# Patient Record
Sex: MALE | Race: WHITE | NOT HISPANIC OR LATINO | Employment: OTHER | URBAN - METROPOLITAN AREA
[De-identification: names, ages, dates, MRNs, and addresses within clinical notes are randomized per-mention and may not be internally consistent; named-entity substitution may affect disease eponyms.]

---

## 2017-05-09 ENCOUNTER — TELEPHONE (OUTPATIENT)
Dept: OPTOMETRY | Facility: CLINIC | Age: 77
End: 2017-05-09

## 2017-05-22 ENCOUNTER — OFFICE VISIT (OUTPATIENT)
Dept: OPTOMETRY | Facility: CLINIC | Age: 77
End: 2017-05-22
Payer: MEDICARE

## 2017-05-22 DIAGNOSIS — H40.053 BORDERLINE GLAUCOMA WITH OCULAR HYPERTENSION, BILATERAL: Primary | ICD-10-CM

## 2017-05-22 DIAGNOSIS — H52.4 HYPEROPIA WITH PRESBYOPIA, BILATERAL: ICD-10-CM

## 2017-05-22 DIAGNOSIS — H25.13 NUCLEAR SCLEROSIS, BILATERAL: ICD-10-CM

## 2017-05-22 DIAGNOSIS — E11.9 DIABETES MELLITUS TYPE 2 WITHOUT RETINOPATHY: ICD-10-CM

## 2017-05-22 DIAGNOSIS — H52.03 HYPEROPIA WITH PRESBYOPIA, BILATERAL: ICD-10-CM

## 2017-05-22 PROCEDURE — 99499 UNLISTED E&M SERVICE: CPT | Mod: S$GLB,,, | Performed by: OPTOMETRIST

## 2017-05-22 PROCEDURE — 99999 PR PBB SHADOW E&M-EST. PATIENT-LVL II: CPT | Mod: PBBFAC,,, | Performed by: OPTOMETRIST

## 2017-05-22 PROCEDURE — 92133 CPTRZD OPH DX IMG PST SGM ON: CPT | Mod: S$GLB,,, | Performed by: OPTOMETRIST

## 2017-05-22 PROCEDURE — 92014 COMPRE OPH EXAM EST PT 1/>: CPT | Mod: S$GLB,,, | Performed by: OPTOMETRIST

## 2017-05-22 PROCEDURE — 92015 DETERMINE REFRACTIVE STATE: CPT | Mod: S$GLB,,, | Performed by: OPTOMETRIST

## 2017-05-22 RX ORDER — LATANOPROST 50 UG/ML
SOLUTION/ DROPS OPHTHALMIC
COMMUNITY
Start: 2017-05-09 | End: 2019-12-31 | Stop reason: SDUPTHER

## 2017-05-22 NOTE — PROGRESS NOTES
HPI      is here for annual Glaucoma and Diabetic Eye exam. Pt states   no vision or ocular changes  Eyemeds: Latanoprost  Blood Sugar- Pt Doesn't Check Regularly   Hemoglobin A1C       Date                     Value               Ref Range             Status                11/19/2015               6.0                 4.5 - 6.2 %           Final                 06/22/2015               10.6 (H)            4.5 - 6.2 %           Final                 06/18/2014               6.6 (H)             4.5 - 6.2 %           Final            ----------  (+)Flashes (+)Floaters  (-)Itch, (+)tear, (+)burn, (+)Dryness. (+) OTC Drops Systane PRN   (-)Photophobia  (-)Glare (-)diplopia (-) headaches      Requests refraction    Last edited by David Sheldon, OD on 5/22/2017  2:15 PM. (History)            Assessment /Plan     For exam results, see Encounter Report.    Borderline glaucoma with ocular hypertension, bilateral  -     OCT - Optic Nerve; Future  -No GLC defects noted OD, OS  -Good control with Latanoprost QHS    Diabetes mellitus type 2 without retinopathy  -No retinopathy noted today.  Continued control with primary care physician and annual comprehensive eye exam.    Nuclear sclerosis, bilateral  -Educated patient on presence of cataracts at today's exam, monitor at annual dilated fundus exam. 5+ years surgical estimate.    Hyperopia with presbyopia, bilateral  Eyeglass Final Rx     Eyeglass Final Rx       Sphere Cylinder Dist VA Add    Right +4.00 Sphere 20/30- +2.50    Left +4.00 Sphere 20/30- +2.50    Type:  PAL                    RTC 1 yr

## 2017-05-26 ENCOUNTER — PATIENT OUTREACH (OUTPATIENT)
Dept: ADMINISTRATIVE | Facility: HOSPITAL | Age: 77
End: 2017-05-26

## 2017-06-06 ENCOUNTER — PATIENT OUTREACH (OUTPATIENT)
Dept: ADMINISTRATIVE | Facility: HOSPITAL | Age: 77
End: 2017-06-06

## 2017-06-06 DIAGNOSIS — E11.9 TYPE 2 DIABETES MELLITUS WITHOUT COMPLICATION, WITHOUT LONG-TERM CURRENT USE OF INSULIN: Primary | ICD-10-CM

## 2017-06-12 ENCOUNTER — LAB VISIT (OUTPATIENT)
Dept: LAB | Facility: HOSPITAL | Age: 77
End: 2017-06-12
Attending: INTERNAL MEDICINE
Payer: MEDICARE

## 2017-06-12 DIAGNOSIS — E11.9 TYPE 2 DIABETES MELLITUS WITHOUT COMPLICATION, WITHOUT LONG-TERM CURRENT USE OF INSULIN: ICD-10-CM

## 2017-06-12 DIAGNOSIS — Z00.00 ANNUAL PHYSICAL EXAM: ICD-10-CM

## 2017-06-12 DIAGNOSIS — E78.5 HYPERLIPIDEMIA: ICD-10-CM

## 2017-06-12 DIAGNOSIS — E11.9 TYPE 2 DIABETES MELLITUS WITHOUT COMPLICATION: ICD-10-CM

## 2017-06-12 DIAGNOSIS — I10 ESSENTIAL HYPERTENSION: ICD-10-CM

## 2017-06-12 LAB
ALBUMIN SERPL BCP-MCNC: 3.5 G/DL
ALP SERPL-CCNC: 111 U/L
ALT SERPL W/O P-5'-P-CCNC: 18 U/L
ANION GAP SERPL CALC-SCNC: 10 MMOL/L
AST SERPL-CCNC: 15 U/L
BASOPHILS # BLD AUTO: 0.06 K/UL
BASOPHILS NFR BLD: 0.6 %
BILIRUB SERPL-MCNC: 0.7 MG/DL
BUN SERPL-MCNC: 18 MG/DL
CALCIUM SERPL-MCNC: 9.1 MG/DL
CHLORIDE SERPL-SCNC: 103 MMOL/L
CHOLEST/HDLC SERPL: 3.5 {RATIO}
CO2 SERPL-SCNC: 25 MMOL/L
COMPLEXED PSA SERPL-MCNC: 0.47 NG/ML
CREAT SERPL-MCNC: 1 MG/DL
DIFFERENTIAL METHOD: ABNORMAL
EOSINOPHIL # BLD AUTO: 0.5 K/UL
EOSINOPHIL NFR BLD: 5.5 %
ERYTHROCYTE [DISTWIDTH] IN BLOOD BY AUTOMATED COUNT: 14.8 %
EST. GFR  (AFRICAN AMERICAN): >60 ML/MIN/1.73 M^2
EST. GFR  (NON AFRICAN AMERICAN): >60 ML/MIN/1.73 M^2
ESTIMATED AVG GLUCOSE: 134 MG/DL
GLUCOSE SERPL-MCNC: 142 MG/DL
HBA1C MFR BLD HPLC: 6.3 %
HCT VFR BLD AUTO: 47 %
HDL/CHOLESTEROL RATIO: 28.2 %
HDLC SERPL-MCNC: 149 MG/DL
HDLC SERPL-MCNC: 42 MG/DL
HGB BLD-MCNC: 15.7 G/DL
LDLC SERPL CALC-MCNC: 75.8 MG/DL
LYMPHOCYTES # BLD AUTO: 3 K/UL
LYMPHOCYTES NFR BLD: 30.6 %
MCH RBC QN AUTO: 30.3 PG
MCHC RBC AUTO-ENTMCNC: 33.4 %
MCV RBC AUTO: 91 FL
MONOCYTES # BLD AUTO: 0.9 K/UL
MONOCYTES NFR BLD: 9 %
NEUTROPHILS # BLD AUTO: 5.3 K/UL
NEUTROPHILS NFR BLD: 54.3 %
NONHDLC SERPL-MCNC: 107 MG/DL
PLATELET # BLD AUTO: 239 K/UL
PMV BLD AUTO: 8.9 FL
POTASSIUM SERPL-SCNC: 3.7 MMOL/L
PROT SERPL-MCNC: 7.5 G/DL
RBC # BLD AUTO: 5.18 M/UL
SODIUM SERPL-SCNC: 138 MMOL/L
TRIGL SERPL-MCNC: 156 MG/DL
TSH SERPL DL<=0.005 MIU/L-ACNC: 2.2 UIU/ML
WBC # BLD AUTO: 9.78 K/UL

## 2017-06-12 PROCEDURE — 80061 LIPID PANEL: CPT

## 2017-06-12 PROCEDURE — 80053 COMPREHEN METABOLIC PANEL: CPT

## 2017-06-12 PROCEDURE — 84153 ASSAY OF PSA TOTAL: CPT

## 2017-06-12 PROCEDURE — 83036 HEMOGLOBIN GLYCOSYLATED A1C: CPT

## 2017-06-12 PROCEDURE — 84443 ASSAY THYROID STIM HORMONE: CPT

## 2017-06-12 PROCEDURE — 36415 COLL VENOUS BLD VENIPUNCTURE: CPT | Mod: PO

## 2017-06-12 PROCEDURE — 85025 COMPLETE CBC W/AUTO DIFF WBC: CPT | Mod: PO

## 2017-06-19 ENCOUNTER — OFFICE VISIT (OUTPATIENT)
Dept: INTERNAL MEDICINE | Facility: CLINIC | Age: 77
End: 2017-06-19
Payer: MEDICARE

## 2017-06-19 VITALS
HEIGHT: 73 IN | DIASTOLIC BLOOD PRESSURE: 72 MMHG | WEIGHT: 216.06 LBS | HEART RATE: 62 BPM | BODY MASS INDEX: 28.63 KG/M2 | SYSTOLIC BLOOD PRESSURE: 112 MMHG

## 2017-06-19 DIAGNOSIS — N40.0 BENIGN NON-NODULAR PROSTATIC HYPERPLASIA WITHOUT LOWER URINARY TRACT SYMPTOMS: ICD-10-CM

## 2017-06-19 DIAGNOSIS — Z00.00 ANNUAL PHYSICAL EXAM: Primary | ICD-10-CM

## 2017-06-19 DIAGNOSIS — I10 ESSENTIAL HYPERTENSION: ICD-10-CM

## 2017-06-19 DIAGNOSIS — H40.053 OCULAR HYPERTENSION, BILATERAL: ICD-10-CM

## 2017-06-19 DIAGNOSIS — Z80.0 FAMILY HISTORY OF COLON CANCER: ICD-10-CM

## 2017-06-19 DIAGNOSIS — E11.40 TYPE 2 DIABETES MELLITUS WITH DIABETIC NEUROPATHY, WITHOUT LONG-TERM CURRENT USE OF INSULIN: ICD-10-CM

## 2017-06-19 PROCEDURE — 99397 PER PM REEVAL EST PAT 65+ YR: CPT | Mod: S$GLB,,, | Performed by: INTERNAL MEDICINE

## 2017-06-19 PROCEDURE — 99499 UNLISTED E&M SERVICE: CPT | Mod: S$GLB,,, | Performed by: INTERNAL MEDICINE

## 2017-06-19 PROCEDURE — 99999 PR PBB SHADOW E&M-EST. PATIENT-LVL III: CPT | Mod: PBBFAC,,, | Performed by: INTERNAL MEDICINE

## 2017-06-19 RX ORDER — LOSARTAN POTASSIUM 100 MG/1
100 TABLET ORAL DAILY
Qty: 30 TABLET | Refills: 11 | Status: SHIPPED | OUTPATIENT
Start: 2017-06-19 | End: 2018-05-30 | Stop reason: SDUPTHER

## 2017-06-19 RX ORDER — METFORMIN HYDROCHLORIDE 500 MG/1
500 TABLET ORAL 2 TIMES DAILY WITH MEALS
Qty: 60 TABLET | Refills: 11 | Status: SHIPPED | OUTPATIENT
Start: 2017-06-19 | End: 2018-05-30 | Stop reason: SDUPTHER

## 2017-06-19 RX ORDER — TAMSULOSIN HYDROCHLORIDE 0.4 MG/1
0.4 CAPSULE ORAL DAILY
Qty: 30 CAPSULE | Refills: 11 | Status: SHIPPED | OUTPATIENT
Start: 2017-06-19 | End: 2018-05-30 | Stop reason: SDUPTHER

## 2017-06-19 RX ORDER — CHLORTHALIDONE 25 MG/1
25 TABLET ORAL DAILY
Qty: 30 TABLET | Refills: 11 | Status: SHIPPED | OUTPATIENT
Start: 2017-06-19 | End: 2018-05-30 | Stop reason: SDUPTHER

## 2017-06-19 RX ORDER — FLUTICASONE PROPIONATE 50 MCG
2 SPRAY, SUSPENSION (ML) NASAL DAILY
Qty: 16 G | Refills: 1 | Status: SHIPPED | OUTPATIENT
Start: 2017-06-19 | End: 2022-11-08
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
## 2018-05-30 RX ORDER — CHLORTHALIDONE 25 MG/1
25 TABLET ORAL DAILY
Qty: 30 TABLET | Refills: 11 | Status: SHIPPED | OUTPATIENT
Start: 2018-05-30 | End: 2019-05-15 | Stop reason: SDUPTHER

## 2018-05-30 RX ORDER — LOSARTAN POTASSIUM 100 MG/1
100 TABLET ORAL DAILY
Qty: 30 TABLET | Refills: 11 | Status: SHIPPED | OUTPATIENT
Start: 2018-05-30 | End: 2019-05-15 | Stop reason: SDUPTHER

## 2018-05-30 RX ORDER — TAMSULOSIN HYDROCHLORIDE 0.4 MG/1
0.4 CAPSULE ORAL DAILY
Qty: 30 CAPSULE | Refills: 11 | Status: SHIPPED | OUTPATIENT
Start: 2018-05-30 | End: 2019-05-15 | Stop reason: SDUPTHER

## 2018-05-30 RX ORDER — METFORMIN HYDROCHLORIDE 500 MG/1
500 TABLET ORAL 2 TIMES DAILY WITH MEALS
Qty: 60 TABLET | Refills: 11 | Status: SHIPPED | OUTPATIENT
Start: 2018-05-30 | End: 2019-05-15 | Stop reason: SDUPTHER

## 2018-05-30 NOTE — TELEPHONE ENCOUNTER
"----- Message from Cheyenne Garber sent at 5/30/2018  2:08 PM CDT -----  Contact: [facility]/ [phone]  RX request - refill or new RX.  Is this a refill or new RX:  refill  RX name and strength:  tamsulosin (FLOMAX) 0.4 mg Cp24  Directions: Take 1 capsule (0.4 mg total) by mouth once daily. 1 Capsule, Sust. Release 24 hr Oral At bedtime  Is this a 30 day or 90 day RX:    Local pharmacy or mail order pharmacy:    Pharmacy name and phone # (DON'T enter "on file" or "in chart"): [facility] [phone] (Phone) [phone] (Fax)  Comments:        "

## 2018-06-12 ENCOUNTER — TELEPHONE (OUTPATIENT)
Dept: INTERNAL MEDICINE | Facility: CLINIC | Age: 78
End: 2018-06-12

## 2018-06-12 NOTE — TELEPHONE ENCOUNTER
"----- Message from Kat Doe sent at 6/12/2018 11:34 AM CDT -----  Contact: [phone]  Patient would like to get medical advice.    Symptoms (please be specific):  Tingling feeling on stomach towards back, possibly shingles without rash    How long has patient had these symptoms:  4-5 days    Pharmacy name and phone # (DON'T enter "on file" or "in chart"):  [facility] # [address] [place] [address] [address] [phone] (Phone)  [phone] (Fax)        Any drug allergies:  No     Would you prefer a response via [employer]?:  No    Comments:    "

## 2018-06-12 NOTE — TELEPHONE ENCOUNTER
Pt states he has a rash on his chest he states his skin tingles when he touch it . It hurts on skin but it is not inside the skin. Pt noticed it this week

## 2018-08-14 ENCOUNTER — TELEPHONE (OUTPATIENT)
Dept: OPTOMETRY | Facility: CLINIC | Age: 78
End: 2018-08-14

## 2019-01-28 ENCOUNTER — PES CALL (OUTPATIENT)
Dept: ADMINISTRATIVE | Facility: CLINIC | Age: 79
End: 2019-01-28

## 2019-05-07 ENCOUNTER — TELEPHONE (OUTPATIENT)
Dept: INTERNAL MEDICINE | Facility: CLINIC | Age: 79
End: 2019-05-07

## 2019-05-07 DIAGNOSIS — I10 ESSENTIAL HYPERTENSION: ICD-10-CM

## 2019-05-07 DIAGNOSIS — E11.40 TYPE 2 DIABETES MELLITUS WITH DIABETIC NEUROPATHY, WITHOUT LONG-TERM CURRENT USE OF INSULIN: ICD-10-CM

## 2019-05-07 DIAGNOSIS — N40.0 BENIGN NON-NODULAR PROSTATIC HYPERPLASIA WITHOUT LOWER URINARY TRACT SYMPTOMS: ICD-10-CM

## 2019-05-07 DIAGNOSIS — Z00.00 ANNUAL PHYSICAL EXAM: Primary | ICD-10-CM

## 2019-05-07 DIAGNOSIS — Z12.5 ENCOUNTER FOR SCREENING FOR MALIGNANT NEOPLASM OF PROSTATE: ICD-10-CM

## 2019-05-07 NOTE — TELEPHONE ENCOUNTER
----- Message from Randa Wan sent at 5/7/2019 11:23 AM CDT -----  Contact: self/[phone]  What orders is pt asking for?: Annual lab    Is there a future appointment with the provider?: yes    When?: 05/15/19    Comments?: please contact patient for lab appointment.

## 2019-05-10 ENCOUNTER — LAB VISIT (OUTPATIENT)
Dept: LAB | Facility: HOSPITAL | Age: 79
End: 2019-05-10
Attending: INTERNAL MEDICINE
Payer: MEDICARE

## 2019-05-10 DIAGNOSIS — Z12.5 ENCOUNTER FOR SCREENING FOR MALIGNANT NEOPLASM OF PROSTATE: ICD-10-CM

## 2019-05-10 DIAGNOSIS — N40.0 BENIGN NON-NODULAR PROSTATIC HYPERPLASIA WITHOUT LOWER URINARY TRACT SYMPTOMS: ICD-10-CM

## 2019-05-10 DIAGNOSIS — I10 ESSENTIAL HYPERTENSION: ICD-10-CM

## 2019-05-10 DIAGNOSIS — Z00.00 ANNUAL PHYSICAL EXAM: ICD-10-CM

## 2019-05-10 DIAGNOSIS — E11.40 TYPE 2 DIABETES MELLITUS WITH DIABETIC NEUROPATHY, WITHOUT LONG-TERM CURRENT USE OF INSULIN: ICD-10-CM

## 2019-05-10 LAB
ALBUMIN SERPL BCP-MCNC: 3.5 G/DL (ref 3.5–5.2)
ALP SERPL-CCNC: 112 U/L (ref 55–135)
ALT SERPL W/O P-5'-P-CCNC: 36 U/L (ref 10–44)
ANION GAP SERPL CALC-SCNC: 10 MMOL/L (ref 8–16)
AST SERPL-CCNC: 27 U/L (ref 10–40)
BASOPHILS # BLD AUTO: 0.08 K/UL (ref 0–0.2)
BASOPHILS NFR BLD: 0.8 % (ref 0–1.9)
BILIRUB SERPL-MCNC: 0.4 MG/DL (ref 0.1–1)
BUN SERPL-MCNC: 23 MG/DL (ref 8–23)
CALCIUM SERPL-MCNC: 9.3 MG/DL (ref 8.7–10.5)
CHLORIDE SERPL-SCNC: 106 MMOL/L (ref 95–110)
CHOLEST SERPL-MCNC: 149 MG/DL (ref 120–199)
CHOLEST/HDLC SERPL: 3.5 {RATIO} (ref 2–5)
CO2 SERPL-SCNC: 25 MMOL/L (ref 23–29)
COMPLEXED PSA SERPL-MCNC: 0.38 NG/ML (ref 0–4)
CREAT SERPL-MCNC: 0.9 MG/DL (ref 0.5–1.4)
DIFFERENTIAL METHOD: ABNORMAL
EOSINOPHIL # BLD AUTO: 0.5 K/UL (ref 0–0.5)
EOSINOPHIL NFR BLD: 5.3 % (ref 0–8)
ERYTHROCYTE [DISTWIDTH] IN BLOOD BY AUTOMATED COUNT: 14.1 % (ref 11.5–14.5)
EST. GFR  (AFRICAN AMERICAN): >60 ML/MIN/1.73 M^2
EST. GFR  (NON AFRICAN AMERICAN): >60 ML/MIN/1.73 M^2
ESTIMATED AVG GLUCOSE: 137 MG/DL (ref 68–131)
GLUCOSE SERPL-MCNC: 144 MG/DL (ref 70–110)
HBA1C MFR BLD HPLC: 6.4 % (ref 4–5.6)
HCT VFR BLD AUTO: 49.1 % (ref 40–54)
HDLC SERPL-MCNC: 42 MG/DL (ref 40–75)
HDLC SERPL: 28.2 % (ref 20–50)
HGB BLD-MCNC: 16.3 G/DL (ref 14–18)
IMM GRANULOCYTES # BLD AUTO: 0.03 K/UL (ref 0–0.04)
IMM GRANULOCYTES NFR BLD AUTO: 0.3 % (ref 0–0.5)
LDLC SERPL CALC-MCNC: 77.6 MG/DL (ref 63–159)
LYMPHOCYTES # BLD AUTO: 3.1 K/UL (ref 1–4.8)
LYMPHOCYTES NFR BLD: 30.3 % (ref 18–48)
MCH RBC QN AUTO: 30.8 PG (ref 27–31)
MCHC RBC AUTO-ENTMCNC: 33.2 G/DL (ref 32–36)
MCV RBC AUTO: 93 FL (ref 82–98)
MONOCYTES # BLD AUTO: 0.7 K/UL (ref 0.3–1)
MONOCYTES NFR BLD: 7.1 % (ref 4–15)
NEUTROPHILS # BLD AUTO: 5.7 K/UL (ref 1.8–7.7)
NEUTROPHILS NFR BLD: 56.2 % (ref 38–73)
NONHDLC SERPL-MCNC: 107 MG/DL
NRBC BLD-RTO: 0 /100 WBC
PLATELET # BLD AUTO: 263 K/UL (ref 150–350)
PMV BLD AUTO: 8.8 FL (ref 9.2–12.9)
POTASSIUM SERPL-SCNC: 3.6 MMOL/L (ref 3.5–5.1)
PROT SERPL-MCNC: 7.3 G/DL (ref 6–8.4)
RBC # BLD AUTO: 5.3 M/UL (ref 4.6–6.2)
SODIUM SERPL-SCNC: 141 MMOL/L (ref 136–145)
TRIGL SERPL-MCNC: 147 MG/DL (ref 30–150)
TSH SERPL DL<=0.005 MIU/L-ACNC: 2.31 UIU/ML (ref 0.4–4)
WBC # BLD AUTO: 10.18 K/UL (ref 3.9–12.7)

## 2019-05-10 PROCEDURE — 84153 ASSAY OF PSA TOTAL: CPT | Mod: HCNC

## 2019-05-10 PROCEDURE — 36415 COLL VENOUS BLD VENIPUNCTURE: CPT | Mod: HCNC,PO

## 2019-05-10 PROCEDURE — 80053 COMPREHEN METABOLIC PANEL: CPT | Mod: HCNC

## 2019-05-10 PROCEDURE — 80061 LIPID PANEL: CPT | Mod: HCNC

## 2019-05-10 PROCEDURE — 84443 ASSAY THYROID STIM HORMONE: CPT | Mod: HCNC

## 2019-05-10 PROCEDURE — 85025 COMPLETE CBC W/AUTO DIFF WBC: CPT | Mod: HCNC

## 2019-05-10 PROCEDURE — 83036 HEMOGLOBIN GLYCOSYLATED A1C: CPT | Mod: HCNC
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
## 2019-05-15 ENCOUNTER — OFFICE VISIT (OUTPATIENT)
Dept: INTERNAL MEDICINE | Facility: CLINIC | Age: 79
End: 2019-05-15
Payer: MEDICARE

## 2019-05-15 VITALS
HEIGHT: 73 IN | SYSTOLIC BLOOD PRESSURE: 136 MMHG | OXYGEN SATURATION: 99 % | DIASTOLIC BLOOD PRESSURE: 70 MMHG | WEIGHT: 207 LBS | HEART RATE: 64 BPM | BODY MASS INDEX: 27.43 KG/M2

## 2019-05-15 DIAGNOSIS — Z80.0 FAMILY HISTORY OF COLON CANCER: ICD-10-CM

## 2019-05-15 DIAGNOSIS — R60.0 EDEMA OF LEFT FOOT: ICD-10-CM

## 2019-05-15 DIAGNOSIS — Z12.11 SCREENING FOR COLON CANCER: ICD-10-CM

## 2019-05-15 DIAGNOSIS — Z00.00 ANNUAL PHYSICAL EXAM: Primary | ICD-10-CM

## 2019-05-15 DIAGNOSIS — I10 ESSENTIAL HYPERTENSION: ICD-10-CM

## 2019-05-15 DIAGNOSIS — E11.40 TYPE 2 DIABETES MELLITUS WITH DIABETIC NEUROPATHY, WITHOUT LONG-TERM CURRENT USE OF INSULIN: ICD-10-CM

## 2019-05-15 DIAGNOSIS — N40.0 BENIGN NON-NODULAR PROSTATIC HYPERPLASIA WITHOUT LOWER URINARY TRACT SYMPTOMS: ICD-10-CM

## 2019-05-15 PROCEDURE — 99999 PR PBB SHADOW E&M-EST. PATIENT-LVL III: ICD-10-PCS | Mod: PBBFAC,HCNC,, | Performed by: INTERNAL MEDICINE

## 2019-05-15 PROCEDURE — 3078F PR MOST RECENT DIASTOLIC BLOOD PRESSURE < 80 MM HG: ICD-10-PCS | Mod: HCNC,CPTII,S$GLB, | Performed by: INTERNAL MEDICINE

## 2019-05-15 PROCEDURE — 3075F SYST BP GE 130 - 139MM HG: CPT | Mod: HCNC,CPTII,S$GLB, | Performed by: INTERNAL MEDICINE

## 2019-05-15 PROCEDURE — 99397 PER PM REEVAL EST PAT 65+ YR: CPT | Mod: HCNC,S$GLB,, | Performed by: INTERNAL MEDICINE

## 2019-05-15 PROCEDURE — 99397 PR PREVENTIVE VISIT,EST,65 & OVER: ICD-10-PCS | Mod: HCNC,S$GLB,, | Performed by: INTERNAL MEDICINE

## 2019-05-15 PROCEDURE — 99499 RISK ADDL DX/OHS AUDIT: ICD-10-PCS | Mod: HCNC,S$GLB,, | Performed by: INTERNAL MEDICINE

## 2019-05-15 PROCEDURE — 3075F PR MOST RECENT SYSTOLIC BLOOD PRESS GE 130-139MM HG: ICD-10-PCS | Mod: HCNC,CPTII,S$GLB, | Performed by: INTERNAL MEDICINE

## 2019-05-15 PROCEDURE — 3078F DIAST BP <80 MM HG: CPT | Mod: HCNC,CPTII,S$GLB, | Performed by: INTERNAL MEDICINE

## 2019-05-15 PROCEDURE — 99999 PR PBB SHADOW E&M-EST. PATIENT-LVL III: CPT | Mod: PBBFAC,HCNC,, | Performed by: INTERNAL MEDICINE

## 2019-05-15 PROCEDURE — 99499 UNLISTED E&M SERVICE: CPT | Mod: HCNC,S$GLB,, | Performed by: INTERNAL MEDICINE

## 2019-05-15 RX ORDER — METFORMIN HYDROCHLORIDE 500 MG/1
500 TABLET ORAL 2 TIMES DAILY WITH MEALS
Qty: 60 TABLET | Refills: 11 | Status: SHIPPED | OUTPATIENT
Start: 2019-05-15 | End: 2019-05-17 | Stop reason: SDUPTHER

## 2019-05-15 RX ORDER — TAMSULOSIN HYDROCHLORIDE 0.4 MG/1
0.4 CAPSULE ORAL DAILY
Qty: 30 CAPSULE | Refills: 11 | Status: SHIPPED | OUTPATIENT
Start: 2019-05-15 | End: 2021-11-20

## 2019-05-15 RX ORDER — CHLORTHALIDONE 25 MG/1
25 TABLET ORAL DAILY
Qty: 30 TABLET | Refills: 11 | Status: SHIPPED | OUTPATIENT
Start: 2019-05-15 | End: 2020-05-15

## 2019-05-15 RX ORDER — LOSARTAN POTASSIUM 100 MG/1
100 TABLET ORAL DAILY
Qty: 30 TABLET | Refills: 11 | Status: SHIPPED | OUTPATIENT
Start: 2019-05-15 | End: 2020-05-14

## 2019-05-16 RX ORDER — LOSARTAN POTASSIUM 100 MG/1
TABLET ORAL
Qty: 30 TABLET | Refills: 11 | Status: SHIPPED | OUTPATIENT
Start: 2019-05-16 | End: 2020-05-03

## 2019-05-16 RX ORDER — TAMSULOSIN HYDROCHLORIDE 0.4 MG/1
CAPSULE ORAL
Qty: 30 CAPSULE | Refills: 11 | Status: SHIPPED | OUTPATIENT
Start: 2019-05-16 | End: 2020-05-19

## 2019-05-17 ENCOUNTER — TELEPHONE (OUTPATIENT)
Dept: INTERNAL MEDICINE | Facility: CLINIC | Age: 79
End: 2019-05-17

## 2019-05-17 RX ORDER — METFORMIN HYDROCHLORIDE 500 MG/1
500 TABLET ORAL 3 TIMES DAILY
Qty: 90 TABLET | Refills: 5 | Status: SHIPPED | OUTPATIENT
Start: 2019-05-17 | End: 2019-11-12 | Stop reason: SDUPTHER

## 2019-05-17 NOTE — TELEPHONE ENCOUNTER
----- Message from Mone Garcia sent at 5/17/2019 11:30 AM CDT -----  Contact: [phone]  Patient requesting a call from the office to discuss medication metFORMIN (GLUCOPHAGE) 500 MG tablet . Please call and advise.Thanks

## 2019-08-02 ENCOUNTER — TELEPHONE (OUTPATIENT)
Dept: INTERNAL MEDICINE | Facility: CLINIC | Age: 79
End: 2019-08-02

## 2019-08-02 NOTE — TELEPHONE ENCOUNTER
----- Message from Mone Garcia sent at 8/2/2019 10:34 AM CDT -----  Contact: [phone]  Patient requesting a call from the office in regards to making an appointment ,stated call back today .

## 2019-08-02 NOTE — TELEPHONE ENCOUNTER
Pt have been having back problems for years he seen a chiropractor but it is not helping he want to know who can he see for his back issues he want you to give him a call after 5 he will not be home

## 2019-08-03 RX ORDER — PREDNISONE 5 MG/1
TABLET ORAL
Qty: 27 TABLET | Refills: 0 | Status: SHIPPED | OUTPATIENT
Start: 2019-08-03 | End: 2019-08-17

## 2019-08-08 ENCOUNTER — TELEPHONE (OUTPATIENT)
Dept: INTERNAL MEDICINE | Facility: CLINIC | Age: 79
End: 2019-08-08

## 2019-08-08 DIAGNOSIS — M47.816 LUMBAR SPONDYLOSIS: Primary | ICD-10-CM

## 2019-08-08 RX ORDER — DICLOFENAC SODIUM 75 MG/1
75 TABLET, DELAYED RELEASE ORAL 2 TIMES DAILY
Qty: 20 TABLET | Refills: 0 | Status: SHIPPED | OUTPATIENT
Start: 2019-08-08 | End: 2019-08-17 | Stop reason: SDUPTHER

## 2019-08-08 NOTE — TELEPHONE ENCOUNTER
----- Message from Hemant Márquez sent at 8/8/2019  9:49 AM CDT -----  Contact: self/[phone]  Pt is calling to discuss medication predniSONE (DELTASONE) 5 MG tablet with Dr. Negrete. Please call and advise.        Thank You

## 2019-08-08 NOTE — TELEPHONE ENCOUNTER
Pt states he was prescribed Prednisone he was told to call today to see how he was doing he states it is not better but he want to discuss what would be the next step please . He want you to call back to discuss

## 2019-08-08 NOTE — TELEPHONE ENCOUNTER
Spoke with patient he states he do not want to come in he states is there another mediation he can take with the prednisone to see if that can help, then maybe do MRI or see pain clinic

## 2019-08-09 NOTE — TELEPHONE ENCOUNTER
Spoke with pt, he states that the medication is working. X-ray scheduled. He wants to know if he can continue the stretching exercises for his back.

## 2019-08-12 ENCOUNTER — HOSPITAL ENCOUNTER (OUTPATIENT)
Dept: RADIOLOGY | Facility: HOSPITAL | Age: 79
Discharge: HOME | End: 2019-08-12
Attending: INTERNAL MEDICINE
Payer: MEDICARE

## 2019-08-12 DIAGNOSIS — M47.816 LUMBAR SPONDYLOSIS: ICD-10-CM

## 2019-08-12 PROCEDURE — 72100 XR LUMBAR SPINE AP AND LATERAL: ICD-10-PCS | Mod: 26,HCNC,, | Performed by: RADIOLOGY

## 2019-08-12 PROCEDURE — 72100 X-RAY EXAM L-S SPINE 2/3 VWS: CPT | Mod: 26,HCNC,, | Performed by: RADIOLOGY

## 2019-08-12 PROCEDURE — 72100 X-RAY EXAM L-S SPINE 2/3 VWS: CPT | Mod: TC,HCNC,FY,PO

## 2019-08-14 ENCOUNTER — TELEPHONE (OUTPATIENT)
Dept: INTERNAL MEDICINE | Facility: CLINIC | Age: 79
End: 2019-08-14

## 2019-08-14 DIAGNOSIS — M47.27 LUMBOSACRAL SPONDYLOSIS WITH RADICULOPATHY: Primary | ICD-10-CM

## 2019-08-15 ENCOUNTER — PES CALL (OUTPATIENT)
Dept: ADMINISTRATIVE | Facility: CLINIC | Age: 79
End: 2019-08-15

## 2019-08-16 ENCOUNTER — HOSPITAL ENCOUNTER (OUTPATIENT)
Dept: RADIOLOGY | Facility: HOSPITAL | Age: 79
Discharge: HOME | End: 2019-08-16
Attending: INTERNAL MEDICINE
Payer: MEDICARE

## 2019-08-16 DIAGNOSIS — M47.27 LUMBOSACRAL SPONDYLOSIS WITH RADICULOPATHY: ICD-10-CM

## 2019-08-16 PROCEDURE — 72148 MRI LUMBAR SPINE W/O DYE: CPT | Mod: TC,HCNC

## 2019-08-16 PROCEDURE — 72148 MRI LUMBAR SPINE W/O DYE: CPT | Mod: 26,HCNC,, | Performed by: RADIOLOGY

## 2019-08-16 PROCEDURE — 72148 MRI LUMBAR SPINE WITHOUT CONTRAST: ICD-10-PCS | Mod: 26,HCNC,, | Performed by: RADIOLOGY

## 2019-08-17 ENCOUNTER — TELEPHONE (OUTPATIENT)
Dept: INTERNAL MEDICINE | Facility: CLINIC | Age: 79
End: 2019-08-17

## 2019-08-17 DIAGNOSIS — M89.9 BONE LESION: Primary | ICD-10-CM

## 2019-08-17 DIAGNOSIS — M48.062 SPINAL STENOSIS OF LUMBAR REGION WITH NEUROGENIC CLAUDICATION: ICD-10-CM

## 2019-08-17 DIAGNOSIS — R93.7 ABNORMAL FINDINGS ON DIAGNOSTIC IMAGING OF OTHER PARTS OF MUSCULOSKELETAL SYSTEM: ICD-10-CM

## 2019-08-17 DIAGNOSIS — E11.40 TYPE 2 DIABETES MELLITUS WITH DIABETIC NEUROPATHY, WITHOUT LONG-TERM CURRENT USE OF INSULIN: ICD-10-CM

## 2019-08-17 RX ORDER — DICLOFENAC SODIUM 75 MG/1
75 TABLET, DELAYED RELEASE ORAL 2 TIMES DAILY
Qty: 60 TABLET | Refills: 1 | Status: SHIPPED | OUTPATIENT
Start: 2019-08-17 | End: 2022-11-08

## 2019-08-17 RX ORDER — PREDNISONE 20 MG/1
TABLET ORAL
Qty: 18 TABLET | Refills: 0 | Status: SHIPPED | OUTPATIENT
Start: 2019-08-17 | End: 2020-09-11

## 2019-08-17 NOTE — TELEPHONE ENCOUNTER
Marci      Refer to pain clinic    Severe spinal stenosis    Refer to neurosurgery    Same as above but main reason  Mri shows bone lesion at L4 vertebrae    Set up lab orders of 8-17 and cxr on monday 8-19

## 2019-08-17 NOTE — TELEPHONE ENCOUNTER
Mri results noted     Buttocks pain and leg pain      Refer to pain clinic regarding spinal stenosis and neurosurgery regarding spinal stenosis and bone lesion     Lab testing and cxr ordered

## 2019-08-19 ENCOUNTER — HOSPITAL ENCOUNTER (OUTPATIENT)
Dept: RADIOLOGY | Facility: HOSPITAL | Age: 79
Discharge: HOME | End: 2019-08-19
Attending: INTERNAL MEDICINE
Payer: MEDICARE

## 2019-08-19 ENCOUNTER — LAB VISIT (OUTPATIENT)
Dept: LAB | Facility: HOSPITAL | Age: 79
End: 2019-08-19
Attending: INTERNAL MEDICINE
Payer: MEDICARE

## 2019-08-19 DIAGNOSIS — E11.40 TYPE 2 DIABETES MELLITUS WITH DIABETIC NEUROPATHY, WITHOUT LONG-TERM CURRENT USE OF INSULIN: ICD-10-CM

## 2019-08-19 DIAGNOSIS — M89.9 BONE LESION: ICD-10-CM

## 2019-08-19 DIAGNOSIS — R93.7 ABNORMAL FINDINGS ON DIAGNOSTIC IMAGING OF OTHER PARTS OF MUSCULOSKELETAL SYSTEM: ICD-10-CM

## 2019-08-19 LAB
ALBUMIN SERPL BCP-MCNC: 3.4 G/DL (ref 3.5–5.2)
ALP SERPL-CCNC: 105 U/L (ref 55–135)
ALT SERPL W/O P-5'-P-CCNC: 38 U/L (ref 10–44)
ANION GAP SERPL CALC-SCNC: 8 MMOL/L (ref 8–16)
AST SERPL-CCNC: 31 U/L (ref 10–40)
BASOPHILS # BLD AUTO: 0.06 K/UL (ref 0–0.2)
BASOPHILS NFR BLD: 0.5 % (ref 0–1.9)
BILIRUB SERPL-MCNC: 0.4 MG/DL (ref 0.1–1)
BUN SERPL-MCNC: 30 MG/DL (ref 8–23)
CALCIUM SERPL-MCNC: 8.9 MG/DL (ref 8.7–10.5)
CHLORIDE SERPL-SCNC: 101 MMOL/L (ref 95–110)
CO2 SERPL-SCNC: 28 MMOL/L (ref 23–29)
COMPLEXED PSA SERPL-MCNC: 0.43 NG/ML (ref 0–4)
CREAT SERPL-MCNC: 1.2 MG/DL (ref 0.5–1.4)
DIFFERENTIAL METHOD: ABNORMAL
EOSINOPHIL # BLD AUTO: 0.1 K/UL (ref 0–0.5)
EOSINOPHIL NFR BLD: 0.9 % (ref 0–8)
ERYTHROCYTE [DISTWIDTH] IN BLOOD BY AUTOMATED COUNT: 14.8 % (ref 11.5–14.5)
EST. GFR  (AFRICAN AMERICAN): >60 ML/MIN/1.73 M^2
EST. GFR  (NON AFRICAN AMERICAN): 57.2 ML/MIN/1.73 M^2
ESTIMATED AVG GLUCOSE: 134 MG/DL (ref 68–131)
GLUCOSE SERPL-MCNC: 176 MG/DL (ref 70–110)
HBA1C MFR BLD HPLC: 6.3 % (ref 4–5.6)
HCT VFR BLD AUTO: 41.3 % (ref 40–54)
HGB BLD-MCNC: 13.7 G/DL (ref 14–18)
IMM GRANULOCYTES # BLD AUTO: 0.08 K/UL (ref 0–0.04)
IMM GRANULOCYTES NFR BLD AUTO: 0.7 % (ref 0–0.5)
LYMPHOCYTES # BLD AUTO: 3 K/UL (ref 1–4.8)
LYMPHOCYTES NFR BLD: 26 % (ref 18–48)
MCH RBC QN AUTO: 30.6 PG (ref 27–31)
MCHC RBC AUTO-ENTMCNC: 33.2 G/DL (ref 32–36)
MCV RBC AUTO: 92 FL (ref 82–98)
MONOCYTES # BLD AUTO: 0.8 K/UL (ref 0.3–1)
MONOCYTES NFR BLD: 6.5 % (ref 4–15)
NEUTROPHILS # BLD AUTO: 7.6 K/UL (ref 1.8–7.7)
NEUTROPHILS NFR BLD: 65.4 % (ref 38–73)
NRBC BLD-RTO: 0 /100 WBC
PLATELET # BLD AUTO: 247 K/UL (ref 150–350)
PMV BLD AUTO: 8.9 FL (ref 9.2–12.9)
POTASSIUM SERPL-SCNC: 3.9 MMOL/L (ref 3.5–5.1)
PROT SERPL-MCNC: 7 G/DL (ref 6–8.4)
RBC # BLD AUTO: 4.47 M/UL (ref 4.6–6.2)
SODIUM SERPL-SCNC: 137 MMOL/L (ref 136–145)
WBC # BLD AUTO: 11.62 K/UL (ref 3.9–12.7)

## 2019-08-19 PROCEDURE — 80053 COMPREHEN METABOLIC PANEL: CPT | Mod: HCNC

## 2019-08-19 PROCEDURE — 71046 X-RAY EXAM CHEST 2 VIEWS: CPT | Mod: 26,HCNC,, | Performed by: RADIOLOGY

## 2019-08-19 PROCEDURE — 85025 COMPLETE CBC W/AUTO DIFF WBC: CPT | Mod: HCNC

## 2019-08-19 PROCEDURE — 71046 XR CHEST PA AND LATERAL: ICD-10-PCS | Mod: 26,HCNC,, | Performed by: RADIOLOGY

## 2019-08-19 PROCEDURE — 36415 COLL VENOUS BLD VENIPUNCTURE: CPT | Mod: HCNC

## 2019-08-19 PROCEDURE — 84153 ASSAY OF PSA TOTAL: CPT | Mod: HCNC

## 2019-08-19 PROCEDURE — 83036 HEMOGLOBIN GLYCOSYLATED A1C: CPT | Mod: HCNC

## 2019-08-19 PROCEDURE — 71046 X-RAY EXAM CHEST 2 VIEWS: CPT | Mod: TC,HCNC,FY

## 2019-08-20 ENCOUNTER — TELEPHONE (OUTPATIENT)
Dept: INTERNAL MEDICINE | Facility: CLINIC | Age: 79
End: 2019-08-20

## 2019-08-20 ENCOUNTER — OFFICE VISIT (OUTPATIENT)
Dept: SPINE | Facility: CLINIC | Age: 79
End: 2019-08-20
Attending: ANESTHESIOLOGY
Payer: MEDICARE

## 2019-08-20 VITALS — HEIGHT: 73 IN | WEIGHT: 207 LBS | BODY MASS INDEX: 27.43 KG/M2

## 2019-08-20 DIAGNOSIS — D64.9 ANEMIA, UNSPECIFIED TYPE: ICD-10-CM

## 2019-08-20 DIAGNOSIS — M51.9 LUMBAR DISC LESION: ICD-10-CM

## 2019-08-20 DIAGNOSIS — M48.062 SPINAL STENOSIS OF LUMBAR REGION WITH NEUROGENIC CLAUDICATION: Primary | ICD-10-CM

## 2019-08-20 DIAGNOSIS — M47.26 OSTEOARTHRITIS OF SPINE WITH RADICULOPATHY, LUMBAR REGION: Primary | ICD-10-CM

## 2019-08-20 DIAGNOSIS — D49.2 BONE NEOPLASM: ICD-10-CM

## 2019-08-20 DIAGNOSIS — M48.061 SPINAL STENOSIS OF LUMBAR REGION, UNSPECIFIED WHETHER NEUROGENIC CLAUDICATION PRESENT: ICD-10-CM

## 2019-08-20 PROCEDURE — 99999 PR PBB SHADOW E&M-EST. PATIENT-LVL III: ICD-10-PCS | Mod: PBBFAC,HCNC,, | Performed by: ANESTHESIOLOGY

## 2019-08-20 PROCEDURE — 99205 PR OFFICE/OUTPT VISIT, NEW, LEVL V, 60-74 MIN: ICD-10-PCS | Mod: HCNC,S$GLB,, | Performed by: ANESTHESIOLOGY

## 2019-08-20 PROCEDURE — 99205 OFFICE O/P NEW HI 60 MIN: CPT | Mod: HCNC,S$GLB,, | Performed by: ANESTHESIOLOGY

## 2019-08-20 PROCEDURE — 99999 PR PBB SHADOW E&M-EST. PATIENT-LVL III: CPT | Mod: PBBFAC,HCNC,, | Performed by: ANESTHESIOLOGY

## 2019-08-20 PROCEDURE — 1101F PT FALLS ASSESS-DOCD LE1/YR: CPT | Mod: HCNC,CPTII,S$GLB, | Performed by: ANESTHESIOLOGY

## 2019-08-20 PROCEDURE — 1101F PR PT FALLS ASSESS DOC 0-1 FALLS W/OUT INJ PAST YR: ICD-10-PCS | Mod: HCNC,CPTII,S$GLB, | Performed by: ANESTHESIOLOGY

## 2019-08-20 NOTE — TELEPHONE ENCOUNTER
Ivanna was able to get patient in to Back and spine at [facility] today please place back and spine referral

## 2019-08-20 NOTE — PROGRESS NOTES
Subjective:      Patient ID: Suleiman Chavez is a 79 y.o. male.    Chief Complaint: Low-back Pain    Referred by: Caleb Negrete MD     Pain Scales  Best: 2/10  Worst: 8/10  Usually: 7/10  Today: 6/10    Low-back Pain   This is a chronic problem. The current episode started more than 1 year ago. The problem occurs constantly. The problem has been gradually worsening since onset. The pain is present in the lumbar spine and sacro-iliac. The pain radiates to the left thigh and left knee. The quality of the pain is described as aching, burning and shooting. The pain is at a severity of 7/10. The pain is severe. The pain is the same all the time. The symptoms are aggravated by bending and standing (walking). Stiffness is present all day. Associated symptoms include leg pain, numbness, tingling and weakness. He has tried bed rest, chiropractic manipulation, muscle relaxant and NSAIDs for the symptoms. The treatment provided no relief. Physical therapy was ineffective.    Current Pain Medication  Tylenol PRN  Aleve PRN  Medrol dose pack    Interventional Pain History  None    Imaging  MRI lumbar spine 8/14/2019  FINDINGS:  Alignment: Normal.    Vertebrae: No fracture or marrow replacing process.  A T1/T2 hypointense lesion with mild surrounding edema is seen within the posterior inferior L4 vertebral body.    Discs: There is moderate to severe disc height loss at L1-L2, L2-L3, L3-L4, and L4-L5.    Cord: Normal.  Conus terminates at T12-L1.    Degenerative findings:    T12-L1: The disc is normal.  There is no spinal canal stenosis or neural foraminal narrowing at this level.    L1-L2: There is moderate disc height loss with a diffuse disc bulge and mild bilateral facet arthropathy.  No spinal canal stenosis or neural foraminal narrowing.    L2-L3: There is moderate disc height loss.  There is a diffuse disc bulge with moderate bilateral facet arthropathy and ligamentum flavum hypertrophy resulting in moderate spinal  canal stenosis and mild right and moderate left neural foraminal narrowing.    L3-L4: There is severe disc height loss with a diffuse disc bulge and moderate bilateral facet arthropathy resulting in moderate spinal canal stenosis and moderate bilateral neural foraminal narrowing. Annular fissure noted.    L4-L5: There is severe disc height loss with a diffuse disc bulge with a superimposed left paracentral disc extrusion extending inferiorly, and moderate bilateral facet arthropathy and ligamentum flavum hypertrophy.   This results in severe spinal canal stenosis, and severe bilateral neural foraminal narrowing.    L5-S1: Disc height is maintained.  Circumferential disc bulge and mild facet arthropathy result in moderate left, mild right neural foraminal narrowing.  No spinal canal stenosis.    Paraspinal muscles & soft tissues: There is mild fatty atrophy of the paraspinal muscles.  The visualized soft tissues are otherwise unremarkable.      Impression       Multilevel degenerative changes of the lumbar spine detailed above.  Severe spinal canal stenosis noted at L4-L5, along with severe bilateral neural foraminal narrowing.    Low signal lesion with mild surrounding edema seen within the inferior posterior L4 vertebral body.  Lesion contacts the inferior endplate and may represent a Schmorl's node.  However, osteoblastic metastasis should be considered.  In the absence of prior studies, recommend further evaluation with whole-body bone scan.       Past Medical History:   Diagnosis Date    Bilateral ocular hypertension     Diabetes mellitus     Glaucoma     Hypertension     Nuclear cataract 12/17/2014       Past Surgical History:   Procedure Laterality Date    CHOLECYSTECTOMY      COLONOSCOPY N/A 1/7/2014    Performed by NARINDER Arita MD at [facility] ([address] [place] [address])       Review of patient's allergies indicates:  No Known Allergies    Current Outpatient Medications   Medication Sig Dispense Refill    aspirin  (ECOTRIN) 81 MG EC tablet Take 81 mg by mouth once daily.      chlorthalidone (HYGROTEN) 25 MG Tab Take 1 tablet (25 mg total) by mouth once daily. 30 tablet 11    diclofenac (VOLTAREN) 75 MG EC tablet Take 1 tablet (75 mg total) by mouth 2 (two) times daily. 60 tablet 1    fluticasone (FLONASE) 50 mcg/actuation nasal spray 2 sprays by Each Nare route once daily. 16 g 1    FLUZONE HIGH-DOSE 2014-15, PF, 180 mcg/0.5 mL Syrg       latanoprost 0.005 % ophthalmic solution       losartan (COZAAR) 100 MG tablet TAKE 1 TABLET BY MOUTH ONCE DAILY 30 tablet 11    losartan (COZAAR) 100 MG tablet Take 1 tablet (100 mg total) by mouth once daily. 30 tablet 11    metFORMIN (GLUCOPHAGE) 500 MG tablet Take 1 tablet (500 mg total) by mouth 3 (three) times daily. 90 tablet 5    predniSONE (DELTASONE) 20 MG tablet 3 tablets po daily for 3 days, then 2 tablets po daily for 3 days, then 1 tablets po daily for 3 days 18 tablet 0    tamsulosin (FLOMAX) 0.4 mg Cap TAKE ONE CAPSULE BY MOUTH AT BEDTIME 30 capsule 11    tamsulosin (FLOMAX) 0.4 mg Cap Take 1 capsule (0.4 mg total) by mouth once daily. 30 capsule 11     No current facility-administered medications for this visit.        Family History   Problem Relation Age of Onset    Cancer Mother         liver    Cancer Father         colon    No Known Problems Sister     No Known Problems Sister     Amblyopia Neg Hx     Blindness Neg Hx     Cataracts Neg Hx     Diabetes Neg Hx     Glaucoma Neg Hx     Hypertension Neg Hx     Macular degeneration Neg Hx     Retinal detachment Neg Hx     Strabismus Neg Hx     Stroke Neg Hx     Thyroid disease Neg Hx        Social History     Socioeconomic History    Marital status:      Spouse name: Not on file    Number of children: Not on file    Years of education: Not on file    Highest education level: Not on file   Occupational History     Employer: OTHER   Social Needs    Financial resource strain: Not on file     "Food insecurity:     Worry: Not on file     Inability: Not on file    Transportation needs:     Medical: Not on file     Non-medical: Not on file   Tobacco Use    Smoking status: Former Smoker     Types: Cigarettes     Last attempt to quit: 5/28/2004     Years since quitting: 15.2    Smokeless tobacco: Never Used   Substance and Sexual Activity    Alcohol use: No     Alcohol/week: 0.0 oz    Drug use: No    Sexual activity: Not on file   Lifestyle    Physical activity:     Days per week: Not on file     Minutes per session: Not on file    Stress: Not on file   Relationships    Social connections:     Talks on phone: Not on file     Gets together: Not on file     Attends Mandaen service: Not on file     Active member of club or organization: Not on file     Attends meetings of clubs or organizations: Not on file     Relationship status: Not on file   Other Topics Concern    Not on file   Social History Narrative    Not on file           Review of Systems   Cardiovascular:        Hypertension    Endocrine:        Diabetic    Musculoskeletal: Positive for back pain and falls.   Neurological: Positive for numbness, tingling and weakness.   All other systems reviewed and are negative.          Objective:   Ht 6' 1" (1.854 m)   Wt 93.9 kg (207 lb)   BMI 27.31 kg/m²   Pain Disability Index Review:  No flowsheet data found.  Normocephalic.  Atraumatic.  Affect appropriate.  Breathing unlabored.  Extra ocular muscles intact.           OBJECTIVE:    Ht 6' 1" (1.854 m)   Wt 93.9 kg (207 lb)   BMI 27.31 kg/m²     PHYSICAL EXAMINATION:    GENERAL: Well appearing, in no acute distress, alert and oriented x3.  PSYCH:  Mood and affect appropriate.  SKIN: Skin color, texture, turgor normal, no rashes or lesions.  HEAD/FACE:  Normocephalic, atraumatic. Cranial nerves grossly intact.  NECK: No pain to palpation over the cervical paraspinous muscles. Spurling Negative. No pain with neck flexion, extension, or lateral " flexion.   CV: RRR with palpation of the radial artery.  PULM: No evidence of respiratory difficulty, symmetric chest rise.  GI:  Soft and non-tender.  BACK: Straight leg raising in the sitting and supine positions is negative to radicular pain. No pain to palpation over the facet joints of the lumbar spine or spinous processes. Normal range of motion without pain reproduction.  EXTREMITIES: Peripheral joint ROM is full and pain free without obvious instability or laxity in all four extremities. No deformities, edema, or skin discoloration. Good capillary refill.  MUSCULOSKELETAL: Shoulder, hip, and knee provocative maneuvers are negative.  There is no pain with palpation over the sacroiliac joints bilaterally.  FABERs test is negative.  FADIRs test is negative.   Bilateral upper motor strength is normal and symmetric. LLE 4/5 and RLE 5/5 strength. No atrophy or tone abnormalities are noted.  NEURO: Bilateral upper and lower extremity coordination and muscle stretch reflexes are physiologic and symmetric.  Plantar response are downgoing. No clonus.  No loss of sensation is noted.  GAIT: normal.          Assessment:     Suleiman Chavez is a 79M with lower back and leg pain consistent with    Encounter Diagnoses   Name Primary?    Bone neoplasm     Osteoarthritis of spine with radiculopathy, lumbar region Yes    Spinal stenosis of lumbar region, unspecified whether neurogenic claudication present          Plan:   We discussed with the patient the assessment and recommendations. The following is the plan we agreed on:    1) Imaging reviewed with patient and all questions answered.    2) Ordered bone scan to evaluate for osteoblastic metastasis as noted on MRI.    3) If no concern for malignancy, will proceed with L4-L5 ILESI. Consented today.    4) Referral to back and spine for possible laminectomy.    5) Finish medrol dose pack. Stop voltaren.      Suleiman was seen today for low-back pain.    Diagnoses and all  orders for this visit:    Osteoarthritis of spine with radiculopathy, lumbar region    Bone neoplasm  -     NM Bone Scan Whole Body; Future    Spinal stenosis of lumbar region, unspecified whether neurogenic claudication present         Clay Tabor MD  CA2/PGY3    I have personally taken the history and examined this patient and agree with the resident's note as stated above.

## 2019-08-20 NOTE — LETTER
August 21, 2019      Caleb Negrete MD  [address]  [facility] [address]           [address]  [address]  [facility] [address]  Phone: [phone]  Fax: [phone]          Patient: Suleiman Chavez   MR Number: [id number]   YOB: 1940   Date of Visit: 8/20/2019       Dear Dr. Caleb Negrete:    Thank you for referring Suleiman Chavez to me for evaluation. Attached you will find relevant portions of my assessment and plan of care.    If you have questions, please do not hesitate to call me. I look forward to following Suleiman Chavez along with you.    Sincerely,    Marisa Bustamante MD    Enclosure  CC:  No Recipients    If you would like to receive this communication electronically, please contact [email or web address] or [phone] to request more information on [facility] Link access.    For providers and/or their staff who would like to refer a patient to [facility], please contact us through our one-stop-shop provider referral line, [facility], at [phone].    If you feel you have received this communication in error or would no longer like to receive these types of communications, please e-mail [email or web address]

## 2019-08-20 NOTE — PROGRESS NOTES
Lab testing noted     This was done due to mri suggesting possible osteoblastic lesion     All look stable   Chemistry was fine   PSA was normal    A bone scan was ordered

## 2019-08-21 NOTE — TELEPHONE ENCOUNTER
Pt is requesting results of labs and x rays please advise thanks he went to back and spine yesterday they do what he want he still in pain

## 2019-08-21 NOTE — TELEPHONE ENCOUNTER
----- Message from Agatha Gustafson sent at 8/21/2019 10:56 AM CDT -----  Contact: pt [phone]  Patient would like to get test results  Name of test (lab, mammo, etc.):   X ray & labs   Date of test:  8/19  Ordering provider: Caden  Where was the test performed:  [facility]  Would the patient rather a call back or a response via [facility]?:  Call back   Comments:  Pt only want to speak with doctor

## 2019-08-21 NOTE — TELEPHONE ENCOUNTER
Marci    The patient will be at the main clinic Thursday at 11:00 a.m.  for his bone scan      I would like to get the lab orders when he is there, whether before or after

## 2019-08-22 ENCOUNTER — TELEPHONE (OUTPATIENT)
Dept: INTERNAL MEDICINE | Facility: CLINIC | Age: 79
End: 2019-08-22

## 2019-08-22 ENCOUNTER — HOSPITAL ENCOUNTER (OUTPATIENT)
Dept: RADIOLOGY | Facility: HOSPITAL | Age: 79
Discharge: HOME | End: 2019-08-22
Attending: ANESTHESIOLOGY
Payer: MEDICARE

## 2019-08-22 DIAGNOSIS — D49.2 BONE NEOPLASM: ICD-10-CM

## 2019-08-22 PROCEDURE — 78306 NM BONE SCAN WHOLE BODY: ICD-10-PCS | Mod: 26,HCNC,, | Performed by: RADIOLOGY

## 2019-08-22 PROCEDURE — 78306 BONE IMAGING WHOLE BODY: CPT | Mod: 26,HCNC,, | Performed by: RADIOLOGY

## 2019-08-22 PROCEDURE — A9503 TC99M MEDRONATE: HCPCS | Mod: HCNC

## 2019-08-22 RX ORDER — GABAPENTIN 300 MG/1
300 CAPSULE ORAL 2 TIMES DAILY
Qty: 60 CAPSULE | Refills: 1 | Status: SHIPPED | OUTPATIENT
Start: 2019-08-22 | End: 2020-01-02

## 2019-08-27 ENCOUNTER — TELEPHONE (OUTPATIENT)
Dept: INTERNAL MEDICINE | Facility: CLINIC | Age: 79
End: 2019-08-27

## 2019-08-27 NOTE — TELEPHONE ENCOUNTER
----- Message from Bonny Escobar sent at 8/27/2019  3:22 PM CDT -----  Contact: patient [phone]  Patient would like to discuss information he has for you regarding the pain management clinic. He prefers to speak with you personally, not the nurse. Please call him befor eyou leave today if possible.

## 2019-09-03 ENCOUNTER — TELEPHONE (OUTPATIENT)
Dept: PAIN MEDICINE | Facility: CLINIC | Age: 79
End: 2019-09-03

## 2019-09-03 NOTE — TELEPHONE ENCOUNTER
----- Message from Sherron Martinez sent at 9/3/2019 11:40 AM CDT -----  Contact: Pt    Name of Who is Calling:MECHE ODELL [[id number]]    What is the request in detail: patient would like a call back regarding cancelling procedure Please contact to further discuss and advise    Can the clinic reply by [facility]: No    What Number to Call Back if not in [facility]: [phone]

## 2019-09-04 ENCOUNTER — TELEPHONE (OUTPATIENT)
Dept: PAIN MEDICINE | Facility: CLINIC | Age: 79
End: 2019-09-04

## 2019-09-04 NOTE — TELEPHONE ENCOUNTER
----- Message from Casandra Keller sent at 9/4/2019  8:15 AM CDT -----  Contact: MECHE ODELL   Name of Who is Calling: MECHE ODELL       What is the request in detail: Patient is requesting a call back to cancel his 09/16/2019 procedure. Please contact to further advise.      Can the clinic reply by [facility]: No      What Number to Call Back if not in [facility]: [phone]

## 2019-09-29 NOTE — PROGRESS NOTES
Patient has ongoing worsening lumbar pain with extension to the buttocks    Lumbar radiograph noted     Will set up mri   
Warm/Dry

## 2019-10-03 ENCOUNTER — HOSPITAL ENCOUNTER (OUTPATIENT)
Dept: RADIOLOGY | Facility: HOSPITAL | Age: 79
Discharge: HOME | End: 2019-10-03
Attending: PHYSICAL MEDICINE & REHABILITATION
Payer: MEDICARE

## 2019-10-03 DIAGNOSIS — M48.062 LUMBAR STENOSIS WITH NEUROGENIC CLAUDICATION: ICD-10-CM

## 2019-10-03 PROCEDURE — 72120 X-RAY BEND ONLY L-S SPINE: CPT | Mod: TC,HCNC

## 2019-10-03 PROCEDURE — 72120 XR LUMBAR SPINE FLEXION AND EXTENSION ONLY: ICD-10-PCS | Mod: 26,HCNC,, | Performed by: RADIOLOGY

## 2019-10-03 PROCEDURE — 72120 X-RAY BEND ONLY L-S SPINE: CPT | Mod: 26,HCNC,, | Performed by: RADIOLOGY

## 2019-10-07 DIAGNOSIS — M48.062 SPINAL STENOSIS, LUMBAR REGION WITH NEUROGENIC CLAUDICATION: Primary | ICD-10-CM

## 2019-11-07 ENCOUNTER — TELEPHONE (OUTPATIENT)
Dept: INTERNAL MEDICINE | Facility: CLINIC | Age: 79
End: 2019-11-07

## 2019-11-07 NOTE — TELEPHONE ENCOUNTER
----- Message from Reny Radford sent at 11/7/2019  3:58 PM CST -----  Contact: Patient [phone]  Request callback to update you on his procedure that was done today.    Please call and advise  Thank you

## 2019-11-12 RX ORDER — METFORMIN HYDROCHLORIDE 500 MG/1
TABLET ORAL
Qty: 90 TABLET | Refills: 5 | Status: SHIPPED | OUTPATIENT
Start: 2019-11-12 | End: 2020-05-03

## 2019-12-31 ENCOUNTER — TELEPHONE (OUTPATIENT)
Dept: OPTOMETRY | Facility: CLINIC | Age: 79
End: 2019-12-31

## 2019-12-31 RX ORDER — LATANOPROST 50 UG/ML
SOLUTION/ DROPS OPHTHALMIC
Status: CANCELLED | OUTPATIENT
Start: 2019-12-31

## 2019-12-31 RX ORDER — LATANOPROST 50 UG/ML
1 SOLUTION/ DROPS OPHTHALMIC NIGHTLY
Qty: 7.5 ML | Refills: 3 | Status: SHIPPED | OUTPATIENT
Start: 2019-12-31 | End: 2021-02-22 | Stop reason: SDUPTHER

## 2019-12-31 NOTE — TELEPHONE ENCOUNTER
----- Message from JOSH Rene sent at 12/31/2019 10:51 AM CST -----  Contact:    Pt  [phone]  Just need to be signed  ----- Message -----  From: Susana Bazan  Sent: 12/31/2019  10:45 AM CST  To: Monalisa Hernandez Staff    Rx Refill/Request     Is this a Refill or New Rx:   Refill    Rx Name and Strength:    latanoprost 0.005 % ophthalmic solution  Preferred Pharmacy with phone number:    [facility]    [phone]  Communication Preference:  Phone   Additional Information:

## 2020-01-02 RX ORDER — GABAPENTIN 300 MG/1
CAPSULE ORAL
Qty: 60 CAPSULE | Refills: 1 | Status: SHIPPED | OUTPATIENT
Start: 2020-01-02 | End: 2020-04-29 | Stop reason: SDUPTHER

## 2020-04-29 ENCOUNTER — TELEPHONE (OUTPATIENT)
Dept: INTERNAL MEDICINE | Facility: CLINIC | Age: 80
End: 2020-04-29

## 2020-04-29 RX ORDER — GABAPENTIN 300 MG/1
300 CAPSULE ORAL 2 TIMES DAILY
Qty: 60 CAPSULE | Refills: 1 | Status: SHIPPED | OUTPATIENT
Start: 2020-04-29 | End: 2020-05-07

## 2020-04-29 RX ORDER — ROPINIROLE 1 MG/1
1 TABLET, FILM COATED ORAL NIGHTLY
Qty: 30 TABLET | Refills: 0 | Status: SHIPPED | OUTPATIENT
Start: 2020-04-29 | End: 2020-08-05

## 2020-04-29 RX ORDER — TRAMADOL HYDROCHLORIDE 50 MG/1
50 TABLET ORAL 2 TIMES DAILY PRN
Qty: 60 TABLET | Refills: 0 | Status: SHIPPED | OUTPATIENT
Start: 2020-04-29 | End: 2020-12-16 | Stop reason: SDUPTHER

## 2020-04-29 NOTE — TELEPHONE ENCOUNTER
----- Message from Fay Fagan sent at 4/29/2020 12:26 PM CDT -----  Contact: [phone]  Patient would like to speak to the nurse for advise on a current medication. Please call and advise.

## 2020-05-03 RX ORDER — LOSARTAN POTASSIUM 100 MG/1
TABLET ORAL
Qty: 30 TABLET | Refills: 9 | Status: SHIPPED | OUTPATIENT
Start: 2020-05-03 | End: 2021-03-30

## 2020-05-03 RX ORDER — METFORMIN HYDROCHLORIDE 500 MG/1
TABLET ORAL
Qty: 90 TABLET | Refills: 5 | Status: SHIPPED | OUTPATIENT
Start: 2020-05-03 | End: 2020-10-26

## 2020-05-07 RX ORDER — GABAPENTIN 300 MG/1
CAPSULE ORAL
Qty: 60 CAPSULE | Refills: 1 | Status: SHIPPED | OUTPATIENT
Start: 2020-05-07 | End: 2020-08-24

## 2020-05-15 RX ORDER — CHLORTHALIDONE 25 MG/1
TABLET ORAL
Qty: 30 TABLET | Refills: 0 | Status: SHIPPED | OUTPATIENT
Start: 2020-05-15 | End: 2020-06-10

## 2020-05-19 RX ORDER — TAMSULOSIN HYDROCHLORIDE 0.4 MG/1
CAPSULE ORAL
Qty: 30 CAPSULE | Refills: 9 | Status: SHIPPED | OUTPATIENT
Start: 2020-05-19 | End: 2021-02-13

## 2020-12-09 ENCOUNTER — PATIENT OUTREACH (OUTPATIENT)
Dept: ADMINISTRATIVE | Facility: HOSPITAL | Age: 80
End: 2020-12-09

## 2020-12-09 ENCOUNTER — TELEPHONE (OUTPATIENT)
Dept: INTERNAL MEDICINE | Facility: CLINIC | Age: 80
End: 2020-12-09

## 2020-12-09 DIAGNOSIS — Z12.5 ENCOUNTER FOR SCREENING FOR MALIGNANT NEOPLASM OF PROSTATE: ICD-10-CM

## 2020-12-09 DIAGNOSIS — N40.0 BENIGN NON-NODULAR PROSTATIC HYPERPLASIA WITHOUT LOWER URINARY TRACT SYMPTOMS: ICD-10-CM

## 2020-12-09 DIAGNOSIS — E11.40 TYPE 2 DIABETES MELLITUS WITH DIABETIC NEUROPATHY, WITHOUT LONG-TERM CURRENT USE OF INSULIN: ICD-10-CM

## 2020-12-09 DIAGNOSIS — I10 ESSENTIAL HYPERTENSION: ICD-10-CM

## 2020-12-09 DIAGNOSIS — Z00.00 ANNUAL PHYSICAL EXAM: Primary | ICD-10-CM

## 2020-12-09 NOTE — TELEPHONE ENCOUNTER
----- Message from Mariya Hinton sent at 12/9/2020  2:49 PM CST -----  Contact: [phone]  Doctor appointment and lab have been scheduled.  Please link lab orders to the lab appointment.  Date of doctor appointment:  12/16  Date of lab appointment:  12/11  Physical or F/U:   Comments:

## 2020-12-11 ENCOUNTER — LAB VISIT (OUTPATIENT)
Dept: LAB | Facility: HOSPITAL | Age: 80
End: 2020-12-11
Attending: INTERNAL MEDICINE
Payer: MEDICARE

## 2020-12-11 DIAGNOSIS — Z00.00 ANNUAL PHYSICAL EXAM: ICD-10-CM

## 2020-12-11 DIAGNOSIS — E11.40 TYPE 2 DIABETES MELLITUS WITH DIABETIC NEUROPATHY, WITHOUT LONG-TERM CURRENT USE OF INSULIN: ICD-10-CM

## 2020-12-11 DIAGNOSIS — Z12.5 ENCOUNTER FOR SCREENING FOR MALIGNANT NEOPLASM OF PROSTATE: ICD-10-CM

## 2020-12-11 DIAGNOSIS — I10 ESSENTIAL HYPERTENSION: ICD-10-CM

## 2020-12-11 DIAGNOSIS — N40.0 BENIGN NON-NODULAR PROSTATIC HYPERPLASIA WITHOUT LOWER URINARY TRACT SYMPTOMS: ICD-10-CM

## 2020-12-11 LAB
ALBUMIN SERPL BCP-MCNC: 3.8 G/DL (ref 3.5–5.2)
ALP SERPL-CCNC: 109 U/L (ref 55–135)
ALT SERPL W/O P-5'-P-CCNC: 21 U/L (ref 10–44)
ANION GAP SERPL CALC-SCNC: 15 MMOL/L (ref 8–16)
AST SERPL-CCNC: 17 U/L (ref 10–40)
BASOPHILS # BLD AUTO: 0.08 K/UL (ref 0–0.2)
BASOPHILS NFR BLD: 0.7 % (ref 0–1.9)
BILIRUB SERPL-MCNC: 0.5 MG/DL (ref 0.1–1)
BUN SERPL-MCNC: 20 MG/DL (ref 8–23)
CALCIUM SERPL-MCNC: 9.3 MG/DL (ref 8.7–10.5)
CHLORIDE SERPL-SCNC: 103 MMOL/L (ref 95–110)
CHOLEST SERPL-MCNC: 174 MG/DL (ref 120–199)
CHOLEST/HDLC SERPL: 3 {RATIO} (ref 2–5)
CO2 SERPL-SCNC: 22 MMOL/L (ref 23–29)
COMPLEXED PSA SERPL-MCNC: 0.39 NG/ML (ref 0–4)
CREAT SERPL-MCNC: 1 MG/DL (ref 0.5–1.4)
DIFFERENTIAL METHOD: ABNORMAL
EOSINOPHIL # BLD AUTO: 0.4 K/UL (ref 0–0.5)
EOSINOPHIL NFR BLD: 3.8 % (ref 0–8)
ERYTHROCYTE [DISTWIDTH] IN BLOOD BY AUTOMATED COUNT: 13.6 % (ref 11.5–14.5)
EST. GFR  (AFRICAN AMERICAN): >60 ML/MIN/1.73 M^2
EST. GFR  (NON AFRICAN AMERICAN): >60 ML/MIN/1.73 M^2
ESTIMATED AVG GLUCOSE: 146 MG/DL (ref 68–131)
GLUCOSE SERPL-MCNC: 173 MG/DL (ref 70–110)
HBA1C MFR BLD HPLC: 6.7 % (ref 4–5.6)
HCT VFR BLD AUTO: 44.8 % (ref 40–54)
HDLC SERPL-MCNC: 58 MG/DL (ref 40–75)
HDLC SERPL: 33.3 % (ref 20–50)
HGB BLD-MCNC: 14.2 G/DL (ref 14–18)
IMM GRANULOCYTES # BLD AUTO: 0.05 K/UL (ref 0–0.04)
IMM GRANULOCYTES NFR BLD AUTO: 0.4 % (ref 0–0.5)
LDLC SERPL CALC-MCNC: 86.4 MG/DL (ref 63–159)
LYMPHOCYTES # BLD AUTO: 3.9 K/UL (ref 1–4.8)
LYMPHOCYTES NFR BLD: 34.3 % (ref 18–48)
MCH RBC QN AUTO: 30 PG (ref 27–31)
MCHC RBC AUTO-ENTMCNC: 31.7 G/DL (ref 32–36)
MCV RBC AUTO: 95 FL (ref 82–98)
MONOCYTES # BLD AUTO: 0.7 K/UL (ref 0.3–1)
MONOCYTES NFR BLD: 6.5 % (ref 4–15)
NEUTROPHILS # BLD AUTO: 6.1 K/UL (ref 1.8–7.7)
NEUTROPHILS NFR BLD: 54.3 % (ref 38–73)
NONHDLC SERPL-MCNC: 116 MG/DL
NRBC BLD-RTO: 0 /100 WBC
PLATELET # BLD AUTO: 258 K/UL (ref 150–350)
PMV BLD AUTO: 9.4 FL (ref 9.2–12.9)
POTASSIUM SERPL-SCNC: 3.6 MMOL/L (ref 3.5–5.1)
PROT SERPL-MCNC: 7.7 G/DL (ref 6–8.4)
RBC # BLD AUTO: 4.74 M/UL (ref 4.6–6.2)
SODIUM SERPL-SCNC: 140 MMOL/L (ref 136–145)
TRIGL SERPL-MCNC: 148 MG/DL (ref 30–150)
TSH SERPL DL<=0.005 MIU/L-ACNC: 2.33 UIU/ML (ref 0.4–4)
WBC # BLD AUTO: 11.31 K/UL (ref 3.9–12.7)

## 2020-12-11 PROCEDURE — 36415 COLL VENOUS BLD VENIPUNCTURE: CPT | Mod: HCNC

## 2020-12-11 PROCEDURE — 80053 COMPREHEN METABOLIC PANEL: CPT | Mod: HCNC

## 2020-12-11 PROCEDURE — 80061 LIPID PANEL: CPT | Mod: HCNC

## 2020-12-11 PROCEDURE — 84153 ASSAY OF PSA TOTAL: CPT | Mod: HCNC

## 2020-12-11 PROCEDURE — 83036 HEMOGLOBIN GLYCOSYLATED A1C: CPT | Mod: HCNC

## 2020-12-11 PROCEDURE — 85025 COMPLETE CBC W/AUTO DIFF WBC: CPT | Mod: HCNC

## 2020-12-11 PROCEDURE — 84443 ASSAY THYROID STIM HORMONE: CPT | Mod: HCNC

## 2020-12-16 ENCOUNTER — OFFICE VISIT (OUTPATIENT)
Dept: INTERNAL MEDICINE | Facility: CLINIC | Age: 80
End: 2020-12-16
Payer: MEDICARE

## 2020-12-16 VITALS
DIASTOLIC BLOOD PRESSURE: 74 MMHG | BODY MASS INDEX: 27.96 KG/M2 | HEART RATE: 84 BPM | HEIGHT: 73 IN | SYSTOLIC BLOOD PRESSURE: 127 MMHG | WEIGHT: 211 LBS | OXYGEN SATURATION: 97 %

## 2020-12-16 DIAGNOSIS — E11.40 TYPE 2 DIABETES MELLITUS WITH DIABETIC NEUROPATHY, WITHOUT LONG-TERM CURRENT USE OF INSULIN: ICD-10-CM

## 2020-12-16 DIAGNOSIS — I10 ESSENTIAL HYPERTENSION: ICD-10-CM

## 2020-12-16 DIAGNOSIS — N40.0 BENIGN NON-NODULAR PROSTATIC HYPERPLASIA WITHOUT LOWER URINARY TRACT SYMPTOMS: ICD-10-CM

## 2020-12-16 DIAGNOSIS — M47.816 LUMBAR SPONDYLOSIS: ICD-10-CM

## 2020-12-16 DIAGNOSIS — Z00.00 ANNUAL PHYSICAL EXAM: Primary | ICD-10-CM

## 2020-12-16 PROCEDURE — 99397 PER PM REEVAL EST PAT 65+ YR: CPT | Mod: HCNC,S$GLB,, | Performed by: INTERNAL MEDICINE

## 2020-12-16 PROCEDURE — 3078F DIAST BP <80 MM HG: CPT | Mod: HCNC,CPTII,S$GLB, | Performed by: INTERNAL MEDICINE

## 2020-12-16 PROCEDURE — 99499 RISK ADDL DX/OHS AUDIT: ICD-10-PCS | Mod: S$GLB,,, | Performed by: INTERNAL MEDICINE

## 2020-12-16 PROCEDURE — 1126F PR PAIN SEVERITY QUANTIFIED, NO PAIN PRESENT: ICD-10-PCS | Mod: HCNC,S$GLB,, | Performed by: INTERNAL MEDICINE

## 2020-12-16 PROCEDURE — 99999 PR PBB SHADOW E&M-EST. PATIENT-LVL III: ICD-10-PCS | Mod: PBBFAC,HCNC,, | Performed by: INTERNAL MEDICINE

## 2020-12-16 PROCEDURE — 3288F PR FALLS RISK ASSESSMENT DOCUMENTED: ICD-10-PCS | Mod: HCNC,CPTII,S$GLB, | Performed by: INTERNAL MEDICINE

## 2020-12-16 PROCEDURE — 3074F PR MOST RECENT SYSTOLIC BLOOD PRESSURE < 130 MM HG: ICD-10-PCS | Mod: HCNC,CPTII,S$GLB, | Performed by: INTERNAL MEDICINE

## 2020-12-16 PROCEDURE — 1101F PR PT FALLS ASSESS DOC 0-1 FALLS W/OUT INJ PAST YR: ICD-10-PCS | Mod: HCNC,CPTII,S$GLB, | Performed by: INTERNAL MEDICINE

## 2020-12-16 PROCEDURE — 1101F PT FALLS ASSESS-DOCD LE1/YR: CPT | Mod: HCNC,CPTII,S$GLB, | Performed by: INTERNAL MEDICINE

## 2020-12-16 PROCEDURE — 99999 PR PBB SHADOW E&M-EST. PATIENT-LVL III: CPT | Mod: PBBFAC,HCNC,, | Performed by: INTERNAL MEDICINE

## 2020-12-16 PROCEDURE — 3074F SYST BP LT 130 MM HG: CPT | Mod: HCNC,CPTII,S$GLB, | Performed by: INTERNAL MEDICINE

## 2020-12-16 PROCEDURE — 99397 PR PREVENTIVE VISIT,EST,65 & OVER: ICD-10-PCS | Mod: HCNC,S$GLB,, | Performed by: INTERNAL MEDICINE

## 2020-12-16 PROCEDURE — 3078F PR MOST RECENT DIASTOLIC BLOOD PRESSURE < 80 MM HG: ICD-10-PCS | Mod: HCNC,CPTII,S$GLB, | Performed by: INTERNAL MEDICINE

## 2020-12-16 PROCEDURE — 3288F FALL RISK ASSESSMENT DOCD: CPT | Mod: HCNC,CPTII,S$GLB, | Performed by: INTERNAL MEDICINE

## 2020-12-16 PROCEDURE — 99499 UNLISTED E&M SERVICE: CPT | Mod: S$GLB,,, | Performed by: INTERNAL MEDICINE

## 2020-12-16 PROCEDURE — 1126F AMNT PAIN NOTED NONE PRSNT: CPT | Mod: HCNC,S$GLB,, | Performed by: INTERNAL MEDICINE

## 2020-12-16 RX ORDER — GABAPENTIN 300 MG/1
300 CAPSULE ORAL 3 TIMES DAILY
Qty: 90 CAPSULE | Refills: 5 | Status: SHIPPED | OUTPATIENT
Start: 2020-12-16 | End: 2021-06-27

## 2020-12-16 RX ORDER — AMOXICILLIN AND CLAVULANATE POTASSIUM 875; 125 MG/1; MG/1
1 TABLET, FILM COATED ORAL 2 TIMES DAILY
Qty: 20 TABLET | Refills: 0 | Status: SHIPPED | OUTPATIENT
Start: 2020-12-16 | End: 2022-02-11

## 2020-12-16 RX ORDER — INFLUENZA A VIRUS A/MICHIGAN/45/2015 X-275 (H1N1) ANTIGEN (FORMALDEHYDE INACTIVATED), INFLUENZA A VIRUS A/SINGAPORE/INFIMH-16-0019/2016 IVR-186 (H3N2) ANTIGEN (FORMALDEHYDE INACTIVATED), INFLUENZA B VIRUS B/PHUKET/3073/2013 ANTIGEN (FORMALDEHYDE INACTIVATED), AND INFLUENZA B VIRUS B/MARYLAND/15/2016 BX-69A ANTIGEN (FORMALDEHYDE INACTIVATED) 60; 60; 60; 60 UG/.7ML; UG/.7ML; UG/.7ML; UG/.7ML
INJECTION, SUSPENSION INTRAMUSCULAR
COMMUNITY
Start: 2020-11-25 | End: 2022-11-08

## 2020-12-16 RX ORDER — TRAMADOL HYDROCHLORIDE 50 MG/1
50 TABLET ORAL 2 TIMES DAILY PRN
Qty: 60 TABLET | Refills: 0 | Status: SHIPPED | OUTPATIENT
Start: 2020-12-16 | End: 2021-05-27 | Stop reason: SDUPTHER
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
## 2021-01-04 ENCOUNTER — IMMUNIZATION (OUTPATIENT)
Dept: INTERNAL MEDICINE | Facility: CLINIC | Age: 81
End: 2021-01-04
Payer: MEDICARE

## 2021-01-04 DIAGNOSIS — Z23 NEED FOR VACCINATION: ICD-10-CM

## 2021-01-04 PROCEDURE — 91300 COVID-19, MRNA, LNP-S, PF, 30 MCG/0.3 ML DOSE VACCINE: CPT | Mod: PBBFAC | Performed by: INTERNAL MEDICINE

## 2021-01-26 ENCOUNTER — IMMUNIZATION (OUTPATIENT)
Dept: INTERNAL MEDICINE | Facility: CLINIC | Age: 81
End: 2021-01-26
Payer: MEDICARE

## 2021-01-26 DIAGNOSIS — Z23 NEED FOR VACCINATION: Primary | ICD-10-CM

## 2021-01-26 PROCEDURE — 91300 COVID-19, MRNA, LNP-S, PF, 30 MCG/0.3 ML DOSE VACCINE: CPT | Mod: PBBFAC | Performed by: INTERNAL MEDICINE

## 2021-01-26 PROCEDURE — 0002A COVID-19, MRNA, LNP-S, PF, 30 MCG/0.3 ML DOSE VACCINE: CPT | Mod: PBBFAC | Performed by: INTERNAL MEDICINE

## 2021-02-23 RX ORDER — LATANOPROST 50 UG/ML
1 SOLUTION/ DROPS OPHTHALMIC NIGHTLY
Qty: 7.5 ML | Refills: 3 | Status: SHIPPED | OUTPATIENT
Start: 2021-02-23 | End: 2021-04-15 | Stop reason: SDUPTHER

## 2021-03-22 ENCOUNTER — PES CALL (OUTPATIENT)
Dept: ADMINISTRATIVE | Facility: CLINIC | Age: 81
End: 2021-03-22

## 2021-04-13 ENCOUNTER — PATIENT OUTREACH (OUTPATIENT)
Dept: ADMINISTRATIVE | Facility: OTHER | Age: 81
End: 2021-04-13

## 2021-04-15 ENCOUNTER — OFFICE VISIT (OUTPATIENT)
Dept: OPTOMETRY | Facility: CLINIC | Age: 81
End: 2021-04-15
Payer: COMMERCIAL

## 2021-04-15 DIAGNOSIS — H40.053 BORDERLINE GLAUCOMA OF BOTH EYES WITH OCULAR HYPERTENSION: ICD-10-CM

## 2021-04-15 DIAGNOSIS — H52.4 HYPEROPIA WITH PRESBYOPIA OF BOTH EYES: ICD-10-CM

## 2021-04-15 DIAGNOSIS — H52.03 HYPEROPIA WITH PRESBYOPIA OF BOTH EYES: ICD-10-CM

## 2021-04-15 DIAGNOSIS — Z01.00 EYE EXAM, ROUTINE: Primary | ICD-10-CM

## 2021-04-15 DIAGNOSIS — E11.9 DIABETES MELLITUS TYPE 2 WITHOUT RETINOPATHY: ICD-10-CM

## 2021-04-15 PROCEDURE — 99499 UNLISTED E&M SERVICE: CPT | Mod: S$GLB,,, | Performed by: OPTOMETRIST

## 2021-04-15 PROCEDURE — 99999 PR PBB SHADOW E&M-EST. PATIENT-LVL III: CPT | Mod: PBBFAC,,, | Performed by: OPTOMETRIST

## 2021-04-15 PROCEDURE — 92004 PR EYE EXAM, NEW PATIENT,COMPREHESV: ICD-10-PCS | Mod: S$GLB,,, | Performed by: OPTOMETRIST

## 2021-04-15 PROCEDURE — 92004 COMPRE OPH EXAM NEW PT 1/>: CPT | Mod: S$GLB,,, | Performed by: OPTOMETRIST

## 2021-04-15 PROCEDURE — 92015 PR REFRACTION: ICD-10-PCS | Mod: S$GLB,,, | Performed by: OPTOMETRIST

## 2021-04-15 PROCEDURE — 92015 DETERMINE REFRACTIVE STATE: CPT | Mod: S$GLB,,, | Performed by: OPTOMETRIST

## 2021-04-15 PROCEDURE — 99499 RISK ADDL DX/OHS AUDIT: ICD-10-PCS | Mod: S$GLB,,, | Performed by: OPTOMETRIST

## 2021-04-15 PROCEDURE — 99999 PR PBB SHADOW E&M-EST. PATIENT-LVL III: ICD-10-PCS | Mod: PBBFAC,,, | Performed by: OPTOMETRIST

## 2021-04-15 RX ORDER — LATANOPROST 50 UG/ML
1 SOLUTION/ DROPS OPHTHALMIC NIGHTLY
Qty: 7.5 ML | Refills: 3 | Status: SHIPPED | OUTPATIENT
Start: 2021-04-15 | End: 2022-04-21

## 2021-05-24 ENCOUNTER — TELEPHONE (OUTPATIENT)
Dept: INTERNAL MEDICINE | Facility: CLINIC | Age: 81
End: 2021-05-24
Payer: MEDICARE

## 2021-05-24 DIAGNOSIS — M47.816 LUMBAR SPONDYLOSIS: Primary | ICD-10-CM

## 2021-05-27 ENCOUNTER — TELEPHONE (OUTPATIENT)
Dept: INTERNAL MEDICINE | Facility: CLINIC | Age: 81
End: 2021-05-27

## 2021-05-27 RX ORDER — TRAMADOL HYDROCHLORIDE 50 MG/1
50 TABLET ORAL 2 TIMES DAILY PRN
Qty: 60 TABLET | Refills: 0 | Status: SHIPPED | OUTPATIENT
Start: 2021-05-27 | End: 2021-11-24

## 2021-05-31 RX ORDER — CHLORTHALIDONE 25 MG/1
25 TABLET ORAL DAILY
Qty: 30 TABLET | Refills: 5 | Status: SHIPPED | OUTPATIENT
Start: 2021-05-31 | End: 2021-11-19

## 2021-06-21 ENCOUNTER — TELEPHONE (OUTPATIENT)
Dept: INTERNAL MEDICINE | Facility: CLINIC | Age: 81
End: 2021-06-21
Payer: MEDICARE

## 2021-06-21 ENCOUNTER — TELEPHONE (OUTPATIENT)
Dept: INTERNAL MEDICINE | Facility: CLINIC | Age: 81
End: 2021-06-21

## 2021-06-27 RX ORDER — GABAPENTIN 300 MG/1
CAPSULE ORAL
Qty: 90 CAPSULE | Refills: 5 | Status: SHIPPED | OUTPATIENT
Start: 2021-06-27 | End: 2022-06-21

## 2021-07-01 ENCOUNTER — PATIENT MESSAGE (OUTPATIENT)
Dept: ADMINISTRATIVE | Facility: OTHER | Age: 81
End: 2021-07-01

## 2021-07-14 DIAGNOSIS — H91.93 BILATERAL HEARING LOSS, UNSPECIFIED HEARING LOSS TYPE: Primary | ICD-10-CM

## 2021-07-29 ENCOUNTER — TELEPHONE (OUTPATIENT)
Dept: INTERNAL MEDICINE | Facility: CLINIC | Age: 81
End: 2021-07-29

## 2021-07-29 DIAGNOSIS — H91.93 BILATERAL HEARING LOSS, UNSPECIFIED HEARING LOSS TYPE: Primary | ICD-10-CM

## 2021-07-29 DIAGNOSIS — M48.062 SPINAL STENOSIS OF LUMBAR REGION WITH NEUROGENIC CLAUDICATION: ICD-10-CM

## 2021-08-03 ENCOUNTER — PATIENT MESSAGE (OUTPATIENT)
Dept: ADMINISTRATIVE | Facility: HOSPITAL | Age: 81
End: 2021-08-03

## 2021-08-08 ENCOUNTER — HOSPITAL ENCOUNTER (OUTPATIENT)
Dept: RADIOLOGY | Facility: HOSPITAL | Age: 81
Discharge: HOME | End: 2021-08-08
Attending: INTERNAL MEDICINE
Payer: MEDICARE

## 2021-08-08 DIAGNOSIS — M48.062 SPINAL STENOSIS OF LUMBAR REGION WITH NEUROGENIC CLAUDICATION: ICD-10-CM

## 2021-08-08 PROCEDURE — 72148 MRI LUMBAR SPINE W/O DYE: CPT | Mod: TC

## 2021-08-08 PROCEDURE — 72148 MRI LUMBAR SPINE WITHOUT CONTRAST: ICD-10-PCS | Mod: 26,,, | Performed by: RADIOLOGY

## 2021-08-08 PROCEDURE — 72148 MRI LUMBAR SPINE W/O DYE: CPT | Mod: 26,,, | Performed by: RADIOLOGY

## 2021-08-09 ENCOUNTER — PATIENT MESSAGE (OUTPATIENT)
Dept: INTERNAL MEDICINE | Facility: CLINIC | Age: 81
End: 2021-08-09

## 2021-08-27 ENCOUNTER — PATIENT MESSAGE (OUTPATIENT)
Dept: RESEARCH | Facility: HOSPITAL | Age: 81
End: 2021-08-27

## 2021-09-21 ENCOUNTER — IMMUNIZATION (OUTPATIENT)
Dept: INTERNAL MEDICINE | Facility: CLINIC | Age: 81
End: 2021-09-21
Payer: MEDICARE

## 2021-09-21 DIAGNOSIS — Z23 NEED FOR VACCINATION: Primary | ICD-10-CM

## 2021-09-21 PROCEDURE — 91300 COVID-19, MRNA, LNP-S, PF, 30 MCG/0.3 ML DOSE VACCINE: CPT | Mod: HCNC,PBBFAC | Performed by: INTERNAL MEDICINE

## 2021-09-21 PROCEDURE — 0003A COVID-19, MRNA, LNP-S, PF, 30 MCG/0.3 ML DOSE VACCINE: CPT | Mod: HCNC,CV19,PBBFAC | Performed by: INTERNAL MEDICINE

## 2021-11-19 ENCOUNTER — PATIENT MESSAGE (OUTPATIENT)
Dept: INTERNAL MEDICINE | Facility: CLINIC | Age: 81
End: 2021-11-19
Payer: MEDICARE

## 2021-11-19 RX ORDER — METFORMIN HYDROCHLORIDE 500 MG/1
500 TABLET ORAL 3 TIMES DAILY
Qty: 90 TABLET | Refills: 0 | Status: SHIPPED | OUTPATIENT
Start: 2021-11-19 | End: 2022-11-08 | Stop reason: CLARIF

## 2021-11-19 RX ORDER — TAMSULOSIN HYDROCHLORIDE 0.4 MG/1
1 CAPSULE ORAL NIGHTLY
Qty: 30 CAPSULE | Refills: 0 | Status: SHIPPED | OUTPATIENT
Start: 2021-11-19 | End: 2022-07-25

## 2021-11-19 RX ORDER — CHLORTHALIDONE 25 MG/1
TABLET ORAL
Qty: 30 TABLET | Refills: 0 | Status: SHIPPED | OUTPATIENT
Start: 2021-11-19 | End: 2022-01-10

## 2021-11-24 RX ORDER — TRAMADOL HYDROCHLORIDE 50 MG/1
TABLET ORAL
Qty: 60 TABLET | Refills: 0 | Status: SHIPPED | OUTPATIENT
Start: 2021-11-24 | End: 2023-04-06 | Stop reason: SDUPTHER

## 2022-01-10 RX ORDER — CHLORTHALIDONE 25 MG/1
TABLET ORAL
Qty: 30 TABLET | Refills: 5 | Status: SHIPPED | OUTPATIENT
Start: 2022-01-10 | End: 2022-07-09

## 2022-01-10 NOTE — TELEPHONE ENCOUNTER
Care Due:                  Date            Visit Type   Department     Provider  --------------------------------------------------------------------------------                                             [facility] PRIMARY  Last Visit: 12-      None         CARE           Caleb Negrete  Next Visit: None Scheduled  None         None Found                                                            Last  Test          Frequency    Reason                     Performed    Due Date  --------------------------------------------------------------------------------    Office Visit  12 months..  chlorthalidone, losartan,   12- 12-                             metFORMIN, tamsulosin....    CMP.........  12 months..  chlorthalidone, losartan,   Not Found    Overdue                             metFORMIN................    HBA1C.......  6 months...  metFORMIN................  12-   06-    Powered by [employer]. Reference number: [id number].   1/10/2022 1:47:25 PM CST

## 2022-01-10 NOTE — TELEPHONE ENCOUNTER
Encounter details require adjustment(s)/ updating by OR Staff  As of this time Protocols and CDM: did not populate or display   Adjustment(s) made: Department  CDM should display. Medication(s) delegated by the OR.  Will resend refill request encounter to P Centralized Refill Staff Pool.   [facility]   Note composed:1:46 PM 01/10/2022

## 2022-01-26 ENCOUNTER — PATIENT MESSAGE (OUTPATIENT)
Dept: ADMINISTRATIVE | Facility: HOSPITAL | Age: 82
End: 2022-01-26
Payer: MEDICARE

## 2022-03-31 RX ORDER — LOSARTAN POTASSIUM 100 MG/1
TABLET ORAL
Qty: 30 TABLET | Refills: 11 | Status: SHIPPED | OUTPATIENT
Start: 2022-03-31 | End: 2023-04-10

## 2022-03-31 NOTE — TELEPHONE ENCOUNTER
Care Due:                  Date            Visit Type   Department     Provider  --------------------------------------------------------------------------------                                EP -                              PRIMARY      [facility] PRIMARY  Last Visit: 12-      CARE ([facility])   DARWIN Negrete  Next Visit: None Scheduled  None         None Found                                                            Last  Test          Frequency    Reason                     Performed    Due Date  --------------------------------------------------------------------------------    Office Visit  12 months..  chlorthalidone, losartan,   12- 12-                             metFORMIN, tamsulosin....    CMP.........  12 months..  chlorthalidone, losartan,   12- 12-                             metFORMIN................    HBA1C.......  6 months...  metFORMIN................  12-   06-    Powered by V[employer] by [employer]. Reference number: [id number].   3/31/2022 8:01:22 AM CDT

## 2022-04-01 NOTE — TELEPHONE ENCOUNTER
This Rx Request does not qualify for assessment with the [facility]   Please review protocol details and the Care Due Message for extra clinical information    Reasons Rx Request may be deferred:  Labs/Vitals overdue  Pt due for OV with PCP    Note composed:9:18 PM 03/31/2022            Visit Information Date & Time Provider Department Dept. Phone Encounter #  
 11/14/2017 10:00 AM Twyla Wise  [facility] [phone] [id number] Upcoming Health Maintenance Date Due FOBT Q 1 YEAR AGE 50-75 9/9/2001 GLAUCOMA SCREENING Q2Y 9/9/2016 MEDICARE YEARLY EXAM 11/15/2018 BREAST CANCER SCRN MAMMOGRAM 11/14/2019 DTaP/Tdap/Td series (2 - Td) 3/11/2026 Allergies as of 11/14/2017  Review Complete On: 11/14/2017 By: Twyla Wise NP Severity Noted Reaction Type Reactions Paxil [Paroxetine Hcl]  04/16/2010    Other (comments) edema Sulfa (Sulfonamide Antibiotics)  04/16/2010    Unable to Obtain Tramadol  04/13/2016    Hives Current Immunizations  Reviewed on 11/14/2017 Name Date Influenza High Dose Vaccine PF  Incomplete, 11/10/2016 Pneumococcal Conjugate (PCV-13) 11/10/2016 Tdap 3/11/2016  8:46 PM  
  
 Reviewed by Wendy Srivastava LPN on 61/30/1186 at 10:09 AM  
 Reviewed by Wendy Srivastava LPN on 08/56/2113 at 10:10 AM  
You Were Diagnosed With   
  
 Codes Comments Mixed hyperlipidemia    -  Primary ICD-10-CM: X42.3 ICD-9-CM: 272.2 Encounter for immunization     ICD-10-CM: W35 ICD-9-CM: V03.89 Other iron deficiency anemia     ICD-10-CM: D50.8 ICD-9-CM: 280.8 Medicare annual wellness visit, initial     ICD-10-CM: Z00.00 ICD-9-CM: V70.0 Smoker     ICD-10-CM: H99.880 ICD-9-CM: 305.1 Screening mammogram, encounter for     ICD-10-CM: Z12.31 
ICD-9-CM: V76.12 Low vitamin D level     ICD-10-CM: E55.9 ICD-9-CM: 268.9 Post-menopause     ICD-10-CM: Z78.0 ICD-9-CM: V49.81   
 ACP (advance care planning)     ICD-10-CM: Z71.89 ICD-9-CM: V65.49 Vitals BP Pulse Temp Resp Height(growth percentile) Weight(growth percentile) 111/69 (BP 1 Location: Left arm, BP Patient Position: Sitting) 88 98 °F (36.7 °C) (Oral) 18 5' 8\" (1.727 m) 145 lb 12.8 oz (66.1 kg) LMP SpO2 BMI OB Status Smoking Status 10/06/1995 98% 22.17 kg/m2 Postmenopausal Current Every Day Smoker BMI and BSA Data Body Mass Index Body Surface Area  
 22.17 kg/m 2 1.78 m 2 Preferred Pharmacy Pharmacy Name Phone [facility] [address] [place] [phone] [facility] [address] [phone] Your Updated Medication List  
  
   
This list is accurate as of: 11/14/17 10:35 AM.  Always use your most recent med list.  
  
  
  
  
 acetaminophen 325 mg tablet Commonly known as:  TYLENOL Take 2 Tabs by mouth every six (6) hours. aspirin delayed-release 81 mg tablet Take  by mouth daily. B.infantis-B.ani-B.long-B.bifi 10-15 mg Tbec Take 1 Cap by mouth daily (with dinner). CALTRATE 600+D PLUS MINERALS 600 mg calcium- 400 unit Tab Generic drug:  Calcium Carbonate-Vit D3-Min Take 1 Tab by mouth nightly. CENTRUM PO Take 1 Tab by mouth nightly. cetirizine 10 mg tablet Commonly known as:  ZYRTEC Take 10 mg by mouth nightly. FISH OIL 1,000 mg Cap Generic drug:  omega-3 fatty acids-vitamin e Take 1 Cap by mouth nightly. fluticasone 50 mcg/actuation nasal spray Commonly known as:  Taylor Vyas 2 Sprays by Both Nostrils route daily. polyethylene glycol 17 gram packet Commonly known as:  Dominique Mt Take 17 g by mouth nightly. pravastatin 40 mg tablet Commonly known as:  PRAVACHOL  
TAKE 1 TABLET AT BEDTIME We Performed the Following CBC WITH AUTOMATED DIFF [92402 CPT(R)] INFLUENZA VIRUS VACCINE, HIGH DOSE SEASONAL, PRESERVATIVE FREE [96941 CPT(R)] LIPID PANEL [41515 CPT(R)] METABOLIC PANEL, COMPREHENSIVE [19163 CPT(R)] VITAMIN D, 25 HYDROXY [id number] CPT(R)] To-Do List   
 11/14/2017 Imaging:  DEXA BONE DENSITY STUDY AXIAL   
  
 11/14/2017 Imaging:  MAXIMO MAMMOGRAM DIAG BILAT SAME DAY INCL CAD Introducing [facility] [place] [facility]!    
 Dear Chelsea: 
 Thank you for requesting a [employer] account. Our records indicate that you have previously registered for a [employer] account but its currently inactive. Please call our [employer] support line at [phone]. Additional Information If you have questions, please visit the Frequently Asked Questions section of the [employer] website at [email or web address] Remember, [employer] is NOT to be used for urgent needs. For medical emergencies, dial 911. Now available from your iPhone and Android! Please provide this summary of care documentation to your next provider. Your primary care clinician is listed as Rubio BLEVINS. If you have any questions after today's visit, please call [phone].

## 2022-05-07 ENCOUNTER — HOSPITAL ENCOUNTER (OUTPATIENT)
Dept: RADIOLOGY | Facility: HOSPITAL | Age: 82
Discharge: HOME | End: 2022-05-07
Attending: NEUROLOGICAL SURGERY
Payer: MEDICARE

## 2022-05-07 DIAGNOSIS — M48.062 LUMBAR STENOSIS WITH NEUROGENIC CLAUDICATION: ICD-10-CM

## 2022-05-07 PROCEDURE — 72148 MRI LUMBAR SPINE W/O DYE: CPT | Mod: 26,,, | Performed by: RADIOLOGY

## 2022-05-07 PROCEDURE — 72148 MRI LUMBAR SPINE W/O DYE: CPT | Mod: TC

## 2022-05-07 PROCEDURE — 72148 MRI LUMBAR SPINE WITHOUT CONTRAST: ICD-10-PCS | Mod: 26,,, | Performed by: RADIOLOGY

## 2022-07-25 RX ORDER — TAMSULOSIN HYDROCHLORIDE 0.4 MG/1
1 CAPSULE ORAL NIGHTLY
Qty: 90 CAPSULE | Refills: 1 | Status: SHIPPED | OUTPATIENT
Start: 2022-07-25 | End: 2022-10-27 | Stop reason: SDUPTHER

## 2022-11-07 ENCOUNTER — HOSPITAL ENCOUNTER (OUTPATIENT)
Facility: HOSPITAL | Age: 82
Discharge: HOME HEALTH | End: 2022-11-10
Attending: EMERGENCY MEDICINE | Admitting: EMERGENCY MEDICINE
Payer: MEDICARE

## 2022-11-07 DIAGNOSIS — A41.9 SEPSIS: ICD-10-CM

## 2022-11-07 DIAGNOSIS — R07.9 CHEST PAIN: ICD-10-CM

## 2022-11-07 DIAGNOSIS — N39.0 URINARY TRACT INFECTION WITHOUT HEMATURIA, SITE UNSPECIFIED: Primary | ICD-10-CM

## 2022-11-07 PROBLEM — M48.062 SPINAL STENOSIS, LUMBAR REGION, WITH NEUROGENIC CLAUDICATION: Status: ACTIVE | Noted: 2019-11-07

## 2022-11-07 LAB
ALBUMIN SERPL BCP-MCNC: 2.6 G/DL (ref 3.5–5.2)
ALP SERPL-CCNC: 117 U/L (ref 55–135)
ALT SERPL W/O P-5'-P-CCNC: 13 U/L (ref 10–44)
ANION GAP SERPL CALC-SCNC: 12 MMOL/L (ref 8–16)
AST SERPL-CCNC: 20 U/L (ref 10–40)
BACTERIA #/AREA URNS AUTO: ABNORMAL /HPF
BASOPHILS # BLD AUTO: 0.03 K/UL (ref 0–0.2)
BASOPHILS NFR BLD: 0.3 % (ref 0–1.9)
BILIRUB SERPL-MCNC: 0.6 MG/DL (ref 0.1–1)
BILIRUB UR QL STRIP: NEGATIVE
BUN SERPL-MCNC: 29 MG/DL (ref 8–23)
CALCIUM SERPL-MCNC: 8.4 MG/DL (ref 8.7–10.5)
CHLORIDE SERPL-SCNC: 99 MMOL/L (ref 95–110)
CLARITY UR REFRACT.AUTO: ABNORMAL
CO2 SERPL-SCNC: 23 MMOL/L (ref 23–29)
COLOR UR AUTO: YELLOW
CREAT SERPL-MCNC: 1.4 MG/DL (ref 0.5–1.4)
DIFFERENTIAL METHOD: ABNORMAL
EOSINOPHIL # BLD AUTO: 0 K/UL (ref 0–0.5)
EOSINOPHIL NFR BLD: 0.1 % (ref 0–8)
ERYTHROCYTE [DISTWIDTH] IN BLOOD BY AUTOMATED COUNT: 13.4 % (ref 11.5–14.5)
EST. GFR  (NO RACE VARIABLE): 50.2 ML/MIN/1.73 M^2
GLUCOSE SERPL-MCNC: 180 MG/DL (ref 70–110)
GLUCOSE UR QL STRIP: NEGATIVE
HCT VFR BLD AUTO: 35 % (ref 40–54)
HCV AB SERPL QL IA: NORMAL
HGB BLD-MCNC: 11.4 G/DL (ref 14–18)
HGB UR QL STRIP: ABNORMAL
HIV 1+2 AB+HIV1 P24 AG SERPL QL IA: NORMAL
HYALINE CASTS UR QL AUTO: 3 /LPF
IMM GRANULOCYTES # BLD AUTO: 0.06 K/UL (ref 0–0.04)
IMM GRANULOCYTES NFR BLD AUTO: 0.5 % (ref 0–0.5)
KETONES UR QL STRIP: ABNORMAL
LACTATE SERPL-SCNC: 0.9 MMOL/L (ref 0.5–2.2)
LEUKOCYTE ESTERASE UR QL STRIP: ABNORMAL
LYMPHOCYTES # BLD AUTO: 0.9 K/UL (ref 1–4.8)
LYMPHOCYTES NFR BLD: 7.9 % (ref 18–48)
MCH RBC QN AUTO: 28.7 PG (ref 27–31)
MCHC RBC AUTO-ENTMCNC: 32.6 G/DL (ref 32–36)
MCV RBC AUTO: 88 FL (ref 82–98)
MICROSCOPIC COMMENT: ABNORMAL
MONOCYTES # BLD AUTO: 0.9 K/UL (ref 0.3–1)
MONOCYTES NFR BLD: 7.9 % (ref 4–15)
NEUTROPHILS # BLD AUTO: 9.6 K/UL (ref 1.8–7.7)
NEUTROPHILS NFR BLD: 83.3 % (ref 38–73)
NITRITE UR QL STRIP: NEGATIVE
NRBC BLD-RTO: 0 /100 WBC
PH UR STRIP: 5 [PH] (ref 5–8)
PLATELET # BLD AUTO: 335 K/UL (ref 150–450)
PMV BLD AUTO: 8.8 FL (ref 9.2–12.9)
POTASSIUM SERPL-SCNC: 3.9 MMOL/L (ref 3.5–5.1)
PROT SERPL-MCNC: 7.1 G/DL (ref 6–8.4)
PROT UR QL STRIP: ABNORMAL
RBC # BLD AUTO: 3.97 M/UL (ref 4.6–6.2)
RBC #/AREA URNS AUTO: 10 /HPF (ref 0–4)
SODIUM SERPL-SCNC: 134 MMOL/L (ref 136–145)
SP GR UR STRIP: 1.02 (ref 1–1.03)
URN SPEC COLLECT METH UR: ABNORMAL
WBC # BLD AUTO: 11.46 K/UL (ref 3.9–12.7)
WBC #/AREA URNS AUTO: >100 /HPF (ref 0–5)

## 2022-11-07 PROCEDURE — 81001 URINALYSIS AUTO W/SCOPE: CPT | Performed by: EMERGENCY MEDICINE

## 2022-11-07 PROCEDURE — 99285 EMERGENCY DEPT VISIT HI MDM: CPT | Mod: 25

## 2022-11-07 PROCEDURE — 87077 CULTURE AEROBIC IDENTIFY: CPT | Performed by: EMERGENCY MEDICINE

## 2022-11-07 PROCEDURE — 87040 BLOOD CULTURE FOR BACTERIA: CPT | Performed by: EMERGENCY MEDICINE

## 2022-11-07 PROCEDURE — 99220 PR INITIAL OBSERVATION CARE,LEVL III: CPT | Mod: ,,, | Performed by: PHYSICIAN ASSISTANT

## 2022-11-07 PROCEDURE — G0378 HOSPITAL OBSERVATION PER HR: HCPCS

## 2022-11-07 PROCEDURE — 87389 HIV-1 AG W/HIV-1&-2 AB AG IA: CPT | Performed by: PHYSICIAN ASSISTANT

## 2022-11-07 PROCEDURE — 87186 SC STD MICRODIL/AGAR DIL: CPT | Performed by: EMERGENCY MEDICINE

## 2022-11-07 PROCEDURE — 87086 URINE CULTURE/COLONY COUNT: CPT | Performed by: EMERGENCY MEDICINE

## 2022-11-07 PROCEDURE — 80053 COMPREHEN METABOLIC PANEL: CPT | Performed by: EMERGENCY MEDICINE

## 2022-11-07 PROCEDURE — 99285 PR EMERGENCY DEPT VISIT,LEVEL V: ICD-10-PCS | Mod: CS,,, | Performed by: EMERGENCY MEDICINE

## 2022-11-07 PROCEDURE — 96365 THER/PROPH/DIAG IV INF INIT: CPT

## 2022-11-07 PROCEDURE — 63600175 PHARM REV CODE 636 W HCPCS: Performed by: EMERGENCY MEDICINE

## 2022-11-07 PROCEDURE — 85025 COMPLETE CBC W/AUTO DIFF WBC: CPT | Performed by: EMERGENCY MEDICINE

## 2022-11-07 PROCEDURE — 99220 PR INITIAL OBSERVATION CARE,LEVL III: ICD-10-PCS | Mod: ,,, | Performed by: PHYSICIAN ASSISTANT

## 2022-11-07 PROCEDURE — 87088 URINE BACTERIA CULTURE: CPT | Performed by: EMERGENCY MEDICINE

## 2022-11-07 PROCEDURE — 99285 EMERGENCY DEPT VISIT HI MDM: CPT | Mod: CS,,, | Performed by: EMERGENCY MEDICINE

## 2022-11-07 PROCEDURE — 83605 ASSAY OF LACTIC ACID: CPT | Performed by: EMERGENCY MEDICINE

## 2022-11-07 PROCEDURE — 0241U SARS-COV2 (COVID) WITH FLU/RSV BY PCR: CPT | Performed by: EMERGENCY MEDICINE

## 2022-11-07 PROCEDURE — 86803 HEPATITIS C AB TEST: CPT | Performed by: PHYSICIAN ASSISTANT

## 2022-11-07 RX ORDER — IBUPROFEN 200 MG
24 TABLET ORAL
Status: DISCONTINUED | OUTPATIENT
Start: 2022-11-07 | End: 2022-11-10 | Stop reason: HOSPADM

## 2022-11-07 RX ORDER — IBUPROFEN 200 MG
16 TABLET ORAL
Status: DISCONTINUED | OUTPATIENT
Start: 2022-11-07 | End: 2022-11-10 | Stop reason: HOSPADM

## 2022-11-07 RX ORDER — ACETAMINOPHEN 325 MG/1
650 TABLET ORAL EVERY 4 HOURS PRN
Status: DISCONTINUED | OUTPATIENT
Start: 2022-11-07 | End: 2022-11-10 | Stop reason: HOSPADM

## 2022-11-07 RX ORDER — GLUCAGON 1 MG
1 KIT INJECTION
Status: DISCONTINUED | OUTPATIENT
Start: 2022-11-07 | End: 2022-11-10 | Stop reason: HOSPADM

## 2022-11-07 RX ORDER — ONDANSETRON 8 MG/1
8 TABLET, ORALLY DISINTEGRATING ORAL EVERY 8 HOURS PRN
Status: DISCONTINUED | OUTPATIENT
Start: 2022-11-07 | End: 2022-11-10 | Stop reason: HOSPADM

## 2022-11-07 RX ORDER — INSULIN ASPART 100 [IU]/ML
0-5 INJECTION, SOLUTION INTRAVENOUS; SUBCUTANEOUS
Status: DISCONTINUED | OUTPATIENT
Start: 2022-11-07 | End: 2022-11-10 | Stop reason: HOSPADM

## 2022-11-07 RX ORDER — PROMETHAZINE HYDROCHLORIDE 25 MG/1
25 TABLET ORAL EVERY 6 HOURS PRN
Status: DISCONTINUED | OUTPATIENT
Start: 2022-11-07 | End: 2022-11-10 | Stop reason: HOSPADM

## 2022-11-07 RX ORDER — POLYETHYLENE GLYCOL 3350 17 G/17G
17 POWDER, FOR SOLUTION ORAL DAILY PRN
Status: DISCONTINUED | OUTPATIENT
Start: 2022-11-07 | End: 2022-11-10 | Stop reason: HOSPADM

## 2022-11-07 RX ORDER — METHOCARBAMOL 750 MG/1
750 TABLET, FILM COATED ORAL 3 TIMES DAILY
COMMUNITY
Start: 2022-10-18 | End: 2022-11-08

## 2022-11-07 RX ORDER — TALC
6 POWDER (GRAM) TOPICAL NIGHTLY PRN
Status: DISCONTINUED | OUTPATIENT
Start: 2022-11-07 | End: 2022-11-10 | Stop reason: HOSPADM

## 2022-11-07 RX ORDER — IPRATROPIUM BROMIDE AND ALBUTEROL SULFATE 2.5; .5 MG/3ML; MG/3ML
3 SOLUTION RESPIRATORY (INHALATION) EVERY 4 HOURS PRN
Status: DISCONTINUED | OUTPATIENT
Start: 2022-11-07 | End: 2022-11-10 | Stop reason: HOSPADM

## 2022-11-07 RX ORDER — BISACODYL 10 MG
10 SUPPOSITORY, RECTAL RECTAL DAILY PRN
Status: DISCONTINUED | OUTPATIENT
Start: 2022-11-07 | End: 2022-11-10 | Stop reason: HOSPADM

## 2022-11-07 RX ORDER — ENOXAPARIN SODIUM 100 MG/ML
40 INJECTION SUBCUTANEOUS EVERY 24 HOURS
Status: DISCONTINUED | OUTPATIENT
Start: 2022-11-07 | End: 2022-11-10 | Stop reason: HOSPADM

## 2022-11-07 RX ADMIN — CEFTRIAXONE 1 G: 1 INJECTION, SOLUTION INTRAVENOUS at 10:11

## 2022-11-07 NOTE — Clinical Note
Diagnosis: Sepsis [160871]   Future Attending Provider: JUAN M MCGARRY [[id number]]   Admitting Provider:: CORI BARBOUR [[id number]]

## 2022-11-08 PROBLEM — N17.9 AKI (ACUTE KIDNEY INJURY): Status: ACTIVE | Noted: 2022-11-08

## 2022-11-08 PROBLEM — N40.0 BENIGN PROSTATIC HYPERPLASIA WITHOUT LOWER URINARY TRACT SYMPTOMS: Status: ACTIVE | Noted: 2022-11-08

## 2022-11-08 PROBLEM — N30.00 ACUTE CYSTITIS WITHOUT HEMATURIA: Status: ACTIVE | Noted: 2022-11-07

## 2022-11-08 PROBLEM — D50.9 IDA (IRON DEFICIENCY ANEMIA): Status: ACTIVE | Noted: 2022-11-08

## 2022-11-08 LAB
ANION GAP SERPL CALC-SCNC: 11 MMOL/L (ref 8–16)
BASOPHILS # BLD AUTO: 0.05 K/UL (ref 0–0.2)
BASOPHILS NFR BLD: 0.4 % (ref 0–1.9)
BUN SERPL-MCNC: 31 MG/DL (ref 8–23)
CALCIUM SERPL-MCNC: 8.3 MG/DL (ref 8.7–10.5)
CHLORIDE SERPL-SCNC: 99 MMOL/L (ref 95–110)
CO2 SERPL-SCNC: 24 MMOL/L (ref 23–29)
CREAT SERPL-MCNC: 1.5 MG/DL (ref 0.5–1.4)
DIFFERENTIAL METHOD: ABNORMAL
EOSINOPHIL # BLD AUTO: 0 K/UL (ref 0–0.5)
EOSINOPHIL NFR BLD: 0 % (ref 0–8)
ERYTHROCYTE [DISTWIDTH] IN BLOOD BY AUTOMATED COUNT: 13.5 % (ref 11.5–14.5)
EST. GFR  (NO RACE VARIABLE): 46.2 ML/MIN/1.73 M^2
ESTIMATED AVG GLUCOSE: 143 MG/DL (ref 68–131)
FERRITIN SERPL-MCNC: 342 NG/ML (ref 20–300)
FOLATE SERPL-MCNC: 6.8 NG/ML (ref 4–24)
GLUCOSE SERPL-MCNC: 161 MG/DL (ref 70–110)
GLUCOSE SERPL-MCNC: 164 MG/DL (ref 70–110)
HBA1C MFR BLD: 6.6 % (ref 4–5.6)
HCT VFR BLD AUTO: 32 % (ref 40–54)
HGB BLD-MCNC: 10.5 G/DL (ref 14–18)
IMM GRANULOCYTES # BLD AUTO: 0.07 K/UL (ref 0–0.04)
IMM GRANULOCYTES NFR BLD AUTO: 0.6 % (ref 0–0.5)
INFLUENZA A, MOLECULAR: NOT DETECTED
INFLUENZA B, MOLECULAR: NOT DETECTED
IRON SERPL-MCNC: 12 UG/DL (ref 45–160)
LYMPHOCYTES # BLD AUTO: 1.5 K/UL (ref 1–4.8)
LYMPHOCYTES NFR BLD: 12.9 % (ref 18–48)
MAGNESIUM SERPL-MCNC: 1.2 MG/DL (ref 1.6–2.6)
MCH RBC QN AUTO: 28.8 PG (ref 27–31)
MCHC RBC AUTO-ENTMCNC: 32.8 G/DL (ref 32–36)
MCV RBC AUTO: 88 FL (ref 82–98)
MONOCYTES # BLD AUTO: 1.1 K/UL (ref 0.3–1)
MONOCYTES NFR BLD: 9.5 % (ref 4–15)
NEUTROPHILS # BLD AUTO: 8.8 K/UL (ref 1.8–7.7)
NEUTROPHILS NFR BLD: 76.6 % (ref 38–73)
NRBC BLD-RTO: 0 /100 WBC
PHOSPHATE SERPL-MCNC: 3 MG/DL (ref 2.7–4.5)
PLATELET # BLD AUTO: 296 K/UL (ref 150–450)
PMV BLD AUTO: 8.6 FL (ref 9.2–12.9)
POCT GLUCOSE: 161 MG/DL (ref 70–110)
POCT GLUCOSE: 164 MG/DL (ref 70–110)
POCT GLUCOSE: 185 MG/DL (ref 70–110)
POCT GLUCOSE: 187 MG/DL (ref 70–110)
POTASSIUM SERPL-SCNC: 3.7 MMOL/L (ref 3.5–5.1)
RBC # BLD AUTO: 3.65 M/UL (ref 4.6–6.2)
RSV AG BY MOLECULAR METHOD: NOT DETECTED
SARS-COV-2 RNA RESP QL NAA+PROBE: NOT DETECTED
SATURATED IRON: 5 % (ref 20–50)
SODIUM SERPL-SCNC: 134 MMOL/L (ref 136–145)
TOTAL IRON BINDING CAPACITY: 253 UG/DL (ref 250–450)
TRANSFERRIN SERPL-MCNC: 171 MG/DL (ref 200–375)
VIT B12 SERPL-MCNC: 192 PG/ML (ref 210–950)
WBC # BLD AUTO: 11.43 K/UL (ref 3.9–12.7)

## 2022-11-08 PROCEDURE — 84100 ASSAY OF PHOSPHORUS: CPT | Performed by: PHYSICIAN ASSISTANT

## 2022-11-08 PROCEDURE — 83036 HEMOGLOBIN GLYCOSYLATED A1C: CPT | Performed by: PHYSICIAN ASSISTANT

## 2022-11-08 PROCEDURE — 96361 HYDRATE IV INFUSION ADD-ON: CPT

## 2022-11-08 PROCEDURE — G0378 HOSPITAL OBSERVATION PER HR: HCPCS

## 2022-11-08 PROCEDURE — 82607 VITAMIN B-12: CPT | Performed by: PHYSICIAN ASSISTANT

## 2022-11-08 PROCEDURE — 83735 ASSAY OF MAGNESIUM: CPT | Performed by: PHYSICIAN ASSISTANT

## 2022-11-08 PROCEDURE — 82962 GLUCOSE BLOOD TEST: CPT

## 2022-11-08 PROCEDURE — 99226 PR SUBSEQUENT OBSERVATION CARE,LEVEL III: CPT | Mod: ,,, | Performed by: STUDENT IN AN ORGANIZED HEALTH CARE EDUCATION/TRAINING PROGRAM

## 2022-11-08 PROCEDURE — 96372 THER/PROPH/DIAG INJ SC/IM: CPT | Performed by: PHYSICIAN ASSISTANT

## 2022-11-08 PROCEDURE — 99226 PR SUBSEQUENT OBSERVATION CARE,LEVEL III: ICD-10-PCS | Mod: ,,, | Performed by: STUDENT IN AN ORGANIZED HEALTH CARE EDUCATION/TRAINING PROGRAM

## 2022-11-08 PROCEDURE — 82728 ASSAY OF FERRITIN: CPT | Performed by: PHYSICIAN ASSISTANT

## 2022-11-08 PROCEDURE — 85025 COMPLETE CBC W/AUTO DIFF WBC: CPT | Performed by: PHYSICIAN ASSISTANT

## 2022-11-08 PROCEDURE — 84466 ASSAY OF TRANSFERRIN: CPT | Performed by: PHYSICIAN ASSISTANT

## 2022-11-08 PROCEDURE — 25000003 PHARM REV CODE 250: Performed by: PHYSICIAN ASSISTANT

## 2022-11-08 PROCEDURE — 63600175 PHARM REV CODE 636 W HCPCS: Performed by: PHYSICIAN ASSISTANT

## 2022-11-08 PROCEDURE — 25000003 PHARM REV CODE 250: Performed by: STUDENT IN AN ORGANIZED HEALTH CARE EDUCATION/TRAINING PROGRAM

## 2022-11-08 PROCEDURE — 96367 TX/PROPH/DG ADDL SEQ IV INF: CPT

## 2022-11-08 PROCEDURE — 63600175 PHARM REV CODE 636 W HCPCS: Performed by: STUDENT IN AN ORGANIZED HEALTH CARE EDUCATION/TRAINING PROGRAM

## 2022-11-08 PROCEDURE — 82746 ASSAY OF FOLIC ACID SERUM: CPT | Performed by: PHYSICIAN ASSISTANT

## 2022-11-08 PROCEDURE — 96366 THER/PROPH/DIAG IV INF ADDON: CPT

## 2022-11-08 PROCEDURE — 80048 BASIC METABOLIC PNL TOTAL CA: CPT | Performed by: PHYSICIAN ASSISTANT

## 2022-11-08 RX ORDER — MAGNESIUM SULFATE HEPTAHYDRATE 40 MG/ML
2 INJECTION, SOLUTION INTRAVENOUS
Status: DISPENSED | OUTPATIENT
Start: 2022-11-08 | End: 2022-11-08

## 2022-11-08 RX ORDER — TAMSULOSIN HYDROCHLORIDE 0.4 MG/1
0.8 CAPSULE ORAL NIGHTLY
Status: DISCONTINUED | OUTPATIENT
Start: 2022-11-08 | End: 2022-11-10 | Stop reason: HOSPADM

## 2022-11-08 RX ORDER — HYDRALAZINE HYDROCHLORIDE 20 MG/ML
10 INJECTION INTRAMUSCULAR; INTRAVENOUS EVERY 6 HOURS PRN
Status: DISCONTINUED | OUTPATIENT
Start: 2022-11-08 | End: 2022-11-10 | Stop reason: HOSPADM

## 2022-11-08 RX ORDER — SODIUM CHLORIDE, SODIUM LACTATE, POTASSIUM CHLORIDE, CALCIUM CHLORIDE 600; 310; 30; 20 MG/100ML; MG/100ML; MG/100ML; MG/100ML
INJECTION, SOLUTION INTRAVENOUS CONTINUOUS
Status: DISCONTINUED | OUTPATIENT
Start: 2022-11-08 | End: 2022-11-10 | Stop reason: HOSPADM

## 2022-11-08 RX ORDER — ROPINIROLE 0.25 MG/1
1 TABLET, FILM COATED ORAL NIGHTLY PRN
Status: DISCONTINUED | OUTPATIENT
Start: 2022-11-08 | End: 2022-11-10 | Stop reason: HOSPADM

## 2022-11-08 RX ORDER — ACETAMINOPHEN 500 MG
1000 TABLET ORAL 2 TIMES DAILY PRN
COMMUNITY

## 2022-11-08 RX ORDER — TRAMADOL HYDROCHLORIDE 50 MG/1
50 TABLET ORAL 2 TIMES DAILY PRN
Status: DISCONTINUED | OUTPATIENT
Start: 2022-11-08 | End: 2022-11-10 | Stop reason: HOSPADM

## 2022-11-08 RX ORDER — ASPIRIN 81 MG/1
81 TABLET ORAL DAILY
Status: DISCONTINUED | OUTPATIENT
Start: 2022-11-08 | End: 2022-11-10 | Stop reason: HOSPADM

## 2022-11-08 RX ORDER — GABAPENTIN 300 MG/1
300 CAPSULE ORAL 3 TIMES DAILY
Status: DISCONTINUED | OUTPATIENT
Start: 2022-11-08 | End: 2022-11-10 | Stop reason: HOSPADM

## 2022-11-08 RX ORDER — LATANOPROST 50 UG/ML
1 SOLUTION/ DROPS OPHTHALMIC EVERY EVENING
Status: DISCONTINUED | OUTPATIENT
Start: 2022-11-08 | End: 2022-11-10 | Stop reason: HOSPADM

## 2022-11-08 RX ORDER — METHOCARBAMOL 750 MG/1
750 TABLET, FILM COATED ORAL 3 TIMES DAILY
Status: DISCONTINUED | OUTPATIENT
Start: 2022-11-08 | End: 2022-11-10 | Stop reason: HOSPADM

## 2022-11-08 RX ORDER — LANOLIN ALCOHOL/MO/W.PET/CERES
1 CREAM (GRAM) TOPICAL DAILY
Status: DISCONTINUED | OUTPATIENT
Start: 2022-11-08 | End: 2022-11-10 | Stop reason: HOSPADM

## 2022-11-08 RX ADMIN — GABAPENTIN 300 MG: 300 CAPSULE ORAL at 04:11

## 2022-11-08 RX ADMIN — METHOCARBAMOL 750 MG: 750 TABLET ORAL at 09:11

## 2022-11-08 RX ADMIN — INSULIN DETEMIR 8 UNITS: 100 INJECTION, SOLUTION SUBCUTANEOUS at 01:11

## 2022-11-08 RX ADMIN — SODIUM CHLORIDE, SODIUM LACTATE, POTASSIUM CHLORIDE, AND CALCIUM CHLORIDE 1000 ML: .6; .31; .03; .02 INJECTION, SOLUTION INTRAVENOUS at 01:11

## 2022-11-08 RX ADMIN — SODIUM CHLORIDE, SODIUM LACTATE, POTASSIUM CHLORIDE, AND CALCIUM CHLORIDE: .6; .31; .03; .02 INJECTION, SOLUTION INTRAVENOUS at 09:11

## 2022-11-08 RX ADMIN — ACETAMINOPHEN 650 MG: 325 TABLET ORAL at 11:11

## 2022-11-08 RX ADMIN — ENOXAPARIN SODIUM 40 MG: 100 INJECTION SUBCUTANEOUS at 12:11

## 2022-11-08 RX ADMIN — FERROUS SULFATE TAB 325 MG (65 MG ELEMENTAL FE) 1 EACH: 325 (65 FE) TAB at 08:11

## 2022-11-08 RX ADMIN — ASPIRIN 81 MG: 81 TABLET, COATED ORAL at 08:11

## 2022-11-08 RX ADMIN — METHOCARBAMOL 750 MG: 750 TABLET ORAL at 08:11

## 2022-11-08 RX ADMIN — ACETAMINOPHEN 650 MG: 325 TABLET ORAL at 04:11

## 2022-11-08 RX ADMIN — MAGNESIUM SULFATE 2 G: 2 INJECTION INTRAVENOUS at 09:11

## 2022-11-08 RX ADMIN — TAMSULOSIN HYDROCHLORIDE 0.8 MG: 0.4 CAPSULE ORAL at 03:11

## 2022-11-08 RX ADMIN — CEFTRIAXONE 1 G: 1 INJECTION, SOLUTION INTRAVENOUS at 04:11

## 2022-11-08 RX ADMIN — INSULIN DETEMIR 8 UNITS: 100 INJECTION, SOLUTION SUBCUTANEOUS at 09:11

## 2022-11-08 RX ADMIN — ACETAMINOPHEN 650 MG: 325 TABLET ORAL at 08:11

## 2022-11-08 RX ADMIN — GABAPENTIN 300 MG: 300 CAPSULE ORAL at 09:11

## 2022-11-08 RX ADMIN — GABAPENTIN 300 MG: 300 CAPSULE ORAL at 08:11

## 2022-11-08 RX ADMIN — ENOXAPARIN SODIUM 40 MG: 100 INJECTION SUBCUTANEOUS at 04:11

## 2022-11-08 RX ADMIN — LATANOPROST 1 DROP: 50 SOLUTION OPHTHALMIC at 09:11

## 2022-11-08 RX ADMIN — TAMSULOSIN HYDROCHLORIDE 0.8 MG: 0.4 CAPSULE ORAL at 09:11

## 2022-11-08 RX ADMIN — SODIUM CHLORIDE, SODIUM LACTATE, POTASSIUM CHLORIDE, AND CALCIUM CHLORIDE: .6; .31; .03; .02 INJECTION, SOLUTION INTRAVENOUS at 10:11

## 2022-11-08 RX ADMIN — ACETAMINOPHEN 650 MG: 325 TABLET ORAL at 12:11

## 2022-11-08 NOTE — ASSESSMENT & PLAN NOTE
- H/H stable near baseline  - Iron studies reviewed  - Started iron supplementation regimen  - Will continue to monitor on daily labs

## 2022-11-08 NOTE — SUBJECTIVE & OBJECTIVE
Interval History: Pt seen and examined this morning on iggy CUEVAS. Overall feels better today, though still with generalized weakness. Eager to work with PT/OT. Discussed continuing abx pending culture results. Care plan reviewed. Otherwise, doing well and with no further complaints at this time.      Objective:     Vital Signs (Most Recent):  Temp: 98.6 °F (37 °C) (11/08/22 0927)  Pulse: 83 (11/08/22 0927)  Resp: (!) 24 (11/08/22 0927)  BP: (!) 148/65 (11/08/22 0927)  SpO2: (!) 94 % (11/08/22 0927)   Vital Signs (24h Range):  Temp:  [98.6 °F (37 °C)-103 °F (39.4 °C)] 98.6 °F (37 °C)  Pulse:  [83-96] 83  Resp:  [15-28] 24  SpO2:  [94 %-97 %] 94 %  BP: (142-173)/(65-74) 148/65     Weight: 95.7 kg (211 lb)  Body mass index is 27.84 kg/m².    Intake/Output Summary (Last 24 hours) at 11/8/2022 0938  Last data filed at 11/8/2022 0521  Gross per 24 hour   Intake 26.23 ml   Output 875 ml   Net -848.77 ml        Physical Exam  Gen: in NAD, appears stated age  Neuro: AAOx4, CN2-12 grossly intact BL; motor, sensory, and strength grossly intact BL  HEENT: NTNC, EOMI, PERRLA, MMM; no thyromegaly or lymphadenopathy; no JVD appreciated  CVS: RRR, no m/r/g; S1/S2 auscultated with no S3 or S4; capillary refill < 2 sec  Resp: lungs CTAB, no w/r/r; no belabored breathing or accessory muscle use appreciated   Abd: BS+ in all 4 quadrants; NTND, soft to palpation; no organomegaly appreciated   Extrem: pulses full, equal, and regular over all 4 extremities; no UE or LE edema BL      Significant Labs: All pertinent labs within the past 24 hours have been reviewed.    Significant Imaging: I have reviewed all pertinent imaging results/findings within the past 24 hours.

## 2022-11-08 NOTE — HOSPITAL COURSE
Pt admitted to [facility] and was started on rocephin pending blood and urine culture results. DILIA noted with Cr 1.5, IVF provided; Tmax 103. Overall clinically improving, with resolution of suprapubic pain and dysuria. Still with fevers into 11/9, Tmax 102.6, relieved with tylenol; Ucx growing GNRs, awaiting further speciation. Renal function improved with IVF. PT/OT consulted for functional assessment.

## 2022-11-08 NOTE — ASSESSMENT & PLAN NOTE
- Cr 1.5, baseline WNL  - IVF LR at 100cc/hr provided  - Renally dosing all medications  - Avoid nephrotoxins  - Will continue to monitor on daily labs

## 2022-11-08 NOTE — PROGRESS NOTES
"[facility] - Emergency Dept  [facility] Medicine  Progress Note    Patient Name: Suleiman Chavez  MRN: [id number]  Patient Class: OP- Observation   Admission Date: 11/7/2022  Length of Stay: 0 days  Attending Physician: Efren Reeves MD  Primary Care Provider: Caleb Negrete MD        Subjective:     Principal Problem:Acute cystitis without hematuria        HPI:  Suleiman Chavez is a 82 y.o. male with a PMHx of HTN, T2DM, spinal stenosis s/p recent laminectomy of L3-L4 about 3 weeks ago who presents to [facility] for evaluation of generlaized weakness and fever. Family at bedside assist with history. Patient is currenly undergoing HH PT/OT since recent spinal surgery. He's usually able to participate in therapy without difficulty, however he's been significantly weak over the past 2 days and was unable to stand from his chair or walk today. He developed intermittent shaking/chills and fevers today. Wife also notes patient appeared to be slightly more confused and "out of it" over the past few days. He has slight bilateral back discomfort. Denies dysuria, difficulty urinating chest pain, SOB, N/V, abdominal pain, dizziness, HA or syncope.     ED: febrile to Tmax 102.3, vitals otherwise stable. No leukocytosis. Cr 1.4, baseline ~1.0. UA infectious. Cxr unremarkable. Given IV CTX 1g.       Overview/Hospital Course:  Pt admitted to [facility] and was started on rocephin pending blood and urine culture results. DILIA noted with Cr 1.5, IVF provided; Tmax 103. Overall clinically improving, with resolution of suprapubic pain and dysuria. PT/OT consulted for functional assessment.      Interval History: Pt seen and examined this morning on iggy CUEVAS. Overall feels better today, though still with generalized weakness. Eager to work with PT/OT. Discussed continuing abx pending culture results. Care plan reviewed. Otherwise, doing well and with no further complaints at this time.      Objective:     Vital Signs (Most Recent):  Temp: " 98.6 °F (37 °C) (11/08/22 0927)  Pulse: 83 (11/08/22 0927)  Resp: (!) 24 (11/08/22 0927)  BP: (!) 148/65 (11/08/22 0927)  SpO2: (!) 94 % (11/08/22 0927)   Vital Signs (24h Range):  Temp:  [98.6 °F (37 °C)-103 °F (39.4 °C)] 98.6 °F (37 °C)  Pulse:  [83-96] 83  Resp:  [15-28] 24  SpO2:  [94 %-97 %] 94 %  BP: (142-173)/(65-74) 148/65     Weight: 95.7 kg (211 lb)  Body mass index is 27.84 kg/m².    Intake/Output Summary (Last 24 hours) at 11/8/2022 0938  Last data filed at 11/8/2022 0521  Gross per 24 hour   Intake 26.23 ml   Output 875 ml   Net -848.77 ml        Physical Exam  Gen: in NAD, appears stated age  Neuro: AAOx4, CN2-12 grossly intact BL; motor, sensory, and strength grossly intact BL  HEENT: NTNC, EOMI, PERRLA, MMM; no thyromegaly or lymphadenopathy; no JVD appreciated  CVS: RRR, no m/r/g; S1/S2 auscultated with no S3 or S4; capillary refill < 2 sec  Resp: lungs CTAB, no w/r/r; no belabored breathing or accessory muscle use appreciated   Abd: BS+ in all 4 quadrants; NTND, soft to palpation; no organomegaly appreciated   Extrem: pulses full, equal, and regular over all 4 extremities; no UE or LE edema BL      Significant Labs: All pertinent labs within the past 24 hours have been reviewed.    Significant Imaging: I have reviewed all pertinent imaging results/findings within the past 24 hours.      Assessment/Plan:      * Acute cystitis without hematuria  - Interval history and physical exam findings as described above  - Tmax 103, no leukocytosis  - BCx and UCx pending  - Continue rocephin  - Clinically improving, hemodynamically stable  - PRN tylenol for fever  - Continuing to closely monitor    DILIA (acute kidney injury)  - Cr 1.5, baseline WNL  - IVF LR at 100cc/hr provided  - Renally dosing all medications  - Avoid nephrotoxins  - Will continue to monitor on daily labs    Essential hypertension  - Working to optimize BP control  - Holding losartan and chlorthalidone given DILIA   - PRN IV hydralazine for  SBP>160  - Will continue to monitor and further titrate antihypertensives as clinically indicated     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, with long-term current use of insulin  - Working to optimize BG control  - Levemir 8u qHS  - SSI provided for corrective dosing  - DXTs as ordered  - Hypoglycemic protocol in effect  - Continue home gabapentin regimen   - Diabetic diet provided    EMA (iron deficiency anemia)  - H/H stable near baseline  - Iron studies reviewed  - Started iron supplementation regimen  - Will continue to monitor on daily labs    Benign prostatic hyperplasia without lower urinary tract symptoms  - Continue home tamsulosin regimen      VTE Risk Mitigation (From admission, onward)         Ordered     enoxaparin injection 40 mg  Daily         11/07/22 2300     IP VTE HIGH RISK PATIENT  Once         11/07/22 2300                Discharge Planning   RAVIN: 11/10/2022     Code Status: Full Code   Is the patient medically ready for discharge?: No    Reason for patient still in hospital (select all that apply): Patient trending condition             Efren Reeves MD  Attending Physician  Department of Hospital Medicine  Epic secure chat preferred, or ext. [phone]  11/8/2022

## 2022-11-08 NOTE — ED NOTES
Tele box #4818 applied to sunil. Waleska in war room states able to see pt on monitor, NSR with HR 87.

## 2022-11-08 NOTE — ED TRIAGE NOTES
Patient complaining of weakness, fatigue, and fever. Tried going to  today to be evaluated but was unable to be seen. Sx began 2 days prior. Home COVID negative. Per EMS, patient normally able to walk without assistance but today he is too weak to walk.

## 2022-11-08 NOTE — ASSESSMENT & PLAN NOTE
- hold home metformin  - start inpatient regimen: detemir 8U qhs + LDSSI  - ACHS accuchecks  - diabetic diet  - repeat A1c  Lab Results   Component Value Date    HGBA1C 6.7 (H) 12/11/2020

## 2022-11-08 NOTE — ED PROVIDER NOTES
Encounter Date: 11/7/2022       History     Chief Complaint   Patient presents with    Weakness    Fever     Patient complaining of weakness, fatigue, and fever. Tried going to  today to be evaluated but was unable to be seen. Sx began 2 days prior. Home COVID negative. Per EMS, patient normally able to walk without assistance but today he is too weak to walk.      Patient is an 82-year-old male with a past medical history of hypertension, type 2 diabetes, BPH, and recent laminectomy of L3-4 2 weeks ago.  He presents to the [facility] [place] for fevers and weakness since last night (11/6).  Last night he realized he could not get out of his chair due to weakness and had a fever of over 101.  He took 2 Tylenol and tramadol and went to sleep.  He then woke up the following morning with a low-grade fever and has had shaking weakness and chills throughout the day.  He has had no nausea, vomiting, diarrhea, shortness of breath, chest pain, dizziness, or vision changes.  He reports no significant tenderness at the site of his recent surgery.  Of note, soon after his previous surgery his PCP doubled his dose of Flomax. Upon presentation in the emergency department he has a temperature of 101.5° F and a blood pressure of 150/70.      Review of patient's allergies indicates:  No Known Allergies  Past Medical History:   Diagnosis Date    Bilateral ocular hypertension     Diabetes mellitus     Glaucoma     Hypertension     Nuclear cataract 12/17/2014     Past Surgical History:   Procedure Laterality Date    CHOLECYSTECTOMY       Family History   Problem Relation Age of Onset    Cancer Mother         liver    Cancer Father         colon    No Known Problems Sister     No Known Problems Sister     Amblyopia Neg Hx     Blindness Neg Hx     Cataracts Neg Hx     Diabetes Neg Hx     Glaucoma Neg Hx     Hypertension Neg Hx     Macular degeneration Neg Hx     Retinal detachment Neg Hx     Strabismus Neg Hx     Stroke Neg Hx     Thyroid disease  Neg Hx      Social History     Tobacco Use    Smoking status: Former     Types: Cigarettes     Quit date: 2004     Years since quittin.4    Smokeless tobacco: Never   Substance Use Topics    Alcohol use: No     Alcohol/week: 0.0 standard drinks    Drug use: No     Review of Systems   Constitutional:  Positive for appetite change, chills, fatigue and fever.   HENT:  Positive for congestion. Negative for sore throat.    Eyes:  Negative for visual disturbance.   Respiratory:  Negative for cough, chest tightness and shortness of breath.    Cardiovascular:  Negative for chest pain, palpitations and leg swelling.   Gastrointestinal:  Negative for abdominal pain, constipation, diarrhea, nausea and vomiting.   Genitourinary:  Positive for frequency. Negative for dysuria and urgency.   Neurological:  Negative for dizziness, syncope, light-headedness and headaches.     Physical Exam     Initial Vitals [22 1641]   BP Pulse Resp Temp SpO2   (!) 150/70 96 20 (!) 101.5 °F (38.6 °C) 96 %      MAP       --         Physical Exam    Constitutional: He appears well-developed and well-nourished. He is diaphoretic.   Eyes: EOM are normal. Pupils are equal, round, and reactive to light.   Cardiovascular:  Normal rate, regular rhythm, normal heart sounds and intact distal pulses.           Pulmonary/Chest: Breath sounds normal. No respiratory distress.   Abdominal: Abdomen is soft. Bowel sounds are normal.   Musculoskeletal:         General: Normal range of motion.      Comments: Site of previous surgery looks clean and is nonerythematous but is diffusely warm to touch     Neurological: He is alert and oriented to person, place, and time.   Skin: Skin is warm. Capillary refill takes less than 2 seconds.   Psychiatric: He has a normal mood and affect.       ED Course   Procedures  Labs Reviewed   CBC W/ AUTO DIFFERENTIAL - Abnormal; Notable for the following components:       Result Value    RBC 3.97 (*)     Hemoglobin 11.4  (*)     Hematocrit 35.0 (*)     MPV 8.8 (*)     Gran # (ANC) 9.6 (*)     Immature Grans (Abs) 0.06 (*)     Lymph # 0.9 (*)     Gran % 83.3 (*)     Lymph % 7.9 (*)     All other components within normal limits   COMPREHENSIVE METABOLIC PANEL - Abnormal; Notable for the following components:    Sodium 134 (*)     Glucose 180 (*)     BUN 29 (*)     Calcium 8.4 (*)     Albumin 2.6 (*)     eGFR 50.2 (*)     All other components within normal limits   URINALYSIS, REFLEX TO URINE CULTURE - Abnormal; Notable for the following components:    Appearance, UA Cloudy (*)     Protein, UA 2+ (*)     Ketones, UA Trace (*)     Occult Blood UA 1+ (*)     Leukocytes, UA 3+ (*)     All other components within normal limits    Narrative:     Specimen Source->Urine   URINALYSIS MICROSCOPIC - Abnormal; Notable for the following components:    RBC, UA 10 (*)     WBC, UA >100 (*)     Bacteria Many (*)     Hyaline Casts, UA 3 (*)     All other components within normal limits    Narrative:     Specimen Source->Urine   CULTURE, BLOOD   CULTURE, BLOOD   CULTURE, URINE   HIV 1 / 2 ANTIBODY    Narrative:     Release to patient->Immediate   HEPATITIS C ANTIBODY    Narrative:     Release to patient->Immediate   LACTIC ACID, PLASMA   SARS-COV2 (COVID) WITH FLU/RSV BY PCR          Imaging Results              X-Ray Chest 1 View (Final result)  Result time 11/07/22 22:52:35   Procedure changed from X-Ray Chest PA And Lateral     Final result by Dylan Martin MD (11/07/22 22:52:35)                   Impression:      There is diminished depth of inspiration without additional radiographic evidence for superimposed acute intrathoracic process.      Electronically signed by: Dylan Martin  Date:    11/07/2022  Time:    22:52               Narrative:    EXAMINATION:  XR CHEST 1 VIEW    CLINICAL HISTORY:  sepsis; Sepsis, unspecified organism    TECHNIQUE:  Single frontal view of the chest was performed.    COMPARISON:  Chest radiograph August 19,  2019    FINDINGS:  Single chest view is submitted.  There is diminished depth of inspiration, this exaggerates the appearance of the cardiomediastinal silhouette which otherwise appears stable.  Accentuation of pulmonary bronchovascular markings consistent with diminished depth of inspiration noted.  There is no evidence for superimposed confluent infiltrate or consolidation, significant pleural effusion or pneumothorax.    Previously identified remote rib fractures are less apparent likely due to position and technique and depth of inspiration, there appears to be remote left clavicle fracture appearing similar.  Overall appearance of the osseous structures is that of chronic change.                                       Medications   enoxaparin injection 40 mg (40 mg Subcutaneous Given 11/8/22 0007)   albuterol-ipratropium 2.5 mg-0.5 mg/3 mL nebulizer solution 3 mL (has no administration in time range)   melatonin tablet 6 mg (has no administration in time range)   ondansetron disintegrating tablet 8 mg (has no administration in time range)   promethazine tablet 25 mg (has no administration in time range)   polyethylene glycol packet 17 g (has no administration in time range)   bisacodyL suppository 10 mg (has no administration in time range)   acetaminophen tablet 650 mg (650 mg Oral Given 11/8/22 0006)   glucose chewable tablet 16 g (has no administration in time range)   glucose chewable tablet 24 g (has no administration in time range)   glucagon (human recombinant) injection 1 mg (has no administration in time range)   dextrose 10% bolus 125 mL (has no administration in time range)   dextrose 10% bolus 250 mL (has no administration in time range)   insulin aspart U-100 pen 0-5 Units (has no administration in time range)   cefTRIAXone (ROCEPHIN) 1 g/50 mL D5W IVPB (0 g Intravenous Stopped 11/7/22 5940)     Medical Decision Making:   History:   Old Medical Records: I decided to obtain old medical  records.  Initial Assessment:   Patient is an 82-year-old male with a past medical history of hypertension, type 2 diabetes, BPH, and recent laminectomy of L3-4 2 weeks ago.  He [place] the [facility] for fevers and weakness since last night (11/6).     Differential Diagnosis:   Sepsis from pneumonia, UTI, or surgical site vs decompensation of his laminectomy surgery    Clinical Tests:   Lab Tests: Ordered and Reviewed  Radiological Study: Ordered and Reviewed  Medical Tests: Ordered and Reviewed  ED Management:  Patient is an 82-year-old male with recent laminectomy surgery that presents with severe generalized weakness and fevers.  He is diaphoretic on presentation and his overall clinical status is suspicious for infection.  Sepsis workup included urinalysis, chest x-ray, and blood cultures.  Urinalysis was positive for UTI and he was started on ceftriaxone.  Additionally he was tested for COVID-19, flu, and RSV, with results pending.  He was admitted to hospital medicine for his sepsis and inability to ambulate.          Attending Attestation:   Physician Attestation Statement for Resident:  As the supervising MD   Physician Attestation Statement: I have personally seen and examined this patient.   I agree with the above history.  -:   As the supervising MD I agree with the above PE.     As the supervising MD I agree with the above treatment, course, plan, and disposition.   -: 83 yo M s/p L3-4 laminectomy at [facility] Oct 18, 2022 presents for fever, chills and generalized weakness today  Reports also frequency of urination no dysuria, also reports congestion and cough though the congestion seems to be chronic    No cp/sob, no abd pain. Back: mild pain, has been getting better since the surgery. No skin rash, no LE edema or pain  Denies focal weakness, his entire body feels weak    Fatigued, congested, mild cough  Scattered mild wheezing  Rrr  Abd soft nd nt  LE no edema no calf ttp  UE 4/5 strength  LE 3/5 strength,  sensation intact, face symmetric, speech normal  Midline L spine surgical incision healing well no erythema no discharge no ttp    Patient had surgery at [facility] [place], however, all his physicians are at [facility] and prefers to stay at [facility]    Based on his presentation, high suspicion for infectious etiology:  vs respiratory. Surgical site healing well, no worsening pain  CXR independently reviewed and neg for infiltrate  UA suggestive of UTI, started on ceftriaxone    Dg:   Acute cystitis  Generalized weakness                              Clinical Impression:   Final diagnoses:  [A41.9] Sepsis  [N39.0] Urinary tract infection without hematuria, site unspecified (Primary)      ED Disposition Condition    Observation Stable                Crisitno Kelly DO  Resident  11/08/22 0059       Liset Willis MD  11/08/22 0843

## 2022-11-08 NOTE — SUBJECTIVE & OBJECTIVE
Past Medical History:   Diagnosis Date    Bilateral ocular hypertension     Diabetes mellitus     Glaucoma     Hypertension     Nuclear cataract 12/17/2014       Past Surgical History:   Procedure Laterality Date    CHOLECYSTECTOMY         Review of patient's allergies indicates:  No Known Allergies    No current facility-administered medications on file prior to encounter.     Current Outpatient Medications on File Prior to Encounter   Medication Sig    amoxicillin-clavulanate 875-125mg (AUGMENTIN) 875-125 mg per tablet TAKE 1 TABLET BY MOUTH TWICE DAILY FOR 10 DAYS    aspirin (ECOTRIN) 81 MG EC tablet Take 81 mg by mouth once daily.    chlorthalidone (HYGROTEN) 25 MG Tab TAKE 1 TABLET BY MOUTH EVERY DAY    diclofenac (VOLTAREN) 75 MG EC tablet Take 1 tablet (75 mg total) by mouth 2 (two) times daily. (Patient not taking: Reported on 12/16/2020)    fluticasone (FLONASE) 50 mcg/actuation nasal spray 2 sprays by Each Nare route once daily.    FLUZONE HIGHDOSE QUAD 20-21  mcg/0.7 mL Syrg INJECT INTO THE MUSCLE PER PROTOCOL    gabapentin (NEURONTIN) 300 MG capsule TAKE 1 CAPSULE BY MOUTH THREE TIMES DAILY    latanoprost 0.005 % ophthalmic solution INSTILL 1 DROP INTO BOTH EYES EVERY EVENING    losartan (COZAAR) 100 MG tablet TAKE 1 TABLET BY MOUTH EVERY DAY    metFORMIN (GLUCOPHAGE) 500 MG tablet Take 1 tablet (500 mg total) by mouth 3 (three) times daily.    metFORMIN (GLUCOPHAGE) 500 MG tablet TAKE 1 TABLET BY MOUTH THREE TIMES DAILY    methocarbamoL (ROBAXIN) 750 MG Tab Take 750 mg by mouth 3 (three) times daily.    rOPINIRole (REQUIP) 1 MG tablet TAKE 1 TABLET BY MOUTH EVERY EVENING    tamsulosin (FLOMAX) 0.4 mg Cap Take 2 capsules (0.8 mg total) by mouth every evening.    traMADoL (ULTRAM) 50 mg tablet TAKE 1 TABLET BY MOUTH TWICE DAILY AS NEEDED FOR PAIN     Family History       Problem Relation (Age of Onset)    Cancer Mother, Father    No Known Problems Sister, Sister          Tobacco Use    Smoking  status: Former     Types: Cigarettes     Quit date: 2004     Years since quittin.4    Smokeless tobacco: Never   Substance and Sexual Activity    Alcohol use: No     Alcohol/week: 0.0 standard drinks    Drug use: No    Sexual activity: Not on file     Review of Systems   Constitutional:  Positive for fatigue and fever. Negative for activity change and chills.   HENT:  Negative for congestion, trouble swallowing and voice change.    Eyes:  Negative for photophobia and visual disturbance.   Respiratory:  Negative for chest tightness, shortness of breath and wheezing.    Cardiovascular:  Negative for chest pain, palpitations and leg swelling.   Gastrointestinal:  Negative for abdominal pain, constipation, diarrhea, nausea and vomiting.   Genitourinary:  Positive for flank pain. Negative for dysuria, frequency, hematuria and urgency.   Musculoskeletal:  Negative for arthralgias, back pain and gait problem.   Skin:  Negative for color change and rash.   Neurological:  Positive for weakness. Negative for dizziness, syncope, light-headedness, numbness and headaches.   Psychiatric/Behavioral:  Positive for confusion. Negative for agitation. The patient is not nervous/anxious.    Objective:     Vital Signs (Most Recent):  Temp: 100.3 °F (37.9 °C) (22 0104)  Pulse: 92 (22)  Resp: (!) 24 (22)  BP: (!) 156/66 (22)  SpO2: 95 % (22)   Vital Signs (24h Range):  Temp:  [99.3 °F (37.4 °C)-102.3 °F (39.1 °C)] 100.3 °F (37.9 °C)  Pulse:  [92-96] 92  Resp:  [20-24] 24  SpO2:  [95 %-96 %] 95 %  BP: (150-156)/(66-70) 156/66     Weight: 95.7 kg (211 lb)  Body mass index is 27.84 kg/m².    Physical Exam  Vitals and nursing note reviewed.   Constitutional:       General: He is not in acute distress.     Appearance: He is well-developed.   HENT:      Head: Normocephalic and atraumatic.      Mouth/Throat:      Pharynx: No oropharyngeal exudate.   Eyes:      Extraocular Movements:  Extraocular movements intact.      Conjunctiva/sclera: Conjunctivae normal.   Cardiovascular:      Rate and Rhythm: Normal rate and regular rhythm.      Heart sounds: Normal heart sounds.   Pulmonary:      Effort: Pulmonary effort is normal. No respiratory distress.      Breath sounds: Normal breath sounds. No wheezing.   Abdominal:      General: Bowel sounds are normal. There is no distension.      Palpations: Abdomen is soft.      Tenderness: There is no abdominal tenderness.      Comments: Difficult to assess CVA tenderness 2/2 immobility   Musculoskeletal:         General: No tenderness. Normal range of motion.      Cervical back: Normal range of motion and neck supple.   Lymphadenopathy:      Cervical: No cervical adenopathy.   Skin:     General: Skin is warm and dry.      Capillary Refill: Capillary refill takes less than 2 seconds.      Findings: No rash.   Neurological:      Mental Status: He is alert and oriented to person, place, and time.      Cranial Nerves: No cranial nerve deficit.      Sensory: No sensory deficit.      Coordination: Coordination normal.   Psychiatric:         Behavior: Behavior normal.         Thought Content: Thought content normal.         Judgment: Judgment normal.           Significant Labs: All pertinent labs within the past 24 hours have been reviewed.  CBC:   Recent Labs   Lab 11/07/22  1840   WBC 11.46   HGB 11.4*   HCT 35.0*        CMP:   Recent Labs   Lab 11/07/22  1840   *   K 3.9   CL 99   CO2 23   *   BUN 29*   CREATININE 1.4   CALCIUM 8.4*   PROT 7.1   ALBUMIN 2.6*   BILITOT 0.6   ALKPHOS 117   AST 20   ALT 13   ANIONGAP 12       Significant Imaging: I have reviewed all pertinent imaging results/findings within the past 24 hours.

## 2022-11-08 NOTE — ASSESSMENT & PLAN NOTE
- Working to optimize BP control  - Holding losartan and chlorthalidone given DILIA   - PRN IV hydralazine for SBP>160  - Will continue to monitor and further titrate antihypertensives as clinically indicated

## 2022-11-08 NOTE — ED NOTES
Please call pt's wife, Liat, for updates of pt's care and whereabouts.   [phone] (cell)  [phone] (home)

## 2022-11-08 NOTE — ED NOTES
Patient identifiers verified and correct for Suleiman Chavez.   LOC: The patient is awake, alert and aware of environment with an appropriate affect, the patient is oriented x 3 and speaking appropriately.   APPEARANCE: Patient appears comfortable and in no acute distress, patient is clean and well groomed.  SKIN: The skin is warm and dry, color consistent with ethnicity, patient has normal skin turgor and moist mucus membranes, skin intact, no breakdown or bruising noted. Per the patient he had a fever today, patient currently afebrile.   MUSCULOSKELETAL: Patient moving all extremities spontaneously, but does have increase weakness in bilateral legs. no swelling noted. Patient usually able to ambulate at home, but today has felt an increase in weakness.   RESPIRATORY: Airway is open and patent, respirations are spontaneous, patient has a normal effort and rate, no accessory muscle use noted, O2 sats noted at 96% on room air.  CARDIAC: Patient has a normal rate and regular rhythm, no edema noted, capillary refill < 3 seconds.   GASTRO: Soft and non tender to palpation, no distention noted, normoactive bowel sounds present in all four quadrants. Pt states bowel movements have been regular.  : Pt denies any pain or frequency with urination. Patient presents to the ER with a condom cath in place from home. Urine a cloudy yellow color.   NEURO: Pt opens eyes spontaneously, behavior appropriate to situation, follows commands, facial expression symmetrical, bilateral hand grasp equal and even, purposeful motor response noted, normal sensation in all extremities when touched with a finger.

## 2022-11-08 NOTE — ED NOTES
LOC/APPEARANCE: Pt is AAOx4 and appears to be in NAD. Pt is calm and cooperative, following commands appropriately. Affect is appropriate. Speech is appropriate and clear. Bed in lowest and locked position w/ side rails up x2.  SKIN: Warm, dry, and intact; color consistent with ethnicity. Mucus membranes dry, acyanotic. 20G PIV clean, dry, and intact at LUE.  MUSCULOSKELETAL: Active ROM to BUE/BLE. No visible swelling or deformities noted to extremities.   RESPIRATORY: Airway is open and patent w/ no c/o SOB. RR spontaneous, even, easy, and non-labored. No accessory muscle use noted. Denies cough. Sating 97% on RA.  CARDIAC:  Regular HR. Denies c/o chest pain or discomfort. +2 peripheral pulses. No peripheral edema noted.   ABDOMEN: Round, soft, and non-tender to palpation. Denies abdominal pain, N/V/D, or change in appetite.  NEUROLOGIC: Head normocephalic and atraumatic. Pt wears bifocals - denies visual changes. Eyes open spontaneously.  Purposeful motor response noted; normal sensation in all extremities.   GENITOURINARY: Pt voiding independently via condom catheter.

## 2022-11-08 NOTE — ASSESSMENT & PLAN NOTE
- Interval history and physical exam findings as described above  - Tmax 103, no leukocytosis  - BCx and UCx pending  - Continue rocephin  - Clinically improving, hemodynamically stable  - PRN tylenol for fever  - Continuing to closely monitor

## 2022-11-08 NOTE — ASSESSMENT & PLAN NOTE
Sepsis  - 2/4 SIRS for HR>90, Temp >100.4  - lactic WNL  - UA infectious  - unable to fully access CVA tenderness 2/2 immobility  - continue IV CTX 1g q24hrs  - follow blood and urine cx  - IVFs  - supportive care

## 2022-11-08 NOTE — H&P
"[facility]  [facility] Physical    Patient Name: Suleiman Chavez  MRN: [id number]  Patient Class: OP- Observation  Admission Date: 11/7/2022  Attending Physician: Efren Reeves MD   Primary Care Provider: Caleb Negrete MD         Patient information was obtained from patient, past medical records and ER records.     Subjective:     Principal Problem:Acute cystitis without hematuria    Chief Complaint:   Chief Complaint   Patient presents with    Weakness    Fever     Patient complaining of weakness, fatigue, and fever. Tried going to  today to be evaluated but was unable to be seen. Sx began 2 days prior. Home COVID negative. Per EMS, patient normally able to walk without assistance but today he is too weak to walk.         HPI: Suleiman Chavez is a 82 y.o. male with a PMHx of HTN, T2DM, spinal stenosis s/p recent laminectomy of L3-L4 about 3 weeks ago who [place] [facility] [place] of generlaized weakness and fever. Family at bedside assist with history. Patient is currenly undergoing HH PT/OT since recent spinal surgery. He's usually able to participate in therapy without difficulty, however he's been significantly weak over the past 2 days and was unable to stand from his chair or walk today. He developed intermittent shaking/chills and fevers today. Wife also notes patient appeared to be slightly more confused and "out of it" over the past few days. He has slight bilateral back discomfort. Denies dysuria, difficulty urinating chest pain, SOB, N/V, abdominal pain, dizziness, HA or syncope.     ED: febrile to Tmax 102.3, vitals otherwise stable. No leukocytosis. Cr 1.4, baseline ~1.0. UA infectious. Cxr unremarkable. Given IV CTX 1g.       Past Medical History:   Diagnosis Date    Bilateral ocular hypertension     Diabetes mellitus     Glaucoma     Hypertension     Nuclear cataract 12/17/2014       Past Surgical History:   Procedure Laterality Date    " CHOLECYSTECTOMY         Review of patient's allergies indicates:  No Known Allergies    No current facility-administered medications on file prior to encounter.     Current Outpatient Medications on File Prior to Encounter   Medication Sig    amoxicillin-clavulanate 875-125mg (AUGMENTIN) 875-125 mg per tablet TAKE 1 TABLET BY MOUTH TWICE DAILY FOR 10 DAYS    aspirin (ECOTRIN) 81 MG EC tablet Take 81 mg by mouth once daily.    chlorthalidone (HYGROTEN) 25 MG Tab TAKE 1 TABLET BY MOUTH EVERY DAY    diclofenac (VOLTAREN) 75 MG EC tablet Take 1 tablet (75 mg total) by mouth 2 (two) times daily. (Patient not taking: Reported on 2020)    fluticasone (FLONASE) 50 mcg/actuation nasal spray 2 sprays by Each Nare route once daily.    FLUZONE HIGHDOSE QUAD 20-21  mcg/0.7 mL Syrg INJECT INTO THE MUSCLE PER PROTOCOL    gabapentin (NEURONTIN) 300 MG capsule TAKE 1 CAPSULE BY MOUTH THREE TIMES DAILY    latanoprost 0.005 % ophthalmic solution INSTILL 1 DROP INTO BOTH EYES EVERY EVENING    losartan (COZAAR) 100 MG tablet TAKE 1 TABLET BY MOUTH EVERY DAY    metFORMIN (GLUCOPHAGE) 500 MG tablet Take 1 tablet (500 mg total) by mouth 3 (three) times daily.    metFORMIN (GLUCOPHAGE) 500 MG tablet TAKE 1 TABLET BY MOUTH THREE TIMES DAILY    methocarbamoL (ROBAXIN) 750 MG Tab Take 750 mg by mouth 3 (three) times daily.    rOPINIRole (REQUIP) 1 MG tablet TAKE 1 TABLET BY MOUTH EVERY EVENING    tamsulosin (FLOMAX) 0.4 mg Cap Take 2 capsules (0.8 mg total) by mouth every evening.    traMADoL (ULTRAM) 50 mg tablet TAKE 1 TABLET BY MOUTH TWICE DAILY AS NEEDED FOR PAIN     Family History       Problem Relation (Age of Onset)    Cancer Mother, Father    No Known Problems Sister, Sister          Tobacco Use    Smoking status: Former     Types: Cigarettes     Quit date: 2004     Years since quittin.4    Smokeless tobacco: Never   Substance and Sexual Activity    Alcohol use: No     Alcohol/week: 0.0 standard  drinks    Drug use: No    Sexual activity: Not on file     Review of Systems   Constitutional:  Positive for fatigue and fever. Negative for activity change and chills.   HENT:  Negative for congestion, trouble swallowing and voice change.    Eyes:  Negative for photophobia and visual disturbance.   Respiratory:  Negative for chest tightness, shortness of breath and wheezing.    Cardiovascular:  Negative for chest pain, palpitations and leg swelling.   Gastrointestinal:  Negative for abdominal pain, constipation, diarrhea, nausea and vomiting.   Genitourinary:  Positive for flank pain. Negative for dysuria, frequency, hematuria and urgency.   Musculoskeletal:  Negative for arthralgias, back pain and gait problem.   Skin:  Negative for color change and rash.   Neurological:  Positive for weakness. Negative for dizziness, syncope, light-headedness, numbness and headaches.   Psychiatric/Behavioral:  Positive for confusion. Negative for agitation. The patient is not nervous/anxious.    Objective:     Vital Signs (Most Recent):  Temp: 100.3 °F (37.9 °C) (11/08/22 0104)  Pulse: 92 (11/07/22 2321)  Resp: (!) 24 (11/07/22 2321)  BP: (!) 156/66 (11/07/22 2321)  SpO2: 95 % (11/07/22 2321)   Vital Signs (24h Range):  Temp:  [99.3 °F (37.4 °C)-102.3 °F (39.1 °C)] 100.3 °F (37.9 °C)  Pulse:  [92-96] 92  Resp:  [20-24] 24  SpO2:  [95 %-96 %] 95 %  BP: (150-156)/(66-70) 156/66     Weight: 95.7 kg (211 lb)  Body mass index is 27.84 kg/m².    Physical Exam  Vitals and nursing note reviewed.   Constitutional:       General: He is not in acute distress.     Appearance: He is well-developed.   HENT:      Head: Normocephalic and atraumatic.      Mouth/Throat:      Pharynx: No oropharyngeal exudate.   Eyes:      Extraocular Movements: Extraocular movements intact.      Conjunctiva/sclera: Conjunctivae normal.   Cardiovascular:      Rate and Rhythm: Normal rate and regular rhythm.      Heart sounds: Normal heart sounds.   Pulmonary:       Effort: Pulmonary effort is normal. No respiratory distress.      Breath sounds: Normal breath sounds. No wheezing.   Abdominal:      General: Bowel sounds are normal. There is no distension.      Palpations: Abdomen is soft.      Tenderness: There is no abdominal tenderness.      Comments: Difficult to assess CVA tenderness 2/2 immobility   Musculoskeletal:         General: No tenderness. Normal range of motion.      Cervical back: Normal range of motion and neck supple.   Lymphadenopathy:      Cervical: No cervical adenopathy.   Skin:     General: Skin is warm and dry.      Capillary Refill: Capillary refill takes less than 2 seconds.      Findings: No rash.   Neurological:      Mental Status: He is alert and oriented to person, place, and time.      Cranial Nerves: No cranial nerve deficit.      Sensory: No sensory deficit.      Coordination: Coordination normal.   Psychiatric:         Behavior: Behavior normal.         Thought Content: Thought content normal.         Judgment: Judgment normal.           Significant Labs: All pertinent labs within the past 24 hours have been reviewed.  CBC:   Recent Labs   Lab 11/07/22  1840   WBC 11.46   HGB 11.4*   HCT 35.0*        CMP:   Recent Labs   Lab 11/07/22  1840   *   K 3.9   CL 99   CO2 23   *   BUN 29*   CREATININE 1.4   CALCIUM 8.4*   PROT 7.1   ALBUMIN 2.6*   BILITOT 0.6   ALKPHOS 117   AST 20   ALT 13   ANIONGAP 12       Significant Imaging: I have reviewed all pertinent imaging results/findings within the past 24 hours.    Assessment/Plan:     * Acute cystitis without hematuria  Sepsis  - 2/4 SIRS for HR>90, Temp >100.4  - lactic WNL  - UA infectious  - unable to fully access CVA tenderness 2/2 immobility  - continue IV CTX 1g q24hrs  - follow blood and urine cx  - IVFs  - supportive care    DILIA (acute kidney injury)  - Cr 1.4, baseline ~1.0  - suspect 2/2 dehydration and UTI  - IVFs and antibiotics as above  - avoid nephrotoxins, renally  dose meds  - strict I/Os  - trend BMP w/ daily labs    Type 2 diabetes mellitus, without long-term current use of insulin  - hold home metformin  - start inpatient regimen: detemir 8U qhs + LDSSI  - ACHS accuchecks  - diabetic diet  - repeat A1c  Lab Results   Component Value Date    HGBA1C 6.7 (H) 12/11/2020       Hypertension  - hold losartan and chlorthalidone given DILIA and sepsis     VTE Risk Mitigation (From admission, onward)         Ordered     enoxaparin injection 40 mg  Daily         11/07/22 2300     IP VTE HIGH RISK PATIENT  Once         11/07/22 2300                   Stella Ruiz PA-C  Department of Hospital Medicine   [facility] - Emergency Dept

## 2022-11-08 NOTE — HPI
"Suleiman Chavez is a 82 y.o. male with a PMHx of HTN, T2DM, spinal stenosis s/p recent laminectomy of L3-L4 about 3 weeks ago who [place] to [facility] [place] of generlaized weakness and fever. Family at bedside assist with history. Patient is currenly undergoing HH PT/OT since recent spinal surgery. He's usually able to participate in therapy without difficulty, however he's been significantly weak over the past 2 days and was unable to stand from his chair or walk today. He developed intermittent shaking/chills and fevers today. Wife also notes patient appeared to be slightly more confused and "out of it" over the past few days. He has slight bilateral back discomfort. Denies dysuria, difficulty urinating chest pain, SOB, N/V, abdominal pain, dizziness, HA or syncope.     ED: febrile to Tmax 102.3, vitals otherwise stable. No leukocytosis. Cr 1.4, baseline ~1.0. UA infectious. Cxr unremarkable. Given IV CTX 1g.   "

## 2022-11-08 NOTE — PHARMACY MED REC
"    Admission Medication History     The home medication history was taken by Elzbieta Vargas.    You may go to "Admission" then "Reconcile Home Medications" tabs to review and/or act upon these items.     The home medication list has been updated by the Pharmacy department.   Please read ALL comments highlighted in yellow.   Please address this information as you see fit.    Feel free to contact us if you have any questions or require assistance.      The medications listed below were removed from the home medication list. Please reorder if appropriate:  Patient reports no longer taking the following medication(s):  AMOXICILLIN-CLAVULANATE 875-125 MG  DICLOFENAC 75 MG EC  FLUTICASONE 50 MCG/ ACTUATION NASAL SPRAY  METHOCARBAMOL 750 MG    Medications listed below were obtained from: Patient/family      Current Outpatient Medications on File Prior to Encounter   Medication Sig    acetaminophen (TYLENOL) 500 MG tablet   Take 1,000 mg by mouth 2 (two) times a day.    aspirin (ECOTRIN) 81 MG EC tablet   Take 81 mg by mouth once daily.    chlorthalidone (HYGROTEN) 25 MG Tab   TAKE 1 TABLET BY MOUTH EVERY DAY    cholecalciferol, vitamin D3, (VITAMIN D3 ORAL)   Take 1,000 mg by mouth once daily.    gabapentin (NEURONTIN) 300 MG capsule   Take 300 mg by mouth 2 (two) times daily.    latanoprost 0.005 % ophthalmic solution   INSTILL 1 DROP INTO BOTH EYES EVERY EVENING    losartan (COZAAR) 100 MG tablet   TAKE 1 TABLET BY MOUTH EVERY DAY    metFORMIN (GLUCOPHAGE) 500 MG tablet   Take 1 tablet by mouth every morning & 2 tablets every evening    rOPINIRole (REQUIP) 1 MG tablet   Take 1 mg by mouth nightly as needed (restless leg).    tamsulosin (FLOMAX) 0.4 mg Cap   Take 2 capsules (0.8 mg total) by mouth every evening.    traMADoL (ULTRAM) 50 mg tablet TAKE 1 TABLET BY MOUTH TWICE DAILY AS NEEDED FOR PAIN         Elzbieta Vargas  EXT [id number]              .        "

## 2022-11-08 NOTE — ASSESSMENT & PLAN NOTE
- Cr 1.4, baseline ~1.0  - suspect 2/2 dehydration and UTI  - IVFs and antibiotics as above  - avoid nephrotoxins, renally dose meds  - strict I/Os  - trend BMP w/ daily labs

## 2022-11-08 NOTE — ASSESSMENT & PLAN NOTE
- Working to optimize BG control  - Levemir 8u qHS  - SSI provided for corrective dosing  - DXTs as ordered  - Hypoglycemic protocol in effect  - Continue home gabapentin regimen   - Diabetic diet provided

## 2022-11-08 NOTE — PLAN OF CARE
11/08/22 1716   Post-Acute Status   Post-Acute Authorization Home Health;Other   Home Health Status Pending post-acute provider review/more information requested   Discharge Delays None known at this time   Discharge Plan   Discharge Plan A Home Health;Home with family   Discharge Plan B Skilled Nursing Facility;Rehab     SW met with patient and family. Family has HH present and can resume care with HH agency. But SW is recommending SNF or Rehab if needed.      JOSE ROBERTO Huffman, MSW-LMSW  Medical Social Worker/  ER Department

## 2022-11-09 PROBLEM — R53.81 DEBILITY: Status: ACTIVE | Noted: 2022-11-09

## 2022-11-09 PROBLEM — N17.9 AKI (ACUTE KIDNEY INJURY): Status: RESOLVED | Noted: 2022-11-08 | Resolved: 2022-11-09

## 2022-11-09 LAB
ANION GAP SERPL CALC-SCNC: 12 MMOL/L (ref 8–16)
BACTERIA UR CULT: ABNORMAL
BASOPHILS # BLD AUTO: 0.04 K/UL (ref 0–0.2)
BASOPHILS NFR BLD: 0.4 % (ref 0–1.9)
BUN SERPL-MCNC: 34 MG/DL (ref 8–23)
CALCIUM SERPL-MCNC: 8.3 MG/DL (ref 8.7–10.5)
CHLORIDE SERPL-SCNC: 99 MMOL/L (ref 95–110)
CO2 SERPL-SCNC: 22 MMOL/L (ref 23–29)
CREAT SERPL-MCNC: 1.4 MG/DL (ref 0.5–1.4)
DIFFERENTIAL METHOD: ABNORMAL
EOSINOPHIL # BLD AUTO: 0 K/UL (ref 0–0.5)
EOSINOPHIL NFR BLD: 0.1 % (ref 0–8)
ERYTHROCYTE [DISTWIDTH] IN BLOOD BY AUTOMATED COUNT: 13.4 % (ref 11.5–14.5)
EST. GFR  (NO RACE VARIABLE): 50.2 ML/MIN/1.73 M^2
GLUCOSE SERPL-MCNC: 112 MG/DL (ref 70–110)
HCT VFR BLD AUTO: 35.9 % (ref 40–54)
HGB BLD-MCNC: 11.5 G/DL (ref 14–18)
IMM GRANULOCYTES # BLD AUTO: 0.05 K/UL (ref 0–0.04)
IMM GRANULOCYTES NFR BLD AUTO: 0.5 % (ref 0–0.5)
LYMPHOCYTES # BLD AUTO: 2.2 K/UL (ref 1–4.8)
LYMPHOCYTES NFR BLD: 22.3 % (ref 18–48)
MAGNESIUM SERPL-MCNC: 1.7 MG/DL (ref 1.6–2.6)
MCH RBC QN AUTO: 28.7 PG (ref 27–31)
MCHC RBC AUTO-ENTMCNC: 32 G/DL (ref 32–36)
MCV RBC AUTO: 90 FL (ref 82–98)
MONOCYTES # BLD AUTO: 1.1 K/UL (ref 0.3–1)
MONOCYTES NFR BLD: 10.8 % (ref 4–15)
NEUTROPHILS # BLD AUTO: 6.6 K/UL (ref 1.8–7.7)
NEUTROPHILS NFR BLD: 65.9 % (ref 38–73)
NRBC BLD-RTO: 0 /100 WBC
PHOSPHATE SERPL-MCNC: 2.7 MG/DL (ref 2.7–4.5)
PLATELET # BLD AUTO: 289 K/UL (ref 150–450)
PMV BLD AUTO: 8.9 FL (ref 9.2–12.9)
POCT GLUCOSE: 115 MG/DL (ref 70–110)
POCT GLUCOSE: 160 MG/DL (ref 70–110)
POCT GLUCOSE: 161 MG/DL (ref 70–110)
POCT GLUCOSE: 165 MG/DL (ref 70–110)
POCT GLUCOSE: 184 MG/DL (ref 70–110)
POCT GLUCOSE: 230 MG/DL (ref 70–110)
POTASSIUM SERPL-SCNC: 3.8 MMOL/L (ref 3.5–5.1)
RBC # BLD AUTO: 4.01 M/UL (ref 4.6–6.2)
SODIUM SERPL-SCNC: 133 MMOL/L (ref 136–145)
WBC # BLD AUTO: 10.03 K/UL (ref 3.9–12.7)

## 2022-11-09 PROCEDURE — 97161 PT EVAL LOW COMPLEX 20 MIN: CPT

## 2022-11-09 PROCEDURE — 97116 GAIT TRAINING THERAPY: CPT

## 2022-11-09 PROCEDURE — 85025 COMPLETE CBC W/AUTO DIFF WBC: CPT | Performed by: PHYSICIAN ASSISTANT

## 2022-11-09 PROCEDURE — 80048 BASIC METABOLIC PNL TOTAL CA: CPT | Performed by: PHYSICIAN ASSISTANT

## 2022-11-09 PROCEDURE — 96366 THER/PROPH/DIAG IV INF ADDON: CPT

## 2022-11-09 PROCEDURE — 63600175 PHARM REV CODE 636 W HCPCS: Performed by: PHYSICIAN ASSISTANT

## 2022-11-09 PROCEDURE — 97165 OT EVAL LOW COMPLEX 30 MIN: CPT

## 2022-11-09 PROCEDURE — 84100 ASSAY OF PHOSPHORUS: CPT | Performed by: PHYSICIAN ASSISTANT

## 2022-11-09 PROCEDURE — 97530 THERAPEUTIC ACTIVITIES: CPT

## 2022-11-09 PROCEDURE — 96372 THER/PROPH/DIAG INJ SC/IM: CPT | Performed by: PHYSICIAN ASSISTANT

## 2022-11-09 PROCEDURE — 99226 PR SUBSEQUENT OBSERVATION CARE,LEVEL III: CPT | Mod: ,,, | Performed by: STUDENT IN AN ORGANIZED HEALTH CARE EDUCATION/TRAINING PROGRAM

## 2022-11-09 PROCEDURE — G0378 HOSPITAL OBSERVATION PER HR: HCPCS

## 2022-11-09 PROCEDURE — 99226 PR SUBSEQUENT OBSERVATION CARE,LEVEL III: ICD-10-PCS | Mod: ,,, | Performed by: STUDENT IN AN ORGANIZED HEALTH CARE EDUCATION/TRAINING PROGRAM

## 2022-11-09 PROCEDURE — 25000003 PHARM REV CODE 250: Performed by: STUDENT IN AN ORGANIZED HEALTH CARE EDUCATION/TRAINING PROGRAM

## 2022-11-09 PROCEDURE — 25000003 PHARM REV CODE 250: Performed by: PHYSICIAN ASSISTANT

## 2022-11-09 PROCEDURE — 63600175 PHARM REV CODE 636 W HCPCS: Performed by: STUDENT IN AN ORGANIZED HEALTH CARE EDUCATION/TRAINING PROGRAM

## 2022-11-09 PROCEDURE — 36415 COLL VENOUS BLD VENIPUNCTURE: CPT | Performed by: PHYSICIAN ASSISTANT

## 2022-11-09 PROCEDURE — 96361 HYDRATE IV INFUSION ADD-ON: CPT

## 2022-11-09 PROCEDURE — 83735 ASSAY OF MAGNESIUM: CPT | Performed by: PHYSICIAN ASSISTANT

## 2022-11-09 RX ADMIN — ACETAMINOPHEN 650 MG: 325 TABLET ORAL at 03:11

## 2022-11-09 RX ADMIN — GABAPENTIN 300 MG: 300 CAPSULE ORAL at 09:11

## 2022-11-09 RX ADMIN — LATANOPROST 1 DROP: 50 SOLUTION OPHTHALMIC at 09:11

## 2022-11-09 RX ADMIN — TAMSULOSIN HYDROCHLORIDE 0.8 MG: 0.4 CAPSULE ORAL at 09:11

## 2022-11-09 RX ADMIN — CEFTRIAXONE 1 G: 1 INJECTION, SOLUTION INTRAVENOUS at 03:11

## 2022-11-09 RX ADMIN — FERROUS SULFATE TAB 325 MG (65 MG ELEMENTAL FE) 1 EACH: 325 (65 FE) TAB at 08:11

## 2022-11-09 RX ADMIN — METHOCARBAMOL 750 MG: 750 TABLET ORAL at 09:11

## 2022-11-09 RX ADMIN — TRAMADOL HYDROCHLORIDE 50 MG: 50 TABLET, COATED ORAL at 05:11

## 2022-11-09 RX ADMIN — GABAPENTIN 300 MG: 300 CAPSULE ORAL at 03:11

## 2022-11-09 RX ADMIN — SODIUM CHLORIDE, SODIUM LACTATE, POTASSIUM CHLORIDE, AND CALCIUM CHLORIDE: .6; .31; .03; .02 INJECTION, SOLUTION INTRAVENOUS at 06:11

## 2022-11-09 RX ADMIN — INSULIN DETEMIR 8 UNITS: 100 INJECTION, SOLUTION SUBCUTANEOUS at 09:11

## 2022-11-09 RX ADMIN — SODIUM CHLORIDE, SODIUM LACTATE, POTASSIUM CHLORIDE, AND CALCIUM CHLORIDE: .6; .31; .03; .02 INJECTION, SOLUTION INTRAVENOUS at 05:11

## 2022-11-09 RX ADMIN — GABAPENTIN 300 MG: 300 CAPSULE ORAL at 08:11

## 2022-11-09 RX ADMIN — ASPIRIN 81 MG: 81 TABLET, COATED ORAL at 09:11

## 2022-11-09 RX ADMIN — ENOXAPARIN SODIUM 40 MG: 100 INJECTION SUBCUTANEOUS at 03:11

## 2022-11-09 RX ADMIN — ROPINIROLE HYDROCHLORIDE 1 MG: 0.25 TABLET, FILM COATED ORAL at 05:11

## 2022-11-09 NOTE — PLAN OF CARE
SW met with patient wife and adult daughter at bedside. Patient was alert but did not answer any questions. SW was able to get collateral information from wife and daughter. Wife stated that the patient had back surgery at [facility] 3 weeks ago and has declined mentally and with his mobility.Wife stated that the patient is currently getting home health with Pt from [facility]. Patient and family is willing for patient to go to an inpatient SNF/Rehab if necessary. According to wife patient is compliant with medications.     Berta Weber, ALAINA, MSW-[facility]  Medical Social Worker/  ER Department   [facility]H  Initial Discharge Assessment       Primary Care Provider: Caleb Negrete MD    Admission Diagnosis: Chest pain [R07.9]  Sepsis [A41.9]  Urinary tract infection without hematuria, site unspecified [N39.0]    Admission Date: 11/7/2022  Expected Discharge Date: 11/10/2022    Discharge Barriers Identified: (P) None    Payor: [employer] MEDICARE / Plan: [employer] MEDICARE HMO / Product Type: Capitation /     Extended Emergency Contact Information  Primary Emergency Contact: Liat Chavez  Address: [address] [place] [address] DR Michael VERDUGO, [address]  Home Phone: [phone]  Relation: Spouse    Discharge Plan A: Home Health, Home with family  Discharge Plan B: Skilled Nursing Facility, Rehab      [facility] - [facility] [address] [place] [address]  Phone: [phone] Fax: [phone]    [id number] - [facility] [address] [address]  Phone: [phone] Fax: [phone]    [id number] [address] [facility] AT [address] [address]  Phone: [phone] Fax: [phone]    [facility] - [place]du[place]  [address]  [facility] [address]  Phone: [phone] Fax: [phone]      Initial Assessment (most recent)       Adult Discharge Assessment - 11/08/22 1716          Discharge Assessment    Assessment Type Discharge Planning Assessment (P)      Confirmed/corrected address, phone number and insurance Yes (P)      Confirmed Demographics Correct on Facesheet (P)      Source of Information patient;family (P)      When was your last doctors appointment? -- (P)    2 weeks ago via telephone but has not seen doctor physically in 2 years    Does patient/caregiver understand observation status Yes (P)      Communicated RAVIN with patient/caregiver Yes (P)      Reason For Admission Weakness, Non ambulatory and AMS (P)      Lives With significant other (P)      Facility Arrived From: Home (P)      Do you expect to return to your current living situation? Yes (P)      Do you have help at home or someone to help you manage your care at home? Yes (P)      Who are your caregiver(s) and their phone number(s)? Wife Liat (P)      Prior to hospitilization cognitive status: Unable to Assess (P)      Current cognitive status: Alert/Oriented (P)      Walking or Climbing Stairs Difficulty ambulation difficulty, dependent;stair climbing difficulty, dependent;transferring difficulty, dependent (P)      Mobility Management walker (P)      Dressing/Bathing Difficulty dressing difficulty, requires equipment;dressing difficulty, assistance 1 person (P)      Dressing/Bathing Management shower chair and wife (P)      Home Accessibility wheelchair accessible (P)    patient has a transport chair at home that he uses    Equipment Currently Used at Home bedside commode;walker, standard;rollator;cane, straight (P)      Readmission within 30 days? Yes (P)      Patient currently being followed by outpatient case management? No (P)      Do you currently have service(s) that help you manage your care at home? Yes (P)      Name and Contact number of agency  [facility] (P)      Is the pt/caregiver preference to resume services with current agency Yes (P)      Do you take prescription medications? Yes (P)      Do you have prescription coverage? Yes (P)      Coverage Medicare (P)      Do you have any problems affording any of your prescribed medications? No (P)      Is the patient taking medications as prescribed? yes (P)      Who is going to help you get home at discharge? Wife and daughter (P)      How do you get to doctors appointments? family or friend will provide (P)      Are you on dialysis? No (P)      Do you take coumadin? No (P)      DME Needed Upon Discharge  none (P)      Discharge Plan discussed with: Spouse/sig other;Patient;Adult children (P)      Name(s) and Number(s) Kath Laird [phone] (P)      Discharge Barriers Identified None (P)         Relationship/Environment    Name(s) of Who Lives With Patient Shona Josue (P)

## 2022-11-09 NOTE — PT/OT/SLP EVAL
Physical Therapy Co-Evaluation with OT    Patient Name:  Suleiman Chavez   MRN:  [id number]    Recommendations:     Discharge Recommendations:  other (see comments)   Discharge Equipment Recommendations: none   Barriers to discharge: None    Assessment:     Suleiman Chavez is a 82 y.o. male admitted with a medical diagnosis of Acute cystitis without hematuria.  He presents with the following impairments/functional limitations:  weakness, impaired endurance, impaired self care skills, impaired functional mobility, decreased coordination, impaired balance, gait instability Pt tolerated treatment well as he was able to gait train 8 ft with RW with min A. Pt had one LOB. Pt will continue to benefit from skilled PT 3x/ wk in order to increase functional mobility. Pt when medically stable should discharge with further therapy.    Rehab Prognosis: Good; patient would benefit from acute skilled PT services to address these deficits and reach maximum level of function.    Recent Surgery: * No surgery found *      Plan:     During this hospitalization, patient to be seen 3 x/week to address the identified rehab impairments via gait training, therapeutic activities, therapeutic exercises, neuromuscular re-education and progress toward the following goals:    Plan of Care Expires:  12/07/22    Subjective     Chief Complaint: none  Patient/Family Comments/goals: to return to prior level of function  Pain/Comfort:  Pain Rating 1: 0/10  Pain Rating Post-Intervention 1: 0/10    Patients cultural, spiritual, Pentecostalism conflicts given the current situation: no    Living Environment:  Pt lives with wife Liat in a [facility] that is wheel chair accessible. There is a slab to enter the home with no railing.  Prior to admission, patients level of function was mod I as he uses a walker, standard to mobilize and a SPC in the bathroom. Pt needs help to bathe which wife provides care as pt sits on shower chair.  Equipment used at home: bedside  commode, walker, standard, cane, straight, transport chair.  DME owned (not currently used): none.  Upon discharge, patient will have assistance from wife and family.    Objective:     Communicated with nurse prior to session.  Patient found supine with Condom Catheter, peripheral IV, telemetry  upon PT entry to room.    General Precautions: Standard, fall   Orthopedic Precautions:N/A   Braces: N/A  Respiratory Status: Room air    Exams:  Cognitive Exam:  Patient is oriented to Person, Place, Time, and Situation  RLE ROM: WFL  RLE Strength: WFL  LLE ROM: WFL  LLE Strength: WFL    Functional Mobility:  Bed Mobility:     Supine to Sit: stand by assistance    Transfers:     Sit to Stand x 2 reps:  stand by assistance with rolling walker. Pt needed verbal cueing for hand placement.     Gait: Pt gait trained for 8 ft with RW and min A. Pt had one LOB. Pt was able to step up on the scale to get a weight. Pt appeared unsteady during gait training.    Due to pt complex medical condition, the skill of 2 licensed therapists is needed to maximize treatment session and progression towards goals        AM-PAC 6 CLICK MOBILITY  Total Score:18       Treatment & Education:  Pt and wife were verbally educated on PT POC and PT role and both expressed verbal understanding.     Patient left up in chair with all lines intact, call button in reach, nurse notified, and nurse and wife present.    GOALS:   Multidisciplinary Problems       Physical Therapy Goals          Problem: Physical Therapy    Goal Priority Disciplines Outcome Goal Variances Interventions   Physical Therapy Goal     PT, PT/OT Ongoing, Progressing     Description: Goals to be met by: 22     Patient will increase functional independence with mobility by performin. Supine to sit with Modified [place]  2. Sit to supine with Modified [place]  3. Sit to stand transfer with Modified [place]  4. Gait  x 150 feet with Contact Guard Assistance using  Rolling Walker.                          History:     Past Medical History:   Diagnosis Date    Bilateral ocular hypertension     Diabetes mellitus     Glaucoma     Hypertension     Nuclear cataract 12/17/2014       Past Surgical History:   Procedure Laterality Date    CHOLECYSTECTOMY         Time Tracking:     PT Received On: 11/09/22  PT Start Time: 1053     PT Stop Time: 1116  PT Total Time (min): 23 min     Billable Minutes: Evaluation 8 min and Gait Training 15 min      11/09/2022

## 2022-11-09 NOTE — PLAN OF CARE
Problem: Physical Therapy  Goal: Physical Therapy Goal  Description: Goals to be met by: 22     Patient will increase functional independence with mobility by performin. Supine to sit with Modified [place]  2. Sit to supine with Modified [place]  3. Sit to stand transfer with Modified [place]  4. Gait  x 150 feet with Contact Guard Assistance using Rolling Walker.     Outcome: Ongoing, Progressing  Eval and goals appropriate 2022

## 2022-11-09 NOTE — PT/OT/SLP EVAL
Occupational Therapy   Evaluation and Treatment    Co-eval with PT to have 2 skilled therapists present to safely assess pt's functional mobility.     Name: Suleiman Chavez  MRN: [id number]  Admitting Diagnosis:  Acute cystitis without hematuria  Recent Surgery: * No surgery found *      Recommendations:     Discharge Recommendations: other (see comments)  Discharge Equipment Recommendations:  none  Barriers to discharge:  None    Assessment:     Suleiman Chavez is a 82 y.o. male with a medical diagnosis of Acute cystitis without hematuria.  Pt tolerated session well and without incident, but he requires assistance to perform ADLs and mobility and is performing below his functional baseline.  He should d/c with further therapy once medically stable.  He presents with the following. Performance deficits affecting function: weakness, impaired endurance, impaired self care skills, impaired functional mobility, gait instability, impaired balance, decreased coordination.      Rehab Prognosis: Good; patient would benefit from acute skilled OT services to address these deficits and reach maximum level of function.       Plan:     Patient to be seen 3 x/week to address the above listed problems via self-care/home management, therapeutic exercises, therapeutic activities  Plan of Care Expires: 12/08/22  Plan of Care Reviewed with: patient, spouse    Subjective     Chief Complaint: decreased level of function  Patient/Family Comments/goals: to return to his PLOF    Occupational Profile:  Living Environment: Pt lives with his wife in a [facility] with a threshold to enter.  He has a step-in shower with a small threshold to clear that has a shower chair.    Previous level of function: Mod I with standard walker in the house, but he uses the single point cane in the bathroom due to the narrowness of the door.  He uses a rollator or transport chair for community distances.  Pt's wife said she (A) him with t/f in and out of the shower,  but pt performed ADLs with independence.   His wife noticed a decline in pt's functional level since his back surgery 3 weeks ago due to UTI.    Roles and Routines:   Equipment Used at Home:  bedside commode, shower chair, rollator, walker, standard, cane, straight, other (see comments) (transport chair)  Assistance upon Discharge: His wife and family     Pain/Comfort:  Pain Rating 1: 0/10  Pain Rating Post-Intervention 1: 0/10    Patients cultural, spiritual, Sabianism conflicts given the current situation: no    Objective:     Communicated with: nursing and PT prior to session.  Patient found HOB elevated with Condom Catheter, telemetry, peripheral IV with his wife present upon OT entry to room.    General Precautions: Standard, fall, hearing impaired   Orthopedic Precautions:N/A   Braces: N/A  Respiratory Status: Room air    Occupational Performance:    Bed Mobility:    Patient completed Rolling/Turning to Right with stand by assistance  Patient completed Scooting/Bridging with stand by assistance  Patient completed Supine to Sit with stand by assistance    Functional Mobility/Transfers:  Patient completed Sit <> Stand Transfer from EOB with stand by assistance with rolling walker, requiring cueing for safe hand placement  Patient completed Bed <> Chair Transfer using Step Transfer technique with minimum assistance with rolling walker  Functional Mobility: Pt ambulated ~8 ft from EOB to the scale for nursing weight measure to the bedside chair, requiring Min A with RW and having 1 episode of LOB.  He was very unsteady while ambulating.      Activities of Daily Living:  Upper Body Dressing: minimum assistance to doff dirty gown/javier clean one and to manage lines while seated in bedside chair  Pt did not need to void but would require Min A with RW to stand pivot to a bedside commode.     Cognitive/Visual Perceptual:  Cognitive/Psychosocial Skills:     -       Oriented to: Person, Place, Time, and Situation    -       Follows Commands/attention:Follows multistep  commands  -       Communication: clear/fluent    Physical Exam:  Upper Extremity Range of Motion:     -       Right Upper Extremity: WFL  -       Left Upper Extremity: WFL  Upper Extremity Strength:    -       Right Upper Extremity: WFL  -       Left Upper Extremity: WFL   Strength:    -       Right Upper Extremity: WFL  -       Left Upper Extremity: WFL  Fine Motor Coordination:    -       Intact    AMPAC 6 Click ADL:  AMPAC Total Score: 19    Treatment & Education:  Pt and his wife edu on role of OT, POC, safety when performing self care tasks, benefit of performing OOB activity, and safety when performing functional transfers and mobility.    - Self care tasks completed-- as noted above      Patient left up in chair with all lines intact, call button in reach, nursing notified, and his wife and nursing present    GOALS:   Multidisciplinary Problems       Occupational Therapy Goals          Problem: Occupational Therapy    Goal Priority Disciplines Outcome Interventions   Occupational Therapy Goal     OT, PT/OT Ongoing, Progressing    Description: Goals to be met by: 11/23/22022     Patient will increase functional independence with ADLs by performing:    UE Dressing with Set-up Assistance.  LE Dressing with Supervision.  Grooming while standing at sink with Supervision.  Toileting from toilet with Supervision for hygiene and clothing management.   Supine to sit with Modified [place].  Sit to stand transfer with Supervision with RW.  Toilet transfer to toilet with SBA with RW.                         History:     Past Medical History:   Diagnosis Date    Bilateral ocular hypertension     Diabetes mellitus     Glaucoma     Hypertension     Nuclear cataract 12/17/2014         Past Surgical History:   Procedure Laterality Date    CHOLECYSTECTOMY         Time Tracking:     OT Date of Treatment: 11/09/22  OT Start Time: 1053  OT Stop Time: 1116  OT Total  Time (min): 23 min    Billable Minutes:Evaluation 10 min  Therapeutic Activity 13 min    11/9/2022

## 2022-11-09 NOTE — SUBJECTIVE & OBJECTIVE
Interval History: Pt seen and examined this morning on iggy CUEVAS. States he is grossly asymptomatic today, improved form admission. Eager to work with PT/OT. Discussed continuing abx pending culture results. Care plan reviewed. Otherwise, doing well and with no further complaints at this time.      Objective:     Vital Signs (Most Recent):  Temp: 98.4 °F (36.9 °C) (11/09/22 0746)  Pulse: 74 (11/09/22 0812)  Resp: 19 (11/09/22 0746)  BP: 127/60 (11/09/22 0746)  SpO2: (!) 93 % (11/09/22 0746)   Vital Signs (24h Range):  Temp:  [98.4 °F (36.9 °C)-102.8 °F (39.3 °C)] 98.4 °F (36.9 °C)  Pulse:  [74-99] 74  Resp:  [17-24] 19  SpO2:  [93 %-96 %] 93 %  BP: (122-173)/(58-74) 127/60     Weight: 95.7 kg (211 lb)  Body mass index is 27.84 kg/m².    Intake/Output Summary (Last 24 hours) at 11/9/2022 0947  Last data filed at 11/9/2022 0439  Gross per 24 hour   Intake 90.42 ml   Output 850 ml   Net -759.58 ml        Physical Exam  Gen: in NAD, appears stated age  Neuro: AAOx4, CN2-12 grossly intact BL; motor, sensory, and strength grossly intact BL  HEENT: NTNC, EOMI, PERRLA, MMM; no thyromegaly or lymphadenopathy; no JVD appreciated  CVS: RRR, no m/r/g; S1/S2 auscultated with no S3 or S4; capillary refill < 2 sec  Resp: lungs CTAB, no w/r/r; no belabored breathing or accessory muscle use appreciated   Abd: BS+ in all 4 quadrants; NTND, soft to palpation; no organomegaly appreciated   Extrem: pulses full, equal, and regular over all 4 extremities; no UE or LE edema BL      Significant Labs: All pertinent labs within the past 24 hours have been reviewed.    Significant Imaging: I have reviewed all pertinent imaging results/findings within the past 24 hours.

## 2022-11-09 NOTE — CONSULTS
[facility] [address] [place] [address] Medicine  Telemedicine Consult Note    Patient Name: Suleiman Chavez  MRN: [id number]  Admission Date: 11/7/2022  Hospital Length of Stay: 0 days  Attending Physician: Efren Reeves MD   Primary Care Provider: Caleb Negrete MD       Thank you for your consult to [facility]. We have reviewed the patient chart. This patient does not meet criteria for [facility] service at this time due to Patient is a new admission. Will hand back to In-house service..        Rama Sage MD  Department of Hospital Medicine   [facility] [address]

## 2022-11-09 NOTE — PLAN OF CARE
Problem: Occupational Therapy  Goal: Occupational Therapy Goal  Description: Goals to be met by: 11/23/22022     Patient will increase functional independence with ADLs by performing:    UE Dressing with Set-up Assistance.  LE Dressing with Supervision.  Grooming while standing at sink with Supervision.  Toileting from toilet with Supervision for hygiene and clothing management.   Supine to sit with Modified [place].  Sit to stand transfer with Supervision with RW.  Toilet transfer to toilet with SBA with RW.    Outcome: Ongoing, Progressing     Pt evaluated and OT goals established.

## 2022-11-09 NOTE — PLAN OF CARE
Patient c/o pain to lower back this morning. PRN tramadol 50mg given per MAR. VSS. Continuous LR going at 100ml/hr. Fall and safety precautions maintained. Bed in lowest locked position. Call light within reach. POC discussed with patient. Will continue to monitor.     Problem: Adult Inpatient Plan of Care  Goal: Plan of Care Review  11/9/2022 0615 by Rahel Peoples LPN  Outcome: Ongoing, Progressing  11/9/2022 0614 by Rahel Peoples LPN  Outcome: Ongoing, Progressing  Goal: Patient-Specific Goal (Individualized)  11/9/2022 0615 by Rahel Peoples LPN  Outcome: Ongoing, Progressing  11/9/2022 0614 by Rahel Peoples LPN  Outcome: Ongoing, Progressing  Goal: Absence of Hospital-Acquired Illness or Injury  11/9/2022 0615 by Rahel Peoples LPN  Outcome: Ongoing, Progressing  11/9/2022 0614 by Rahel Peoples LPN  Outcome: Ongoing, Progressing  Goal: Optimal Comfort and Wellbeing  11/9/2022 0615 by Rahel Peoples LPN  Outcome: Ongoing, Progressing  11/9/2022 0614 by Rahel Peoples LPN  Outcome: Ongoing, Progressing  Goal: Readiness for Transition of Care  11/9/2022 0615 by Rahel Peoples LPN  Outcome: Ongoing, Progressing  11/9/2022 0614 by Rahel Peoples LPN  Outcome: Ongoing, Progressing     Problem: Infection  Goal: Absence of Infection Signs and Symptoms  11/9/2022 0615 by Rahel Peoples LPN  Outcome: Ongoing, Progressing  11/9/2022 0614 by Rahel Peoples LPN  Outcome: Ongoing, Progressing     Problem: Fall Injury Risk  Goal: Absence of Fall and Fall-Related Injury  11/9/2022 0615 by Rahel Peoples LPN  Outcome: Ongoing, Progressing  11/9/2022 0614 by Rahel Peoples LPN  Outcome: Ongoing, Progressing     Problem: Diabetes Comorbidity  Goal: Blood Glucose Level Within Targeted Range  11/9/2022 0615 by Rahel Peoples LPN  Outcome: Ongoing, Progressing  11/9/2022 0614 by Rahel Peoples LPN  Outcome: Ongoing, Progressing     Problem: Fluid and Electrolyte Imbalance (Acute Kidney Injury/Impairment)  Goal: Fluid and  Electrolyte Balance  11/9/2022 0615 by Rahel Peoples LPN  Outcome: Ongoing, Progressing  11/9/2022 0614 by Rahel Peoples LPN  Outcome: Ongoing, Progressing     Problem: Oral Intake Inadequate (Acute Kidney Injury/Impairment)  Goal: Optimal Nutrition Intake  11/9/2022 0615 by Rahel Peoples LPN  Outcome: Ongoing, Progressing  11/9/2022 0614 by Rahel Peoples LPN  Outcome: Ongoing, Progressing     Problem: Renal Function Impairment (Acute Kidney Injury/Impairment)  Goal: Effective Renal Function  11/9/2022 0615 by Rahel Peoples LPN  Outcome: Ongoing, Progressing  11/9/2022 0614 by Rahel Peoples LPN  Outcome: Ongoing, Progressing     Problem: Skin Injury Risk Increased  Goal: Skin Health and Integrity  11/9/2022 0615 by Rahel Peoples LPN  Outcome: Ongoing, Progressing  11/9/2022 0614 by Rahel Peoples LPN  Outcome: Ongoing, Progressing

## 2022-11-09 NOTE — ASSESSMENT & PLAN NOTE
- Interval history and physical exam findings as described above  - Tmax 102.6, no leukocytosis  - BCx and UCx pending; prelim UCx with GNRs  - Continue rocephin  - Clinically improving, hemodynamically stable  - PRN tylenol for fever  - Continuing to closely monitor

## 2022-11-09 NOTE — NURSING
11/08/22 1800   Vital Signs   Temp 99.5 °F (37.5 °C)   Temp src Oral   Pulse 82   Heart Rate Source Monitor   Resp 17   SpO2 (!) 94 %   O2 Device (Oxygen Therapy) room air   BP (!) 122/59   MAP (mmHg) 85   BP Location Left arm   BP Method Automatic   Patient Position Sitting        Cardiac/Telemetry Details / Alarms   Cardiac/Telemetry Monitor On Yes   Cardiac/Telemetry Audible Yes   Cardiac/Telemetry Alarms Set Yes   Cardiac/Telemetry Box Number [id number]   Assessments (Pre/Post)   Level of Consciousness (AVPU) alert

## 2022-11-09 NOTE — PROGRESS NOTES
"[facility] - [facility] [address] [place] [address] Medicine  Progress Note    Patient Name: Suleiman Chavez  MRN: [id number]  Patient Class: OP- Observation   Admission Date: 11/7/2022  Length of Stay: 0 days  Attending Physician: Efren Reeves MD  Primary Care Provider: Caleb Negrete MD        Subjective:     Principal Problem:Acute cystitis without hematuria        HPI:  Suleiman Chavez is a 82 y.o. male with a PMHx of HTN, T2DM, spinal stenosis s/p recent laminectomy of L3-L4 about 3 weeks ago who presents to [facility] [place] [place] for evaluation of generlaized weakness and fever. Family at bedside assist with history. Patient is currenly undergoing HH PT/OT since recent spinal surgery. He's usually able to participate in therapy without difficulty, however he's been significantly weak over the past 2 days and was unable to stand from his chair or walk today. He developed intermittent shaking/chills and fevers today. Wife also notes patient appeared to be slightly more confused and "out of it" over the past few days. He has slight bilateral back discomfort. Denies dysuria, difficulty urinating chest pain, SOB, N/V, abdominal pain, dizziness, HA or syncope.     ED: febrile to Tmax 102.3, vitals otherwise stable. No leukocytosis. Cr 1.4, baseline ~1.0. UA infectious. Cxr unremarkable. Given IV CTX 1g.       Overview/Hospital Course:  [place] [facility] [place] rocephin pending blood and urine culture results. DILIA noted with Cr 1.5, IVF provided; Tmax 103. Overall clinically improving, with resolution of suprapubic pain and dysuria. Still with fevers into 11/9, Tmax 102.6, relieved with tylenol; Ucx growing GNRs, awaiting further speciation. Renal function improved with IVF. PT/OT consulted for functional assessment.      Interval History: Pt seen and examined this morning on iggy CUEVAS. States he is grossly asymptomatic today, improved form admission. Eager to work with PT/OT. Discussed continuing abx pending culture results. " Care plan reviewed. Otherwise, doing well and with no further complaints at this time.      Objective:     Vital Signs (Most Recent):  Temp: 98.4 °F (36.9 °C) (11/09/22 0746)  Pulse: 74 (11/09/22 0812)  Resp: 19 (11/09/22 0746)  BP: 127/60 (11/09/22 0746)  SpO2: (!) 93 % (11/09/22 0746)   Vital Signs (24h Range):  Temp:  [98.4 °F (36.9 °C)-102.8 °F (39.3 °C)] 98.4 °F (36.9 °C)  Pulse:  [74-99] 74  Resp:  [17-24] 19  SpO2:  [93 %-96 %] 93 %  BP: (122-173)/(58-74) 127/60     Weight: 95.7 kg (211 lb)  Body mass index is 27.84 kg/m².    Intake/Output Summary (Last 24 hours) at 11/9/2022 0947  Last data filed at 11/9/2022 0439  Gross per 24 hour   Intake 90.42 ml   Output 850 ml   Net -759.58 ml        Physical Exam  Gen: in NAD, appears stated age  Neuro: AAOx4, CN2-12 grossly intact BL; motor, sensory, and strength grossly intact BL  HEENT: NTNC, EOMI, PERRLA, MMM; no thyromegaly or lymphadenopathy; no JVD appreciated  CVS: RRR, no m/r/g; S1/S2 auscultated with no S3 or S4; capillary refill < 2 sec  Resp: lungs CTAB, no w/r/r; no belabored breathing or accessory muscle use appreciated   Abd: BS+ in all 4 quadrants; NTND, soft to palpation; no organomegaly appreciated   Extrem: pulses full, equal, and regular over all 4 extremities; no UE or LE edema BL      Significant Labs: All pertinent labs within the past 24 hours have been reviewed.    Significant Imaging: I have reviewed all pertinent imaging results/findings within the past 24 hours.      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, with long-term current use of insulin  - Working to optimize BG control  - Levemir 8u qHS  - SSI provided for corrective dosing  - DXTs as ordered  - Hypoglycemic protocol in effect  - Continue home gabapentin regimen   - Diabetic diet provided    EMA (iron deficiency anemia)  - H/H stable near baseline  - Iron studies reviewed  - Started iron supplementation regimen  - Will continue to monitor on daily labs    Benign prostatic hyperplasia without lower urinary tract symptoms  - Continue home tamsulosin regimen    Debility  - PT/OT following  - SW/CM enlisted for assistance with discharge planning      VTE Risk Mitigation (From admission, onward)         Ordered     enoxaparin injection 40 mg  Daily         11/07/22 2300     IP VTE HIGH RISK PATIENT  Once         11/07/22 2300                Discharge Planning   RAVIN: 11/11/2022     Code Status: Full Code   Is the patient medically ready for discharge?: No    Reason for patient still in hospital (select all that apply): Patient trending condition  Discharge Plan A: Home Health, Home with family   Discharge Delays: None known at this time          Efren Reeves MD  Attending Physician  Department of Hospital Medicine  Epic secure chat preferred, or ext. [phone]  11/9/2022

## 2022-11-10 VITALS
BODY MASS INDEX: 27.63 KG/M2 | SYSTOLIC BLOOD PRESSURE: 185 MMHG | WEIGHT: 208.5 LBS | HEART RATE: 65 BPM | TEMPERATURE: 97 F | HEIGHT: 73 IN | RESPIRATION RATE: 19 BRPM | OXYGEN SATURATION: 97 % | DIASTOLIC BLOOD PRESSURE: 83 MMHG

## 2022-11-10 LAB
ANION GAP SERPL CALC-SCNC: 8 MMOL/L (ref 8–16)
BASOPHILS # BLD AUTO: 0.02 K/UL (ref 0–0.2)
BASOPHILS NFR BLD: 0.3 % (ref 0–1.9)
BUN SERPL-MCNC: 31 MG/DL (ref 8–23)
CALCIUM SERPL-MCNC: 7.8 MG/DL (ref 8.7–10.5)
CHLORIDE SERPL-SCNC: 100 MMOL/L (ref 95–110)
CO2 SERPL-SCNC: 28 MMOL/L (ref 23–29)
CREAT SERPL-MCNC: 1.1 MG/DL (ref 0.5–1.4)
DIFFERENTIAL METHOD: ABNORMAL
EOSINOPHIL # BLD AUTO: 0.2 K/UL (ref 0–0.5)
EOSINOPHIL NFR BLD: 2 % (ref 0–8)
ERYTHROCYTE [DISTWIDTH] IN BLOOD BY AUTOMATED COUNT: 13.4 % (ref 11.5–14.5)
EST. GFR  (NO RACE VARIABLE): >60 ML/MIN/1.73 M^2
GLUCOSE SERPL-MCNC: 145 MG/DL (ref 70–110)
HCT VFR BLD AUTO: 30.1 % (ref 40–54)
HGB BLD-MCNC: 9.7 G/DL (ref 14–18)
IMM GRANULOCYTES # BLD AUTO: 0.03 K/UL (ref 0–0.04)
IMM GRANULOCYTES NFR BLD AUTO: 0.4 % (ref 0–0.5)
LYMPHOCYTES # BLD AUTO: 1.4 K/UL (ref 1–4.8)
LYMPHOCYTES NFR BLD: 18.1 % (ref 18–48)
MAGNESIUM SERPL-MCNC: 1.8 MG/DL (ref 1.6–2.6)
MCH RBC QN AUTO: 28.1 PG (ref 27–31)
MCHC RBC AUTO-ENTMCNC: 32.2 G/DL (ref 32–36)
MCV RBC AUTO: 87 FL (ref 82–98)
MONOCYTES # BLD AUTO: 0.7 K/UL (ref 0.3–1)
MONOCYTES NFR BLD: 9.2 % (ref 4–15)
NEUTROPHILS # BLD AUTO: 5.4 K/UL (ref 1.8–7.7)
NEUTROPHILS NFR BLD: 70 % (ref 38–73)
NRBC BLD-RTO: 0 /100 WBC
PHOSPHATE SERPL-MCNC: 2.1 MG/DL (ref 2.7–4.5)
PLATELET # BLD AUTO: 254 K/UL (ref 150–450)
PMV BLD AUTO: 9.1 FL (ref 9.2–12.9)
POCT GLUCOSE: 123 MG/DL (ref 70–110)
POCT GLUCOSE: 134 MG/DL (ref 70–110)
POCT GLUCOSE: 174 MG/DL (ref 70–110)
POTASSIUM SERPL-SCNC: 3.1 MMOL/L (ref 3.5–5.1)
RBC # BLD AUTO: 3.45 M/UL (ref 4.6–6.2)
SODIUM SERPL-SCNC: 136 MMOL/L (ref 136–145)
WBC # BLD AUTO: 7.69 K/UL (ref 3.9–12.7)

## 2022-11-10 PROCEDURE — 25000003 PHARM REV CODE 250: Performed by: PHYSICIAN ASSISTANT

## 2022-11-10 PROCEDURE — 25000003 PHARM REV CODE 250: Performed by: INTERNAL MEDICINE

## 2022-11-10 PROCEDURE — 36415 COLL VENOUS BLD VENIPUNCTURE: CPT | Performed by: PHYSICIAN ASSISTANT

## 2022-11-10 PROCEDURE — 80048 BASIC METABOLIC PNL TOTAL CA: CPT | Performed by: PHYSICIAN ASSISTANT

## 2022-11-10 PROCEDURE — 63600175 PHARM REV CODE 636 W HCPCS: Performed by: STUDENT IN AN ORGANIZED HEALTH CARE EDUCATION/TRAINING PROGRAM

## 2022-11-10 PROCEDURE — 83735 ASSAY OF MAGNESIUM: CPT | Performed by: PHYSICIAN ASSISTANT

## 2022-11-10 PROCEDURE — 96361 HYDRATE IV INFUSION ADD-ON: CPT

## 2022-11-10 PROCEDURE — 99217 PR OBSERVATION CARE DISCHARGE: ICD-10-PCS | Mod: ,,, | Performed by: INTERNAL MEDICINE

## 2022-11-10 PROCEDURE — 25000003 PHARM REV CODE 250: Performed by: STUDENT IN AN ORGANIZED HEALTH CARE EDUCATION/TRAINING PROGRAM

## 2022-11-10 PROCEDURE — G0378 HOSPITAL OBSERVATION PER HR: HCPCS

## 2022-11-10 PROCEDURE — 84100 ASSAY OF PHOSPHORUS: CPT | Performed by: PHYSICIAN ASSISTANT

## 2022-11-10 PROCEDURE — 99217 PR OBSERVATION CARE DISCHARGE: CPT | Mod: ,,, | Performed by: INTERNAL MEDICINE

## 2022-11-10 PROCEDURE — 85025 COMPLETE CBC W/AUTO DIFF WBC: CPT | Performed by: PHYSICIAN ASSISTANT

## 2022-11-10 PROCEDURE — 94761 N-INVAS EAR/PLS OXIMETRY MLT: CPT

## 2022-11-10 RX ORDER — FERROUS SULFATE 325(65) MG
325 TABLET, DELAYED RELEASE (ENTERIC COATED) ORAL DAILY
Qty: 30 TABLET | Refills: 2 | Status: SHIPPED | OUTPATIENT
Start: 2022-11-10 | End: 2023-02-08

## 2022-11-10 RX ORDER — LANOLIN ALCOHOL/MO/W.PET/CERES
1000 CREAM (GRAM) TOPICAL DAILY
Qty: 30 TABLET | Refills: 2 | Status: SHIPPED | OUTPATIENT
Start: 2022-11-11 | End: 2023-02-09

## 2022-11-10 RX ORDER — CEFDINIR 300 MG/1
300 CAPSULE ORAL 2 TIMES DAILY
Qty: 10 CAPSULE | Refills: 0 | Status: SHIPPED | OUTPATIENT
Start: 2022-11-10 | End: 2022-11-15

## 2022-11-10 RX ORDER — SODIUM,POTASSIUM PHOSPHATES 280-250MG
1 POWDER IN PACKET (EA) ORAL
Qty: 4 PACKET | Refills: 0 | Status: SHIPPED | OUTPATIENT
Start: 2022-11-10 | End: 2022-11-12

## 2022-11-10 RX ORDER — LANOLIN ALCOHOL/MO/W.PET/CERES
1000 CREAM (GRAM) TOPICAL DAILY
Status: DISCONTINUED | OUTPATIENT
Start: 2022-11-10 | End: 2022-11-10 | Stop reason: HOSPADM

## 2022-11-10 RX ADMIN — CYANOCOBALAMIN TAB 1000 MCG 1000 MCG: 1000 TAB at 10:11

## 2022-11-10 RX ADMIN — ACETAMINOPHEN 650 MG: 325 TABLET ORAL at 12:11

## 2022-11-10 RX ADMIN — SODIUM CHLORIDE, SODIUM LACTATE, POTASSIUM CHLORIDE, AND CALCIUM CHLORIDE: .6; .31; .03; .02 INJECTION, SOLUTION INTRAVENOUS at 03:11

## 2022-11-10 RX ADMIN — ASPIRIN 81 MG: 81 TABLET, COATED ORAL at 08:11

## 2022-11-10 RX ADMIN — POTASSIUM BICARBONATE 50 MEQ: 978 TABLET, EFFERVESCENT ORAL at 10:11

## 2022-11-10 RX ADMIN — TRAMADOL HYDROCHLORIDE 50 MG: 50 TABLET, COATED ORAL at 04:11

## 2022-11-10 RX ADMIN — FERROUS SULFATE TAB 325 MG (65 MG ELEMENTAL FE) 1 EACH: 325 (65 FE) TAB at 08:11

## 2022-11-10 RX ADMIN — GABAPENTIN 300 MG: 300 CAPSULE ORAL at 08:11

## 2022-11-10 RX ADMIN — METHOCARBAMOL 750 MG: 750 TABLET ORAL at 08:11

## 2022-11-10 NOTE — DISCHARGE SUMMARY
"[facility] [address] [place] [address] Medicine  Discharge Summary      Patient Name: Suleiman Chavez  MRN: [id number]  Admission Date: 11/7/2022  Hospital Length of Stay: 0 days  Discharge Date and Time:  11/10/2022 1:55 PM  Attending Physician: Antwan Chinchilla MD   Discharging Provider: Antwan Chinchilla MD  Primary Care Provider: Caleb Negrete MD        HPI:     Suleiman Chavez is a 82 y.o. male with a PMHx of HTN, T2DM, spinal stenosis s/p recent laminectomy of L3-L4 about 3 weeks ago who [place] [facility] [place] of generlaized weakness and fever. Family at bedside assist with history. Patient is currenly undergoing HH PT/OT since recent spinal surgery. He's usually able to participate in therapy without difficulty, however he's been significantly weak over the past 2 days and was unable to stand from his chair or walk today. He developed intermittent shaking/chills and fevers today. Wife also notes patient appeared to be slightly more confused and "out of it" over the past few days. He has slight bilateral back discomfort. Denies dysuria, difficulty urinating chest pain, SOB, N/V, abdominal pain, dizziness, HA or syncope.      ED: febrile to Tmax 102.3, vitals otherwise stable. No leukocytosis. Cr 1.4, baseline ~1.0. UA infectious. Cxr unremarkable. Given IV CTX 1g.          * No surgery found *      Hospital Course:     The patient was admitted with days of worsening somnolence and reduced oral intake found to have a UTI and an DILIA.  On admission ceftriaxone and fluids were initiated with this he returned to his baseline cognitive status and his renal injury resolved.  Urine culture positive for Klebsiella pneumoniae to ic it is sensitive to ceftriaxone.  He will be discharged on 5 days of cefdinir to complete treatment course.      Regarding his DILIA, with fluid administration has resolved.  Oral intake has normalized as has his cognitive status.  Home chlorthalidone will be held at discharge.  " Follow with PCP to follow his blood pressure regimen; home losartan will be resumed to address his currently elevated blood pressure.    Resuming HH    Phos Nak  for two days. Follow with PCP in one week for labs to determine if it needs to be continued    * Acute cystitis without hematuria 2/2 Klebsiella pneumoniae  - Interval history and physical exam findings as described above  - Tmax 102.6, no leukocytosis    - Continue rocephin while admitted. Transitioning to omnicef  - Clinically improving, hemodynamically stable  - PRN tylenol for fever  - Continuing to closely monitor     Essential hypertension  - Working to optimize BP control  - Holding losartan and chlorthalidone given DILIA in house. 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, with long-term current use of insulin  - resume home regimen as DILIA resolved  - Continue home gabapentin regimen   - Diabetic diet provided     EMA (iron deficiency anemia)  - H/H stable near baseline  - Iron studies reviewed  - Started iron supplementation regimen  -no evidence of acute bleed     Benign prostatic hyperplasia without lower urinary tract symptoms  - Continue home tamsulosin regimen       B12 deficiency  -repletion initiated    Hypomagnesemia, hypophosphatemia  -repletion initiated    Debility  - PT/OT following  - SW/CM enlisted for assistance with discharge planning    Of note, admission med rec is incorrect.  Patient was on augmenin months ago and not immediately prior to admission    Incision from prior surgical site clean without evidence of infection    Consults:   Consults (From admission, onward)          Status Ordering Provider     Inpatient virtual consult to Hospital Medicine  Once        Provider:  (Not yet assigned)    Completed JUAN M MCGARRY     Inpatient virtual consult to Hospital Medicine  Once        Provider:  (Not yet assigned)    Completed JUAN M MCGARRY             Final Active Diagnoses:    Diagnosis Date Noted POA    PRINCIPAL PROBLEM:  Acute cystitis without hematuria [N30.00] 11/07/2022 Yes    Debility [R53.81] 11/09/2022 Yes    EMA (iron deficiency anemia) [D50.9] 11/08/2022 Yes    Benign prostatic hyperplasia without lower urinary tract symptoms [N40.0] 11/08/2022 Yes    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, with long-term current use of insulin [E11.43, Z79.4] 06/23/2014 Not Applicable    Essential hypertension [I10]  Yes      Problems Resolved During this Admission:    Diagnosis Date Noted Date Resolved POA    DILIA (acute kidney injury) [N17.9] 11/08/2022 11/09/2022 Yes      Discharged Condition: stable    Disposition: [place] [facility]    Follow Up:    Patient Instructions:      Notify your health care provider if you experience any of the following:  temperature >100.4     Notify your health care provider if you experience any of the following:  persistent nausea and vomiting or diarrhea     Notify your health care provider if you experience any of the following:  severe uncontrolled pain     Notify your health care provider if you experience any of the following:  difficulty breathing or increased cough     Notify your health care provider if you experience any of the following:  severe persistent headache     Notify your health care provider if you experience any of the following:  worsening rash     Notify your health care provider if you experience any of the following:  persistent dizziness, light-headedness, or visual disturbances     Notify your health care provider if you experience any of the following:  increased confusion or weakness     Notify your health care provider if you experience any of the following:     Medications:  Reconciled Home Medications:      Medication List        START taking these medications      cefdinir 300 MG capsule  Commonly known as: OMNICEF  Take 1 capsule (300 mg total) by mouth 2 (two)  times daily. for 5 days     cyanocobalamin 1000 MCG tablet  Commonly known as: VITAMIN B-12  Take 1 tablet (1,000 mcg total) by mouth once daily.  Start taking on: November 11, 2022     ferrous sulfate 325 (65 FE) MG EC tablet  Take 1 tablet (325 mg total) by mouth once daily.     potassium, sodium phosphates 280-160-250 mg Pwpk  Commonly known as: PHOS-NAK  Take 1 packet by mouth 2 (two) times daily before meals. for 2 days            CHANGE how you take these medications      gabapentin 300 MG capsule  Commonly known as: NEURONTIN  TAKE 1 CAPSULE BY MOUTH THREE TIMES DAILY  What changed: when to take this     metFORMIN 500 MG tablet  Commonly known as: GLUCOPHAGE  TAKE 1 TABLET BY MOUTH THREE TIMES DAILY  What changed:   how much to take  how to take this  when to take this  additional instructions     rOPINIRole 1 MG tablet  Commonly known as: REQUIP  TAKE 1 TABLET BY MOUTH EVERY EVENING  What changed:   when to take this  reasons to take this            CONTINUE taking these medications      acetaminophen 500 MG tablet  Commonly known as: TYLENOL  Take 1,000 mg by mouth 2 (two) times a day.     aspirin 81 MG EC tablet  Commonly known as: ECOTRIN  Take 81 mg by mouth once daily.     latanoprost 0.005 % ophthalmic solution  INSTILL 1 DROP INTO BOTH EYES EVERY EVENING     losartan 100 MG tablet  Commonly known as: COZAAR  TAKE 1 TABLET BY MOUTH EVERY DAY     tamsulosin 0.4 mg Cap  Commonly known as: FLOMAX  Take 2 capsules (0.8 mg total) by mouth every evening.     traMADoL 50 mg tablet  Commonly known as: ULTRAM  TAKE 1 TABLET BY MOUTH TWICE DAILY AS NEEDED FOR PAIN            STOP taking these medications      chlorthalidone 25 MG Tab  Commonly known as: HYGROTEN     VITAMIN D3 ORAL              Pending Diagnostic Studies:       None          Indwelling Lines/Drains at time of discharge:   Lines/Drains/Airways       Drain  Duration             Male External Urinary Catheter 11/09/22 1200 Small 1 day                     Time spent on the discharge of patient: 35 minutes         Antwan Chinchilla MD  Department of Hospital Medicine  [facility]

## 2022-11-10 NOTE — PLAN OF CARE
[facility]  Discharge Final Note    Primary Care Provider: Caleb Negrete MD    Expected Discharge Date: 11/10/2022    No future appointments.  Pt discharged home with Home health provided by [facility].   Doug Garcia, RN,BSN        Final Discharge Note (most recent)       Final Note - 11/10/22 1438          Final Note    Assessment Type Final Discharge Note     Anticipated Discharge Disposition Home-Health Care [facility]     Hospital Resources/Appts/Education Provided Provided patient/caregiver with written discharge plan information;Appointments scheduled and added to AVS        Post-Acute Status    Home Health Status Set-up Complete/Auth obtained     Discharge Delays None known at this time                     Important Message from Medicare

## 2022-11-10 NOTE — CONSULTS
[facility] [address] [place] [address] Medicine  Telemedicine Consult Note    Patient Name: Suleiman Chavez  MRN: [id number]  Admission Date: 11/7/2022  Hospital Length of Stay: 0 days  Attending Physician: Efren Reeves MD   Primary Care Provider: Caleb Negrete MD       Thank you for your consult to [facility]. We have reviewed the patient chart. This patient does not meet criteria for [facility] service at this time due to Patient has a condition likely to benefit from in-depth physical exam or palpation / auscultation. Will hand back to In-house service..        Rama Sage MD  Department of [facility] Medicine   [facility]

## 2022-11-10 NOTE — NURSING
Pt discharged to wife. iv removed. both pt and wife verbalized understanding of discharged orders and new medications. New meds delivered from [facility] as requested

## 2022-11-10 NOTE — PLAN OF CARE
Problem: Adult Inpatient Plan of Care  Goal: Plan of Care Review  11/10/2022 0618 by Rahel Peoples LPN  Outcome: Ongoing, Progressing  11/10/2022 0618 by Rahel Peoples LPN  Outcome: Ongoing, Progressing  Goal: Patient-Specific Goal (Individualized)  11/10/2022 0618 by Rahel Peoples LPN  Outcome: Ongoing, Progressing  11/10/2022 0618 by Rahel Peoples LPN  Outcome: Ongoing, Progressing  Goal: Absence of Hospital-Acquired Illness or Injury  11/10/2022 0618 by Rahel Peoples LPN  Outcome: Ongoing, Progressing  11/10/2022 0618 by Rahel Peoples LPN  Outcome: Ongoing, Progressing  Goal: Optimal Comfort and Wellbeing  11/10/2022 0618 by Rahel Peoples LPN  Outcome: Ongoing, Progressing  11/10/2022 0618 by Rahel Peoples LPN  Outcome: Ongoing, Progressing  Goal: Readiness for Transition of Care  11/10/2022 0618 by Rahel Peoples LPN  Outcome: Ongoing, Progressing  11/10/2022 0618 by Rahel Peoples LPN  Outcome: Ongoing, Progressing     Problem: Infection  Goal: Absence of Infection Signs and Symptoms  11/10/2022 0618 by Rahel Peoples LPN  Outcome: Ongoing, Progressing  11/10/2022 0618 by Rahel Peoples LPN  Outcome: Ongoing, Progressing     Problem: Fall Injury Risk  Goal: Absence of Fall and Fall-Related Injury  11/10/2022 0618 by Rahel Peoples LPN  Outcome: Ongoing, Progressing  11/10/2022 0618 by Rahel Peoples LPN  Outcome: Ongoing, Progressing     Problem: Diabetes Comorbidity  Goal: Blood Glucose Level Within Targeted Range  11/10/2022 0618 by Rahel Peoples LPN  Outcome: Ongoing, Progressing  11/10/2022 0618 by Rahel Peoples LPN  Outcome: Ongoing, Progressing     Problem: Fluid and Electrolyte Imbalance (Acute Kidney Injury/Impairment)  Goal: Fluid and Electrolyte Balance  11/10/2022 0618 by Rahel Peoples LPN  Outcome: Ongoing, Progressing  11/10/2022 0618 by Rahel Peoples LPN  Outcome: Ongoing, Progressing     Problem: Oral Intake Inadequate (Acute Kidney Injury/Impairment)  Goal: Optimal Nutrition  Intake  11/10/2022 0618 by Rahel Peoples LPN  Outcome: Ongoing, Progressing  11/10/2022 0618 by Rahel Peoples LPN  Outcome: Ongoing, Progressing     Problem: Renal Function Impairment (Acute Kidney Injury/Impairment)  Goal: Effective Renal Function  11/10/2022 0618 by Rahel Peoples LPN  Outcome: Ongoing, Progressing  11/10/2022 0618 by Rahel Peoples LPN  Outcome: Ongoing, Progressing     Problem: Skin Injury Risk Increased  Goal: Skin Health and Integrity  11/10/2022 0618 by Rahel Peoples LPN  Outcome: Ongoing, Progressing  11/10/2022 0618 by Rahel Peoples LPN  Outcome: Ongoing, Progressing

## 2022-11-10 NOTE — PLAN OF CARE
Pt accepted by [facility]. Nurse Coordinator will contact pt/family with first vist appointment.      11/10/22 5461   Post-Acute Status   Post-Acute Authorization Home Health   Home Health Status Set-up Complete/Auth obtained   Hospital Resources/Appts/Education Provided Provided patient/caregiver with written discharge plan information;Appointments scheduled and added to S   Discharge Plan   Discharge Plan A Home Health   Discharge Plan B Home Health     Doug Garcia RN,BSN

## 2022-11-10 NOTE — PLAN OF CARE
[facility]      HOME HEALTH ORDERS  FACE TO FACE ENCOUNTER    Patient Name: Suleiman Chavez  YOB: 1940    PCP: Caleb Negrete MD   PCP Address: [address] / [place] [address]  PCP Phone Number: [phone]  PCP Fax: [phone]    Encounter Date: 11/7/22    Admit to Home Health    Diagnoses:  Active Hospital Problems    Diagnosis  POA    *Acute cystitis without hematuria [N30.00]  Yes    Debility [R53.81]  Yes    EMA (iron deficiency anemia) [D50.9]  Yes    Benign prostatic hyperplasia without lower urinary tract symptoms [N40.0]  Yes    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, with long-term current use of insulin [E11.43, Z79.4]  Not Applicable    Essential hypertension [I10]  Yes      Resolved Hospital Problems    Diagnosis Date Resolved POA    DILIA (acute kidney injury) [N17.9] 11/09/2022 Yes       Follow Up Appointments:  No future appointments.    Allergies:Review of patient's allergies indicates:  No Known Allergies    Medications: Review discharge medications with patient and family and provide education.    Current Facility-Administered Medications   Medication Dose Route Frequency Provider Last Rate Last Admin    acetaminophen tablet 650 mg  650 mg Oral Q4H PRN Stella Ruiz PA-C   650 mg at 11/10/22 1258    albuterol-ipratropium 2.5 mg-0.5 mg/3 mL nebulizer solution 3 mL  3 mL Nebulization Q4H PRN Stella Ruiz PA-C        aspirin EC tablet 81 mg  81 mg Oral Daily Stella Ruiz PA-C   81 mg at 11/10/22 0847    bisacodyL suppository 10 mg  10 mg Rectal Daily PRN Stella Ruiz PA-C        cefTRIAXone (ROCEPHIN) 1 g/50 mL D5W IVPB  1 g Intravenous Q24H Stella Ruiz PA-C   Stopped at 11/09/22 1627    cyanocobalamin tablet 1,000 mcg  1,000 mcg Oral Daily Antwan Chinchilla MD   1,000 mcg at 11/10/22 1022    dextrose 10% bolus 125 mL  12.5 g Intravenous PRN Stella Ruiz PA-C        dextrose 10% bolus  250 mL  25 g Intravenous PRN Stella Ruiz PA-C        enoxaparin injection 40 mg  40 mg Subcutaneous Daily Stella Ruiz PA-C   40 mg at 11/09/22 1557    ferrous sulfate tablet 1 each  1 tablet Oral Daily Efren Reeves MD   1 each at 11/10/22 0847    gabapentin capsule 300 mg  300 mg Oral TID Stella Ruiz PA-C   300 mg at 11/10/22 0847    glucagon (human recombinant) injection 1 mg  1 mg Intramuscular PRN Stella Ruiz PA-C        glucose chewable tablet 16 g  16 g Oral PRN Stella Ruiz PA-C        glucose chewable tablet 24 g  24 g Oral PRN Stella Ruiz PA-C        hydrALAZINE injection 10 mg  10 mg Intravenous Q6H PRN Efren Reeves MD        insulin aspart U-100 pen 0-5 Units  0-5 Units Subcutaneous QID (AC + HS) PRN Stella Ruiz PA-C        insulin detemir U-100 pen 8 Units  8 Units Subcutaneous QHS Stella Ruiz PA-C   8 Units at 11/09/22 2106    lactated ringers infusion   Intravenous Continuous Efren Reeves  mL/hr at 11/10/22 0330 New Bag at 11/10/22 0330    latanoprost 0.005 % ophthalmic solution 1 drop  1 drop Both Eyes Daily PM Stella Ruiz PA-C   1 drop at 11/09/22 2106    melatonin tablet 6 mg  6 mg Oral Nightly PRN Stella Ruiz PA-C        methocarbamoL tablet 750 mg  750 mg Oral TID Stella Ruiz PA-C   750 mg at 11/10/22 0847    ondansetron disintegrating tablet 8 mg  8 mg Oral Q8H PRN Stella Ruiz PA-C        polyethylene glycol packet 17 g  17 g Oral Daily PRN Stella Ruiz PA-C        promethazine tablet 25 mg  25 mg Oral Q6H PRN Stella Ruiz PA-C        rOPINIRole tablet 1 mg  1 mg Oral Nightly PRN Stella Ruiz PA-C   1 mg at 11/09/22 1756    tamsulosin 24 hr capsule 0.8 mg  0.8 mg Oral QHS Stella Ruiz PA-C   0.8 mg at 11/09/22 2105    traMADoL tablet 50 mg  50 mg Oral BID PRN Stella Ruiz PA-C   50 mg at 11/10/22 0408     Current Discharge Medication  List        START taking these medications    Details   cefdinir (OMNICEF) 300 MG capsule Take 1 capsule (300 mg total) by mouth 2 (two) times daily. for 5 days  Qty: 10 capsule, Refills: 0      cyanocobalamin (VITAMIN B-12) 1000 MCG tablet Take 1 tablet (1,000 mcg total) by mouth once daily.  Qty: 30 tablet, Refills: 2      ferrous sulfate 325 (65 FE) MG EC tablet Take 1 tablet (325 mg total) by mouth once daily.  Qty: 30 tablet, Refills: 2      potassium, sodium phosphates (PHOS-NAK) 280-160-250 mg PwPk Take 1 packet by mouth 2 (two) times daily before meals. for 2 days  Qty: 4 packet, Refills: 0           CONTINUE these medications which have NOT CHANGED    Details   acetaminophen (TYLENOL) 500 MG tablet Take 1,000 mg by mouth 2 (two) times a day.      aspirin (ECOTRIN) 81 MG EC tablet Take 81 mg by mouth once daily.      gabapentin (NEURONTIN) 300 MG capsule TAKE 1 CAPSULE BY MOUTH THREE TIMES DAILY  Qty: 90 capsule, Refills: 5    Comments: This prescription was filled on 6/1/2022. Any refills authorized will be placed on file.      latanoprost 0.005 % ophthalmic solution INSTILL 1 DROP INTO BOTH EYES EVERY EVENING  Qty: 7.5 mL, Refills: 3      losartan (COZAAR) 100 MG tablet TAKE 1 TABLET BY MOUTH EVERY DAY  Qty: 30 tablet, Refills: 11    Comments: This prescription was filled on 3/11/2022. Any refills authorized will be placed on file.      metFORMIN (GLUCOPHAGE) 500 MG tablet TAKE 1 TABLET BY MOUTH THREE TIMES DAILY  Qty: 90 tablet, Refills: 5    Comments: This prescription was filled on 5/19/2022. Any refills authorized will be placed on file.      rOPINIRole (REQUIP) 1 MG tablet TAKE 1 TABLET BY MOUTH EVERY EVENING  Qty: 30 tablet, Refills: 5      tamsulosin (FLOMAX) 0.4 mg Cap Take 2 capsules (0.8 mg total) by mouth every evening.  Qty: 180 capsule, Refills: 1    Comments: This prescription was filled on 7/5/2022. Any refills authorized will be placed on file.      traMADoL (ULTRAM) 50 mg tablet TAKE 1  TABLET BY MOUTH TWICE DAILY AS NEEDED FOR PAIN  Qty: 60 tablet, Refills: 0           STOP taking these medications       chlorthalidone (HYGROTEN) 25 MG Tab Comments:   Reason for Stopping:         cholecalciferol, vitamin D3, (VITAMIN D3 ORAL) Comments:   Reason for Stopping:         methocarbamoL (ROBAXIN) 750 MG Tab Comments:   Reason for Stopping:                 I have seen and examined this patient within the last 30 days. My clinical findings that support the need for the home health skilled services and home bound status are the following:no   Weakness/numbness causing balance and gait disturbance due to Infection making it taxing to leave home.     Diet:   diabetic diet 2000 calorie    Referrals/ Consults  Physical Therapy to evaluate and treat. Evaluate for home safety and equipment needs; Establish/upgrade home exercise program. Perform / instruct on therapeutic exercises, gait training, transfer training, and Range of Motion.  Occupational Therapy to evaluate and treat. Evaluate home environment for safety and equipment needs. Perform/Instruct on transfers, ADL training, ROM, and therapeutic exercises.    Activities:   activity as tolerated    Nursing:   Agency to admit patient within 24 hours of hospital discharge unless specified on physician order or at patient request    SN to complete comprehensive assessment including routine vital signs. Instruct on disease process and s/s of complications to report to MD. Review/verify medication list sent home with the patient at time of discharge  and instruct patient/caregiver as needed. Frequency may be adjusted depending on start of care date.     Skilled nurse to perform up to 3 visits PRN for symptoms related to diagnosis    Notify MD if SBP > 160 or < 90; DBP > 90 or < 50; HR > 120 or < 50; Temp > 101; O2 < 88%; Other:       Ok to schedule additional visits based on staff availability and patient request on consecutive days within the home health  episode.    When multiple disciplines ordered:    Start of Care occurs on Sunday - Wednesday schedule remaining discipline evaluations as ordered on separate consecutive days following the start of care.    Thursday SOC -schedule subsequent evaluations Friday and Monday the following week.     Friday - Saturday SOC - schedule subsequent discipline evaluations on consecutive days starting Monday of the following week.    For all post-discharge communication and subsequent orders please contact patient's primary care physician. If unable to reach primary care physician or do not receive response within 30 minutes, please [facility] on call physician for clinical staff order clarification    Miscellaneous   Diabetic Care:   SN to perform and educate Diabetic management with blood glucose monitoring: and Report CBG < 60 or > 350 to physician.    Home Health Aide:  Nursing Twice weekly and Three times weekly, Physical Therapy Three times weekly, and Occupational Therapy Three times weekly    Wound Care Orders  no    I certify that this patient is confined to his home and needs intermittent skilled nursing care, physical therapy, and occupational therapy.

## 2022-11-12 LAB — BACTERIA BLD CULT: NORMAL

## 2022-11-13 LAB — BACTERIA BLD CULT: NORMAL

## 2022-11-21 ENCOUNTER — LAB VISIT (OUTPATIENT)
Dept: LAB | Facility: HOSPITAL | Age: 82
End: 2022-11-21
Attending: INTERNAL MEDICINE
Payer: MEDICARE

## 2022-11-21 ENCOUNTER — OFFICE VISIT (OUTPATIENT)
Dept: INTERNAL MEDICINE | Facility: CLINIC | Age: 82
End: 2022-11-21
Payer: MEDICARE

## 2022-11-21 VITALS
OXYGEN SATURATION: 97 % | HEART RATE: 83 BPM | BODY MASS INDEX: 28.02 KG/M2 | WEIGHT: 211.44 LBS | HEIGHT: 73 IN | SYSTOLIC BLOOD PRESSURE: 148 MMHG | DIASTOLIC BLOOD PRESSURE: 64 MMHG

## 2022-11-21 DIAGNOSIS — E11.40 TYPE 2 DIABETES MELLITUS WITH DIABETIC NEUROPATHY, WITHOUT LONG-TERM CURRENT USE OF INSULIN: ICD-10-CM

## 2022-11-21 DIAGNOSIS — N40.1 BENIGN PROSTATIC HYPERPLASIA WITH URINARY FREQUENCY: ICD-10-CM

## 2022-11-21 DIAGNOSIS — M47.816 LUMBAR SPONDYLOSIS: ICD-10-CM

## 2022-11-21 DIAGNOSIS — R35.0 BENIGN PROSTATIC HYPERPLASIA WITH URINARY FREQUENCY: ICD-10-CM

## 2022-11-21 DIAGNOSIS — N39.0 URINARY TRACT INFECTION WITHOUT HEMATURIA, SITE UNSPECIFIED: ICD-10-CM

## 2022-11-21 DIAGNOSIS — N40.0 BENIGN NON-NODULAR PROSTATIC HYPERPLASIA WITHOUT LOWER URINARY TRACT SYMPTOMS: ICD-10-CM

## 2022-11-21 DIAGNOSIS — N39.0 URINARY TRACT INFECTION WITHOUT HEMATURIA, SITE UNSPECIFIED: Primary | ICD-10-CM

## 2022-11-21 LAB
BILIRUB UR QL STRIP: NEGATIVE
CLARITY UR REFRACT.AUTO: CLEAR
COLOR UR AUTO: YELLOW
GLUCOSE UR QL STRIP: NEGATIVE
HGB UR QL STRIP: NEGATIVE
KETONES UR QL STRIP: NEGATIVE
LEUKOCYTE ESTERASE UR QL STRIP: NEGATIVE
NITRITE UR QL STRIP: NEGATIVE
PH UR STRIP: 6 [PH] (ref 5–8)
PROT UR QL STRIP: ABNORMAL
SP GR UR STRIP: 1.01 (ref 1–1.03)
URN SPEC COLLECT METH UR: ABNORMAL

## 2022-11-21 PROCEDURE — 3077F SYST BP >= 140 MM HG: CPT | Mod: CPTII,S$GLB,, | Performed by: INTERNAL MEDICINE

## 2022-11-21 PROCEDURE — 3288F PR FALLS RISK ASSESSMENT DOCUMENTED: ICD-10-PCS | Mod: CPTII,S$GLB,, | Performed by: INTERNAL MEDICINE

## 2022-11-21 PROCEDURE — 1160F PR REVIEW ALL MEDS BY PRESCRIBER/CLIN PHARMACIST DOCUMENTED: ICD-10-PCS | Mod: CPTII,S$GLB,, | Performed by: INTERNAL MEDICINE

## 2022-11-21 PROCEDURE — 1101F PT FALLS ASSESS-DOCD LE1/YR: CPT | Mod: CPTII,S$GLB,, | Performed by: INTERNAL MEDICINE

## 2022-11-21 PROCEDURE — 80048 BASIC METABOLIC PNL TOTAL CA: CPT | Performed by: INTERNAL MEDICINE

## 2022-11-21 PROCEDURE — 1159F MED LIST DOCD IN RCRD: CPT | Mod: CPTII,S$GLB,, | Performed by: INTERNAL MEDICINE

## 2022-11-21 PROCEDURE — 85025 COMPLETE CBC W/AUTO DIFF WBC: CPT | Performed by: INTERNAL MEDICINE

## 2022-11-21 PROCEDURE — 3078F DIAST BP <80 MM HG: CPT | Mod: CPTII,S$GLB,, | Performed by: INTERNAL MEDICINE

## 2022-11-21 PROCEDURE — 81003 URINALYSIS AUTO W/O SCOPE: CPT | Performed by: INTERNAL MEDICINE

## 2022-11-21 PROCEDURE — 99214 OFFICE O/P EST MOD 30 MIN: CPT | Mod: S$GLB,,, | Performed by: INTERNAL MEDICINE

## 2022-11-21 PROCEDURE — 3072F PR LOW RISK FOR RETINOPATHY: ICD-10-PCS | Mod: CPTII,S$GLB,, | Performed by: INTERNAL MEDICINE

## 2022-11-21 PROCEDURE — 84443 ASSAY THYROID STIM HORMONE: CPT | Performed by: INTERNAL MEDICINE

## 2022-11-21 PROCEDURE — 1126F PR PAIN SEVERITY QUANTIFIED, NO PAIN PRESENT: ICD-10-PCS | Mod: CPTII,S$GLB,, | Performed by: INTERNAL MEDICINE

## 2022-11-21 PROCEDURE — 1160F RVW MEDS BY RX/DR IN RCRD: CPT | Mod: CPTII,S$GLB,, | Performed by: INTERNAL MEDICINE

## 2022-11-21 PROCEDURE — 36415 COLL VENOUS BLD VENIPUNCTURE: CPT | Performed by: INTERNAL MEDICINE

## 2022-11-21 PROCEDURE — 1101F PR PT FALLS ASSESS DOC 0-1 FALLS W/OUT INJ PAST YR: ICD-10-PCS | Mod: CPTII,S$GLB,, | Performed by: INTERNAL MEDICINE

## 2022-11-21 PROCEDURE — 99999 PR PBB SHADOW E&M-EST. PATIENT-LVL IV: CPT | Mod: PBBFAC,,, | Performed by: INTERNAL MEDICINE

## 2022-11-21 PROCEDURE — 87086 URINE CULTURE/COLONY COUNT: CPT | Performed by: INTERNAL MEDICINE

## 2022-11-21 PROCEDURE — 3078F PR MOST RECENT DIASTOLIC BLOOD PRESSURE < 80 MM HG: ICD-10-PCS | Mod: CPTII,S$GLB,, | Performed by: INTERNAL MEDICINE

## 2022-11-21 PROCEDURE — 99999 PR PBB SHADOW E&M-EST. PATIENT-LVL IV: ICD-10-PCS | Mod: PBBFAC,,, | Performed by: INTERNAL MEDICINE

## 2022-11-21 PROCEDURE — 3072F LOW RISK FOR RETINOPATHY: CPT | Mod: CPTII,S$GLB,, | Performed by: INTERNAL MEDICINE

## 2022-11-21 PROCEDURE — 99214 PR OFFICE/OUTPT VISIT, EST, LEVL IV, 30-39 MIN: ICD-10-PCS | Mod: S$GLB,,, | Performed by: INTERNAL MEDICINE

## 2022-11-21 PROCEDURE — 3288F FALL RISK ASSESSMENT DOCD: CPT | Mod: CPTII,S$GLB,, | Performed by: INTERNAL MEDICINE

## 2022-11-21 PROCEDURE — 1159F PR MEDICATION LIST DOCUMENTED IN MEDICAL RECORD: ICD-10-PCS | Mod: CPTII,S$GLB,, | Performed by: INTERNAL MEDICINE

## 2022-11-21 PROCEDURE — 3077F PR MOST RECENT SYSTOLIC BLOOD PRESSURE >= 140 MM HG: ICD-10-PCS | Mod: CPTII,S$GLB,, | Performed by: INTERNAL MEDICINE

## 2022-11-21 PROCEDURE — 1126F AMNT PAIN NOTED NONE PRSNT: CPT | Mod: CPTII,S$GLB,, | Performed by: INTERNAL MEDICINE

## 2022-11-21 PROCEDURE — 99499 UNLISTED E&M SERVICE: CPT | Mod: HCNC,S$GLB,, | Performed by: INTERNAL MEDICINE
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
## 2022-11-22 ENCOUNTER — PATIENT MESSAGE (OUTPATIENT)
Dept: INTERNAL MEDICINE | Facility: CLINIC | Age: 82
End: 2022-11-22
Payer: MEDICARE

## 2022-11-22 LAB
ANION GAP SERPL CALC-SCNC: 13 MMOL/L (ref 8–16)
BASOPHILS # BLD AUTO: 0.05 K/UL (ref 0–0.2)
BASOPHILS NFR BLD: 0.5 % (ref 0–1.9)
BUN SERPL-MCNC: 13 MG/DL (ref 8–23)
CALCIUM SERPL-MCNC: 9.3 MG/DL (ref 8.7–10.5)
CHLORIDE SERPL-SCNC: 107 MMOL/L (ref 95–110)
CO2 SERPL-SCNC: 24 MMOL/L (ref 23–29)
CREAT SERPL-MCNC: 1 MG/DL (ref 0.5–1.4)
DIFFERENTIAL METHOD: ABNORMAL
EOSINOPHIL # BLD AUTO: 0.2 K/UL (ref 0–0.5)
EOSINOPHIL NFR BLD: 2.6 % (ref 0–8)
ERYTHROCYTE [DISTWIDTH] IN BLOOD BY AUTOMATED COUNT: 14.3 % (ref 11.5–14.5)
EST. GFR  (NO RACE VARIABLE): >60 ML/MIN/1.73 M^2
GLUCOSE SERPL-MCNC: 92 MG/DL (ref 70–110)
HCT VFR BLD AUTO: 35.8 % (ref 40–54)
HGB BLD-MCNC: 11.2 G/DL (ref 14–18)
IMM GRANULOCYTES # BLD AUTO: 0.04 K/UL (ref 0–0.04)
IMM GRANULOCYTES NFR BLD AUTO: 0.4 % (ref 0–0.5)
LYMPHOCYTES # BLD AUTO: 2.1 K/UL (ref 1–4.8)
LYMPHOCYTES NFR BLD: 23 % (ref 18–48)
MCH RBC QN AUTO: 28.7 PG (ref 27–31)
MCHC RBC AUTO-ENTMCNC: 31.3 G/DL (ref 32–36)
MCV RBC AUTO: 92 FL (ref 82–98)
MONOCYTES # BLD AUTO: 0.4 K/UL (ref 0.3–1)
MONOCYTES NFR BLD: 4.8 % (ref 4–15)
NEUTROPHILS # BLD AUTO: 6.3 K/UL (ref 1.8–7.7)
NEUTROPHILS NFR BLD: 68.7 % (ref 38–73)
NRBC BLD-RTO: 0 /100 WBC
PLATELET # BLD AUTO: 464 K/UL (ref 150–450)
PMV BLD AUTO: 9 FL (ref 9.2–12.9)
POTASSIUM SERPL-SCNC: 4.1 MMOL/L (ref 3.5–5.1)
RBC # BLD AUTO: 3.9 M/UL (ref 4.6–6.2)
SODIUM SERPL-SCNC: 144 MMOL/L (ref 136–145)
TSH SERPL DL<=0.005 MIU/L-ACNC: 1.43 UIU/ML (ref 0.4–4)
WBC # BLD AUTO: 9.18 K/UL (ref 3.9–12.7)

## 2022-11-23 LAB
BACTERIA UR CULT: NORMAL
BACTERIA UR CULT: NORMAL

## 2022-11-24 ENCOUNTER — PATIENT MESSAGE (OUTPATIENT)
Dept: INTERNAL MEDICINE | Facility: CLINIC | Age: 82
End: 2022-11-24
Payer: MEDICARE

## 2022-11-25 ENCOUNTER — PATIENT MESSAGE (OUTPATIENT)
Dept: INTERNAL MEDICINE | Facility: CLINIC | Age: 82
End: 2022-11-25
Payer: MEDICARE

## 2022-11-25 RX ORDER — CHLORTHALIDONE 25 MG/1
25 TABLET ORAL DAILY
COMMUNITY
Start: 2022-11-10 | End: 2022-12-14 | Stop reason: SDUPTHER

## 2022-11-28 ENCOUNTER — PATIENT MESSAGE (OUTPATIENT)
Dept: INTERNAL MEDICINE | Facility: CLINIC | Age: 82
End: 2022-11-28
Payer: MEDICARE

## 2022-11-29 ENCOUNTER — OFFICE VISIT (OUTPATIENT)
Dept: INTERNAL MEDICINE | Facility: CLINIC | Age: 82
End: 2022-11-29
Payer: MEDICARE

## 2022-11-29 ENCOUNTER — PATIENT MESSAGE (OUTPATIENT)
Dept: INTERNAL MEDICINE | Facility: CLINIC | Age: 82
End: 2022-11-29

## 2022-11-29 VITALS
OXYGEN SATURATION: 97 % | BODY MASS INDEX: 27.7 KG/M2 | HEART RATE: 98 BPM | DIASTOLIC BLOOD PRESSURE: 70 MMHG | SYSTOLIC BLOOD PRESSURE: 120 MMHG | WEIGHT: 209 LBS | HEIGHT: 73 IN

## 2022-11-29 DIAGNOSIS — I10 ESSENTIAL HYPERTENSION: ICD-10-CM

## 2022-11-29 DIAGNOSIS — N39.0 ACUTE LOWER UTI (URINARY TRACT INFECTION): Primary | ICD-10-CM

## 2022-11-29 LAB
BACTERIA #/AREA URNS AUTO: ABNORMAL /HPF
BILIRUB UR QL STRIP: NEGATIVE
CLARITY UR REFRACT.AUTO: ABNORMAL
COLOR UR AUTO: ABNORMAL
GLUCOSE UR QL STRIP: NEGATIVE
HGB UR QL STRIP: ABNORMAL
HYALINE CASTS UR QL AUTO: 0 /LPF
KETONES UR QL STRIP: NEGATIVE
LEUKOCYTE ESTERASE UR QL STRIP: ABNORMAL
MICROSCOPIC COMMENT: ABNORMAL
NITRITE UR QL STRIP: NEGATIVE
NON-SQ EPI CELLS #/AREA URNS AUTO: 12 /HPF
PH UR STRIP: 6 [PH] (ref 5–8)
PROT UR QL STRIP: ABNORMAL
RBC #/AREA URNS AUTO: 47 /HPF (ref 0–4)
SP GR UR STRIP: 1.01 (ref 1–1.03)
URN SPEC COLLECT METH UR: ABNORMAL
WBC #/AREA URNS AUTO: >100 /HPF (ref 0–5)
WBC CLUMPS UR QL AUTO: ABNORMAL

## 2022-11-29 PROCEDURE — 1159F MED LIST DOCD IN RCRD: CPT | Mod: CPTII,S$GLB,, | Performed by: INTERNAL MEDICINE

## 2022-11-29 PROCEDURE — 1160F PR REVIEW ALL MEDS BY PRESCRIBER/CLIN PHARMACIST DOCUMENTED: ICD-10-PCS | Mod: CPTII,S$GLB,, | Performed by: INTERNAL MEDICINE

## 2022-11-29 PROCEDURE — 96372 THER/PROPH/DIAG INJ SC/IM: CPT | Mod: S$GLB,,, | Performed by: INTERNAL MEDICINE

## 2022-11-29 PROCEDURE — 1159F PR MEDICATION LIST DOCUMENTED IN MEDICAL RECORD: ICD-10-PCS | Mod: CPTII,S$GLB,, | Performed by: INTERNAL MEDICINE

## 2022-11-29 PROCEDURE — 1101F PR PT FALLS ASSESS DOC 0-1 FALLS W/OUT INJ PAST YR: ICD-10-PCS | Mod: CPTII,S$GLB,, | Performed by: INTERNAL MEDICINE

## 2022-11-29 PROCEDURE — 3078F DIAST BP <80 MM HG: CPT | Mod: CPTII,S$GLB,, | Performed by: INTERNAL MEDICINE

## 2022-11-29 PROCEDURE — 96372 PR INJECTION,THERAP/PROPH/DIAG2ST, IM OR SUBCUT: ICD-10-PCS | Mod: S$GLB,,, | Performed by: INTERNAL MEDICINE

## 2022-11-29 PROCEDURE — 87186 SC STD MICRODIL/AGAR DIL: CPT | Performed by: INTERNAL MEDICINE

## 2022-11-29 PROCEDURE — 1160F RVW MEDS BY RX/DR IN RCRD: CPT | Mod: CPTII,S$GLB,, | Performed by: INTERNAL MEDICINE

## 2022-11-29 PROCEDURE — 99999 PR PBB SHADOW E&M-EST. PATIENT-LVL III: CPT | Mod: PBBFAC,,, | Performed by: INTERNAL MEDICINE

## 2022-11-29 PROCEDURE — 81001 URINALYSIS AUTO W/SCOPE: CPT | Performed by: INTERNAL MEDICINE

## 2022-11-29 PROCEDURE — 87088 URINE BACTERIA CULTURE: CPT | Performed by: INTERNAL MEDICINE

## 2022-11-29 PROCEDURE — 3288F PR FALLS RISK ASSESSMENT DOCUMENTED: ICD-10-PCS | Mod: CPTII,S$GLB,, | Performed by: INTERNAL MEDICINE

## 2022-11-29 PROCEDURE — 99999 PR PBB SHADOW E&M-EST. PATIENT-LVL III: ICD-10-PCS | Mod: PBBFAC,,, | Performed by: INTERNAL MEDICINE

## 2022-11-29 PROCEDURE — 3288F FALL RISK ASSESSMENT DOCD: CPT | Mod: CPTII,S$GLB,, | Performed by: INTERNAL MEDICINE

## 2022-11-29 PROCEDURE — 3072F PR LOW RISK FOR RETINOPATHY: ICD-10-PCS | Mod: CPTII,S$GLB,, | Performed by: INTERNAL MEDICINE

## 2022-11-29 PROCEDURE — 1126F AMNT PAIN NOTED NONE PRSNT: CPT | Mod: CPTII,S$GLB,, | Performed by: INTERNAL MEDICINE

## 2022-11-29 PROCEDURE — 87077 CULTURE AEROBIC IDENTIFY: CPT | Performed by: INTERNAL MEDICINE

## 2022-11-29 PROCEDURE — 3074F PR MOST RECENT SYSTOLIC BLOOD PRESSURE < 130 MM HG: ICD-10-PCS | Mod: CPTII,S$GLB,, | Performed by: INTERNAL MEDICINE

## 2022-11-29 PROCEDURE — 3078F PR MOST RECENT DIASTOLIC BLOOD PRESSURE < 80 MM HG: ICD-10-PCS | Mod: CPTII,S$GLB,, | Performed by: INTERNAL MEDICINE

## 2022-11-29 PROCEDURE — 1101F PT FALLS ASSESS-DOCD LE1/YR: CPT | Mod: CPTII,S$GLB,, | Performed by: INTERNAL MEDICINE

## 2022-11-29 PROCEDURE — 99214 PR OFFICE/OUTPT VISIT, EST, LEVL IV, 30-39 MIN: ICD-10-PCS | Mod: 25,S$GLB,, | Performed by: INTERNAL MEDICINE

## 2022-11-29 PROCEDURE — 3072F LOW RISK FOR RETINOPATHY: CPT | Mod: CPTII,S$GLB,, | Performed by: INTERNAL MEDICINE

## 2022-11-29 PROCEDURE — 87086 URINE CULTURE/COLONY COUNT: CPT | Performed by: INTERNAL MEDICINE

## 2022-11-29 PROCEDURE — 3074F SYST BP LT 130 MM HG: CPT | Mod: CPTII,S$GLB,, | Performed by: INTERNAL MEDICINE

## 2022-11-29 PROCEDURE — 1126F PR PAIN SEVERITY QUANTIFIED, NO PAIN PRESENT: ICD-10-PCS | Mod: CPTII,S$GLB,, | Performed by: INTERNAL MEDICINE

## 2022-11-29 PROCEDURE — 99214 OFFICE O/P EST MOD 30 MIN: CPT | Mod: 25,S$GLB,, | Performed by: INTERNAL MEDICINE

## 2022-11-29 RX ORDER — CIPROFLOXACIN 500 MG/1
500 TABLET ORAL 2 TIMES DAILY
Qty: 28 TABLET | Refills: 0 | Status: SHIPPED | OUTPATIENT
Start: 2022-11-29 | End: 2022-12-13

## 2022-11-29 RX ORDER — AMLODIPINE BESYLATE 5 MG/1
5 TABLET ORAL DAILY
COMMUNITY
End: 2022-12-14 | Stop reason: SDUPTHER
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
## 2022-11-30 PROCEDURE — 90677 PCV20 VACCINE IM: CPT | Mod: S$GLB,,, | Performed by: INTERNAL MEDICINE

## 2022-11-30 PROCEDURE — G0009 PNEUMOCOCCAL CONJUGATE VACCINE 20-VALENT: ICD-10-PCS | Mod: S$GLB,,, | Performed by: INTERNAL MEDICINE

## 2022-11-30 PROCEDURE — 90677 PNEUMOCOCCAL CONJUGATE VACCINE 20-VALENT: ICD-10-PCS | Mod: S$GLB,,, | Performed by: INTERNAL MEDICINE

## 2022-11-30 PROCEDURE — G0009 ADMIN PNEUMOCOCCAL VACCINE: HCPCS | Mod: S$GLB,,, | Performed by: INTERNAL MEDICINE

## 2022-11-30 RX ORDER — METFORMIN HYDROCHLORIDE 500 MG/1
TABLET ORAL
Qty: 90 TABLET | Refills: 5 | Status: SHIPPED | OUTPATIENT
Start: 2022-11-30 | End: 2023-06-19

## 2022-11-30 NOTE — TELEPHONE ENCOUNTER
Refill Routing Note   Medication(s) are not appropriate for processing by [facility] for the following reason(s):      - Patient states taking requested medication(s) differently than prescribed    [facility] action(s):  Defer          Medication reconciliation completed: No     Appointments  past 12m or future 3m with PCP    Date Provider   Last Visit   11/29/2022 Caleb Negrete MD   Next Visit   Visit date not found Caleb Negrete MD   ED visits in past 90 days: 0        Note composed:12:56 PM 11/30/2022

## 2022-12-01 LAB — BACTERIA UR CULT: ABNORMAL

## 2022-12-05 ENCOUNTER — PATIENT MESSAGE (OUTPATIENT)
Dept: INTERNAL MEDICINE | Facility: CLINIC | Age: 82
End: 2022-12-05
Payer: MEDICARE

## 2022-12-08 ENCOUNTER — PATIENT MESSAGE (OUTPATIENT)
Dept: INTERNAL MEDICINE | Facility: CLINIC | Age: 82
End: 2022-12-08
Payer: MEDICARE

## 2022-12-14 ENCOUNTER — PATIENT MESSAGE (OUTPATIENT)
Dept: INTERNAL MEDICINE | Facility: CLINIC | Age: 82
End: 2022-12-14
Payer: MEDICARE

## 2022-12-14 RX ORDER — AMLODIPINE BESYLATE 5 MG/1
5 TABLET ORAL 2 TIMES DAILY
Qty: 60 TABLET | Refills: 0 | Status: SHIPPED | OUTPATIENT
Start: 2022-12-14 | End: 2023-02-14

## 2022-12-14 RX ORDER — CHLORTHALIDONE 25 MG/1
25 TABLET ORAL DAILY
Qty: 30 TABLET | Refills: 1 | Status: SHIPPED | OUTPATIENT
Start: 2022-12-14 | End: 2023-02-06

## 2022-12-16 ENCOUNTER — PATIENT MESSAGE (OUTPATIENT)
Dept: INTERNAL MEDICINE | Facility: CLINIC | Age: 82
End: 2022-12-16
Payer: MEDICARE

## 2022-12-16 RX ORDER — CIPROFLOXACIN 500 MG/1
500 TABLET ORAL 2 TIMES DAILY
Qty: 28 TABLET | Refills: 0 | Status: SHIPPED | OUTPATIENT
Start: 2022-12-16 | End: 2022-12-30 | Stop reason: SDUPTHER

## 2022-12-21 ENCOUNTER — PATIENT MESSAGE (OUTPATIENT)
Dept: INTERNAL MEDICINE | Facility: CLINIC | Age: 82
End: 2022-12-21
Payer: MEDICARE

## 2022-12-21 DIAGNOSIS — N41.1 PROSTATITIS, CHRONIC: Primary | ICD-10-CM

## 2022-12-29 ENCOUNTER — PATIENT MESSAGE (OUTPATIENT)
Dept: INTERNAL MEDICINE | Facility: CLINIC | Age: 82
End: 2022-12-29
Payer: MEDICARE

## 2022-12-30 RX ORDER — CIPROFLOXACIN 500 MG/1
500 TABLET ORAL 2 TIMES DAILY
Qty: 14 TABLET | Refills: 0 | Status: SHIPPED | OUTPATIENT
Start: 2022-12-30 | End: 2023-01-06

## 2023-01-10 ENCOUNTER — OFFICE VISIT (OUTPATIENT)
Dept: UROLOGY | Facility: CLINIC | Age: 83
End: 2023-01-10
Payer: MEDICARE

## 2023-01-10 VITALS
HEIGHT: 73 IN | BODY MASS INDEX: 26.59 KG/M2 | DIASTOLIC BLOOD PRESSURE: 84 MMHG | WEIGHT: 200.63 LBS | HEART RATE: 93 BPM | SYSTOLIC BLOOD PRESSURE: 168 MMHG

## 2023-01-10 DIAGNOSIS — R33.9 URINARY RETENTION: ICD-10-CM

## 2023-01-10 DIAGNOSIS — N40.1 BPH WITH URINARY OBSTRUCTION: Primary | ICD-10-CM

## 2023-01-10 DIAGNOSIS — N41.1 PROSTATITIS, CHRONIC: ICD-10-CM

## 2023-01-10 DIAGNOSIS — N13.8 BPH WITH URINARY OBSTRUCTION: Primary | ICD-10-CM

## 2023-01-10 PROCEDURE — 3077F PR MOST RECENT SYSTOLIC BLOOD PRESSURE >= 140 MM HG: ICD-10-PCS | Mod: HCNC,CPTII,S$GLB, | Performed by: NURSE PRACTITIONER

## 2023-01-10 PROCEDURE — 99999 PR PBB SHADOW E&M-EST. PATIENT-LVL III: ICD-10-PCS | Mod: PBBFAC,HCNC,, | Performed by: NURSE PRACTITIONER

## 2023-01-10 PROCEDURE — 1160F PR REVIEW ALL MEDS BY PRESCRIBER/CLIN PHARMACIST DOCUMENTED: ICD-10-PCS | Mod: HCNC,CPTII,S$GLB, | Performed by: NURSE PRACTITIONER

## 2023-01-10 PROCEDURE — 1160F RVW MEDS BY RX/DR IN RCRD: CPT | Mod: HCNC,CPTII,S$GLB, | Performed by: NURSE PRACTITIONER

## 2023-01-10 PROCEDURE — 99204 OFFICE O/P NEW MOD 45 MIN: CPT | Mod: HCNC,S$GLB,, | Performed by: NURSE PRACTITIONER

## 2023-01-10 PROCEDURE — 1101F PR PT FALLS ASSESS DOC 0-1 FALLS W/OUT INJ PAST YR: ICD-10-PCS | Mod: HCNC,CPTII,S$GLB, | Performed by: NURSE PRACTITIONER

## 2023-01-10 PROCEDURE — 1126F AMNT PAIN NOTED NONE PRSNT: CPT | Mod: HCNC,CPTII,S$GLB, | Performed by: NURSE PRACTITIONER

## 2023-01-10 PROCEDURE — 3288F PR FALLS RISK ASSESSMENT DOCUMENTED: ICD-10-PCS | Mod: HCNC,CPTII,S$GLB, | Performed by: NURSE PRACTITIONER

## 2023-01-10 PROCEDURE — 1101F PT FALLS ASSESS-DOCD LE1/YR: CPT | Mod: HCNC,CPTII,S$GLB, | Performed by: NURSE PRACTITIONER

## 2023-01-10 PROCEDURE — 3077F SYST BP >= 140 MM HG: CPT | Mod: HCNC,CPTII,S$GLB, | Performed by: NURSE PRACTITIONER

## 2023-01-10 PROCEDURE — 3079F DIAST BP 80-89 MM HG: CPT | Mod: HCNC,CPTII,S$GLB, | Performed by: NURSE PRACTITIONER

## 2023-01-10 PROCEDURE — 1159F PR MEDICATION LIST DOCUMENTED IN MEDICAL RECORD: ICD-10-PCS | Mod: HCNC,CPTII,S$GLB, | Performed by: NURSE PRACTITIONER

## 2023-01-10 PROCEDURE — 3288F FALL RISK ASSESSMENT DOCD: CPT | Mod: HCNC,CPTII,S$GLB, | Performed by: NURSE PRACTITIONER

## 2023-01-10 PROCEDURE — 3079F PR MOST RECENT DIASTOLIC BLOOD PRESSURE 80-89 MM HG: ICD-10-PCS | Mod: HCNC,CPTII,S$GLB, | Performed by: NURSE PRACTITIONER

## 2023-01-10 PROCEDURE — 99204 PR OFFICE/OUTPT VISIT, NEW, LEVL IV, 45-59 MIN: ICD-10-PCS | Mod: HCNC,S$GLB,, | Performed by: NURSE PRACTITIONER

## 2023-01-10 PROCEDURE — 1159F MED LIST DOCD IN RCRD: CPT | Mod: HCNC,CPTII,S$GLB, | Performed by: NURSE PRACTITIONER

## 2023-01-10 PROCEDURE — 1126F PR PAIN SEVERITY QUANTIFIED, NO PAIN PRESENT: ICD-10-PCS | Mod: HCNC,CPTII,S$GLB, | Performed by: NURSE PRACTITIONER

## 2023-01-10 PROCEDURE — 99999 PR PBB SHADOW E&M-EST. PATIENT-LVL III: CPT | Mod: PBBFAC,HCNC,, | Performed by: NURSE PRACTITIONER

## 2023-01-10 NOTE — PROGRESS NOTES
CHIEF COMPLAINT:    Mr. Chavez is a 82 y.o. male presenting for .      PRESENTING ILLNESS:    Suleiman Chavez is a 82 y.o. male with a PMH of spinal stenosis, htn, bph, DM type 2 who presents for urinary incontinence/retention.    New patient to urology department. Presents today due to bph with obstruction.  History of laminectomy in October, but reports urinary symptoms preceded surgery.  Reports able to urinate during the day with some leakage, however having significant bed wetting at night.  Has had 2 recent urinary tract infections which he was hospitalized.  Initial uti treated with cefdinir x 5 days. The second episode treated with a total of 3 weeks of cipro with no improvement in urinary leakage/incontinence.  PCP also increased tamsulosin dose, but has not improved symptoms.  PVR > 800 ml in office today.  After discussion will plan for CIC and suds/cysto for diagnostics.       Urine cultures: 11/7/22 Kleb pneumo >100K  11/29/22 Kleb Pneumo > 100K    REVIEW OF SYSTEMS:    Review of Systems   Constitutional:  Negative for chills and fever.   Respiratory:  Negative for shortness of breath.    Cardiovascular:  Negative for chest pain.   Gastrointestinal:  Negative for constipation and diarrhea.   Genitourinary:  Positive for frequency. Negative for dysuria, flank pain, hematuria and urgency.   Neurological:  Negative for dizziness and weakness.     PATIENT HISTORY:    Past Medical History:   Diagnosis Date    Bilateral ocular hypertension     Diabetes mellitus     Glaucoma     Hypertension     Nuclear cataract 12/17/2014       Family History   Problem Relation Age of Onset    Cancer Mother         liver    Cancer Father         colon    No Known Problems Sister     No Known Problems Sister     Amblyopia Neg Hx     Blindness Neg Hx     Cataracts Neg Hx     Diabetes Neg Hx     Glaucoma Neg Hx     Hypertension Neg Hx     Macular degeneration Neg Hx     Retinal detachment Neg Hx     Strabismus Neg Hx      Stroke Neg Hx     Thyroid disease Neg Hx        Allergies:  Patient has no known allergies.    Medications:    Current Outpatient Medications:     acetaminophen (TYLENOL) 500 MG tablet, Take 1,000 mg by mouth 2 (two) times a day., Disp: , Rfl:     amLODIPine (NORVASC) 5 MG tablet, Take 1 tablet (5 mg total) by mouth 2 (two) times daily., Disp: 60 tablet, Rfl: 0    aspirin (ECOTRIN) 81 MG EC tablet, Take 81 mg by mouth once daily., Disp: , Rfl:     chlorthalidone (HYGROTEN) 25 MG Tab, Take 1 tablet (25 mg total) by mouth once daily., Disp: 30 tablet, Rfl: 1    cyanocobalamin (VITAMIN B-12) 1000 MCG tablet, Take 1 tablet (1,000 mcg total) by mouth once daily., Disp: 30 tablet, Rfl: 2    ferrous sulfate 325 (65 FE) MG EC tablet, Take 1 tablet (325 mg total) by mouth once daily., Disp: 30 tablet, Rfl: 2    gabapentin (NEURONTIN) 300 MG capsule, TAKE 1 CAPSULE BY MOUTH THREE TIMES DAILY (Patient taking differently: Take 300 mg by mouth 2 (two) times daily.), Disp: 90 capsule, Rfl: 5    latanoprost 0.005 % ophthalmic solution, INSTILL 1 DROP INTO BOTH EYES EVERY EVENING, Disp: 7.5 mL, Rfl: 3    losartan (COZAAR) 100 MG tablet, TAKE 1 TABLET BY MOUTH EVERY DAY, Disp: 30 tablet, Rfl: 11    metFORMIN (GLUCOPHAGE) 500 MG tablet, Take 1 tablet by mouth every morning & 2 tablets every evening, Disp: 90 tablet, Rfl: 5    rOPINIRole (REQUIP) 1 MG tablet, TAKE 1 TABLET BY MOUTH EVERY EVENING (Patient taking differently: Take 1 mg by mouth nightly as needed (restless leg).), Disp: 30 tablet, Rfl: 5    tamsulosin (FLOMAX) 0.4 mg Cap, Take 2 capsules (0.8 mg total) by mouth every evening., Disp: 180 capsule, Rfl: 1    traMADoL (ULTRAM) 50 mg tablet, TAKE 1 TABLET BY MOUTH TWICE DAILY AS NEEDED FOR PAIN, Disp: 60 tablet, Rfl: 0    PHYSICAL EXAMINATION:    Physical Exam  Vitals and nursing note reviewed.   Constitutional:       Appearance: Normal appearance. He is well-developed.   HENT:      Head: Normocephalic and atraumatic.   Eyes:       Pupils: Pupils are equal, round, and reactive to light.   Pulmonary:      Effort: Pulmonary effort is normal.   Musculoskeletal:         General: Normal range of motion.      Cervical back: Normal range of motion.   Skin:     General: Skin is warm and dry.   Neurological:      Mental Status: He is alert and oriented to person, place, and time.   Psychiatric:         Behavior: Behavior normal.         LABS:    Bladder scan performed by Nurse Gerry.   ml    Lab Results   Component Value Date    PSA 0.39 12/11/2020    PSA 0.38 05/10/2019    PSA 0.47 06/12/2017    PSA 0.58 06/16/2015    PSA 0.57 06/18/2014    PSA 0.56 05/28/2013    PSA 0.65 07/23/2010    PSA 1.0 09/08/2008    PSA 0.8 02/27/2007    PSADIAG 0.43 08/19/2019       IMPRESSION:  Encounter Diagnoses   Name Primary?    BPH with urinary obstruction Yes    Prostatitis, chronic     Urinary retention          PLAN:  Problem List Items Addressed This Visit    None  Visit Diagnoses       BPH with urinary obstruction    -  Primary    Relevant Orders    Simple urodynamics w/ cysto    Prostatitis, chronic        Urinary retention        Relevant Orders    Simple urodynamics w/ cysto            1. Urinary retention/incontinence   - continue flomax   - start CIC BID with 14 Fr catheter indefinitely, video shown and demonstrated on self   - schedule suds/cysto  2. Bph with obstruction   Continue flomax   Schedule suds/cysto  3. Rtc for above appt    Rosemary Watters NP    I spent over 45 minutes with the patient. Over 50% of the visit was spent in counseling.

## 2023-01-12 ENCOUNTER — PATIENT MESSAGE (OUTPATIENT)
Dept: UROLOGY | Facility: CLINIC | Age: 83
End: 2023-01-12
Payer: MEDICARE

## 2023-01-13 ENCOUNTER — PATIENT MESSAGE (OUTPATIENT)
Dept: UROLOGY | Facility: CLINIC | Age: 83
End: 2023-01-13
Payer: MEDICARE

## 2023-01-17 ENCOUNTER — PROCEDURE VISIT (OUTPATIENT)
Dept: UROLOGY | Facility: CLINIC | Age: 83
End: 2023-01-17
Payer: MEDICARE

## 2023-01-17 VITALS
HEART RATE: 116 BPM | HEIGHT: 73 IN | BODY MASS INDEX: 26.77 KG/M2 | DIASTOLIC BLOOD PRESSURE: 70 MMHG | TEMPERATURE: 98 F | SYSTOLIC BLOOD PRESSURE: 123 MMHG | WEIGHT: 202 LBS

## 2023-01-17 DIAGNOSIS — Z79.4 TYPE 2 DIABETES MELLITUS WITH DIABETIC AUTONOMIC NEUROPATHY, WITH LONG-TERM CURRENT USE OF INSULIN: Primary | ICD-10-CM

## 2023-01-17 DIAGNOSIS — N40.1 BPH WITH URINARY OBSTRUCTION: ICD-10-CM

## 2023-01-17 DIAGNOSIS — M54.40 CHRONIC MIDLINE LOW BACK PAIN WITH SCIATICA, SCIATICA LATERALITY UNSPECIFIED: ICD-10-CM

## 2023-01-17 DIAGNOSIS — E11.43 TYPE 2 DIABETES MELLITUS WITH DIABETIC AUTONOMIC NEUROPATHY, WITH LONG-TERM CURRENT USE OF INSULIN: Primary | ICD-10-CM

## 2023-01-17 DIAGNOSIS — N13.8 BPH WITH URINARY OBSTRUCTION: ICD-10-CM

## 2023-01-17 DIAGNOSIS — G89.29 CHRONIC MIDLINE LOW BACK PAIN WITH SCIATICA, SCIATICA LATERALITY UNSPECIFIED: ICD-10-CM

## 2023-01-17 DIAGNOSIS — R33.9 URINARY RETENTION: ICD-10-CM

## 2023-01-17 PROCEDURE — 51784 PR ANAL/URINARY MUSCLE STUDY: ICD-10-PCS | Mod: 26,HCNC,51,S$GLB | Performed by: UROLOGY

## 2023-01-17 PROCEDURE — 51797 INTRAABDOMINAL PRESSURE TEST: CPT | Mod: 26,HCNC,S$GLB, | Performed by: UROLOGY

## 2023-01-17 PROCEDURE — 51784 ANAL/URINARY MUSCLE STUDY: CPT | Mod: 26,HCNC,51,S$GLB | Performed by: UROLOGY

## 2023-01-17 PROCEDURE — 52000 PR CYSTOURETHROSCOPY: ICD-10-PCS | Mod: HCNC,59,S$GLB, | Performed by: UROLOGY

## 2023-01-17 PROCEDURE — 52000 CYSTOURETHROSCOPY: CPT | Mod: HCNC,59,S$GLB, | Performed by: UROLOGY

## 2023-01-17 PROCEDURE — 51741 ELECTRO-UROFLOWMETRY FIRST: CPT | Mod: 26,HCNC,51,S$GLB | Performed by: UROLOGY

## 2023-01-17 PROCEDURE — 51797 PR VOIDING PRESS STUDY INTRA-ABDOMINAL VOID: ICD-10-PCS | Mod: 26,HCNC,S$GLB, | Performed by: UROLOGY

## 2023-01-17 PROCEDURE — 51741 PR UROFLOWMETRY, COMPLEX: ICD-10-PCS | Mod: 26,HCNC,51,S$GLB | Performed by: UROLOGY

## 2023-01-17 PROCEDURE — 51728 CYSTOMETROGRAM W/VP: CPT | Mod: 26,HCNC,S$GLB, | Performed by: UROLOGY

## 2023-01-17 PROCEDURE — 51728 PR COMPLEX CYSTOMETROGRAM VOIDING PRESSURE STUDIES: ICD-10-PCS | Mod: 26,HCNC,S$GLB, | Performed by: UROLOGY

## 2023-01-17 NOTE — PROCEDURES
Simple urodynamics w/ cysto    Date/Time: 1/17/2023 9:30 AM  Performed by: Nicola Hsu MD  Authorized by: Rosemary Watters NP   Comments: Procedure Date:  01/17/2023    Procedure:   Diagnostic Cystourethroscopy   Complex Cystometrogram   Voiding / Pressure Study with Intrarectal Balloon   Complex Uroflow   Electromyogram of Anal Sphincter.     Pre-OP Diagnosis:   Urinary retention   Post-OP Diagnosis:   same   Anesthesia:   Anesthesia Administered:   Intraurethral instillation of 10 mL 2% lidocaine (Xylocaine) jelly.   Findings:   --- Bladder ---   CYSTOMETROGRAM ( Filling Phase ):   Cystometric Numeric Data:   - First Desire (Sensation): 446 mL at 51cm of water.   - Normal Desire: 472mL at 57 cm of water.   - Strong Desire: 510 mL at 63 cm of water.   - Urgency (Imminent Void) : 546 mL at 74 cm of water.   - Maximum Cystometric Capacity: 656 mL.   Compliance:   - high.   Leak Point Pressure:   - Valsalva ( Abdominal ) Leak Point Pressure: none.   UROFLOW:   - Voided Volume: none mL.   - Residual Urine: over 600 mL.   - Maximum Flow Rate: n/a mL/sec.   - Flow Pattern: no flow  VOIDING PRESSURE STUDY ( Voiding Phase ):   Detrusor Pressure:   - Maximum Detrusor Pressure: 120 cm of water.   - Detrusor Pressure at Maximum Flow: n/a cm of water.   - Detrusor Contraction Characteristics: no coordinated bladder contraction(s).   ELECTROMYOGRAM:   - no relaxation of sphincter upon voiding.     ---Diagnostic Cystourethroscopy ---   Normal urethra.    Prostate: 3.5 cm minimal obstruction.  Open bladder neck  Width of Bladder Neck Opening: Approximately 18 Fr.   Normal bladder. Floppy appearing bladder with no tumor or stone.  Some irritation due to catheterization.  Normal ureteral orifices bilaterally.       Description of Procedure:   Informed Consent:   - Risks, benefits and alternatives of procedure discussed with   patient and informed consent obtained.   Patient Position:   - Supine.   --- Bladder ---   Prep and  Drape:   - Patient prepped and draped in usual sterile fashion using povidone   iodine (Betadine).   --- Diagnostic Cystourethroscopy ---   Instruments:   - 16 Fr flexible cystoscope with 0 degree lens.   Procedure Details:   - Cystoscope passed under vision into bladder.   - Bladder and urethra examined in their entirety with findings as   above.   --- Urodynamic Studies ---   Procedure Details:   Cystometrogram:   - Catheter(s) passed into the bladder.   - Rectal balloon inserted.   - Catheter(s) connected to infusion medium and to pressure recording   device.   - Infusion Rate: 30 mL / min.   Electromyogram:   - Perineal electromyogram pad placed and connected to electromyogram   recording device.   Equipment:   - Catheters: Double lumen catheter.   - Medium: Liquid.   - Pressure Recording Device: Calibrated electronic equipment.   Complications:   No immediate complications.    CONCLUSIONS:   1. Urinary retention  2. Diabetic neuropathy  3. Chronic back pain, s/p back surgery x 2  4. Hx of UTI, sepsis.    His bladder capacity is over 30 oz.  His voiding diary revealed 14 to 25 oz cath PVR in the AM and 16 to 32 oz of cath PVR at night before his goes to bed.  I recommend more frequent CIC to keep his bladder capacity less than 20 oz.  Recommend at least 3 x if not 4 x CIC a day indefinitely.  Continue flomax 2 capsules a day.  No prostate obstruction noted.    Once his bladder capacity is less than 20 oz ( 600 ml or less), we may consider sacral neuromodulation if he desires.  Nature of sacral neuromodulation explained to pt and his wife.    Will follow up in 3 months with another voiding diary    Post-OP Plan:   Patient was discharged home in a stable condition.  Medications: none  Follow up:  3 months with voiding dairy x 3 days.    · 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, with long-term current use of insulin    · BPH with urinary obstruction   Simple urodynamics w/ cysto    · Urinary  "retention   Simple urodynamics w/ cysto   catheter 14-16 Fr-" Misc; 1 Units by Misc.(Non-Drug; Combo Route) route 4 (four) times daily. 1 Units by Misc.(Non-Drug; Combo Route) route 4 (four) times daily, indefinitely.  Dispense: 120 each; Refill: 99    · Chronic midline low back pain with sciatica, sciatica laterality unspecified            "

## 2023-01-17 NOTE — PATIENT INSTRUCTIONS
_                                                                                                                                                                                             If any problems after hours or weekends, you may call [phone] and ask for the urology resident on call. SIMPLE URODYNAMIC STUDY (SUDS) & CYSTOSCOPY  UROLOGY CLINIC DISCHARGE INSTRUCTIONS    You have had a procedure that will require time to properly heal. Follow the instructions you have been given on how to care for yourself once you are home. Below is additional information to help in your recovery.    ACTIVITY  There are no restrictions in activity. Start doing again the things you did before the procedure.  You may experience a slight burning sensation. You may notice a small amount of blood in your urine. This will clear up within a day. Call the clinic if this continues beyond 48 hours.    DIET  Continue your normal diet. You may eat the same foods you ate before your procedure.  Drink plenty of fluids during the first 24-48 hours following your procedure.    MEDICATIONS  Resume all other previous medications from your prescribing physician.  Continue any pre=procedure antibiotics until they are all gone.    SIGNS AND SYMPTOMS TO REPORT TO THE DOCTOR  Chills or fever greater than 101° F within 24 hours of procedure.  Changes in urination, such as increased bleeding, foul smell, cloudy urine, or painful urination.  Call your doctor with any questions or concerns.    For any emergency situation, call 911 immediately or go to your nearest emergency room.    [facility]  [phone]

## 2023-01-24 ENCOUNTER — TELEPHONE (OUTPATIENT)
Dept: INTERNAL MEDICINE | Facility: CLINIC | Age: 83
End: 2023-01-24
Payer: MEDICARE

## 2023-01-24 RX ORDER — INFLUENZA A VIRUS A/VICTORIA/2570/2019 IVR-215 (H1N1) ANTIGEN (FORMALDEHYDE INACTIVATED), INFLUENZA A VIRUS A/DARWIN/6/2021 IVR-227 (H3N2) ANTIGEN (FORMALDEHYDE INACTIVATED), INFLUENZA B VIRUS B/AUSTRIA/1359417/2021 BVR-26 ANTIGEN (FORMALDEHYDE INACTIVATED), INFLUENZA B VIRUS B/PHUKET/3073/2013 BVR-1B ANTIGEN (FORMALDEHYDE INACTIVATED) 15; 15; 15; 15 UG/.5ML; UG/.5ML; UG/.5ML; UG/.5ML
INJECTION, SUSPENSION INTRAMUSCULAR
COMMUNITY
Start: 2022-11-16 | End: 2024-03-17

## 2023-02-05 NOTE — TELEPHONE ENCOUNTER
No new care gaps identified.  [facility] Embedded Care Gaps. Reference number: [id number]. 2/05/2023   8:04:51 AM CST

## 2023-02-06 RX ORDER — CHLORTHALIDONE 25 MG/1
25 TABLET ORAL DAILY
Qty: 90 TABLET | Refills: 1 | Status: SHIPPED | OUTPATIENT
Start: 2023-02-06 | End: 2023-04-07

## 2023-02-06 NOTE — TELEPHONE ENCOUNTER
Refill Routing Note   Medication(s) are not appropriate for processing by [facility] for the following reason(s):        Refill Routing Note   Medication(s) are not appropriate for processing by [facility] for the following reason(s):       New or recently adjusted medication    [facility] action(s):  Defer                          Appointments  past 12m or future 3m with PCP    Date Provider   Last Visit   11/29/2022 Caleb Negrete MD   Next Visit   Visit date not found Caleb Negrete MD   ED visits in past 90 days: 0        Note composed:7:29 AM 02/06/2023

## 2023-02-07 DIAGNOSIS — Z00.00 ENCOUNTER FOR MEDICARE ANNUAL WELLNESS EXAM: ICD-10-CM

## 2023-02-09 DIAGNOSIS — Z00.00 ENCOUNTER FOR MEDICARE ANNUAL WELLNESS EXAM: ICD-10-CM

## 2023-02-14 RX ORDER — AMLODIPINE BESYLATE 5 MG/1
5 TABLET ORAL 2 TIMES DAILY
Qty: 60 TABLET | Refills: 5 | Status: SHIPPED | OUTPATIENT
Start: 2023-02-14 | End: 2023-09-19

## 2023-02-14 NOTE — TELEPHONE ENCOUNTER
Care Due:                  Date            Visit Type   Department     Provider  --------------------------------------------------------------------------------                                EP -                              PRIMARY      [facility] PRIMARY  Last Visit: 11-      CARE ([facility])   DARWIN Negrete  Next Visit: None Scheduled  None         None Found                                                            Last  Test          Frequency    Reason                     Performed    Due Date  --------------------------------------------------------------------------------    HBA1C.......  6 months...  metFORMIN................  11- 05-    [facility] Embedded Care Gaps. Reference number: [id number]. 2/14/2023   11:29:08 AM CST

## 2023-02-14 NOTE — TELEPHONE ENCOUNTER
Refill Routing Note   Medication(s) are not appropriate for processing by [facility] for the following reason(s):       New or recently adjusted medication    [facility] action(s):  Defer                   Appointments  past 12m or future 3m with PCP    Date Provider   Last Visit   11/29/2022 Caleb Negrete MD   Next Visit   Visit date not found Caleb Negrete MD   ED visits in past 90 days: 0        Note composed:11:58 AM 02/14/2023

## 2023-03-02 ENCOUNTER — TELEPHONE (OUTPATIENT)
Dept: UROLOGY | Facility: CLINIC | Age: 83
End: 2023-03-02
Payer: MEDICARE

## 2023-03-02 ENCOUNTER — OFFICE VISIT (OUTPATIENT)
Dept: URGENT CARE | Facility: CLINIC | Age: 83
End: 2023-03-02
Payer: MEDICARE

## 2023-03-02 VITALS
HEART RATE: 107 BPM | BODY MASS INDEX: 26.77 KG/M2 | HEIGHT: 73 IN | SYSTOLIC BLOOD PRESSURE: 108 MMHG | DIASTOLIC BLOOD PRESSURE: 67 MMHG | RESPIRATION RATE: 18 BRPM | TEMPERATURE: 100 F | WEIGHT: 202 LBS

## 2023-03-02 DIAGNOSIS — R10.2 PELVIC PAIN: Primary | ICD-10-CM

## 2023-03-02 DIAGNOSIS — R53.83 FATIGUE, UNSPECIFIED TYPE: ICD-10-CM

## 2023-03-02 DIAGNOSIS — R53.1 WEAKNESS: ICD-10-CM

## 2023-03-02 PROCEDURE — 99213 OFFICE O/P EST LOW 20 MIN: CPT | Mod: S$GLB,,, | Performed by: NURSE PRACTITIONER

## 2023-03-02 PROCEDURE — 1160F PR REVIEW ALL MEDS BY PRESCRIBER/CLIN PHARMACIST DOCUMENTED: ICD-10-PCS | Mod: CPTII,S$GLB,, | Performed by: NURSE PRACTITIONER

## 2023-03-02 PROCEDURE — 3074F SYST BP LT 130 MM HG: CPT | Mod: CPTII,S$GLB,, | Performed by: NURSE PRACTITIONER

## 2023-03-02 PROCEDURE — 3078F PR MOST RECENT DIASTOLIC BLOOD PRESSURE < 80 MM HG: ICD-10-PCS | Mod: CPTII,S$GLB,, | Performed by: NURSE PRACTITIONER

## 2023-03-02 PROCEDURE — 1159F MED LIST DOCD IN RCRD: CPT | Mod: CPTII,S$GLB,, | Performed by: NURSE PRACTITIONER

## 2023-03-02 PROCEDURE — 1126F AMNT PAIN NOTED NONE PRSNT: CPT | Mod: CPTII,S$GLB,, | Performed by: NURSE PRACTITIONER

## 2023-03-02 PROCEDURE — 1126F PR PAIN SEVERITY QUANTIFIED, NO PAIN PRESENT: ICD-10-PCS | Mod: CPTII,S$GLB,, | Performed by: NURSE PRACTITIONER

## 2023-03-02 PROCEDURE — 3078F DIAST BP <80 MM HG: CPT | Mod: CPTII,S$GLB,, | Performed by: NURSE PRACTITIONER

## 2023-03-02 PROCEDURE — 1160F RVW MEDS BY RX/DR IN RCRD: CPT | Mod: CPTII,S$GLB,, | Performed by: NURSE PRACTITIONER

## 2023-03-02 PROCEDURE — 3074F PR MOST RECENT SYSTOLIC BLOOD PRESSURE < 130 MM HG: ICD-10-PCS | Mod: CPTII,S$GLB,, | Performed by: NURSE PRACTITIONER

## 2023-03-02 PROCEDURE — 1159F PR MEDICATION LIST DOCUMENTED IN MEDICAL RECORD: ICD-10-PCS | Mod: CPTII,S$GLB,, | Performed by: NURSE PRACTITIONER

## 2023-03-02 PROCEDURE — 99213 PR OFFICE/OUTPT VISIT, EST, LEVL III, 20-29 MIN: ICD-10-PCS | Mod: S$GLB,,, | Performed by: NURSE PRACTITIONER

## 2023-03-02 RX ORDER — PNEUMOCOCCAL 20-VALENT CONJUGATE VACCINE 2.2; 2.2; 2.2; 2.2; 2.2; 2.2; 2.2; 2.2; 2.2; 2.2; 2.2; 2.2; 2.2; 2.2; 2.2; 2.2; 4.4; 2.2; 2.2; 2.2 UG/.5ML; UG/.5ML; UG/.5ML; UG/.5ML; UG/.5ML; UG/.5ML; UG/.5ML; UG/.5ML; UG/.5ML; UG/.5ML; UG/.5ML; UG/.5ML; UG/.5ML; UG/.5ML; UG/.5ML; UG/.5ML; UG/.5ML; UG/.5ML; UG/.5ML; UG/.5ML
INJECTION, SUSPENSION INTRAMUSCULAR
COMMUNITY
Start: 2022-11-30 | End: 2024-03-17

## 2023-03-02 NOTE — PROGRESS NOTES
"Subjective:       Patient ID: Suleiman Chavez is a 83 y.o. male.    Vitals:  height is 6' 1" (1.854 m) and weight is 91.6 kg (202 lb). His temperature is 99.6 °F (37.6 °C). His blood pressure is 108/67 and his pulse is 107. His respiration is 18.     Chief Complaint: Urinary Tract Infection    Patient complains of pain in lower abd earlier this morning, weakness, fatigue , loss of appetite. Hx UTIs with similar presentation.  Patient self caths QID, states no change in urine color, odor, quantity.  + chills but no fever.   Symptoms  started this morning     Urinary Tract Infection   This is a new problem. The current episode started today. The problem has been gradually worsening. Associated symptoms include chills. Pertinent negatives include no flank pain, frequency, nausea, urgency, vomiting, constipation or rash. He has tried nothing for the symptoms. The treatment provided no relief.   Constitution: Positive for appetite change, chills, fatigue and generalized weakness. Negative for activity change, sweating, fever and unexpected weight change.   HENT:  Negative for ear pain, ear discharge, congestion, postnasal drip, sinus pain, sinus pressure, sore throat, trouble swallowing and voice change.    Neck: Negative for neck pain and painful lymph nodes.   Cardiovascular:  Negative for chest pain, leg swelling, palpitations and sob on exertion.   Respiratory:  Negative for chest tightness, cough, sputum production, shortness of breath, wheezing and asthma.    Gastrointestinal:  Negative for abdominal pain, history of abdominal surgery, nausea, vomiting, constipation, diarrhea, bright red blood in stool, dark colored stools, rectal bleeding, rectal pain and hemorrhoids.   Genitourinary:  Negative for dysuria, frequency, urgency, flank pain and pelvic pain.   Musculoskeletal:  Negative for joint pain, joint swelling, abnormal ROM of joint, arthritis, back pain, pain with walking, muscle cramps and muscle ache. "   Skin:  Negative for color change, pale, rash and erythema.   Allergic/Immunologic: Negative for asthma and sneezing.   Neurological:  Positive for altered mental status. Negative for dizziness, passing out, facial drooping, speech difficulty, coordination disturbances, loss of balance, headaches, numbness, tingling and seizures.   Hematologic/Lymphatic: Negative for swollen lymph nodes.   Psychiatric/Behavioral:  Positive for altered mental status.      Objective:      Physical Exam   Constitutional: He is oriented to person, place, and time. He appears well-developed. He is cooperative. No distress.   HENT:   Head: Normocephalic and atraumatic.   Ears:   Right Ear: Tympanic membrane, external ear and ear canal normal. impacted cerumen  Left Ear: Tympanic membrane, external ear and ear canal normal. impacted cerumen  Nose: Nose normal. No rhinorrhea or congestion.   Mouth/Throat: Oropharynx is clear and moist. Mucous membranes are dry. No oropharyngeal exudate or posterior oropharyngeal erythema.   Eyes: Conjunctivae and lids are normal. Pupils are equal, round, and reactive to light. Right eye exhibits no discharge. Left eye exhibits no discharge. No scleral icterus. Extraocular movement intact   Neck: Trachea normal and phonation normal. Neck supple. No neck rigidity present.   Cardiovascular: Normal rate, regular rhythm, normal heart sounds and normal pulses.   Pulmonary/Chest: Effort normal and breath sounds normal. No stridor. No respiratory distress. He has no wheezes. He has no rhonchi. He has no rales. He exhibits no tenderness.   Abdominal: Normal appearance and bowel sounds are normal. He exhibits no distension and no mass. Soft. flat abdomen There is no abdominal tenderness. There is no rebound, no guarding, no left CVA tenderness and no right CVA tenderness.   Musculoskeletal: Normal range of motion.         General: No tenderness, deformity or signs of injury. Normal range of motion.      Cervical  back: He exhibits no tenderness.      Comments: Patient extremely weak, unable to stand independently.     Lymphadenopathy:     He has no cervical adenopathy.   Neurological: He is alert and oriented to person, place, and time. He has normal strength and normal reflexes. He displays weakness. No sensory deficit. Gait abnormal.   Skin: Skin is warm, dry, intact, not diaphoretic, not pale and no rash. Capillary refill takes less than 2 seconds. No bruising and No erythema   Psychiatric: His speech is normal and behavior is normal. Judgment and thought content normal.   Nursing note and vitals reviewed.      Assessment:       1. Pelvic pain    2. Weakness    3. Fatigue, unspecified type          Plan:       -patient attempted self cath but unsuccessful. Extremely weak but AAO x 3, hx of fatigue, change in mental status, decreased appetite,   VS with deviation from baseline suggesting possible sepsis. Denies chest  pain, SOB, palpitations, pedal edema, abdominal pain, dysuria, flank pain, fever, N/V. Recommend UA/culture, EKG, chest xray, covid screen. Patient unable to cooperate for diagnostic exams secondary to weakness. Family members requesting transport to ER, EMS called.     Pelvic pain  -     POCT Urinalysis, Dipstick, Automated, W/O Scope    Weakness    Fatigue, unspecified type

## 2023-03-02 NOTE — TELEPHONE ENCOUNTER
----- Message from Selin Malcolm LPN sent at 3/2/2023  2:17 PM CST -----  Contact: @[phone]    ----- Message -----  From: Lorene Frances  Sent: 3/2/2023  12:56 PM CST  To: [facility] Staff    Pt is calling in stating that after he inserted his catheter he noticed some discomfort. Pt states that he is getting a UTI. Please call to discuss further.

## 2023-03-03 ENCOUNTER — PATIENT MESSAGE (OUTPATIENT)
Dept: UROLOGY | Facility: CLINIC | Age: 83
End: 2023-03-03
Payer: MEDICARE

## 2023-03-03 ENCOUNTER — PATIENT MESSAGE (OUTPATIENT)
Dept: INTERNAL MEDICINE | Facility: CLINIC | Age: 83
End: 2023-03-03
Payer: MEDICARE

## 2023-03-03 ENCOUNTER — TELEPHONE (OUTPATIENT)
Dept: UROLOGY | Facility: CLINIC | Age: 83
End: 2023-03-03
Payer: MEDICARE

## 2023-03-03 DIAGNOSIS — R50.9 FEVER, UNSPECIFIED FEVER CAUSE: Primary | ICD-10-CM

## 2023-03-03 NOTE — TELEPHONE ENCOUNTER
Spouse states they went to  ER because [facility] had a long wait. They catheterized him and sent it for cx-(300cc) the urine cx is still pending, but UA was not positive for nitrates. They placed him on cipro-he has diarrhea, he has taken Cipro before for up to 3 weeks at a time without issues. She also has diarrhea and thinks they both may have a GI bug. He is weak and not feeling well. I asked if he was given IV fluids at  and wife said no. I advised her to call PCP, take imodium, and to stay hydrated. I gave her our fax number and asked her to have  fax the culture results when ready. Sona't made on 3/16/23.

## 2023-03-03 NOTE — TELEPHONE ENCOUNTER
----- Message from Selin Malcolm LPN sent at 3/3/2023  8:33 AM CST -----  Regarding: FW: Pt Adv  Contact: [phone]    ----- Message -----  From: Sherron Hwang  Sent: 3/3/2023   8:09 AM CST  To: [facility] Staff  Subject: Pt Adv                                           Pt's wife Liat calling in regards to pt's recent urgent care visit due to UTI. Urgent care transferred pt to [facility] where pt received shot and was sent home. Pt's wife stated is still very weak, and would like to know next plan of care.   Please call and adv @ [phone]

## 2023-03-04 ENCOUNTER — HOSPITAL ENCOUNTER (EMERGENCY)
Facility: HOSPITAL | Age: 83
Discharge: HOME | End: 2023-03-04
Attending: EMERGENCY MEDICINE
Payer: MEDICARE

## 2023-03-04 ENCOUNTER — DOCUMENTATION ONLY (OUTPATIENT)
Dept: INTERNAL MEDICINE | Facility: CLINIC | Age: 83
End: 2023-03-04
Payer: MEDICARE

## 2023-03-04 VITALS
RESPIRATION RATE: 24 BRPM | WEIGHT: 210 LBS | HEART RATE: 75 BPM | BODY MASS INDEX: 27.83 KG/M2 | HEIGHT: 73 IN | DIASTOLIC BLOOD PRESSURE: 60 MMHG | OXYGEN SATURATION: 97 % | TEMPERATURE: 98 F | SYSTOLIC BLOOD PRESSURE: 144 MMHG

## 2023-03-04 DIAGNOSIS — E83.42 HYPOMAGNESEMIA: Primary | ICD-10-CM

## 2023-03-04 DIAGNOSIS — E87.6 HYPOKALEMIA: ICD-10-CM

## 2023-03-04 DIAGNOSIS — E86.0 DEHYDRATION: ICD-10-CM

## 2023-03-04 DIAGNOSIS — R19.7 DIARRHEA IN ADULT PATIENT: ICD-10-CM

## 2023-03-04 LAB
ALBUMIN SERPL BCP-MCNC: 3.2 G/DL (ref 3.5–5.2)
ALP SERPL-CCNC: 101 U/L (ref 55–135)
ALT SERPL W/O P-5'-P-CCNC: 16 U/L (ref 10–44)
ANION GAP SERPL CALC-SCNC: 11 MMOL/L (ref 8–16)
AST SERPL-CCNC: 22 U/L (ref 10–40)
BASOPHILS # BLD AUTO: ABNORMAL K/UL (ref 0–0.2)
BASOPHILS NFR BLD: 0 % (ref 0–1.9)
BILIRUB SERPL-MCNC: 0.5 MG/DL (ref 0.1–1)
BILIRUB UR QL STRIP: NEGATIVE
BUN SERPL-MCNC: 34 MG/DL (ref 8–23)
CALCIUM SERPL-MCNC: 9 MG/DL (ref 8.7–10.5)
CHLORIDE SERPL-SCNC: 97 MMOL/L (ref 95–110)
CLARITY UR REFRACT.AUTO: CLEAR
CO2 SERPL-SCNC: 23 MMOL/L (ref 23–29)
COLOR UR AUTO: YELLOW
CREAT SERPL-MCNC: 1.2 MG/DL (ref 0.5–1.4)
DIFFERENTIAL METHOD: ABNORMAL
EOSINOPHIL # BLD AUTO: ABNORMAL K/UL (ref 0–0.5)
EOSINOPHIL NFR BLD: 0 % (ref 0–8)
ERYTHROCYTE [DISTWIDTH] IN BLOOD BY AUTOMATED COUNT: 15.5 % (ref 11.5–14.5)
EST. GFR  (NO RACE VARIABLE): >60 ML/MIN/1.73 M^2
GLUCOSE SERPL-MCNC: 181 MG/DL (ref 70–110)
GLUCOSE UR QL STRIP: NEGATIVE
HCT VFR BLD AUTO: 39.1 % (ref 40–54)
HGB BLD-MCNC: 12.5 G/DL (ref 14–18)
HGB UR QL STRIP: NEGATIVE
IMM GRANULOCYTES # BLD AUTO: ABNORMAL K/UL (ref 0–0.04)
IMM GRANULOCYTES NFR BLD AUTO: ABNORMAL % (ref 0–0.5)
INFLUENZA A, MOLECULAR: NOT DETECTED
INFLUENZA B, MOLECULAR: NOT DETECTED
KETONES UR QL STRIP: NEGATIVE
LEUKOCYTE ESTERASE UR QL STRIP: NEGATIVE
LYMPHOCYTES # BLD AUTO: ABNORMAL K/UL (ref 1–4.8)
LYMPHOCYTES NFR BLD: 6 % (ref 18–48)
MAGNESIUM SERPL-MCNC: 1.4 MG/DL (ref 1.6–2.6)
MCH RBC QN AUTO: 27.5 PG (ref 27–31)
MCHC RBC AUTO-ENTMCNC: 32 G/DL (ref 32–36)
MCV RBC AUTO: 86 FL (ref 82–98)
METAMYELOCYTES NFR BLD MANUAL: 1 %
MONOCYTES # BLD AUTO: ABNORMAL K/UL (ref 0.3–1)
MONOCYTES NFR BLD: 9 % (ref 4–15)
NEUTROPHILS NFR BLD: 63 % (ref 38–73)
NEUTS BAND NFR BLD MANUAL: 21 %
NITRITE UR QL STRIP: NEGATIVE
NRBC BLD-RTO: 0 /100 WBC
PH UR STRIP: 5 [PH] (ref 5–8)
PHOSPHATE SERPL-MCNC: 2.9 MG/DL (ref 2.7–4.5)
PLATELET # BLD AUTO: 260 K/UL (ref 150–450)
PLATELET BLD QL SMEAR: ABNORMAL
PMV BLD AUTO: 9.1 FL (ref 9.2–12.9)
POTASSIUM SERPL-SCNC: 3.2 MMOL/L (ref 3.5–5.1)
PROT SERPL-MCNC: 7.8 G/DL (ref 6–8.4)
PROT UR QL STRIP: NEGATIVE
RBC # BLD AUTO: 4.55 M/UL (ref 4.6–6.2)
RSV AG BY MOLECULAR METHOD: NOT DETECTED
SARS-COV-2 RNA RESP QL NAA+PROBE: NOT DETECTED
SODIUM SERPL-SCNC: 131 MMOL/L (ref 136–145)
SP GR UR STRIP: 1.02 (ref 1–1.03)
URN SPEC COLLECT METH UR: NORMAL
WBC # BLD AUTO: 9.07 K/UL (ref 3.9–12.7)

## 2023-03-04 PROCEDURE — 0241U SARS-COV2 (COVID) WITH FLU/RSV BY PCR: CPT | Mod: HCNC | Performed by: EMERGENCY MEDICINE

## 2023-03-04 PROCEDURE — 25500020 PHARM REV CODE 255: Mod: HCNC | Performed by: EMERGENCY MEDICINE

## 2023-03-04 PROCEDURE — 84100 ASSAY OF PHOSPHORUS: CPT | Mod: HCNC | Performed by: EMERGENCY MEDICINE

## 2023-03-04 PROCEDURE — 81003 URINALYSIS AUTO W/O SCOPE: CPT | Mod: HCNC | Performed by: EMERGENCY MEDICINE

## 2023-03-04 PROCEDURE — 99284 EMERGENCY DEPT VISIT MOD MDM: CPT | Mod: HCNC,CS,, | Performed by: EMERGENCY MEDICINE

## 2023-03-04 PROCEDURE — 63600175 PHARM REV CODE 636 W HCPCS: Mod: HCNC | Performed by: EMERGENCY MEDICINE

## 2023-03-04 PROCEDURE — 99284 PR EMERGENCY DEPT VISIT,LEVEL IV: ICD-10-PCS | Mod: HCNC,CS,, | Performed by: EMERGENCY MEDICINE

## 2023-03-04 PROCEDURE — 25000003 PHARM REV CODE 250: Mod: HCNC | Performed by: EMERGENCY MEDICINE

## 2023-03-04 PROCEDURE — 85007 BL SMEAR W/DIFF WBC COUNT: CPT | Mod: HCNC | Performed by: EMERGENCY MEDICINE

## 2023-03-04 PROCEDURE — 83735 ASSAY OF MAGNESIUM: CPT | Mod: HCNC | Performed by: EMERGENCY MEDICINE

## 2023-03-04 PROCEDURE — 99285 EMERGENCY DEPT VISIT HI MDM: CPT | Mod: 25,HCNC

## 2023-03-04 PROCEDURE — 96365 THER/PROPH/DIAG IV INF INIT: CPT | Mod: HCNC,59

## 2023-03-04 PROCEDURE — 85027 COMPLETE CBC AUTOMATED: CPT | Mod: HCNC | Performed by: EMERGENCY MEDICINE

## 2023-03-04 PROCEDURE — 80053 COMPREHEN METABOLIC PANEL: CPT | Mod: HCNC | Performed by: EMERGENCY MEDICINE

## 2023-03-04 PROCEDURE — 96361 HYDRATE IV INFUSION ADD-ON: CPT | Mod: HCNC

## 2023-03-04 PROCEDURE — P9612 CATHETERIZE FOR URINE SPEC: HCPCS | Mod: HCNC

## 2023-03-04 RX ORDER — MAGNESIUM SULFATE HEPTAHYDRATE 40 MG/ML
2 INJECTION, SOLUTION INTRAVENOUS
Status: COMPLETED | OUTPATIENT
Start: 2023-03-04 | End: 2023-03-04

## 2023-03-04 RX ORDER — POTASSIUM CHLORIDE 750 MG/1
10 TABLET, EXTENDED RELEASE ORAL DAILY
Qty: 5 TABLET | Refills: 0 | Status: SHIPPED | OUTPATIENT
Start: 2023-03-04 | End: 2023-09-19

## 2023-03-04 RX ADMIN — SODIUM CHLORIDE, POTASSIUM CHLORIDE, SODIUM LACTATE AND CALCIUM CHLORIDE 1000 ML: 600; 310; 30; 20 INJECTION, SOLUTION INTRAVENOUS at 11:03

## 2023-03-04 RX ADMIN — MAGNESIUM SULFATE 2 G: 2 INJECTION INTRAVENOUS at 01:03

## 2023-03-04 RX ADMIN — IOHEXOL 100 ML: 350 INJECTION, SOLUTION INTRAVENOUS at 12:03

## 2023-03-04 RX ADMIN — POTASSIUM BICARBONATE 25 MEQ: 978 TABLET, EFFERVESCENT ORAL at 01:03

## 2023-03-04 NOTE — DISCHARGE INSTRUCTIONS
Take Imodium as directed, as needed for recurrent diarrhea. Take potassium supplement for next 5 days as prescribed.

## 2023-03-04 NOTE — PROGRESS NOTES
Internal medicine note    83-year-old male    Received a call from the family regarding medical circumstances of lower abdominal pain and diarrhea, which the diarrhea was watery and explosive for the past 2 days.  With progressive weakness.    He presented to urgent care yesterday.  There was concern of urinary tract infection.  He has had urosepsis in the past and was manifested by weakness.    He was not able to be examined in urgent care because unable to stand up.  EMS called out.  Presented to [facility].  A dip urine was perform which was unrevealing.  Nevertheless ciprofloxacin was prescribed.  He was not admitted      Talked to the family that will prior be best to present to the emergency room at [facility] for further assessment.      I happened to be in clinic this Saturday morning    I was able to see the patient.    Temperature 97.7°   Pulse 96   Blood pressure 90 6-100 systolic over 62   Chest clear breath sounds   Heart regular rate rhythm   Abdominal exam appears to be distended, hyperactive bowel sounds, diffusely uncomfortable in all quadrants, no guarding or rebound  Rectal exam stool is liquid, brown, heme-negative      Suspect that he has dehydration secondary to diarrhea illness probably viral gastroenteritis  Also should be evaluated for small-bowel obstruction    Best patient presents to the emergency room which he and family are in agreement for lab testing, imaging study, IV fluids      He did not take his medications of chlorthalidone/losartan/tamsulosin/amlodipine/metformin this morning

## 2023-03-04 NOTE — ED PROVIDER NOTES
Encounter Date: 3/4/2023       History     Chief Complaint   Patient presents with    Dehydration     Diarrhea x 2 days, seen at  yesterday and sent to  ED d/t weakness--d/c from  on cipro for possible UTI, pt self caths (no UTI revealed); went to PCP this AM and reported hypotension in 100s; +generalized weakness     83-year-old man with comorbidities of hypertension, glaucoma, and diabetes who also requires self urinary catheterization 4 times daily presents to the emergency department for evaluation of generalized weakness and mild hypotension noted at primary care clinic this morning in the setting of  recent innumerable episodes of recurrent nonbloody, nonmelanotic diarrhea over the past 2 days with associated anorexia for the past 3 days without alfred fever, nausea, chest pain, syncope, headache, visual disturbance, or altered mental status.  The patient was evaluated in an outlying emergency department where a urinalysis was performed which reportedly did not reveal the presence of nitrite, but ciprofloxacin therapy was instituted as an outpatient.  Per the accompanying wife, primary care as recommended that patient discontinue that therapy at this time, and present to this emergency department for further evaluation.  At the time my exam, the patient describes generalized weakness with mild, diffuse abdominal discomfort without the current urge to stool or associated nausea, chest pain, or shortness of breath.    Review of patient's allergies indicates:  No Known Allergies  Past Medical History:   Diagnosis Date    Bilateral ocular hypertension     Diabetes mellitus     Glaucoma     Hypertension     Nuclear cataract 12/17/2014     Past Surgical History:   Procedure Laterality Date    CHOLECYSTECTOMY       Family History   Problem Relation Age of Onset    Cancer Mother         liver    Cancer Father         colon    No Known Problems Sister     No Known Problems Sister     Amblyopia Neg Hx     Blindness  Neg Hx     Cataracts Neg Hx     Diabetes Neg Hx     Glaucoma Neg Hx     Hypertension Neg Hx     Macular degeneration Neg Hx     Retinal detachment Neg Hx     Strabismus Neg Hx     Stroke Neg Hx     Thyroid disease Neg Hx      Social History     Tobacco Use    Smoking status: Former     Types: Cigarettes     Quit date: 2004     Years since quittin.7     Passive exposure: Past    Smokeless tobacco: Never   Substance Use Topics    Alcohol use: No     Alcohol/week: 0.0 standard drinks    Drug use: No     Review of Systems   Constitutional:  Positive for fatigue. Negative for fever.   Gastrointestinal:  Positive for abdominal pain and diarrhea. Negative for anal bleeding, blood in stool, nausea, rectal pain and vomiting.   Musculoskeletal:  Negative for back pain and neck pain.   Skin:  Negative for rash and wound.     Physical Exam     Initial Vitals [23 1059]   BP Pulse Resp Temp SpO2   (!) 139/59 92 18 97.9 °F (36.6 °C) 95 %      MAP       --         Physical Exam    Vitals reviewed.  Constitutional:   83-year-old  man, no acute distress noted   HENT:   Head: Normocephalic and atraumatic.   Moderately desiccated mucous membranes without evidence of additional intraoral injury   Eyes: EOM are normal. Pupils are equal, round, and reactive to light.   Neck: No tracheal deviation present.   Cardiovascular:  Normal rate, regular rhythm and intact distal pulses.           Pulmonary/Chest: Breath sounds normal. No stridor. No respiratory distress.   Abdominal: Abdomen is soft.   There is mild palpation tenderness to the right mid and lower quadrants without associated distention or palpable mass   Musculoskeletal:         General: Normal range of motion.      Comments: Trace bilateral pedal edema     Neurological: He is alert and oriented to person, place, and time.   Skin: Skin is warm and dry.   Psychiatric: His behavior is normal. Thought content normal.       ED Course   Procedures  Labs Reviewed    CBC W/ AUTO DIFFERENTIAL - Abnormal; Notable for the following components:       Result Value    RBC 4.55 (*)     Hemoglobin 12.5 (*)     Hematocrit 39.1 (*)     RDW 15.5 (*)     MPV 9.1 (*)     Lymph % 6.0 (*)     Platelet Estimate Clumped (*)     All other components within normal limits   COMPREHENSIVE METABOLIC PANEL - Abnormal; Notable for the following components:    Sodium 131 (*)     Potassium 3.2 (*)     Glucose 181 (*)     BUN 34 (*)     Albumin 3.2 (*)     All other components within normal limits   MAGNESIUM - Abnormal; Notable for the following components:    Magnesium 1.4 (*)     All other components within normal limits   URINALYSIS, REFLEX TO URINE CULTURE    Narrative:     Specimen Source->Urine   PHOSPHORUS   SARS-COV2 (COVID) WITH FLU/RSV BY PCR          Imaging Results               CT Abdomen Pelvis With Contrast (Final result)  Result time 03/04/23 13:19:27      Final result by Barak Cortez MD (03/04/23 13:19:27)                   Impression:      This report was flagged in Epic as abnormal.    1. Distention of the urinary bladder, correlation with any history of outlet obstruction or urinary retention.  This likely accounts for reflux through the ureters and bilateral perinephric fat stranding.  Correlation however with urinalysis is advised to exclude superimposed infection.  Differential for the left hydronephrosis would potentially include sequela of recently passed calculus.  2. Calcifications within the kidneys bilaterally, likely reflecting vascular calcification however nonobstructive nephrolithiasis is not excluded.  3. The large bowel is distended with gas and some degree of liquid stool.  Correlation with any history of diarrheal illness.  There are several scattered colonic diverticula without inflammation to suggest diverticulitis.  4. Findings suggesting hepatic steatosis, correlation with LFTs recommended.  5. Please see above for several additional  findings.      Electronically signed by: Barak Cortez MD  Date:    03/04/2023  Time:    13:19               Narrative:    EXAMINATION:  CT ABDOMEN PELVIS WITH CONTRAST    CLINICAL HISTORY:  Abdominal pain, acute, nonlocalized;    TECHNIQUE:  Low dose axial images, sagittal and coronal reformations were obtained from the lung bases to the pubic symphysis following the IV administration of 100 mL of Omnipaque 350 .  Oral contrast was not given.    COMPARISON:  None.    FINDINGS:  Images of the lower thorax are remarkable for minimal dependent atelectasis.    The spleen and adrenal glands are grossly unremarkable.  There may be mild hypoattenuation of the hepatic parenchyma.  There is atrophic change of the pancreas noting scattered pancreatic calcification.  The gallbladder is surgically absent, no significant biliary dilation status post cholecystectomy.  There is high attenuating material within the gastric lumen without gastric wall thickening.  No significant abdominal lymphadenopathy.  The portal vein, splenic vein, SMV, celiac axis and SMA all are patent noting extensive atherosclerotic calcification at the origin of the SMA without occlusion.    The kidneys enhance symmetrically noting bilateral perinephric fat stranding.  There is bilateral renal vascular calcification versus nonobstructive nephrolithiasis.  There is a low attenuating lesion arising from the interpolar region of the right kidney measuring 2.3 cm, attenuation of which suggests cyst.  There is no right hydronephrosis.  The right ureter is mildly prominent without calculi seen.  There is mild left hydroureteronephrosis.  The left ureter is otherwise unremarkable without calculi seen.  The urinary bladder is distended without wall thickening.  The prostate is not enlarged.    There are several scattered colonic diverticula without surrounding inflammation to suggest diverticulitis.  There is gaseous distention of the large bowel.  The terminal  ileum is unremarkable.  The appendix is not confidently identified, no pericecal inflammation.  The small bowel is grossly unremarkable.  There are several scattered shotty periaortic, pericaval, and mesenteric lymph nodes.  There is atherosclerotic calcification of the aorta and its branches.  No focal organized pelvic fluid collection.    There is osteopenia.  There are degenerative changes of the bilateral femoroacetabular joints, pubic symphysis, sacroiliac joints and spine.  There are remote appearing rib injuries.  No significant inguinal lymphadenopathy.                                       Medications   magnesium sulfate 2g in water 50mL IVPB (premix) (2 g Intravenous New Bag 3/4/23 1313)   lactated ringers bolus 1,000 mL (0 mLs Intravenous Stopped 3/4/23 1303)   potassium bicarbonate disintegrating tablet 25 mEq (25 mEq Oral Given 3/4/23 1309)   iohexoL (OMNIPAQUE 350) injection 100 mL (100 mLs Intravenous Given 3/4/23 1256)     Medical Decision Making:   History:   Old Medical Records: I decided to obtain old medical records.  Old Records Summarized: records from clinic visits.  Differential Diagnosis:   Dehydration, electrolyte derangement, DILIA, diverticulitis, COVID  Clinical Tests:   Lab Tests: Ordered and Reviewed          Attending Attestation:             Attending ED Notes:   Emergency department evaluation today reveals minimal microcytic anemia without evidence of leukocytosis/granulocytosis.  Metabolic profile reveals mild azotemia without alfred renal dysfunction as well as mild hyponatremia and hypokalemia.  Potassium has been repleted orally in the ED.  The serum magnesium was also noted to be depleted, and this has been repleted intravenously with 2 g IVPB.  Urinalysis does not reveal any evidence of active process.  CT scan of the abdomen pelvis raises concerns for potential urinary obstruction, however, this gentleman is known to have an outflow obstruction requiring self catheterization  which was performed shortly after CT scan was performed, and patient denies any associated discomfort.  He is tolerating a regular diet at the time of disposition.  All questions have been answered to the patient and accompanying spouse is satisfaction.  He will be discharged home in improved condition with instructions to continue outpatient Imodium therapy as needed for persistent diarrhea, take the potassium supplementation that I have prescribed daily, and contact his managing PCP next available to discuss emergency department findings an outpatient plan and follow-up.                 Clinical Impression:   Final diagnoses:  [E83.42] Hypomagnesemia (Primary)  [E87.6] Hypokalemia  [E86.0] Dehydration  [R19.7] Diarrhea in adult patient        ED Disposition Condition    Discharge Stable          ED Prescriptions       Medication Sig Dispense Start Date End Date Auth. Provider    potassium chloride (KLOR-CON) 10 MEQ TbSR Take 1 tablet (10 mEq total) by mouth once daily. 5 tablet 3/4/2023 -- Yrn Uribe MD          Follow-up Information       Follow up With Specialties Details Why Contact Info    Caleb Negrete MD Internal Medicine Call in 1 day To discuss emergency department evaluation, findings, and outpatient plan [id number]  [place] [address]  [phone]      [facility] - Emergency Dept Emergency Medicine  As needed, If symptoms worsen [id number] [facility] [id number] [phone]             Yrn Uribe MD  03/04/23 3549

## 2023-03-07 ENCOUNTER — LAB VISIT (OUTPATIENT)
Dept: LAB | Facility: HOSPITAL | Age: 83
End: 2023-03-07
Attending: INTERNAL MEDICINE
Payer: MEDICARE

## 2023-03-07 ENCOUNTER — PATIENT MESSAGE (OUTPATIENT)
Dept: INTERNAL MEDICINE | Facility: CLINIC | Age: 83
End: 2023-03-07

## 2023-03-07 ENCOUNTER — OFFICE VISIT (OUTPATIENT)
Dept: INTERNAL MEDICINE | Facility: CLINIC | Age: 83
End: 2023-03-07
Payer: MEDICARE

## 2023-03-07 VITALS
HEART RATE: 88 BPM | BODY MASS INDEX: 27.7 KG/M2 | SYSTOLIC BLOOD PRESSURE: 100 MMHG | HEIGHT: 73 IN | OXYGEN SATURATION: 95 % | WEIGHT: 209 LBS | DIASTOLIC BLOOD PRESSURE: 50 MMHG

## 2023-03-07 DIAGNOSIS — R50.9 FEVER, UNSPECIFIED FEVER CAUSE: ICD-10-CM

## 2023-03-07 DIAGNOSIS — E87.6 LOW BLOOD POTASSIUM: ICD-10-CM

## 2023-03-07 DIAGNOSIS — A08.4 VIRAL GASTROENTERITIS: Primary | ICD-10-CM

## 2023-03-07 DIAGNOSIS — R79.0 LOW MAGNESIUM LEVEL: ICD-10-CM

## 2023-03-07 DIAGNOSIS — A08.4 VIRAL GASTROENTERITIS: ICD-10-CM

## 2023-03-07 LAB
ANION GAP SERPL CALC-SCNC: 11 MMOL/L (ref 8–16)
ANION GAP SERPL CALC-SCNC: 11 MMOL/L (ref 8–16)
BASOPHILS # BLD AUTO: 0.06 K/UL (ref 0–0.2)
BASOPHILS NFR BLD: 0.5 % (ref 0–1.9)
BUN SERPL-MCNC: 23 MG/DL (ref 8–23)
BUN SERPL-MCNC: 23 MG/DL (ref 8–23)
CALCIUM SERPL-MCNC: 8.9 MG/DL (ref 8.7–10.5)
CALCIUM SERPL-MCNC: 8.9 MG/DL (ref 8.7–10.5)
CHLORIDE SERPL-SCNC: 96 MMOL/L (ref 95–110)
CHLORIDE SERPL-SCNC: 96 MMOL/L (ref 95–110)
CO2 SERPL-SCNC: 28 MMOL/L (ref 23–29)
CO2 SERPL-SCNC: 28 MMOL/L (ref 23–29)
CREAT SERPL-MCNC: 1.2 MG/DL (ref 0.5–1.4)
CREAT SERPL-MCNC: 1.2 MG/DL (ref 0.5–1.4)
DIFFERENTIAL METHOD: ABNORMAL
EOSINOPHIL # BLD AUTO: 0.1 K/UL (ref 0–0.5)
EOSINOPHIL NFR BLD: 0.6 % (ref 0–8)
ERYTHROCYTE [DISTWIDTH] IN BLOOD BY AUTOMATED COUNT: 15.2 % (ref 11.5–14.5)
EST. GFR  (NO RACE VARIABLE): >60 ML/MIN/1.73 M^2
EST. GFR  (NO RACE VARIABLE): >60 ML/MIN/1.73 M^2
GLUCOSE SERPL-MCNC: 148 MG/DL (ref 70–110)
GLUCOSE SERPL-MCNC: 148 MG/DL (ref 70–110)
HCT VFR BLD AUTO: 38 % (ref 40–54)
HGB BLD-MCNC: 12.1 G/DL (ref 14–18)
IMM GRANULOCYTES # BLD AUTO: 0.11 K/UL (ref 0–0.04)
IMM GRANULOCYTES NFR BLD AUTO: 0.9 % (ref 0–0.5)
LYMPHOCYTES # BLD AUTO: 1.8 K/UL (ref 1–4.8)
LYMPHOCYTES NFR BLD: 14.9 % (ref 18–48)
MAGNESIUM SERPL-MCNC: 1.9 MG/DL (ref 1.6–2.6)
MCH RBC QN AUTO: 27.4 PG (ref 27–31)
MCHC RBC AUTO-ENTMCNC: 31.8 G/DL (ref 32–36)
MCV RBC AUTO: 86 FL (ref 82–98)
MONOCYTES # BLD AUTO: 1.2 K/UL (ref 0.3–1)
MONOCYTES NFR BLD: 9.7 % (ref 4–15)
NEUTROPHILS # BLD AUTO: 8.9 K/UL (ref 1.8–7.7)
NEUTROPHILS NFR BLD: 73.4 % (ref 38–73)
NRBC BLD-RTO: 0 /100 WBC
PLATELET # BLD AUTO: 363 K/UL (ref 150–450)
PMV BLD AUTO: 9.5 FL (ref 9.2–12.9)
POTASSIUM SERPL-SCNC: 3.3 MMOL/L (ref 3.5–5.1)
POTASSIUM SERPL-SCNC: 3.3 MMOL/L (ref 3.5–5.1)
RBC # BLD AUTO: 4.41 M/UL (ref 4.6–6.2)
SODIUM SERPL-SCNC: 135 MMOL/L (ref 136–145)
SODIUM SERPL-SCNC: 135 MMOL/L (ref 136–145)
WBC # BLD AUTO: 12.11 K/UL (ref 3.9–12.7)

## 2023-03-07 PROCEDURE — 1101F PR PT FALLS ASSESS DOC 0-1 FALLS W/OUT INJ PAST YR: ICD-10-PCS | Mod: HCNC,CPTII,S$GLB, | Performed by: INTERNAL MEDICINE

## 2023-03-07 PROCEDURE — 3074F SYST BP LT 130 MM HG: CPT | Mod: HCNC,CPTII,S$GLB, | Performed by: INTERNAL MEDICINE

## 2023-03-07 PROCEDURE — 99214 OFFICE O/P EST MOD 30 MIN: CPT | Mod: HCNC,S$GLB,, | Performed by: INTERNAL MEDICINE

## 2023-03-07 PROCEDURE — 99999 PR PBB SHADOW E&M-EST. PATIENT-LVL IV: ICD-10-PCS | Mod: PBBFAC,HCNC,, | Performed by: INTERNAL MEDICINE

## 2023-03-07 PROCEDURE — 1126F PR PAIN SEVERITY QUANTIFIED, NO PAIN PRESENT: ICD-10-PCS | Mod: HCNC,CPTII,S$GLB, | Performed by: INTERNAL MEDICINE

## 2023-03-07 PROCEDURE — 83735 ASSAY OF MAGNESIUM: CPT | Mod: HCNC | Performed by: INTERNAL MEDICINE

## 2023-03-07 PROCEDURE — 1159F MED LIST DOCD IN RCRD: CPT | Mod: HCNC,CPTII,S$GLB, | Performed by: INTERNAL MEDICINE

## 2023-03-07 PROCEDURE — 1101F PT FALLS ASSESS-DOCD LE1/YR: CPT | Mod: HCNC,CPTII,S$GLB, | Performed by: INTERNAL MEDICINE

## 2023-03-07 PROCEDURE — 99999 PR PBB SHADOW E&M-EST. PATIENT-LVL IV: CPT | Mod: PBBFAC,HCNC,, | Performed by: INTERNAL MEDICINE

## 2023-03-07 PROCEDURE — 1160F RVW MEDS BY RX/DR IN RCRD: CPT | Mod: HCNC,CPTII,S$GLB, | Performed by: INTERNAL MEDICINE

## 2023-03-07 PROCEDURE — 1126F AMNT PAIN NOTED NONE PRSNT: CPT | Mod: HCNC,CPTII,S$GLB, | Performed by: INTERNAL MEDICINE

## 2023-03-07 PROCEDURE — 3288F PR FALLS RISK ASSESSMENT DOCUMENTED: ICD-10-PCS | Mod: HCNC,CPTII,S$GLB, | Performed by: INTERNAL MEDICINE

## 2023-03-07 PROCEDURE — 3078F DIAST BP <80 MM HG: CPT | Mod: HCNC,CPTII,S$GLB, | Performed by: INTERNAL MEDICINE

## 2023-03-07 PROCEDURE — 3078F PR MOST RECENT DIASTOLIC BLOOD PRESSURE < 80 MM HG: ICD-10-PCS | Mod: HCNC,CPTII,S$GLB, | Performed by: INTERNAL MEDICINE

## 2023-03-07 PROCEDURE — 3288F FALL RISK ASSESSMENT DOCD: CPT | Mod: HCNC,CPTII,S$GLB, | Performed by: INTERNAL MEDICINE

## 2023-03-07 PROCEDURE — 1159F PR MEDICATION LIST DOCUMENTED IN MEDICAL RECORD: ICD-10-PCS | Mod: HCNC,CPTII,S$GLB, | Performed by: INTERNAL MEDICINE

## 2023-03-07 PROCEDURE — 85025 COMPLETE CBC W/AUTO DIFF WBC: CPT | Mod: HCNC | Performed by: INTERNAL MEDICINE

## 2023-03-07 PROCEDURE — 99214 PR OFFICE/OUTPT VISIT, EST, LEVL IV, 30-39 MIN: ICD-10-PCS | Mod: HCNC,S$GLB,, | Performed by: INTERNAL MEDICINE

## 2023-03-07 PROCEDURE — 1160F PR REVIEW ALL MEDS BY PRESCRIBER/CLIN PHARMACIST DOCUMENTED: ICD-10-PCS | Mod: HCNC,CPTII,S$GLB, | Performed by: INTERNAL MEDICINE

## 2023-03-07 PROCEDURE — 36415 COLL VENOUS BLD VENIPUNCTURE: CPT | Mod: HCNC | Performed by: INTERNAL MEDICINE

## 2023-03-07 PROCEDURE — 80048 BASIC METABOLIC PNL TOTAL CA: CPT | Mod: HCNC | Performed by: INTERNAL MEDICINE

## 2023-03-07 PROCEDURE — 3074F PR MOST RECENT SYSTOLIC BLOOD PRESSURE < 130 MM HG: ICD-10-PCS | Mod: HCNC,CPTII,S$GLB, | Performed by: INTERNAL MEDICINE
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
## 2023-03-10 ENCOUNTER — PATIENT MESSAGE (OUTPATIENT)
Dept: INTERNAL MEDICINE | Facility: CLINIC | Age: 83
End: 2023-03-10
Payer: MEDICARE

## 2023-03-10 DIAGNOSIS — I10 ESSENTIAL HYPERTENSION: Primary | ICD-10-CM

## 2023-03-16 ENCOUNTER — LAB VISIT (OUTPATIENT)
Dept: LAB | Facility: HOSPITAL | Age: 83
End: 2023-03-16
Attending: INTERNAL MEDICINE
Payer: MEDICARE

## 2023-03-16 ENCOUNTER — OFFICE VISIT (OUTPATIENT)
Dept: UROLOGY | Facility: CLINIC | Age: 83
End: 2023-03-16
Payer: MEDICARE

## 2023-03-16 VITALS
WEIGHT: 209.44 LBS | HEIGHT: 73 IN | HEART RATE: 102 BPM | BODY MASS INDEX: 27.76 KG/M2 | SYSTOLIC BLOOD PRESSURE: 136 MMHG | DIASTOLIC BLOOD PRESSURE: 73 MMHG

## 2023-03-16 DIAGNOSIS — I10 ESSENTIAL HYPERTENSION: ICD-10-CM

## 2023-03-16 DIAGNOSIS — N30.00 ACUTE CYSTITIS WITHOUT HEMATURIA: Primary | ICD-10-CM

## 2023-03-16 DIAGNOSIS — R33.9 URINARY RETENTION: ICD-10-CM

## 2023-03-16 LAB
ANION GAP SERPL CALC-SCNC: 10 MMOL/L (ref 8–16)
BUN SERPL-MCNC: 12 MG/DL (ref 8–23)
CALCIUM SERPL-MCNC: 8.8 MG/DL (ref 8.7–10.5)
CHLORIDE SERPL-SCNC: 103 MMOL/L (ref 95–110)
CO2 SERPL-SCNC: 28 MMOL/L (ref 23–29)
CREAT SERPL-MCNC: 0.8 MG/DL (ref 0.5–1.4)
EST. GFR  (NO RACE VARIABLE): >60 ML/MIN/1.73 M^2
GLUCOSE SERPL-MCNC: 111 MG/DL (ref 70–110)
POTASSIUM SERPL-SCNC: 3.5 MMOL/L (ref 3.5–5.1)
SODIUM SERPL-SCNC: 141 MMOL/L (ref 136–145)

## 2023-03-16 PROCEDURE — 3078F PR MOST RECENT DIASTOLIC BLOOD PRESSURE < 80 MM HG: ICD-10-PCS | Mod: HCNC,CPTII,S$GLB, | Performed by: UROLOGY

## 2023-03-16 PROCEDURE — 1159F PR MEDICATION LIST DOCUMENTED IN MEDICAL RECORD: ICD-10-PCS | Mod: HCNC,CPTII,S$GLB, | Performed by: UROLOGY

## 2023-03-16 PROCEDURE — 3075F PR MOST RECENT SYSTOLIC BLOOD PRESS GE 130-139MM HG: ICD-10-PCS | Mod: HCNC,CPTII,S$GLB, | Performed by: UROLOGY

## 2023-03-16 PROCEDURE — 99215 OFFICE O/P EST HI 40 MIN: CPT | Mod: HCNC,S$GLB,, | Performed by: UROLOGY

## 2023-03-16 PROCEDURE — 3075F SYST BP GE 130 - 139MM HG: CPT | Mod: HCNC,CPTII,S$GLB, | Performed by: UROLOGY

## 2023-03-16 PROCEDURE — 3288F PR FALLS RISK ASSESSMENT DOCUMENTED: ICD-10-PCS | Mod: HCNC,CPTII,S$GLB, | Performed by: UROLOGY

## 2023-03-16 PROCEDURE — 99999 PR PBB SHADOW E&M-EST. PATIENT-LVL IV: CPT | Mod: PBBFAC,HCNC,, | Performed by: UROLOGY

## 2023-03-16 PROCEDURE — 3078F DIAST BP <80 MM HG: CPT | Mod: HCNC,CPTII,S$GLB, | Performed by: UROLOGY

## 2023-03-16 PROCEDURE — 36415 COLL VENOUS BLD VENIPUNCTURE: CPT | Mod: HCNC | Performed by: INTERNAL MEDICINE

## 2023-03-16 PROCEDURE — 1126F PR PAIN SEVERITY QUANTIFIED, NO PAIN PRESENT: ICD-10-PCS | Mod: HCNC,CPTII,S$GLB, | Performed by: UROLOGY

## 2023-03-16 PROCEDURE — 1101F PR PT FALLS ASSESS DOC 0-1 FALLS W/OUT INJ PAST YR: ICD-10-PCS | Mod: HCNC,CPTII,S$GLB, | Performed by: UROLOGY

## 2023-03-16 PROCEDURE — 3288F FALL RISK ASSESSMENT DOCD: CPT | Mod: HCNC,CPTII,S$GLB, | Performed by: UROLOGY

## 2023-03-16 PROCEDURE — 99215 PR OFFICE/OUTPT VISIT, EST, LEVL V, 40-54 MIN: ICD-10-PCS | Mod: HCNC,S$GLB,, | Performed by: UROLOGY

## 2023-03-16 PROCEDURE — 80048 BASIC METABOLIC PNL TOTAL CA: CPT | Mod: HCNC | Performed by: INTERNAL MEDICINE

## 2023-03-16 PROCEDURE — 1159F MED LIST DOCD IN RCRD: CPT | Mod: HCNC,CPTII,S$GLB, | Performed by: UROLOGY

## 2023-03-16 PROCEDURE — 1126F AMNT PAIN NOTED NONE PRSNT: CPT | Mod: HCNC,CPTII,S$GLB, | Performed by: UROLOGY

## 2023-03-16 PROCEDURE — 1101F PT FALLS ASSESS-DOCD LE1/YR: CPT | Mod: HCNC,CPTII,S$GLB, | Performed by: UROLOGY

## 2023-03-16 PROCEDURE — 99999 PR PBB SHADOW E&M-EST. PATIENT-LVL IV: ICD-10-PCS | Mod: PBBFAC,HCNC,, | Performed by: UROLOGY

## 2023-03-16 NOTE — PROGRESS NOTES
CHIEF COMPLAINT:    Mr. Chavez is a 83 y.o. male presenting for .      PRESENTING ILLNESS:    Suleiman Chavez is a 83 y.o. male with a PMH of spinal stenosis, htn, bph, DM type 2 who presents for urinary incontinence/retention.    Went to ER on 3/4/23 for evaluation of generalized weakness and mild hypotension noted at primary care clinic this morning in the setting of  recent innumerable episodes of recurrent nonbloody, nonmelanotic diarrhea over the past 2 days with associated anorexia for the past 3 days without alfred fever, nausea, chest pain, syncope, headache, visual disturbance, or altered mental status.  The patient was evaluated in an outlying emergency department where a urinalysis was performed which reportedly did not reveal the presence of nitrite, but ciprofloxacin therapy was instituted as an outpatient.     I saw him for chronic urinary retention back in 1/17/23.   History of laminectomy in October, but reports urinary symptoms preceded surgery.  Reports able to urinate during the day with some leakage, however having significant bed wetting at night.  Has had 2 recent urinary tract infections which he was hospitalized.  Initial uti treated with cefdinir x 5 days. The second episode treated with a total of 3 weeks of cipro with no improvement in urinary leakage/incontinence.  PCP also increased tamsulosin dose, but has not improved symptoms.  PVR > 800 ml in office today.  After discussion will plan for CIC and suds/cysto for diagnostics.       Urine cultures: 11/7/22 Kleb pneumo >100K  11/29/22 Kleb Pneumo > 100K    Simple urodynamics w/ cysto  Date/Time: 1/17/2023 9:30 AM  Procedure:   Diagnostic Cystourethroscopy   Complex Cystometrogram   Voiding / Pressure Study with Intrarectal Balloon   Complex Uroflow   Electromyogram of Anal Sphincter.     Pre-OP Diagnosis:   Urinary retention   Post-OP Diagnosis:   same   Anesthesia:   Anesthesia Administered:   Intraurethral instillation of 10 mL 2%  lidocaine (Xylocaine) jelly.   Findings:   --- Bladder ---   CYSTOMETROGRAM ( Filling Phase ):   Cystometric Numeric Data:   - First Desire (Sensation): 446 mL at 51cm of water.   - Normal Desire: 472mL at 57 cm of water.   - Strong Desire: 510 mL at 63 cm of water.   - Urgency (Imminent Void) : 546 mL at 74 cm of water.   - Maximum Cystometric Capacity: 656 mL.   Compliance:   - high.   Leak Point Pressure:   - Valsalva ( Abdominal ) Leak Point Pressure: none.   UROFLOW:   - Voided Volume: none mL.   - Residual Urine: over 600 mL.   - Maximum Flow Rate: n/a mL/sec.   - Flow Pattern: no flow  VOIDING PRESSURE STUDY ( Voiding Phase ):   Detrusor Pressure:   - Maximum Detrusor Pressure: 120 cm of water.   - Detrusor Pressure at Maximum Flow: n/a cm of water.   - Detrusor Contraction Characteristics: no coordinated bladder contraction(s).   ELECTROMYOGRAM:   - no relaxation of sphincter upon voiding.     ---Diagnostic Cystourethroscopy ---   Normal urethra.    Prostate: 3.5 cm minimal obstruction.  Open bladder neck  Width of Bladder Neck Opening: Approximately 18 Fr.   Normal bladder. Floppy appearing bladder with no tumor or stone.  Some irritation due to catheterization.  Normal ureteral orifices bilaterally.   CONCLUSIONS:   1. Urinary retention  2. Diabetic neuropathy  3. Chronic back pain, s/p back surgery x 2  4. Hx of UTI, sepsis.    His bladder capacity is over 30 oz.  His voiding diary revealed 14 to 25 oz cath PVR in the AM and 16 to 32 oz of cath PVR at night before his goes to bed.  I recommend more frequent CIC to keep his bladder capacity less than 20 oz.  Recommend at least 3 x if not 4 x CIC a day indefinitely.  Continue flomax 2 capsules a day.  No prostate obstruction noted.    Once his bladder capacity is less than 20 oz ( 600 ml or less), we may consider sacral neuromodulation if he desires.  Nature of sacral neuromodulation explained to pt and his wife.    Will follow up in 3 months with another  "voiding diary      REVIEW OF SYSTEMS:    Review of Systems   Constitutional:  Negative for chills and fever.   Respiratory:  Negative for shortness of breath.    Cardiovascular:  Negative for chest pain.   Gastrointestinal:  Negative for constipation and diarrhea.   Genitourinary:  Positive for frequency. Negative for dysuria, flank pain, hematuria and urgency.   Neurological:  Negative for dizziness and weakness.     PATIENT HISTORY:    Past Medical History:   Diagnosis Date    Bilateral ocular hypertension     Diabetes mellitus     Glaucoma     Hypertension     Nuclear cataract 12/17/2014       Family History   Problem Relation Age of Onset    Cancer Mother         liver    Cancer Father         colon    No Known Problems Sister     No Known Problems Sister     Amblyopia Neg Hx     Blindness Neg Hx     Cataracts Neg Hx     Diabetes Neg Hx     Glaucoma Neg Hx     Hypertension Neg Hx     Macular degeneration Neg Hx     Retinal detachment Neg Hx     Strabismus Neg Hx     Stroke Neg Hx     Thyroid disease Neg Hx        Allergies:  Patient has no known allergies.    Medications:    Current Outpatient Medications:     acetaminophen (TYLENOL) 500 MG tablet, Take 1,000 mg by mouth 2 (two) times a day., Disp: , Rfl:     amLODIPine (NORVASC) 5 MG tablet, Take 1 tablet (5 mg total) by mouth 2 (two) times daily., Disp: 60 tablet, Rfl: 5    aspirin (ECOTRIN) 81 MG EC tablet, Take 81 mg by mouth once daily., Disp: , Rfl:     catheter 14-16 Fr-" Misc, 1 Units by Misc.(Non-Drug; Combo Route) route 3 (three) times daily. 1 Units by Misc.(Non-Drug; Combo Route) route 4 (four) times daily, indefinitely., Disp: 90 each, Rfl: 99    chlorthalidone (HYGROTEN) 25 MG Tab, Take 1 tablet (25 mg total) by mouth once daily., Disp: 90 tablet, Rfl: 1    FLUAD QUAD 2022-23,65Y UP,,PF, 60 mcg (15 mcg x 4)/0.5 mL Syrg, , Disp: , Rfl:     gabapentin (NEURONTIN) 300 MG capsule, TAKE 1 CAPSULE BY MOUTH THREE TIMES DAILY (Patient taking differently: " Take 300 mg by mouth 2 (two) times daily.), Disp: 90 capsule, Rfl: 5    latanoprost 0.005 % ophthalmic solution, INSTILL 1 DROP INTO BOTH EYES EVERY EVENING, Disp: 7.5 mL, Rfl: 3    losartan (COZAAR) 100 MG tablet, TAKE 1 TABLET BY MOUTH EVERY DAY, Disp: 30 tablet, Rfl: 11    metFORMIN (GLUCOPHAGE) 500 MG tablet, Take 1 tablet by mouth every morning & 2 tablets every evening, Disp: 90 tablet, Rfl: 5    potassium chloride (KLOR-CON) 10 MEQ TbSR, Take 1 tablet (10 mEq total) by mouth once daily., Disp: 5 tablet, Rfl: 0    PREVNAR 20, PF, 0.5 mL Syrg injection, , Disp: , Rfl:     rOPINIRole (REQUIP) 1 MG tablet, TAKE 1 TABLET BY MOUTH EVERY EVENING (Patient taking differently: Take 1 mg by mouth nightly as needed (restless leg).), Disp: 30 tablet, Rfl: 5    tamsulosin (FLOMAX) 0.4 mg Cap, Take 2 capsules (0.8 mg total) by mouth every evening., Disp: 180 capsule, Rfl: 1    traMADoL (ULTRAM) 50 mg tablet, TAKE 1 TABLET BY MOUTH TWICE DAILY AS NEEDED FOR PAIN, Disp: 60 tablet, Rfl: 0    PHYSICAL EXAMINATION:    Physical Exam  Vitals and nursing note reviewed.   Constitutional:       Appearance: Normal appearance. He is well-developed.   HENT:      Head: Normocephalic and atraumatic.   Eyes:      Pupils: Pupils are equal, round, and reactive to light.   Pulmonary:      Effort: Pulmonary effort is normal.   Musculoskeletal:         General: Normal range of motion.      Cervical back: Normal range of motion.   Skin:     General: Skin is warm and dry.   Neurological:      Mental Status: He is alert and oriented to person, place, and time.   Psychiatric:         Behavior: Behavior normal.         LABS:    Bladder scan performed by Nurse Gerry.   ml    Lab Results   Component Value Date    PSA 0.39 12/11/2020    PSA 0.38 05/10/2019    PSA 0.47 06/12/2017    PSA 0.58 06/16/2015    PSA 0.57 06/18/2014    PSA 0.56 05/28/2013    PSA 0.65 07/23/2010    PSA 1.0 09/08/2008    PSA 0.8 02/27/2007    PSADIAG 0.43 08/19/2019  "      IMPRESSION:  Encounter Diagnoses   Name Primary?    Acute cystitis without hematuria Yes    Urinary retention          PLAN:  Problem List Items Addressed This Visit       Acute cystitis without hematuria - Primary     Other Visit Diagnoses       Urinary retention        Relevant Medications    catheter 14-16 Fr-" Misc            1. Urinary retention/incontinence   - continue flomax   - start CIC TID with 14 Fr catheter indefinitel.   Record how much he voids on his own and check the PVR by catheter twice a day.    2. Bph with obstruction   Continue flomax     Drink plenty of water to prevent recurrent UTI.  Pay attention to urine collection ( clean catch urine) and recommend to get urine culture for any suspected UTI ( even possible cath urine for culture).    Treat chronic constipation.    Increase water and empty the bladder more regularly.  Probiotics  D-Mannose 1 gram twice a day  Cranberry Pills / Juice      I spent 40 minutes with the patient of which more than half was spent in direct consultation with the patient in regards to our treatment and plan.   Nicola Hsu MD    Follow up in about 3 months (around 6/16/2023), or voiding diary for 1 wk.       "

## 2023-03-16 NOTE — PATIENT INSTRUCTIONS
Pay attention to urine collection ( clean catch urine) and recommend to get urine culture for any suspected UTI ( even possible cath urine for culture).    Treat chronic constipation.    Increase water and empty the bladder more regularly.  Probiotics ( Align) daily  D-Mannose 1 gram twice a day  Cranberry Pills / Juice

## 2023-03-23 ENCOUNTER — PES CALL (OUTPATIENT)
Dept: ADMINISTRATIVE | Facility: CLINIC | Age: 83
End: 2023-03-23
Payer: MEDICARE

## 2023-04-03 ENCOUNTER — PATIENT MESSAGE (OUTPATIENT)
Dept: INTERNAL MEDICINE | Facility: CLINIC | Age: 83
End: 2023-04-03
Payer: MEDICARE

## 2023-04-06 ENCOUNTER — LAB VISIT (OUTPATIENT)
Dept: LAB | Facility: HOSPITAL | Age: 83
End: 2023-04-06
Attending: INTERNAL MEDICINE
Payer: MEDICARE

## 2023-04-06 ENCOUNTER — OFFICE VISIT (OUTPATIENT)
Dept: INTERNAL MEDICINE | Facility: CLINIC | Age: 83
End: 2023-04-06
Payer: MEDICARE

## 2023-04-06 ENCOUNTER — PATIENT MESSAGE (OUTPATIENT)
Dept: INTERNAL MEDICINE | Facility: CLINIC | Age: 83
End: 2023-04-06

## 2023-04-06 VITALS — WEIGHT: 197.06 LBS | BODY MASS INDEX: 26.12 KG/M2 | HEIGHT: 73 IN

## 2023-04-06 DIAGNOSIS — I10 ESSENTIAL HYPERTENSION: ICD-10-CM

## 2023-04-06 DIAGNOSIS — I10 ESSENTIAL HYPERTENSION: Primary | ICD-10-CM

## 2023-04-06 DIAGNOSIS — E11.40 TYPE 2 DIABETES MELLITUS WITH DIABETIC NEUROPATHY, WITHOUT LONG-TERM CURRENT USE OF INSULIN: ICD-10-CM

## 2023-04-06 LAB
ESTIMATED AVG GLUCOSE: 120 MG/DL (ref 68–131)
HBA1C MFR BLD: 5.8 % (ref 4–5.6)

## 2023-04-06 PROCEDURE — 99214 OFFICE O/P EST MOD 30 MIN: CPT | Mod: HCNC,S$GLB,, | Performed by: INTERNAL MEDICINE

## 2023-04-06 PROCEDURE — 80048 BASIC METABOLIC PNL TOTAL CA: CPT | Mod: HCNC | Performed by: INTERNAL MEDICINE

## 2023-04-06 PROCEDURE — 99999 PR PBB SHADOW E&M-EST. PATIENT-LVL II: ICD-10-PCS | Mod: PBBFAC,HCNC,, | Performed by: INTERNAL MEDICINE

## 2023-04-06 PROCEDURE — 99214 PR OFFICE/OUTPT VISIT, EST, LEVL IV, 30-39 MIN: ICD-10-PCS | Mod: HCNC,S$GLB,, | Performed by: INTERNAL MEDICINE

## 2023-04-06 PROCEDURE — 83036 HEMOGLOBIN GLYCOSYLATED A1C: CPT | Mod: HCNC | Performed by: INTERNAL MEDICINE

## 2023-04-06 PROCEDURE — 1160F PR REVIEW ALL MEDS BY PRESCRIBER/CLIN PHARMACIST DOCUMENTED: ICD-10-PCS | Mod: HCNC,CPTII,S$GLB, | Performed by: INTERNAL MEDICINE

## 2023-04-06 PROCEDURE — 83735 ASSAY OF MAGNESIUM: CPT | Mod: HCNC | Performed by: INTERNAL MEDICINE

## 2023-04-06 PROCEDURE — 1159F MED LIST DOCD IN RCRD: CPT | Mod: HCNC,CPTII,S$GLB, | Performed by: INTERNAL MEDICINE

## 2023-04-06 PROCEDURE — 1159F PR MEDICATION LIST DOCUMENTED IN MEDICAL RECORD: ICD-10-PCS | Mod: HCNC,CPTII,S$GLB, | Performed by: INTERNAL MEDICINE

## 2023-04-06 PROCEDURE — 36415 COLL VENOUS BLD VENIPUNCTURE: CPT | Mod: HCNC | Performed by: INTERNAL MEDICINE

## 2023-04-06 PROCEDURE — 99999 PR PBB SHADOW E&M-EST. PATIENT-LVL II: CPT | Mod: PBBFAC,HCNC,, | Performed by: INTERNAL MEDICINE

## 2023-04-06 PROCEDURE — 1160F RVW MEDS BY RX/DR IN RCRD: CPT | Mod: HCNC,CPTII,S$GLB, | Performed by: INTERNAL MEDICINE

## 2023-04-06 RX ORDER — TRAMADOL HYDROCHLORIDE 50 MG/1
50 TABLET ORAL 2 TIMES DAILY PRN
Qty: 60 TABLET | Refills: 0 | Status: SHIPPED | OUTPATIENT
Start: 2023-04-06 | End: 2023-09-11
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
## 2023-04-07 ENCOUNTER — PATIENT MESSAGE (OUTPATIENT)
Dept: INTERNAL MEDICINE | Facility: CLINIC | Age: 83
End: 2023-04-07
Payer: MEDICARE

## 2023-04-07 LAB
ANION GAP SERPL CALC-SCNC: 8 MMOL/L (ref 8–16)
BUN SERPL-MCNC: 13 MG/DL (ref 8–23)
CALCIUM SERPL-MCNC: 9 MG/DL (ref 8.7–10.5)
CHLORIDE SERPL-SCNC: 104 MMOL/L (ref 95–110)
CO2 SERPL-SCNC: 26 MMOL/L (ref 23–29)
CREAT SERPL-MCNC: 0.8 MG/DL (ref 0.5–1.4)
EST. GFR  (NO RACE VARIABLE): >60 ML/MIN/1.73 M^2
GLUCOSE SERPL-MCNC: 90 MG/DL (ref 70–110)
MAGNESIUM SERPL-MCNC: 1.4 MG/DL (ref 1.6–2.6)
POTASSIUM SERPL-SCNC: 4.4 MMOL/L (ref 3.5–5.1)
SODIUM SERPL-SCNC: 138 MMOL/L (ref 136–145)

## 2023-04-07 RX ORDER — HYDROCHLOROTHIAZIDE 12.5 MG/1
12.5 TABLET ORAL DAILY
Qty: 30 TABLET | Refills: 0 | Status: SHIPPED | OUTPATIENT
Start: 2023-04-07 | End: 2023-05-09 | Stop reason: SDUPTHER

## 2023-04-20 RX ORDER — TAMSULOSIN HYDROCHLORIDE 0.4 MG/1
CAPSULE ORAL
Qty: 180 CAPSULE | Refills: 3 | Status: SHIPPED | OUTPATIENT
Start: 2023-04-20

## 2023-04-20 NOTE — TELEPHONE ENCOUNTER
No new care gaps identified.  [facility] Embedded Care Gaps. Reference number: [id number]. 4/20/2023   8:02:01 AM ADDY

## 2023-04-20 NOTE — TELEPHONE ENCOUNTER
Refill Decision Note   Suleiman Chavez  is requesting a refill authorization.  Brief Assessment and Rationale for Refill:  Approve     Medication Therapy Plan:       Medication Reconciliation Completed: No   Comments:     No Care Gaps recommended.     Note composed:8:19 AM 04/20/2023

## 2023-05-05 NOTE — TELEPHONE ENCOUNTER
No care due was identified.  [facility] Embedded Care Due Messages. Reference number: [id number].   5/05/2023 1:33:17 PM CDT

## 2023-05-05 NOTE — TELEPHONE ENCOUNTER
Refill Routing Note   Medication(s) are not appropriate for processing by [facility] for the following reason(s):      Medication outside of protocol  New or recently adjusted medication    [facility] action(s):  Defer  Route            Appointments  past 12m or future 3m with PCP    Date Provider   Last Visit   4/6/2023 Caleb Negrete MD   Next Visit   Visit date not found Caleb Negrete MD   ED visits in past 90 days: 1        Note composed:1:52 PM 05/05/2023

## 2023-05-09 RX ORDER — HYDROCHLOROTHIAZIDE 12.5 MG/1
12.5 TABLET ORAL DAILY
Qty: 30 TABLET | Refills: 5 | Status: SHIPPED | OUTPATIENT
Start: 2023-05-09 | End: 2024-03-17

## 2023-05-09 RX ORDER — DICLOFENAC SODIUM 75 MG/1
75 TABLET, DELAYED RELEASE ORAL 2 TIMES DAILY PRN
Qty: 60 TABLET | Refills: 1 | Status: ON HOLD | OUTPATIENT
Start: 2023-05-09 | End: 2024-03-17

## 2023-05-09 RX ORDER — HYDROCHLOROTHIAZIDE 12.5 MG/1
TABLET ORAL
Qty: 90 TABLET | Refills: 3 | OUTPATIENT
Start: 2023-05-09

## 2023-05-09 NOTE — TELEPHONE ENCOUNTER
No care due was identified.  [facility] Embedded Care Due Messages. Reference number: [id number].   5/09/2023 12:21:21 PM CDT

## 2023-05-10 RX ORDER — LATANOPROST 50 UG/ML
SOLUTION/ DROPS OPHTHALMIC
Qty: 7.5 ML | Refills: 3 | Status: SHIPPED | OUTPATIENT
Start: 2023-05-10

## 2023-06-02 ENCOUNTER — TELEPHONE (OUTPATIENT)
Dept: INTERNAL MEDICINE | Facility: CLINIC | Age: 83
End: 2023-06-02
Payer: MEDICARE

## 2023-06-02 DIAGNOSIS — K14.8 TONGUE MASS: ICD-10-CM

## 2023-06-02 DIAGNOSIS — K14.0 TONGUE ULCER: Primary | ICD-10-CM

## 2023-06-12 ENCOUNTER — OFFICE VISIT (OUTPATIENT)
Dept: OTOLARYNGOLOGY | Facility: CLINIC | Age: 83
End: 2023-06-12
Payer: MEDICARE

## 2023-06-12 VITALS
DIASTOLIC BLOOD PRESSURE: 66 MMHG | BODY MASS INDEX: 25.1 KG/M2 | HEART RATE: 92 BPM | HEIGHT: 73 IN | SYSTOLIC BLOOD PRESSURE: 108 MMHG | WEIGHT: 189.38 LBS

## 2023-06-12 DIAGNOSIS — K14.8 TONGUE MASS: ICD-10-CM

## 2023-06-12 DIAGNOSIS — K14.0 TONGUE ULCER: ICD-10-CM

## 2023-06-12 PROCEDURE — 3078F DIAST BP <80 MM HG: CPT | Mod: CPTII,S$GLB,, | Performed by: OTOLARYNGOLOGY

## 2023-06-12 PROCEDURE — 1126F AMNT PAIN NOTED NONE PRSNT: CPT | Mod: CPTII,S$GLB,, | Performed by: OTOLARYNGOLOGY

## 2023-06-12 PROCEDURE — 99999 PR PBB SHADOW E&M-EST. PATIENT-LVL IV: ICD-10-PCS | Mod: PBBFAC,,, | Performed by: OTOLARYNGOLOGY

## 2023-06-12 PROCEDURE — 1159F MED LIST DOCD IN RCRD: CPT | Mod: CPTII,S$GLB,, | Performed by: OTOLARYNGOLOGY

## 2023-06-12 PROCEDURE — 99204 OFFICE O/P NEW MOD 45 MIN: CPT | Mod: 25,S$GLB,, | Performed by: OTOLARYNGOLOGY

## 2023-06-12 PROCEDURE — 1160F PR REVIEW ALL MEDS BY PRESCRIBER/CLIN PHARMACIST DOCUMENTED: ICD-10-PCS | Mod: CPTII,S$GLB,, | Performed by: OTOLARYNGOLOGY

## 2023-06-12 PROCEDURE — 99204 PR OFFICE/OUTPT VISIT, NEW, LEVL IV, 45-59 MIN: ICD-10-PCS | Mod: 25,S$GLB,, | Performed by: OTOLARYNGOLOGY

## 2023-06-12 PROCEDURE — 1160F RVW MEDS BY RX/DR IN RCRD: CPT | Mod: CPTII,S$GLB,, | Performed by: OTOLARYNGOLOGY

## 2023-06-12 PROCEDURE — 40812 PR EXCIS MOUTH MUCOSA/SUB,SIMPL REPAIR: ICD-10-PCS | Mod: S$GLB,,, | Performed by: OTOLARYNGOLOGY

## 2023-06-12 PROCEDURE — 3074F SYST BP LT 130 MM HG: CPT | Mod: CPTII,S$GLB,, | Performed by: OTOLARYNGOLOGY

## 2023-06-12 PROCEDURE — 3078F PR MOST RECENT DIASTOLIC BLOOD PRESSURE < 80 MM HG: ICD-10-PCS | Mod: CPTII,S$GLB,, | Performed by: OTOLARYNGOLOGY

## 2023-06-12 PROCEDURE — 1159F PR MEDICATION LIST DOCUMENTED IN MEDICAL RECORD: ICD-10-PCS | Mod: CPTII,S$GLB,, | Performed by: OTOLARYNGOLOGY

## 2023-06-12 PROCEDURE — 99999 PR PBB SHADOW E&M-EST. PATIENT-LVL IV: CPT | Mod: PBBFAC,,, | Performed by: OTOLARYNGOLOGY

## 2023-06-12 PROCEDURE — 1126F PR PAIN SEVERITY QUANTIFIED, NO PAIN PRESENT: ICD-10-PCS | Mod: CPTII,S$GLB,, | Performed by: OTOLARYNGOLOGY

## 2023-06-12 PROCEDURE — 3074F PR MOST RECENT SYSTOLIC BLOOD PRESSURE < 130 MM HG: ICD-10-PCS | Mod: CPTII,S$GLB,, | Performed by: OTOLARYNGOLOGY

## 2023-06-12 PROCEDURE — 40812 EXCISE/REPAIR MOUTH LESION: CPT | Mod: S$GLB,,, | Performed by: OTOLARYNGOLOGY

## 2023-06-12 NOTE — ASSESSMENT & PLAN NOTE
Left tongue mass worrisome for malignancy.  Biopsy obtained today.  I will contact him with results.

## 2023-06-12 NOTE — PROGRESS NOTES
CC: Left tongue lesion      HPI   83 y.o. male presents with  a 1-3 month history of a left-sided tongue lesion.  He reports this is somewhat painful.  He is tried over-the-counter medications such as Mylanta and Orajel with little relief.  He is a former smoker.  No other complaints.    Review of Systems   Constitutional: Negative for fatigue and unexpected weight change.   HENT: Per HPI.  Eyes: Negative for visual disturbance.   Respiratory: Negative for shortness of breath, hemoptysis   Cardiovascular: Negative for chest pain and palpitations.   Musculoskeletal: Negative for decreased ROM, back pain.   Skin: Negative for rash, sunburn, itching.   Neurological: Negative for dizziness and seizures.   Hematological: Negative for adenopathy. Does not bruise/bleed easily.   Endocrine: Negative for rapid weight loss/weight gain, heat/cold intolerance.     Past Medical History   Patient Active Problem List   Diagnosis    Ocular hypertension    Essential hypertension    Screen for colon cancer    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, with long-term current use of insulin    Adenomatous polyp    Family history of colon cancer    Nuclear cataract    Borderline glaucoma of both eyes with ocular hypertension    Acute cystitis without hematuria    Spinal stenosis, lumbar region, with neurogenic claudication    EMA (iron deficiency anemia)    Benign prostatic hyperplasia without lower urinary tract symptoms    Debility    Chronic midline low back pain with sciatica    Tongue mass           Past Surgical History   Past Surgical History:   Procedure Laterality Date    CHOLECYSTECTOMY           Family History   Family History   Problem Relation Age of Onset    Cancer Mother         liver    Cancer Father         colon    No Known Problems Sister     No Known Problems Sister     Amblyopia Neg Hx     Blindness Neg Hx     Cataracts Neg Hx     Diabetes Neg Hx     Glaucoma Neg Hx     Hypertension Neg Hx      Macular degeneration Neg Hx     Retinal detachment Neg Hx     Strabismus Neg Hx     Stroke Neg Hx     Thyroid disease Neg Hx            Social History   .  Social History     Socioeconomic History    Marital status:    Occupational History     Employer: OTHER   Tobacco Use    Smoking status: Former     Types: Cigarettes     Quit date: 2004     Years since quittin.0     Passive exposure: Past    Smokeless tobacco: Never   Substance and Sexual Activity    Alcohol use: No     Alcohol/week: 0.0 standard drinks    Drug use: No         Allergies   Review of patient's allergies indicates:  No Known Allergies        Physical Exam     Vitals:    23 0913   BP: 108/66   Pulse: 92         Body mass index is 24.99 kg/m².      General: AOx3, NAD   Respiratory:  Symmetric chest rise, normal effort  Oral Cavity:  Oral Tongue mobile.  Roughly 1.5 cm ulceronodular lesion of L lateral tongue. Minimal palpable depth. Hard Palate WNL. No buccal or FOM lesions.  Oropharynx:  No masses/lesions of the posterior pharyngeal wall. Tonsillar fossa without lesions. Soft palate without masses. Midline uvula.   Neck: No scars.  No cervical lymphadenopathy, thyromegaly or thyroid nodules.  Normal range of motion.    Face: House Brackmann I bilaterally.     We discussed the risks, benefits, indications, and alternatives to oral biopsy. I explained that the risks of biopsy in the oral cavity include, but are not limited to, infection, bleeding, scarring, recurrence, failure to achieve a diagnosis, and the need for additional procedures.  Remotely, cheek or facial weakness could be possible if deep extension was identified.  Time was allowed for questions, and all questions were answered to the patient's apparent satisfaction.  Informed consent was obtained.    A timeout was performed according to the universal protocol with full patient participation.  1% lidocaine with 1:100,000 epinephrine was then injected submucosally in  the region of the lesion with a 27-gauge needle.  The patient was draped in the standard fashion.  The lesion was identified, and full thickness of mucosa including the lesion was sharply excised down to the submucosal layer with a 4mm punch.  The lesion was severed from its base with the curved scissors and passed off the table for permanent pathologic analysis.  Hemostasis was achieved with direct pressure.  The wound was closed with interrupted 3-0 Vicryl suture.  The patient tolerated this procedure well.  There were no apparent complications.      Assessment/Plan  Problem List Items Addressed This Visit          ENT    Tongue mass     Left tongue mass worrisome for malignancy.  Biopsy obtained today.  I will contact him with results.         Relevant Orders    Specimen to Pathology ENT     Other Visit Diagnoses       Tongue ulcer        Relevant Orders    Specimen to Pathology ENT

## 2023-06-16 DIAGNOSIS — K14.0 TONGUE ULCER: Primary | ICD-10-CM

## 2023-06-16 DIAGNOSIS — C02.9 SQUAMOUS CELL CANCER OF TONGUE: ICD-10-CM

## 2023-06-16 LAB
COMMENT: NORMAL
FINAL PATHOLOGIC DIAGNOSIS: NORMAL
GROSS: NORMAL
Lab: NORMAL
MICROSCOPIC EXAM: NORMAL

## 2023-06-16 RX ORDER — SODIUM CHLORIDE 0.9 % (FLUSH) 0.9 %
10 SYRINGE (ML) INJECTION
Status: CANCELLED | OUTPATIENT
Start: 2023-06-16

## 2023-06-16 RX ORDER — LIDOCAINE HYDROCHLORIDE 10 MG/ML
1 INJECTION, SOLUTION EPIDURAL; INFILTRATION; INTRACAUDAL; PERINEURAL ONCE
Status: CANCELLED | OUTPATIENT
Start: 2023-06-16 | End: 2023-06-16

## 2023-06-19 DIAGNOSIS — C02.9 SQUAMOUS CELL CANCER OF TONGUE: Primary | ICD-10-CM

## 2023-06-19 RX ORDER — METFORMIN HYDROCHLORIDE 500 MG/1
TABLET ORAL
Qty: 270 TABLET | Refills: 1 | Status: SHIPPED | OUTPATIENT
Start: 2023-06-19 | End: 2023-12-21

## 2023-06-19 NOTE — TELEPHONE ENCOUNTER
No care due was identified.  [facility] Embedded Care Due Messages. Reference number: [id number].   6/19/2023 8:03:05 AM CDT

## 2023-06-19 NOTE — TELEPHONE ENCOUNTER
Refill Decision Note   Suleiman Chavez  is requesting a refill authorization.  Brief Assessment and Rationale for Refill:  Approve     Medication Therapy Plan:       Medication Reconciliation Completed: No   Comments:     No Care Gaps recommended.     Note composed:2:47 PM 06/19/2023

## 2023-06-20 ENCOUNTER — OFFICE VISIT (OUTPATIENT)
Dept: UROLOGY | Facility: CLINIC | Age: 83
End: 2023-06-20
Payer: MEDICARE

## 2023-06-20 VITALS
WEIGHT: 190.69 LBS | BODY MASS INDEX: 25.27 KG/M2 | HEART RATE: 79 BPM | DIASTOLIC BLOOD PRESSURE: 46 MMHG | SYSTOLIC BLOOD PRESSURE: 107 MMHG | HEIGHT: 73 IN

## 2023-06-20 DIAGNOSIS — N40.0 BENIGN PROSTATIC HYPERPLASIA WITHOUT LOWER URINARY TRACT SYMPTOMS: Primary | ICD-10-CM

## 2023-06-20 DIAGNOSIS — R33.9 INCOMPLETE BLADDER EMPTYING: ICD-10-CM

## 2023-06-20 PROCEDURE — 3078F PR MOST RECENT DIASTOLIC BLOOD PRESSURE < 80 MM HG: ICD-10-PCS | Mod: CPTII,S$GLB,, | Performed by: UROLOGY

## 2023-06-20 PROCEDURE — 3288F FALL RISK ASSESSMENT DOCD: CPT | Mod: CPTII,S$GLB,, | Performed by: UROLOGY

## 2023-06-20 PROCEDURE — 1126F PR PAIN SEVERITY QUANTIFIED, NO PAIN PRESENT: ICD-10-PCS | Mod: CPTII,S$GLB,, | Performed by: UROLOGY

## 2023-06-20 PROCEDURE — 99999 PR PBB SHADOW E&M-EST. PATIENT-LVL III: ICD-10-PCS | Mod: PBBFAC,,, | Performed by: UROLOGY

## 2023-06-20 PROCEDURE — 3074F SYST BP LT 130 MM HG: CPT | Mod: CPTII,S$GLB,, | Performed by: UROLOGY

## 2023-06-20 PROCEDURE — 1101F PR PT FALLS ASSESS DOC 0-1 FALLS W/OUT INJ PAST YR: ICD-10-PCS | Mod: CPTII,S$GLB,, | Performed by: UROLOGY

## 2023-06-20 PROCEDURE — 1101F PT FALLS ASSESS-DOCD LE1/YR: CPT | Mod: CPTII,S$GLB,, | Performed by: UROLOGY

## 2023-06-20 PROCEDURE — 1159F MED LIST DOCD IN RCRD: CPT | Mod: CPTII,S$GLB,, | Performed by: UROLOGY

## 2023-06-20 PROCEDURE — 3074F PR MOST RECENT SYSTOLIC BLOOD PRESSURE < 130 MM HG: ICD-10-PCS | Mod: CPTII,S$GLB,, | Performed by: UROLOGY

## 2023-06-20 PROCEDURE — 99999 PR PBB SHADOW E&M-EST. PATIENT-LVL III: CPT | Mod: PBBFAC,,, | Performed by: UROLOGY

## 2023-06-20 PROCEDURE — 1159F PR MEDICATION LIST DOCUMENTED IN MEDICAL RECORD: ICD-10-PCS | Mod: CPTII,S$GLB,, | Performed by: UROLOGY

## 2023-06-20 PROCEDURE — 3078F DIAST BP <80 MM HG: CPT | Mod: CPTII,S$GLB,, | Performed by: UROLOGY

## 2023-06-20 PROCEDURE — 3288F PR FALLS RISK ASSESSMENT DOCUMENTED: ICD-10-PCS | Mod: CPTII,S$GLB,, | Performed by: UROLOGY

## 2023-06-20 PROCEDURE — 99214 PR OFFICE/OUTPT VISIT, EST, LEVL IV, 30-39 MIN: ICD-10-PCS | Mod: S$GLB,,, | Performed by: UROLOGY

## 2023-06-20 PROCEDURE — 1126F AMNT PAIN NOTED NONE PRSNT: CPT | Mod: CPTII,S$GLB,, | Performed by: UROLOGY

## 2023-06-20 PROCEDURE — 99214 OFFICE O/P EST MOD 30 MIN: CPT | Mod: S$GLB,,, | Performed by: UROLOGY

## 2023-06-20 NOTE — PROGRESS NOTES
CHIEF COMPLAINT:    Mr. Chavez is a 83 y.o. male presenting for .      PRESENTING ILLNESS:    Suleiman Chavez is a 83 y.o. male with a PMH of spinal stenosis, htn, bph, DM type 2 who presents for urinary incontinence/retention.    Went to ER on 3/4/23 for evaluation of generalized weakness and mild hypotension noted at primary care clinic this morning in the setting of  recent innumerable episodes of recurrent nonbloody, nonmelanotic diarrhea over the past 2 days with associated anorexia for the past 3 days without alfred fever, nausea, chest pain, syncope, headache, visual disturbance, or altered mental status.  The patient was evaluated in an outlying emergency department where a urinalysis was performed which reportedly did not reveal the presence of nitrite, but ciprofloxacin therapy was instituted as an outpatient.     I saw him for chronic urinary retention back in 1/17/23.   History of laminectomy in October, but reports urinary symptoms preceded surgery.  Reports able to urinate during the day with some leakage, however having significant bed wetting at night.  Has had 2 recent urinary tract infections which he was hospitalized.  Initial uti treated with cefdinir x 5 days. The second episode treated with a total of 3 weeks of cipro with no improvement in urinary leakage/incontinence.  PCP also increased tamsulosin dose, but has not improved symptoms.  PVR > 800 ml in office today.  After discussion will plan for CIC and suds/cysto for diagnostics.       Urine cultures: 11/7/22 Kleb pneumo >100K  11/29/22 Kleb Pneumo > 100K    Simple urodynamics w/ cysto  Date/Time: 1/17/2023 9:30 AM  Procedure:   Diagnostic Cystourethroscopy   Complex Cystometrogram   Voiding / Pressure Study with Intrarectal Balloon   Complex Uroflow   Electromyogram of Anal Sphincter.     Pre-OP Diagnosis:   Urinary retention   Post-OP Diagnosis:   same   Anesthesia:   Anesthesia Administered:   Intraurethral instillation of 10 mL 2%  lidocaine (Xylocaine) jelly.   Findings:   --- Bladder ---   CYSTOMETROGRAM ( Filling Phase ):   Cystometric Numeric Data:   - First Desire (Sensation): 446 mL at 51cm of water.   - Normal Desire: 472mL at 57 cm of water.   - Strong Desire: 510 mL at 63 cm of water.   - Urgency (Imminent Void) : 546 mL at 74 cm of water.   - Maximum Cystometric Capacity: 656 mL.   Compliance:   - high.   Leak Point Pressure:   - Valsalva ( Abdominal ) Leak Point Pressure: none.   UROFLOW:   - Voided Volume: none mL.   - Residual Urine: over 600 mL.   - Maximum Flow Rate: n/a mL/sec.   - Flow Pattern: no flow  VOIDING PRESSURE STUDY ( Voiding Phase ):   Detrusor Pressure:   - Maximum Detrusor Pressure: 120 cm of water.   - Detrusor Pressure at Maximum Flow: n/a cm of water.   - Detrusor Contraction Characteristics: no coordinated bladder contraction(s).   ELECTROMYOGRAM:   - no relaxation of sphincter upon voiding.     ---Diagnostic Cystourethroscopy ---   Normal urethra.    Prostate: 3.5 cm minimal obstruction.  Open bladder neck  Width of Bladder Neck Opening: Approximately 18 Fr.   Normal bladder. Floppy appearing bladder with no tumor or stone.  Some irritation due to catheterization.  Normal ureteral orifices bilaterally.   CONCLUSIONS:   1. Urinary retention  2. Diabetic neuropathy  3. Chronic back pain, s/p back surgery x 2  4. Hx of UTI, sepsis.    His bladder capacity is over 30 oz.  His voiding diary revealed 14 to 25 oz cath PVR in the AM and 16 to 32 oz of cath PVR at night before his goes to bed.  I recommend more frequent CIC to keep his bladder capacity less than 20 oz.  Recommend at least 3 x if not 4 x CIC a day indefinitely.  Continue flomax 2 capsules a day.  No prostate obstruction noted.    Once his bladder capacity is less than 20 oz ( 600 ml or less), we may consider sacral neuromodulation if he desires.  Nature of sacral neuromodulation explained to pt and his wife.    Will follow up in 3 months with another  "voiding diary      REVIEW OF SYSTEMS:    Review of Systems   Constitutional:  Negative for chills and fever.   Respiratory:  Negative for shortness of breath.    Cardiovascular:  Negative for chest pain.   Gastrointestinal:  Negative for constipation and diarrhea.   Genitourinary:  Positive for frequency. Negative for dysuria, flank pain, hematuria and urgency.   Neurological:  Negative for dizziness and weakness.     PATIENT HISTORY:    Past Medical History:   Diagnosis Date    Bilateral ocular hypertension     Diabetes mellitus     Glaucoma     Hypertension     Nuclear cataract 12/17/2014       Family History   Problem Relation Age of Onset    Cancer Mother         liver    Cancer Father         colon    No Known Problems Sister     No Known Problems Sister     Amblyopia Neg Hx     Blindness Neg Hx     Cataracts Neg Hx     Diabetes Neg Hx     Glaucoma Neg Hx     Hypertension Neg Hx     Macular degeneration Neg Hx     Retinal detachment Neg Hx     Strabismus Neg Hx     Stroke Neg Hx     Thyroid disease Neg Hx        Allergies:  Patient has no known allergies.    Medications:    Current Outpatient Medications:     acetaminophen (TYLENOL) 500 MG tablet, Take 1,000 mg by mouth 2 (two) times a day., Disp: , Rfl:     amLODIPine (NORVASC) 5 MG tablet, Take 1 tablet (5 mg total) by mouth 2 (two) times daily., Disp: 60 tablet, Rfl: 5    aspirin (ECOTRIN) 81 MG EC tablet, Take 81 mg by mouth once daily., Disp: , Rfl:     catheter 14-16 Fr-" Misc, 1 Units by Misc.(Non-Drug; Combo Route) route 3 (three) times daily. 1 Units by Misc.(Non-Drug; Combo Route) route 4 (four) times daily, indefinitely., Disp: 90 each, Rfl: 99    diclofenac (VOLTAREN) 75 MG EC tablet, Take 1 tablet (75 mg total) by mouth 2 (two) times daily as needed., Disp: 60 tablet, Rfl: 1    FLUAD QUAD 2022-23,65Y UP,,PF, 60 mcg (15 mcg x 4)/0.5 mL Syrg, , Disp: , Rfl:     gabapentin (NEURONTIN) 300 MG capsule, TAKE 1 CAPSULE BY MOUTH THREE TIMES DAILY (Patient " taking differently: Take 300 mg by mouth 2 (two) times daily as needed.), Disp: 90 capsule, Rfl: 5    hydroCHLOROthiazide (HYDRODIURIL) 12.5 MG Tab, Take 1 tablet (12.5 mg total) by mouth once daily., Disp: 30 tablet, Rfl: 5    latanoprost 0.005 % ophthalmic solution, INSTILL 1 DROP INTO BOTH EYES EVERY EVENING, Disp: 7.5 mL, Rfl: 3    losartan (COZAAR) 100 MG tablet, TAKE 1 TABLET BY MOUTH EVERY DAY, Disp: 90 tablet, Rfl: 3    metFORMIN (GLUCOPHAGE) 500 MG tablet, TAKE 1 TABLET BY MOUTH EVERY MORNING AND 2 TABLETS EVERY EVENING, Disp: 270 tablet, Rfl: 1    potassium chloride (KLOR-CON) 10 MEQ TbSR, Take 1 tablet (10 mEq total) by mouth once daily., Disp: 5 tablet, Rfl: 0    PREVNAR 20, PF, 0.5 mL Syrg injection, , Disp: , Rfl:     rOPINIRole (REQUIP) 1 MG tablet, TAKE 1 TABLET BY MOUTH EVERY EVENING (Patient taking differently: Take 1 mg by mouth nightly as needed (restless leg).), Disp: 30 tablet, Rfl: 5    tamsulosin (FLOMAX) 0.4 mg Cap, TAKE two CAPSULES BY MOUTH EVERY EVENING, Disp: 180 capsule, Rfl: 3    traMADoL (ULTRAM) 50 mg tablet, Take 1 tablet (50 mg total) by mouth 2 (two) times daily as needed., Disp: 60 tablet, Rfl: 0    PHYSICAL EXAMINATION:    Physical Exam  Vitals and nursing note reviewed.   Constitutional:       Appearance: Normal appearance. He is well-developed.   HENT:      Head: Normocephalic and atraumatic.   Eyes:      Pupils: Pupils are equal, round, and reactive to light.   Pulmonary:      Effort: Pulmonary effort is normal.   Musculoskeletal:         General: Normal range of motion.      Cervical back: Normal range of motion.   Skin:     General: Skin is warm and dry.   Neurological:      Mental Status: He is alert and oriented to person, place, and time.   Psychiatric:         Behavior: Behavior normal.         LABS:    Bladder scan performed by Nurse Gerry.   ml    Lab Results   Component Value Date    PSA 0.39 12/11/2020    PSA 0.38 05/10/2019    PSA 0.47 06/12/2017    PSA 0.58  06/16/2015    PSA 0.57 06/18/2014    PSA 0.56 05/28/2013    PSA 0.65 07/23/2010    PSA 1.0 09/08/2008    PSA 0.8 02/27/2007    PSADIAG 0.43 08/19/2019       IMPRESSION:  Encounter Diagnoses   Name Primary?    Benign prostatic hyperplasia without lower urinary tract symptoms Yes    Incomplete bladder emptying            PLAN:  Problem List Items Addressed This Visit       Benign prostatic hyperplasia without lower urinary tract symptoms - Primary    Incomplete bladder emptying         1. Urinary retention/incontinence   - continue flomax   - start CIC TID with 14 Fr catheter indefinitel.   Record how much he voids on his own and check the PVR by catheter twice a day.    2. Bph with obstruction   Continue flomax    His voiding diary revealed that he voids 1 to 7 oz on his own. But still has cath PVR of 5 to 8 oz.  So recommend to continue CIC 3 x a day using 14 F catheter indefinitely.  Recommend to place a lowry catheter for upcoming surgery under anesthesia.     To prevent recurrent UTI,  Treat chronic constipation.  Increase water and empty the bladder more regularly.  Probiotics  D-Mannose 1 gram twice a day  Cranberry Pills / Juice      I spent 25 minutes with the patient of which more than half was spent in direct consultation with the patient in regards to our treatment and plan.   Nicola Hsu MD    Follow up in about 1 year (around 6/20/2024).

## 2023-06-28 ENCOUNTER — TELEPHONE (OUTPATIENT)
Dept: HEMATOLOGY/ONCOLOGY | Facility: CLINIC | Age: 83
End: 2023-06-28
Payer: MEDICARE

## 2023-06-28 NOTE — TELEPHONE ENCOUNTER
----- Message from Tremontana Chevalier sent at 6/28/2023 11:40 AM CDT -----  Regarding: peer to peer  Peer to Peer Offer    Nature of call: Caller says auth pending and need a final determination and offering a peer to peer for director    Name Of Caller and Co.:  ERIN with [employer]    Provider Name: Sawyer    Peer to Peer Callback:     Type and name of testing:  tempus XT solid tumor panel test    CPT code:   81455X1    DX code:  C02.9    Ref #: [id number]     Date(s) of Service:  06/12 to 06/16/2023    Peer to Peer deadline to accept offer:  on or before 06/30 @ 1 pm EST    If the provider does not accept the peer to peer by the deadline, a denial letter will be sent in the mail.

## 2023-06-29 ENCOUNTER — PATIENT MESSAGE (OUTPATIENT)
Dept: SURGERY | Facility: HOSPITAL | Age: 83
End: 2023-06-29
Payer: MEDICARE

## 2023-06-30 ENCOUNTER — PATIENT MESSAGE (OUTPATIENT)
Dept: INTERNAL MEDICINE | Facility: CLINIC | Age: 83
End: 2023-06-30
Payer: MEDICARE

## 2023-06-30 ENCOUNTER — TELEPHONE (OUTPATIENT)
Dept: PREADMISSION TESTING | Facility: HOSPITAL | Age: 83
End: 2023-06-30

## 2023-06-30 DIAGNOSIS — Z01.818 PRE-OP TESTING: Primary | ICD-10-CM

## 2023-06-30 RX ORDER — D-MANNOSE
POWDER (GRAM) ORAL
COMMUNITY

## 2023-06-30 NOTE — ANESTHESIA PAT ROS NOTE
06/30/2023  Suleiman Chavez is a 83 y.o., male.      Pre-op Assessment    I have reviewed the Patient Summary Reports.     I have reviewed the Nursing Notes. I have reviewed the NPO Status.   I have reviewed the Medications.     Review of Systems  Anesthesia Hx:  No problems with previous Anesthesia   History of prior surgery of interest to airway management or planning:  Previous anesthesia: General 10/18/2022 LAMINEC/FACETECT/FORAMIN,LUMBAR with general anesthesia.  Procedure performed at an [facility].      Airway issues documented on chart review include videolaryngoscope used     Denies Family Hx of Anesthesia complications.    Denies Personal Hx of Anesthesia complications.                    Social:  Social Alcohol Use, Former Smoker Former; Passive Exposure: Past (Quit Date: 05/28/2004)  Smoked 53 years        Hematology/Oncology:       -- Anemia:               Hematology Comments: IRON DEFICIENCY ANEMIA                Oncology Comments: Squamous cell cancer of tongue    Adenomatous polyp  Family history of colon cancer  EMA (iron deficiency anemia)       EENT/Dental:   Eyes: Visual Impairment   Has Bilateral Catarract        Eye Disease:    Glaucoma:        12/17/2014  Nuclear cataract  Bilateral ocular hypertension  Diabetes mellitus  Glaucoma    Throat Symptoms   Throat Disease:   Cancer of, tongue  Tongue mass  Tongue ulcer    Cardiovascular:  Exercise tolerance: poor   Denies Pacemaker. Hypertension, well controlled       Denies Angina.    denies PVD no hyperlipidemia  Denies WIN.    Functional Capacity 4 METS                         Pulmonary:       Denies Shortness of breath.  Denies Recent URI.  Denies Sleep Apnea. 1/2023 COVID+-RESOLVED               Renal/:   Denies Chronic Renal Disease. no renal calculi BPH Acute cystitis without hematuria  Benign prostatic hyperplasia without  lower urinary tract symptoms  Incomplete bladder emptying             Hepatic/GI:   Denies PUD.  Denies Hiatal Hernia.  Denies GERD. Denies Liver Disease.  Denies Hepatitis. CHOLECYSTECTOMY          Musculoskeletal:  Arthritis    Musculoskeletal General/Symptoms: low back pain, sciatica. Functional capacity is ambulatory with walker.   Joint Disease:  Arthritis, Osteoarthritis        Lumbar Spine Disorders, Lumbar Disc Disease, S/P Lumbar Fusion SPINAL STENOSIS    Neurological:  Denies TIA.  Denies CVA. Neuromuscular Disease,   Denies Seizures.        Osteoarthritis  Peripheral Neuropathy                          Endocrine:   Diabetes, Type 2 Diabetes  , Complications include Diabetic Neuropathy, peripheral sensory neuropathy, erectile dysfunction , controlled by diet, oral hypoglycemics.                   Dermatological:  Skin Normal    Psych:  Psychiatric Normal                  Physical Exam  General: Well nourished, Cooperative, Alert and Oriented    Airway:  Mouth Opening: Normal  Tongue: Normal  Neck ROM: Flexion Decreased, Extension Decreased, Left Lateral Motion Decreased  Oropharynx: Abnormal Mass  DIFFICULTY SWALLOWING WHEN EATING DUE TO PAIN  Dental:  Intact  MISSING TEETH  Chest/Lungs:  Clear to auscultation, Normal Respiratory Rate    Heart:  Rate: Normal  Rhythm: Regular Rhythm  Sounds: Normal        Anesthesia Assessment: Preoperative EQUATION    Planned Procedure: Procedure(s) (LRB):  GLOSSECTOMY (Left)  DISSECTION, NECK, RADICAL (Left)  Requested Anesthesia Type:General  Surgeon: Jaxon Carmen MD  Service: ENT  Known or anticipated Date of Surgery:7/12/2023    Surgeon notes: reviewed    Electronic QUestionnaire Assessment completed via nurse interview with patient.        Triage considerations:     The patient has no apparent active cardiac condition (No unstable coronary Syndrome such as severe unstable angina or recent [<1 month] myocardial infarction, decompensated CHF, severe valvular    disease or significant arrhythmia)    Previous anesthesia records:GETA, No problems, and video laryngoscope used    Airway     TM distance: >3 FB  Neck ROM: full  Mallampati: II  ETT 10/18/22; 1206 (created via procedure documentation); Video laryngoscopy, Stylet; Oral Standard; 7.5 mm; Cuffed; Auscultation, Capnometry, Symmetrical chest wall movement; Pink tape; Other (Comment); 1   Final airway type: Endotracheal airwaySuccessful airway: Oral ETT  Cuffed: Cuffed  Successful intubation technique: Video laryngoscopy and Stylet  Endotracheal tube insertion site: Oral  Blade: Chanel  Blade size: #3  Placement verified by: auscultation, CO2 detection, chest rise and direct visualization  Breath Sounds: Equal  Cormack-Lehane Classification: grade I - full view of glottis  ETT size: 7.5mm  Inital cuff pressure (cm H2O): MOP  ETT to lips (cm): 21  Measured from: Lips  Lips/Dentition: Unchanged  Secured Method: Pink tape Secured Location: Right  Number of attempts at approach: 1    Last PCP note: within 3 months , within [facility]   Subspecialty notes: ENT, Urology    Other important co-morbidities: DM2, HTN, and SQUAMOUS CELL CARCINOMA OF TONGUE, TONGUE ULCER       Tests already available:  Available tests,  3-6 months ago , within [facility] .     4/6/23  A1C, BMP, MAGNESIUM    3/4/23  CT ABDOMEN PELVIS, EKG            Instructions given. (See in Nurse's note)    Optimization:  Anesthesia Preop Clinic Assessment  Indicated WILL SCHEDULE    Medical Opinion Indicated TBCB PCP       Sub-specialist consult indicated:   TBD       Plan:    Testing:  BMP, EKG, Hematocrit, Hemoglobin, and T&S   Pre-anesthesia  visit       Visit focus: concerns in complex and/or prolonged anesthesia, airway concerns     Consultation:Patient's PCP for a statement of optimization      Patient  has previously scheduled Medical Appointment: NOT AT THIS TIME    Navigation: Tests Scheduled.              Consults scheduled.             Results will be  tracked by Preop Clinic.

## 2023-07-02 LAB
DNA RANGE(S) EXAMINED NAR: NORMAL
GENE DIS ANL INTERP-IMP: POSITIVE
GENE DIS ASSESSED: NORMAL
GENE MUT TESTED BLD/T: 7.9 M/MB
MSI CA SPEC-IMP: NORMAL
PD-L1 BY 22C3 TISS IMSTN DOC: POSITIVE
REASON FOR STUDY: NORMAL
TEMPUS FUSIONADDENDUM: NORMAL
TEMPUS PD-L1 (22C3) COMBINED POSITIVE SCORE: 50
TEMPUS PD-L1 (22C3) TUMOR PROPORTION SCORE: 50 %
TEMPUS PORTAL: NORMAL
TEMPUS THERAPY1: NORMAL
TEMPUS THERAPY2: NORMAL
TEMPUS THERAPY3: NORMAL
TEMPUS THERAPY4: NORMAL
TEMPUS THERAPY5: NORMAL
TEMPUS THERAPYCOUNT: 5
TEMPUS TRIAL1: NORMAL
TEMPUS TRIAL2: NORMAL
TEMPUS TRIAL3: NORMAL
TEMPUS TRIALCOUNT: 3

## 2023-07-03 ENCOUNTER — PATIENT MESSAGE (OUTPATIENT)
Dept: OTOLARYNGOLOGY | Facility: CLINIC | Age: 83
End: 2023-07-03
Payer: MEDICARE

## 2023-07-03 NOTE — TELEPHONE ENCOUNTER
Called & spoke with pt's wife, scheduled PET & CT for this Friday. She also stated they are supposed to meet with Dr. Carmen again pre surgery, scheduled appt for 7/10. Reviewed details of appts & confirmed.

## 2023-07-05 ENCOUNTER — TELEPHONE (OUTPATIENT)
Dept: HEMATOLOGY/ONCOLOGY | Facility: CLINIC | Age: 83
End: 2023-07-05
Payer: MEDICARE

## 2023-07-05 NOTE — TELEPHONE ENCOUNTER
Returned call to patient and patient's wife Liat, explained Tempus testing, completed financial assistance -full approval and email sent for their records.

## 2023-07-06 ENCOUNTER — HOSPITAL ENCOUNTER (OUTPATIENT)
Dept: CARDIOLOGY | Facility: CLINIC | Age: 83
Discharge: HOME | End: 2023-07-06
Payer: MEDICARE

## 2023-07-06 ENCOUNTER — HOSPITAL ENCOUNTER (OUTPATIENT)
Dept: PREADMISSION TESTING | Facility: HOSPITAL | Age: 83
Discharge: HOME | End: 2023-07-06
Attending: OTOLARYNGOLOGY | Admitting: OTOLARYNGOLOGY
Payer: MEDICARE

## 2023-07-06 VITALS
DIASTOLIC BLOOD PRESSURE: 68 MMHG | TEMPERATURE: 98 F | SYSTOLIC BLOOD PRESSURE: 149 MMHG | HEART RATE: 85 BPM | BODY MASS INDEX: 25.73 KG/M2 | HEIGHT: 72 IN | WEIGHT: 190 LBS | OXYGEN SATURATION: 98 %

## 2023-07-06 DIAGNOSIS — Z01.818 PRE-OP TESTING: ICD-10-CM

## 2023-07-06 PROCEDURE — 93005 EKG 12-LEAD: ICD-10-PCS | Mod: S$GLB,,, | Performed by: ANESTHESIOLOGY

## 2023-07-06 PROCEDURE — 93010 EKG 12-LEAD: ICD-10-PCS | Mod: S$GLB,,, | Performed by: INTERNAL MEDICINE

## 2023-07-06 PROCEDURE — 93010 ELECTROCARDIOGRAM REPORT: CPT | Mod: S$GLB,,, | Performed by: INTERNAL MEDICINE

## 2023-07-06 PROCEDURE — 93005 ELECTROCARDIOGRAM TRACING: CPT | Mod: S$GLB,,, | Performed by: ANESTHESIOLOGY

## 2023-07-06 NOTE — DISCHARGE INSTRUCTIONS
Your surgery has been scheduled for:_______7/12/23___________________________________    You should report to:  ____[facility], located on the [place] side of the first floor of the           [facility] ([phone])  __X__[facility], located on the [facility] side of the            Second floor of the [facility] ([phone])  ____3rd Floor [facility] located on the [facility] side of the [facility] [phone]  ____[facility]: located at [address] [facility] [place] [address]. [address].     Please Note   Tell your doctor if you take Aspirin, products containing Aspirin, herbal medications  or blood thinners, such as Coumadin, Ticlid, or Plavix.  (Consult your provider regarding holding or stopping before surgery).  Arrange for someone to drive you home following surgery.  You will not be allowed to leave the surgical facility alone or drive yourself home following sedation and anesthesia.    Before Surgery  Stop taking all herbal medications, vitamins, and supplements 7 days prior to surgery  No Motrin/Advil (Ibuprofen) 7 days before surgery  No Aleve (Naproxen) 7 days before surgery  Stop Taking Asprin, products containing Asprin __7___days before surgery  Stop taking blood thinners_______days before surgery  No Goody's/BC  Powder 7 days before surgery  Refrain from drinking alcoholic beverages for 24hours before and after surgery  Stop or limit smoking ____7_____days before surgery  You may take Tylenol for pain    Night before Surgery  Do not eat or drink after midnight  Take a shower or bath (shower is recommended).  Bathe with Hibiclens soap or an antibacterial soap from the neck down.  If not supplied by your surgeon, hibiclens soap will need to be purchased over the counter in pharmacy.  Rinse soap off thoroughly.  Shampoo your hair with your regular shampoo    The Day of  Surgery  Take another bath or shower with hibiclens or any antibacterial soap, to reduce the chance of infection.  Take heart and blood pressure medications with a small sip of water, as advised by the perioperative team.  Do not take fluid pills  You may brush your teeth and rinse your mouth, but do not swall any additional water.   Do not apply perfumes, powder, body lotions or deodorant on the day of surgery.  Nail polish should be removed.  Do not wear makeup or moisturizer  Wear comfortable clothes, such as a button front shirt and loose fitting pants.  Leave all jewelry, including body piercings, and valuables at home.    Bring any devices you will neeed after surgery such as crutches or canes.  If you have sleep apnea, please bring your CPAP machine  In the event that your physical condition changes including the onset of a cold or respiratory illness, or if you have to delay or cancel your surgery, please notify your surgeon.     Anesthesia: General Anesthesia     You are watched continuously during your procedure by your anesthesia provider.     Youre due to have surgery. During surgery, youll be given medicine called anesthesia or anesthetic. This will keep you comfortable and pain-free. Your anesthesia provider will use general anesthesia.  What is general anesthesia?  General anesthesia puts you into a state like deep sleep. It goes into the bloodstream (IV anesthetics), into the lungs (gas anesthetics), or both. You feel nothing during the procedure. You will not remember it. During the procedure, the anesthesia provider monitors you continuously. He or she checks your heart rate and rhythm, blood pressure, breathing, and blood oxygen.  IV anesthetics. IV anesthetics are given through an IV line in your arm. Theyre often given first. This is so you are asleep before a gas anesthetic is started. Some kinds of IV anesthetics relieve pain. Others relax you. Your doctor will decide which kind is best in  your case.  Gas anesthetics. Gas anesthetics are breathed into the lungs. They are often used to keep you asleep. They can be given through a facemask or a tube placed in your larynx or trachea (breathing tube).  If you have a facemask, your anesthesia provider will most likely place it over your nose and mouth while youre still awake. Youll breathe oxygen through the mask as your IV anesthetic is started. Gas anesthetic may be added through the mask.  If you have a tube in the larynx or trachea, it will be inserted into your throat after youre asleep.  Anesthesia tools and medicines  You will likely have:  IV anesthetics. These are put into an IV line into your bloodstream.  Gas anesthetics. You breathe these anesthetics into your lungs, where they pass into your bloodstream.  Pulse oximeter. This is a small clip that is attached to the end of your finger. This measures your blood oxygen level.  Electrocardiography leads (electrodes). These are small sticky pads that are placed on your chest. They record your heart rate and rhythm.  Blood pressure cuff. This reads your blood pressure.  Risks and possible complications  General anesthesia has some risks. These include:  Breathing problems  Nausea and vomiting  Sore throat or hoarseness (usually temporary)  Allergic reaction to the anesthetic  Irregular heartbeat (rare)  Cardiac arrest (rare)   Anesthesia safety  Follow all instructions you are given for how long not to eat or drink before your procedure.  Be sure your doctor knows what medicines and drugs you take. This includes over-the-counter medicines, herbs, supplements, alcohol or other drugs. You will be asked when those were last taken.  Have an adult family member or friend drive you home after the procedure.  For the first 24 hours after your surgery:  Do not drive or use heavy equipment.  Do not make important decisions or sign legal documents. If important decisions or signing legal documents is  necessary during the first 24 hours after surgery, have a trusted family member or spouse act on your behalf.  Avoid alcohol.  Have a responsible adult stay with you. He or she can watch for problems and help keep you safe.  Date Last Reviewed: 12/1/2016 © 2000-2017 [employer]. [address] [place] [address], [place] [address]. All rights reserved. This information is not intended as a substitute for professional medical care. Always follow your healthcare professional's instructions.

## 2023-07-07 ENCOUNTER — HOSPITAL ENCOUNTER (OUTPATIENT)
Dept: RADIOLOGY | Facility: HOSPITAL | Age: 83
Discharge: HOME | End: 2023-07-07
Attending: OTOLARYNGOLOGY
Payer: MEDICARE

## 2023-07-07 DIAGNOSIS — C02.9 SQUAMOUS CELL CANCER OF TONGUE: ICD-10-CM

## 2023-07-07 LAB — POCT GLUCOSE: 102 MG/DL (ref 70–110)

## 2023-07-07 PROCEDURE — 70491 CT SOFT TISSUE NECK W/DYE: CPT | Mod: 26,,, | Performed by: RADIOLOGY

## 2023-07-07 PROCEDURE — A9552 F18 FDG: HCPCS

## 2023-07-07 PROCEDURE — 78815 PET IMAGE W/CT SKULL-THIGH: CPT | Mod: 26,PI,, | Performed by: STUDENT IN AN ORGANIZED HEALTH CARE EDUCATION/TRAINING PROGRAM

## 2023-07-07 PROCEDURE — 78815 NM PET CT ROUTINE: ICD-10-PCS | Mod: 26,PI,, | Performed by: STUDENT IN AN ORGANIZED HEALTH CARE EDUCATION/TRAINING PROGRAM

## 2023-07-07 PROCEDURE — 25500020 PHARM REV CODE 255: Performed by: OTOLARYNGOLOGY

## 2023-07-07 PROCEDURE — 70491 CT SOFT TISSUE NECK W/DYE: CPT | Mod: TC

## 2023-07-07 PROCEDURE — 70491 CT SOFT TISSUE NECK WITH CONTRAST: ICD-10-PCS | Mod: 26,,, | Performed by: RADIOLOGY

## 2023-07-07 RX ADMIN — IOHEXOL 100 ML: 350 INJECTION, SOLUTION INTRAVENOUS at 10:07

## 2023-07-10 ENCOUNTER — OFFICE VISIT (OUTPATIENT)
Dept: OTOLARYNGOLOGY | Facility: CLINIC | Age: 83
End: 2023-07-10
Payer: MEDICARE

## 2023-07-10 VITALS
DIASTOLIC BLOOD PRESSURE: 67 MMHG | HEIGHT: 72 IN | WEIGHT: 187.19 LBS | HEART RATE: 77 BPM | BODY MASS INDEX: 25.35 KG/M2 | SYSTOLIC BLOOD PRESSURE: 103 MMHG

## 2023-07-10 DIAGNOSIS — C02.9 SQUAMOUS CELL CANCER OF TONGUE: Primary | ICD-10-CM

## 2023-07-10 PROCEDURE — 99213 PR OFFICE/OUTPT VISIT, EST, LEVL III, 20-29 MIN: ICD-10-PCS | Mod: S$GLB,,, | Performed by: OTOLARYNGOLOGY

## 2023-07-10 PROCEDURE — 1159F PR MEDICATION LIST DOCUMENTED IN MEDICAL RECORD: ICD-10-PCS | Mod: CPTII,S$GLB,, | Performed by: OTOLARYNGOLOGY

## 2023-07-10 PROCEDURE — 3078F PR MOST RECENT DIASTOLIC BLOOD PRESSURE < 80 MM HG: ICD-10-PCS | Mod: CPTII,S$GLB,, | Performed by: OTOLARYNGOLOGY

## 2023-07-10 PROCEDURE — 3074F PR MOST RECENT SYSTOLIC BLOOD PRESSURE < 130 MM HG: ICD-10-PCS | Mod: CPTII,S$GLB,, | Performed by: OTOLARYNGOLOGY

## 2023-07-10 PROCEDURE — 1126F AMNT PAIN NOTED NONE PRSNT: CPT | Mod: CPTII,S$GLB,, | Performed by: OTOLARYNGOLOGY

## 2023-07-10 PROCEDURE — 99999 PR PBB SHADOW E&M-EST. PATIENT-LVL III: CPT | Mod: PBBFAC,,, | Performed by: OTOLARYNGOLOGY

## 2023-07-10 PROCEDURE — 3074F SYST BP LT 130 MM HG: CPT | Mod: CPTII,S$GLB,, | Performed by: OTOLARYNGOLOGY

## 2023-07-10 PROCEDURE — 1126F PR PAIN SEVERITY QUANTIFIED, NO PAIN PRESENT: ICD-10-PCS | Mod: CPTII,S$GLB,, | Performed by: OTOLARYNGOLOGY

## 2023-07-10 PROCEDURE — 1159F MED LIST DOCD IN RCRD: CPT | Mod: CPTII,S$GLB,, | Performed by: OTOLARYNGOLOGY

## 2023-07-10 PROCEDURE — 99999 PR PBB SHADOW E&M-EST. PATIENT-LVL III: ICD-10-PCS | Mod: PBBFAC,,, | Performed by: OTOLARYNGOLOGY

## 2023-07-10 PROCEDURE — 1160F PR REVIEW ALL MEDS BY PRESCRIBER/CLIN PHARMACIST DOCUMENTED: ICD-10-PCS | Mod: CPTII,S$GLB,, | Performed by: OTOLARYNGOLOGY

## 2023-07-10 PROCEDURE — 3078F DIAST BP <80 MM HG: CPT | Mod: CPTII,S$GLB,, | Performed by: OTOLARYNGOLOGY

## 2023-07-10 PROCEDURE — 99213 OFFICE O/P EST LOW 20 MIN: CPT | Mod: S$GLB,,, | Performed by: OTOLARYNGOLOGY

## 2023-07-10 PROCEDURE — 1160F RVW MEDS BY RX/DR IN RCRD: CPT | Mod: CPTII,S$GLB,, | Performed by: OTOLARYNGOLOGY

## 2023-07-11 ENCOUNTER — ANESTHESIA EVENT (OUTPATIENT)
Dept: SURGERY | Facility: HOSPITAL | Age: 83
DRG: 141 | End: 2023-07-11
Payer: MEDICARE

## 2023-07-11 NOTE — H&P (VIEW-ONLY)
CC: Left tongue lesion      HPI   83 y.o. male presents with  a 1-3 month history of a left-sided tongue lesion.  He reports this is somewhat painful.  He is tried over-the-counter medications such as Mylanta and Orajel with little relief.  He is a former smoker.  No other complaints.    Recent biopsy revealed squamous cell carcinoma.  PET-CT scan revealed left cervical adenopathy.  There was no evidence of distant disease.  He is scheduled for surgery next week.    Review of Systems   Constitutional: Negative for fatigue and unexpected weight change.   HENT: Per HPI.  Eyes: Negative for visual disturbance.   Respiratory: Negative for shortness of breath, hemoptysis   Cardiovascular: Negative for chest pain and palpitations.   Musculoskeletal: Negative for decreased ROM, back pain.   Skin: Negative for rash, sunburn, itching.   Neurological: Negative for dizziness and seizures.   Hematological: Negative for adenopathy. Does not bruise/bleed easily.   Endocrine: Negative for rapid weight loss/weight gain, heat/cold intolerance.     Past Medical History   Patient Active Problem List   Diagnosis    Ocular hypertension    Essential hypertension    Screen for colon cancer    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, with long-term current use of insulin    Adenomatous polyp    Family history of colon cancer    Nuclear cataract    Borderline glaucoma of both eyes with ocular hypertension    Acute cystitis without hematuria    Spinal stenosis, lumbar region, with neurogenic claudication    EMA (iron deficiency anemia)    Benign prostatic hyperplasia without lower urinary tract symptoms    Debility    Chronic midline low back pain with sciatica    Tongue mass    Tongue ulcer    Squamous cell cancer of tongue    Incomplete bladder emptying           Past Surgical History   Past Surgical History:   Procedure Laterality Date    CHOLECYSTECTOMY           Family History   Family History   Problem Relation Age  of Onset    Cancer Mother         liver    Cancer Father         colon    No Known Problems Sister     No Known Problems Sister     Amblyopia Neg Hx     Blindness Neg Hx     Cataracts Neg Hx     Diabetes Neg Hx     Glaucoma Neg Hx     Hypertension Neg Hx     Macular degeneration Neg Hx     Retinal detachment Neg Hx     Strabismus Neg Hx     Stroke Neg Hx     Thyroid disease Neg Hx            Social History   .  Social History     Socioeconomic History    Marital status:    Occupational History     Employer: OTHER   Tobacco Use    Smoking status: Former     Types: Cigarettes     Quit date: 2004     Years since quittin.1     Passive exposure: Past    Smokeless tobacco: Never   Substance and Sexual Activity    Alcohol use: No     Alcohol/week: 0.0 standard drinks    Drug use: No         Allergies   Review of patient's allergies indicates:  No Known Allergies        Physical Exam     Vitals:    07/10/23 1454   BP: 103/67   Pulse: 77         Body mass index is 25.38 kg/m².      General: AOx3, NAD   Respiratory:  Symmetric chest rise, normal effort  Oral Cavity:  Oral Tongue mobile.  Roughly 1.5 cm ulceronodular lesion of L lateral tongue. Minimal palpable depth. Hard Palate WNL. No buccal or FOM lesions.  Oropharynx:  No masses/lesions of the posterior pharyngeal wall. Tonsillar fossa without lesions. Soft palate without masses. Midline uvula.   Neck: No scars.  No cervical lymphadenopathy, thyromegaly or thyroid nodules.  Normal range of motion.    Face: House Brackmann I bilaterally.     PET-CT, neck CT reviewed.      Assessment/Plan  Problem List Items Addressed This Visit          Oncology    Squamous cell cancer of tongue - Primary     PET with regional disease. To OR next week for partial glossectomy, L neck dissection.

## 2023-07-11 NOTE — ANESTHESIA PREPROCEDURE EVALUATION
[facility]  Anesthesia Pre-Operative Evaluation         Patient Name: Suleiman Chavez  YOB: 1940  MRN: [id number]    SUBJECTIVE:     Pre-operative evaluation for Procedure(s) (LRB):  GLOSSECTOMY (Left)  DISSECTION, NECK, RADICAL (Left)     07/11/2023    Suleiman Chavez is a 83 y.o. male w/ a significant PMHx of PMH of spinal stenosis, HTN, BPH, DM2, tongue ulcer/mass concerning for malignancy.     Patient now presents for the above procedure with Dr. Carmen.      LDA: 20 G PIV R hand    Prev airway:   2022  Blade: [employer]  Blade size: #3  Placement verified by: auscultation, CO2 detection, chest rise and direct visualization  Breath Sounds: Equal  Cormack-Lehane Classification: grade I - full view of glottis  ETT size: 7.5mm      Patient Active Problem List   Diagnosis    Ocular hypertension    Essential hypertension    Screen for colon cancer    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, with long-term current use of insulin    Adenomatous polyp    Family history of colon cancer    Nuclear cataract    Borderline glaucoma of both eyes with ocular hypertension    Acute cystitis without hematuria    Spinal stenosis, lumbar region, with neurogenic claudication    EMA (iron deficiency anemia)    Benign prostatic hyperplasia without lower urinary tract symptoms    Debility    Chronic midline low back pain with sciatica    Tongue mass    Tongue ulcer    Squamous cell cancer of tongue    Incomplete bladder emptying       Review of patient's allergies indicates:  No Known Allergies    Current Inpatient Medications:      No current facility-administered medications on file prior to encounter.     Current Outpatient Medications on File Prior to Encounter   Medication Sig Dispense Refill    amLODIPine (NORVASC) 5 MG tablet Take 1 tablet (5 mg total) by mouth 2 (two) times daily. 60 tablet 5    aspirin (ECOTRIN) 81 MG EC tablet Take 81 mg by mouth once  "daily.      d-mannose Powd Take by mouth.      diclofenac (VOLTAREN) 75 MG EC tablet Take 1 tablet (75 mg total) by mouth 2 (two) times daily as needed. 60 tablet 1    hydroCHLOROthiazide (HYDRODIURIL) 12.5 MG Tab Take 1 tablet (12.5 mg total) by mouth once daily. 30 tablet 5    losartan (COZAAR) 100 MG tablet TAKE 1 TABLET BY MOUTH EVERY DAY 90 tablet 3    potassium chloride (KLOR-CON) 10 MEQ TbSR Take 1 tablet (10 mEq total) by mouth once daily. 5 tablet 0    tamsulosin (FLOMAX) 0.4 mg Cap TAKE two CAPSULES BY MOUTH EVERY EVENING 180 capsule 3    acetaminophen (TYLENOL) 500 MG tablet Take 1,000 mg by mouth 2 (two) times a day.      catheter 14-16 Fr-" Misc 1 Units by Misc.(Non-Drug; Combo Route) route 3 (three) times daily. 1 Units by Misc.(Non-Drug; Combo Route) route 4 (four) times daily, indefinitely. 90 each 99    FLUAD QUAD ,65Y UP,,PF, 60 mcg (15 mcg x 4)/0.5 mL Syrg       gabapentin (NEURONTIN) 300 MG capsule TAKE 1 CAPSULE BY MOUTH THREE TIMES DAILY (Patient taking differently: Take 300 mg by mouth 2 (two) times daily as needed.) 90 capsule 5    latanoprost 0.005 % ophthalmic solution INSTILL 1 DROP INTO BOTH EYES EVERY EVENING 7.5 mL 3    PREVNAR 20, PF, 0.5 mL Syrg injection       rOPINIRole (REQUIP) 1 MG tablet TAKE 1 TABLET BY MOUTH EVERY EVENING (Patient taking differently: Take 1 mg by mouth nightly as needed (restless leg).) 30 tablet 5    traMADoL (ULTRAM) 50 mg tablet Take 1 tablet (50 mg total) by mouth 2 (two) times daily as needed. 60 tablet 0       Past Surgical History:   Procedure Laterality Date    CHOLECYSTECTOMY         Social History     Socioeconomic History    Marital status:    Occupational History     Employer: OTHER   Tobacco Use    Smoking status: Former     Types: Cigarettes     Quit date: 2004     Years since quittin.1     Passive exposure: Past    Smokeless tobacco: Never   Substance and Sexual Activity    Alcohol use: No     " Alcohol/week: 0.0 standard drinks    Drug use: No       OBJECTIVE:     Vital Signs Range (Last 24H):         Significant Labs:  Lab Results   Component Value Date    WBC 12.11 03/07/2023    HGB 13.3 (L) 07/06/2023    HCT 41.4 07/06/2023     03/07/2023    CHOL 174 12/11/2020    TRIG 148 12/11/2020    HDL 58 12/11/2020    ALT 16 03/04/2023    AST 22 03/04/2023     07/06/2023    K 4.5 07/06/2023     07/06/2023    CREATININE 0.9 07/06/2023    BUN 19 07/06/2023    CO2 26 07/06/2023    TSH 1.430 11/21/2022    PSA 0.39 12/11/2020    HGBA1C 5.8 (H) 04/06/2023       Diagnostic Studies: No relevant studies.    EKG:   Results for orders placed or performed during the hospital encounter of 07/06/23   EKG 12-lead    Collection Time: 07/06/23  3:04 PM    Narrative    Test Reason : Z01.818,    Vent. Rate : 071 BPM     Atrial Rate : 071 BPM     P-R Int : 196 ms          QRS Dur : 100 ms      QT Int : 398 ms       P-R-T Axes : 078 023 076 degrees     QTc Int : 432 ms    Normal sinus rhythm with Premature atrial complexes  Normal ECG  When compared with ECG of 04-MAR-2023 11:32,  Premature atrial complexes are now Present  Confirmed by Lance Sanchez MD ([id number]) on 7/6/2023 3:39:37 PM    Referred By: BELEN SIMPSON           Confirmed By:Lance Sanchez MD       2D ECHO:  TTE:  No results found for this or any previous visit.      ASSESSMENT/PLAN:         Pre-op Assessment    I have reviewed the Patient Summary Reports.     I have reviewed the Nursing Notes. I have reviewed the NPO Status.   I have reviewed the Medications.     Review of Systems  Anesthesia Hx:  No problems with previous Anesthesia  History of prior surgery of interest to airway management or planning: Denies Family Hx of Anesthesia complications.   Denies Personal Hx of Anesthesia complications.   Social:  Former Smoker    Hematology/Oncology:        Current/Recent Cancer.   EENT/Dental:EENT/Dental Normal   Cardiovascular:   Hypertension     Pulmonary:  Pulmonary Normal    Hepatic/GI:  Hepatic/GI Normal    Musculoskeletal:   +sciatica Spine Disorders: lumbar Degenerative disease and Chronic Pain    Neurological:  Neurology Normal    Endocrine:   Diabetes        Physical Exam  General: Well nourished, Cooperative, Alert and Oriented    Airway:  Mallampati: II / I  Mouth Opening: Normal  TM Distance: Normal  Tongue: Normal  Neck ROM: Normal ROM    Dental:  Multiple missing teeth       Anesthesia Plan  Type of Anesthesia, risks & benefits discussed:    Anesthesia Type: Gen ETT  Intra-op Monitoring Plan: Standard ASA Monitors  Post Op Pain Control Plan: multimodal analgesia and IV/PO Opioids PRN  Induction:  IV  Airway Plan: Direct, Post-Induction  Informed Consent: Informed consent signed with the Patient and all parties understand the risks and agree with anesthesia plan.  All questions answered.   ASA Score: 3  Day of Surgery Review of History & Physical: H&P Update referred to the surgeon/provider.    Ready For Surgery From Anesthesia Perspective.     .

## 2023-07-12 ENCOUNTER — ANESTHESIA (OUTPATIENT)
Dept: SURGERY | Facility: HOSPITAL | Age: 83
DRG: 141 | End: 2023-07-12
Payer: MEDICARE

## 2023-07-12 ENCOUNTER — HOSPITAL ENCOUNTER (INPATIENT)
Facility: HOSPITAL | Age: 83
LOS: 2 days | Discharge: HOME | DRG: 141 | End: 2023-07-14
Attending: OTOLARYNGOLOGY | Admitting: OTOLARYNGOLOGY
Payer: MEDICARE

## 2023-07-12 DIAGNOSIS — K14.0 TONGUE ULCER: ICD-10-CM

## 2023-07-12 DIAGNOSIS — C02.9 SQUAMOUS CELL CANCER OF TONGUE: Primary | ICD-10-CM

## 2023-07-12 LAB
ABO + RH BLD: NORMAL
BLD GP AB SCN CELLS X3 SERPL QL: NORMAL
POCT GLUCOSE: 137 MG/DL (ref 70–110)
POCT GLUCOSE: 155 MG/DL (ref 70–110)
POCT GLUCOSE: 169 MG/DL (ref 70–110)
SPECIMEN OUTDATE: NORMAL

## 2023-07-12 PROCEDURE — 27000221 HC OXYGEN, UP TO 24 HOURS

## 2023-07-12 PROCEDURE — 25000003 PHARM REV CODE 250: Performed by: STUDENT IN AN ORGANIZED HEALTH CARE EDUCATION/TRAINING PROGRAM

## 2023-07-12 PROCEDURE — 41120 PARTIAL REMOVAL OF TONGUE: CPT | Mod: 51,,, | Performed by: OTOLARYNGOLOGY

## 2023-07-12 PROCEDURE — 63600175 PHARM REV CODE 636 W HCPCS: Performed by: STUDENT IN AN ORGANIZED HEALTH CARE EDUCATION/TRAINING PROGRAM

## 2023-07-12 PROCEDURE — 63600175 PHARM REV CODE 636 W HCPCS

## 2023-07-12 PROCEDURE — D9220A PRA ANESTHESIA: ICD-10-PCS | Mod: ,,, | Performed by: ANESTHESIOLOGY

## 2023-07-12 PROCEDURE — 71000015 HC POSTOP RECOV 1ST HR: Performed by: OTOLARYNGOLOGY

## 2023-07-12 PROCEDURE — 25000003 PHARM REV CODE 250: Performed by: OTOLARYNGOLOGY

## 2023-07-12 PROCEDURE — 37000008 HC ANESTHESIA 1ST 15 MINUTES: Performed by: OTOLARYNGOLOGY

## 2023-07-12 PROCEDURE — 86900 BLOOD TYPING SEROLOGIC ABO: CPT | Performed by: OTOLARYNGOLOGY

## 2023-07-12 PROCEDURE — 99900035 HC TECH TIME PER 15 MIN (STAT)

## 2023-07-12 PROCEDURE — 82962 GLUCOSE BLOOD TEST: CPT | Performed by: OTOLARYNGOLOGY

## 2023-07-12 PROCEDURE — 71000016 HC POSTOP RECOV ADDL HR: Performed by: OTOLARYNGOLOGY

## 2023-07-12 PROCEDURE — 94761 N-INVAS EAR/PLS OXIMETRY MLT: CPT

## 2023-07-12 PROCEDURE — 38724 REMOVAL OF LYMPH NODES NECK: CPT | Mod: LT,,, | Performed by: OTOLARYNGOLOGY

## 2023-07-12 PROCEDURE — 27201423 OPTIME MED/SURG SUP & DEVICES STERILE SUPPLY: Performed by: OTOLARYNGOLOGY

## 2023-07-12 PROCEDURE — 36000706: Performed by: OTOLARYNGOLOGY

## 2023-07-12 PROCEDURE — 38724 PR REMOVAL NODES, NECK,CERV MOD RAD: ICD-10-PCS | Mod: LT,,, | Performed by: OTOLARYNGOLOGY

## 2023-07-12 PROCEDURE — 41120 PR PART REMOVAL TONGUE,<1/2: ICD-10-PCS | Mod: 51,,, | Performed by: OTOLARYNGOLOGY

## 2023-07-12 PROCEDURE — D9220A PRA ANESTHESIA: Mod: ,,, | Performed by: ANESTHESIOLOGY

## 2023-07-12 PROCEDURE — 20600001 HC STEP DOWN PRIVATE ROOM

## 2023-07-12 PROCEDURE — 37000009 HC ANESTHESIA EA ADD 15 MINS: Performed by: OTOLARYNGOLOGY

## 2023-07-12 PROCEDURE — 25000003 PHARM REV CODE 250

## 2023-07-12 PROCEDURE — 36000707: Performed by: OTOLARYNGOLOGY

## 2023-07-12 PROCEDURE — 71000033 HC RECOVERY, INTIAL HOUR: Performed by: OTOLARYNGOLOGY

## 2023-07-12 RX ORDER — ONDANSETRON 2 MG/ML
INJECTION INTRAMUSCULAR; INTRAVENOUS
Status: DISCONTINUED | OUTPATIENT
Start: 2023-07-12 | End: 2023-07-12

## 2023-07-12 RX ORDER — HYDROCODONE BITARTRATE AND ACETAMINOPHEN 5; 325 MG/1; MG/1
1 TABLET ORAL EVERY 4 HOURS PRN
Status: DISCONTINUED | OUTPATIENT
Start: 2023-07-12 | End: 2023-07-14 | Stop reason: HOSPADM

## 2023-07-12 RX ORDER — LIDOCAINE HYDROCHLORIDE 20 MG/ML
INJECTION, SOLUTION EPIDURAL; INFILTRATION; INTRACAUDAL; PERINEURAL
Status: DISCONTINUED | OUTPATIENT
Start: 2023-07-12 | End: 2023-07-12

## 2023-07-12 RX ORDER — LOSARTAN POTASSIUM 50 MG/1
100 TABLET ORAL DAILY
Status: DISCONTINUED | OUTPATIENT
Start: 2023-07-13 | End: 2023-07-14 | Stop reason: HOSPADM

## 2023-07-12 RX ORDER — AMLODIPINE BESYLATE 5 MG/1
5 TABLET ORAL 2 TIMES DAILY
Status: DISCONTINUED | OUTPATIENT
Start: 2023-07-12 | End: 2023-07-14 | Stop reason: HOSPADM

## 2023-07-12 RX ORDER — SODIUM CHLORIDE 0.9 % (FLUSH) 0.9 %
10 SYRINGE (ML) INJECTION
Status: DISCONTINUED | OUTPATIENT
Start: 2023-07-12 | End: 2023-07-12 | Stop reason: HOSPADM

## 2023-07-12 RX ORDER — DEXAMETHASONE SODIUM PHOSPHATE 4 MG/ML
INJECTION, SOLUTION INTRA-ARTICULAR; INTRALESIONAL; INTRAMUSCULAR; INTRAVENOUS; SOFT TISSUE
Status: DISCONTINUED | OUTPATIENT
Start: 2023-07-12 | End: 2023-07-12

## 2023-07-12 RX ORDER — PHENYLEPHRINE HCL IN 0.9% NACL 1 MG/10 ML
SYRINGE (ML) INTRAVENOUS
Status: DISCONTINUED | OUTPATIENT
Start: 2023-07-12 | End: 2023-07-12

## 2023-07-12 RX ORDER — ACETAMINOPHEN 325 MG/1
650 TABLET ORAL EVERY 4 HOURS PRN
Status: DISCONTINUED | OUTPATIENT
Start: 2023-07-12 | End: 2023-07-14 | Stop reason: HOSPADM

## 2023-07-12 RX ORDER — HALOPERIDOL 5 MG/ML
0.5 INJECTION INTRAMUSCULAR EVERY 10 MIN PRN
Status: COMPLETED | OUTPATIENT
Start: 2023-07-12 | End: 2023-07-12

## 2023-07-12 RX ORDER — ONDANSETRON 8 MG/1
8 TABLET, ORALLY DISINTEGRATING ORAL EVERY 8 HOURS PRN
Status: DISCONTINUED | OUTPATIENT
Start: 2023-07-12 | End: 2023-07-14 | Stop reason: HOSPADM

## 2023-07-12 RX ORDER — ENOXAPARIN SODIUM 100 MG/ML
40 INJECTION SUBCUTANEOUS EVERY 24 HOURS
Status: DISCONTINUED | OUTPATIENT
Start: 2023-07-12 | End: 2023-07-14 | Stop reason: HOSPADM

## 2023-07-12 RX ORDER — SODIUM CHLORIDE 0.9 % (FLUSH) 0.9 %
10 SYRINGE (ML) INJECTION
Status: DISCONTINUED | OUTPATIENT
Start: 2023-07-12 | End: 2023-07-12

## 2023-07-12 RX ORDER — PROCHLORPERAZINE EDISYLATE 5 MG/ML
5 INJECTION INTRAMUSCULAR; INTRAVENOUS EVERY 6 HOURS PRN
Status: DISCONTINUED | OUTPATIENT
Start: 2023-07-12 | End: 2023-07-14 | Stop reason: HOSPADM

## 2023-07-12 RX ORDER — PROPOFOL 10 MG/ML
VIAL (ML) INTRAVENOUS
Status: DISCONTINUED | OUTPATIENT
Start: 2023-07-12 | End: 2023-07-12

## 2023-07-12 RX ORDER — IBUPROFEN 200 MG
16 TABLET ORAL
Status: DISCONTINUED | OUTPATIENT
Start: 2023-07-12 | End: 2023-07-14 | Stop reason: HOSPADM

## 2023-07-12 RX ORDER — IBUPROFEN 200 MG
24 TABLET ORAL
Status: DISCONTINUED | OUTPATIENT
Start: 2023-07-12 | End: 2023-07-14 | Stop reason: HOSPADM

## 2023-07-12 RX ORDER — FENTANYL CITRATE 50 UG/ML
25 INJECTION, SOLUTION INTRAMUSCULAR; INTRAVENOUS EVERY 5 MIN PRN
Status: COMPLETED | OUTPATIENT
Start: 2023-07-12 | End: 2023-07-12

## 2023-07-12 RX ORDER — INSULIN ASPART 100 [IU]/ML
1-10 INJECTION, SOLUTION INTRAVENOUS; SUBCUTANEOUS
Status: DISCONTINUED | OUTPATIENT
Start: 2023-07-12 | End: 2023-07-14 | Stop reason: HOSPADM

## 2023-07-12 RX ORDER — HYDROCHLOROTHIAZIDE 12.5 MG/1
12.5 TABLET ORAL DAILY
Status: DISCONTINUED | OUTPATIENT
Start: 2023-07-12 | End: 2023-07-14 | Stop reason: HOSPADM

## 2023-07-12 RX ORDER — TAMSULOSIN HYDROCHLORIDE 0.4 MG/1
2 CAPSULE ORAL NIGHTLY
Status: DISCONTINUED | OUTPATIENT
Start: 2023-07-12 | End: 2023-07-14 | Stop reason: HOSPADM

## 2023-07-12 RX ORDER — FENTANYL CITRATE 50 UG/ML
INJECTION, SOLUTION INTRAMUSCULAR; INTRAVENOUS
Status: DISCONTINUED | OUTPATIENT
Start: 2023-07-12 | End: 2023-07-12

## 2023-07-12 RX ORDER — LIDOCAINE HYDROCHLORIDE AND EPINEPHRINE 10; 10 MG/ML; UG/ML
INJECTION, SOLUTION INFILTRATION; PERINEURAL
Status: DISCONTINUED | OUTPATIENT
Start: 2023-07-12 | End: 2023-07-12 | Stop reason: HOSPADM

## 2023-07-12 RX ORDER — LIDOCAINE HYDROCHLORIDE 10 MG/ML
1 INJECTION, SOLUTION EPIDURAL; INFILTRATION; INTRACAUDAL; PERINEURAL ONCE
Status: DISCONTINUED | OUTPATIENT
Start: 2023-07-12 | End: 2023-07-12

## 2023-07-12 RX ORDER — HYDROMORPHONE HYDROCHLORIDE 1 MG/ML
1 INJECTION, SOLUTION INTRAMUSCULAR; INTRAVENOUS; SUBCUTANEOUS EVERY 4 HOURS PRN
Status: DISCONTINUED | OUTPATIENT
Start: 2023-07-12 | End: 2023-07-14 | Stop reason: HOSPADM

## 2023-07-12 RX ORDER — GLUCAGON 1 MG
1 KIT INJECTION
Status: DISCONTINUED | OUTPATIENT
Start: 2023-07-12 | End: 2023-07-14 | Stop reason: HOSPADM

## 2023-07-12 RX ORDER — AMOXICILLIN 250 MG
1 CAPSULE ORAL 2 TIMES DAILY
Status: DISCONTINUED | OUTPATIENT
Start: 2023-07-12 | End: 2023-07-14 | Stop reason: HOSPADM

## 2023-07-12 RX ORDER — TALC
6 POWDER (GRAM) TOPICAL NIGHTLY PRN
Status: DISCONTINUED | OUTPATIENT
Start: 2023-07-12 | End: 2023-07-14 | Stop reason: HOSPADM

## 2023-07-12 RX ORDER — ROPINIROLE 0.5 MG/1
1 TABLET, FILM COATED ORAL NIGHTLY PRN
Status: DISCONTINUED | OUTPATIENT
Start: 2023-07-12 | End: 2023-07-14 | Stop reason: HOSPADM

## 2023-07-12 RX ORDER — ROCURONIUM BROMIDE 10 MG/ML
INJECTION, SOLUTION INTRAVENOUS
Status: DISCONTINUED | OUTPATIENT
Start: 2023-07-12 | End: 2023-07-12

## 2023-07-12 RX ORDER — DEXAMETHASONE SODIUM PHOSPHATE 100 MG/10ML
10 INJECTION INTRAMUSCULAR; INTRAVENOUS EVERY 12 HOURS
Status: COMPLETED | OUTPATIENT
Start: 2023-07-13 | End: 2023-07-13

## 2023-07-12 RX ADMIN — FENTANYL CITRATE 25 MCG: 0.05 INJECTION, SOLUTION INTRAMUSCULAR; INTRAVENOUS at 11:07

## 2023-07-12 RX ADMIN — ROCURONIUM BROMIDE 40 MG: 10 INJECTION INTRAVENOUS at 08:07

## 2023-07-12 RX ADMIN — HYDROCODONE BITARTRATE AND ACETAMINOPHEN 1 TABLET: 5; 325 TABLET ORAL at 11:07

## 2023-07-12 RX ADMIN — ROCURONIUM BROMIDE 30 MG: 10 INJECTION INTRAVENOUS at 09:07

## 2023-07-12 RX ADMIN — DEXAMETHASONE SODIUM PHOSPHATE 10 MG: 4 INJECTION, SOLUTION INTRAMUSCULAR; INTRAVENOUS at 08:07

## 2023-07-12 RX ADMIN — AMPICILLIN SODIUM AND SULBACTAM SODIUM 3 G: 2; 1 INJECTION, POWDER, FOR SOLUTION INTRAMUSCULAR; INTRAVENOUS at 08:07

## 2023-07-12 RX ADMIN — Medication 100 MCG: at 10:07

## 2023-07-12 RX ADMIN — SODIUM CHLORIDE: 0.9 INJECTION, SOLUTION INTRAVENOUS at 08:07

## 2023-07-12 RX ADMIN — PROPOFOL 20 MG: 10 INJECTION, EMULSION INTRAVENOUS at 10:07

## 2023-07-12 RX ADMIN — AMLODIPINE BESYLATE 5 MG: 5 TABLET ORAL at 08:07

## 2023-07-12 RX ADMIN — HALOPERIDOL LACTATE 0.5 MG: 5 INJECTION, SOLUTION INTRAMUSCULAR at 12:07

## 2023-07-12 RX ADMIN — ROCURONIUM BROMIDE 30 MG: 10 INJECTION INTRAVENOUS at 08:07

## 2023-07-12 RX ADMIN — PROPOFOL 30 MG: 10 INJECTION, EMULSION INTRAVENOUS at 08:07

## 2023-07-12 RX ADMIN — SENNOSIDES AND DOCUSATE SODIUM 1 TABLET: 50; 8.6 TABLET ORAL at 11:07

## 2023-07-12 RX ADMIN — FENTANYL CITRATE 25 MCG: 0.05 INJECTION, SOLUTION INTRAMUSCULAR; INTRAVENOUS at 12:07

## 2023-07-12 RX ADMIN — LIDOCAINE HYDROCHLORIDE 80 MG: 20 INJECTION, SOLUTION EPIDURAL; INFILTRATION; INTRACAUDAL; PERINEURAL at 08:07

## 2023-07-12 RX ADMIN — ONDANSETRON 4 MG: 2 INJECTION INTRAMUSCULAR; INTRAVENOUS at 10:07

## 2023-07-12 RX ADMIN — HALOPERIDOL LACTATE 0.5 MG: 5 INJECTION, SOLUTION INTRAMUSCULAR at 11:07

## 2023-07-12 RX ADMIN — FENTANYL CITRATE 100 MCG: 50 INJECTION, SOLUTION INTRAMUSCULAR; INTRAVENOUS at 08:07

## 2023-07-12 RX ADMIN — AMPICILLIN AND SULBACTAM 1.5 G: 1; .5 INJECTION, POWDER, FOR SOLUTION INTRAVENOUS at 05:07

## 2023-07-12 RX ADMIN — SODIUM CHLORIDE, SODIUM GLUCONATE, SODIUM ACETATE, POTASSIUM CHLORIDE, MAGNESIUM CHLORIDE, SODIUM PHOSPHATE, DIBASIC, AND POTASSIUM PHOSPHATE: .53; .5; .37; .037; .03; .012; .00082 INJECTION, SOLUTION INTRAVENOUS at 08:07

## 2023-07-12 RX ADMIN — ROCURONIUM BROMIDE 20 MG: 10 INJECTION INTRAVENOUS at 10:07

## 2023-07-12 RX ADMIN — PROPOFOL 200 MG: 10 INJECTION, EMULSION INTRAVENOUS at 08:07

## 2023-07-12 RX ADMIN — AMPICILLIN SODIUM AND SULBACTAM SODIUM 3 G: 2; 1 INJECTION, POWDER, FOR SOLUTION INTRAMUSCULAR; INTRAVENOUS at 09:07

## 2023-07-12 RX ADMIN — HYDROCHLOROTHIAZIDE 12.5 MG: 12.5 TABLET ORAL at 11:07

## 2023-07-12 RX ADMIN — HYDROMORPHONE HYDROCHLORIDE 1 MG: 1 INJECTION, SOLUTION INTRAMUSCULAR; INTRAVENOUS; SUBCUTANEOUS at 08:07

## 2023-07-12 RX ADMIN — SODIUM CHLORIDE, POTASSIUM CHLORIDE, SODIUM LACTATE AND CALCIUM CHLORIDE 1000 ML: 600; 310; 30; 20 INJECTION, SOLUTION INTRAVENOUS at 05:07

## 2023-07-12 RX ADMIN — SUGAMMADEX 400 MG: 100 INJECTION, SOLUTION INTRAVENOUS at 11:07

## 2023-07-12 RX ADMIN — Medication 100 MCG: at 08:07

## 2023-07-12 NOTE — ANESTHESIA PROCEDURE NOTES
Intubation    Date/Time: 7/12/2023 8:07 AM  Performed by: Janneth Rodas MD  Authorized by: Rhonda G Leopold, MD     Intubation:     Induction:  Intravenous    Intubated:  Postinduction    Mask Ventilation:  Easy mask    Attempts:  2    Attempted By:  Resident anesthesiologist    Method of Intubation:  Video laryngoscopy and other (see comments) (nasal jason)    Blade:  Chanel 3    Laryngeal View Grade: Grade I - full view of cords      Attempted By (2nd Attempt):  Staff anesthesiologist    Method of Intubation (2nd Attempt):  Video laryngoscopy    Blade (2nd Attempt):  Chanel 3    Laryngeal View Grade (2nd Attempt): Grade I - full view of cords      Difficult Airway Encountered?: No      Complications:  None    Airway Device:  Nasal endotracheal tube    Airway Device Size:  7.0    Style/Cuff Inflation:  Cuffed    Inflation Amount (mL):  5    Secured at:  [facility]    Placement Verified By:  Capnometry    Complicating Factors:  None    Findings Post-Intubation:  BS equal bilateral and atraumatic/condition of teeth unchanged

## 2023-07-12 NOTE — ANESTHESIA POSTPROCEDURE EVALUATION
Anesthesia Post Evaluation    Patient: Suleiman Chavez    Procedure(s) Performed: Procedure(s) (LRB):  GLOSSECTOMY (Left)  DISSECTION, NECK, RADICAL (Left)    Final Anesthesia Type: general      Patient location during evaluation: PACU  Patient participation: Yes- Able to Participate  Level of consciousness: awake  Post-procedure vital signs: reviewed and stable  Pain management: adequate  Airway patency: patent    PONV status at discharge: No PONV  Anesthetic complications: no      Cardiovascular status: blood pressure returned to baseline and stable  Respiratory status: spontaneous ventilation and unassisted  Hydration status: euvolemic  Follow-up not needed.          Vitals Value Taken Time   /58 07/12/23 1301   Temp 36.7 °C (98 °F) 07/12/23 1245   Pulse 75 07/12/23 1305   Resp 12 07/12/23 1305   SpO2 93 % 07/12/23 1305   Vitals shown include unvalidated device data.      Event Time   Out of Recovery 11:45:00         Pain/Romulo Score: Pain Rating Prior to Med Admin: 8 (7/12/2023 12:27 PM)  Pain Rating Post Med Admin: 1 (7/12/2023  1:00 PM)  Romulo Score: 9 (7/12/2023 11:45 AM)

## 2023-07-12 NOTE — TRANSFER OF CARE
Anesthesia Transfer of Care Note    Patient: Suleiman Chavez    Procedure(s) Performed: Procedure(s) (LRB):  GLOSSECTOMY (Left)  DISSECTION, NECK, RADICAL (Left)    Patient location: PACU    Anesthesia Type: general    Transport from OR: Transported from OR on 6-10 L/min O2 by face mask with adequate spontaneous ventilation    Post pain: adequate analgesia    Post assessment: no apparent anesthetic complications and tolerated procedure well    Post vital signs: stable    Level of consciousness: awake and alert    Nausea/Vomiting: no nausea/vomiting    Complications: none    Transfer of care protocol was followed      Last vitals:   Visit Vitals  BP (!) 141/65 (BP Location: Right arm, Patient Position: Lying)   Pulse 67   Temp 36.7 °C (98.1 °F) (Oral)   Resp 20   Ht 6' (1.829 m)   Wt 85.7 kg (189 lb)   SpO2 100%   BMI 25.63 kg/m²

## 2023-07-12 NOTE — OP NOTE
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 Left partial glossectomy  2. Left modified neck dissection of levels 1B through 4    ANESTHESIA: General      INDICATIONS FOR PROCEDURE:   Suleiman Chavez is a 83 y.o. man who recently presented to me for evaluation of a left-sided tongue lesion.  Biopsy revealed squamous cell carcinoma.  Staging studies revealed no evidence of distant disease.  He was noted to have evidence of malignant adenopathy in the left neck.    He was apprised of the risks, benefits and alternatives to surgery.  In spite of the risk inherent to surgery,he provided informed consent for the aforementioned procedures.     PROCEDURE IN DETAIL:  The patient was taken to the operating room and placed on the operating table in the supine position.  General endotracheal anesthesia was induced by the anesthesia team.     The left neck was addressed.  An apron incision was marked to allow for access to levels 1B through 4.  The intended incision was injected with several cc of 1% lidocaine with epinephrine.  The neck and face were then prepped and draped in standard sterile fashion.      To begin, the skin was incised utilizing the Bovie on cutting current.  Superiorly and inferiorly-based subplatysmal flaps were elevated from the level of the mandible and mastoid superiorly to the level of the clavicle inferiorly.  Next, the anterior border of the sternocleidomastoid muscle was skeletonized.  The spinal accessory nerve was identified in the standard position and traced cephalad the skull base.  It was freed from the adjacent fibrofatty tissue.  The posterior belly of the digastric muscle was identified and skeletonized.  The internal jugular vein was  identified the skull base.  The lateral border of the internal jugular vein was skeletonized.  The fibrofatty tissue of level 2b was transected down to the underlying deep cervical fascia and swept deep to the spinal accessory nerve.  There was a malignant node within level 2A which was freed from the nerve and the internal jugular vein.  The fibrofatty tissue of levels 2A and 3 were transected down to the underlying deep cervical fascia and cervical rootlets.  The omohyoid was circumferentially dissected and divided with the Bovie.  The transverse cervical vessels were identified and preserved as was the phrenic nerve.  Several pedicles were taken inferiorly.  These were clipped and divided to prevent a Chyle leak.  The specimen was then elevated off the floor of the neck preserving the phrenic nerve.  It was  from the carotid sheath utilizing a 15 blade.  The hypoglossal nerve was also identified and preserved.  Ultimately, the specimen was amputated and sent to pathology for permanent analysis.      Attention was then turned to left level 1B.  The marginal mandibular nerve was identified and preserved as it traversed the mandible.  The digastric muscle was skeletonized.  The facial vein was isolated, doubly clamped, divided and suture ligated.  The anterior belly of the digastric was skeletonized as was the mylohyoid.  The mylohyoid vessels were controlled with electrocautery.  The specimen was then freed from the mandible superiorly.  The perifacial nodes were incorporated with the specimen.  The facial artery was then controlled proximally distally.  It was clamped, divided and suture ligated with 2-0 silk tie.  The specimen was then freed from the mylohyoid which was retracted anteriorly.  The submandibular ganglion and Ferry's duct were clipped and divided.  The hypoglossal nerve was identified and preserved.  The specimen was sent to pathology for permanent analysis.  Hemostasis was achieved in  the neck with electrocautery.    Attention was then turned to the partial glossectomy.  The tumor in question was identified the lateral border of the tongue on the left.  A 2 cm margin was marked circumferentially incised with the Bovie.  Dissection proceeded into the underlying tongue musculature.  The lingual nerve was identified and preserved.  A distal branch of the lingual artery was isolated, clamped, divided and suture ligated.  Ultimately, the specimen was amputated and sent to pathology for frozen section analysis.  It was oriented as below.  All circumferential margins and the deep margin were noted to be negative for carcinoma.  Hemostasis was achieved in the oral cavity with electrocautery.  The glossectomy site was then closed primarily utilizing interrupted 3-0 Vicryl suture.    Attention was then returned to the neck.  There was no evidence of bleeding or Chyle leak with application of 30 cm of water via Valsalva maneuver.  The wound was then closed with absorbable suture and Dermabond over a 15 [place] Lionel drain.  The drain was secured with silk suture.  Once the wound was closed, he was handed back to anesthesia.  He was awakened, extubated transferred to recovery in satisfactory condition.    There were no intraoperative complications.  I was present for and participated in the entire procedure as dictated above.       ESTIMATED BLOOD LOSS: 25 mL    SPECIMENS:   Specimen (24h ago, onward)       Start     Ordered    07/12/23 1022  Specimen to Pathology, Surgery ENT  Once        Comments: Pre-op Diagnosis: Tongue ulcer [K14.0]Squamous cell cancer of tongue [C02.9]Procedure(s):GLOSSECTOMYDISSECTION, NECK, RADICAL Number of specimens: 3Name of specimens: 1. Left neck dissection, levels 2,3,4 - permanent2. Left neck dissection, level 1B - permanent3. Left partial glossectomy,  single stitch anterior, double stitch dorsal - frozen (to Pathology)     References:    Click here for ordering Quick Tip    Question Answer Comment   Procedure Type: ENT    Specimen Class: Known or suspected malignancy    Which provider would you like to cc? SHABANA SCOTT.    Release to patient Immediate        07/12/23 7340

## 2023-07-12 NOTE — BRIEF OP NOTE
[facility] - Surgery ([facility])  Brief Operative Note    SUMMARY     Surgery Date: 7/12/2023     Surgeon(s) and Role:     * Shabana Scott MD - Primary     * Rich Watts MD - Resident - Assisting        Pre-op Diagnosis:  Tongue ulcer [K14.0]  Squamous cell cancer of tongue [C02.9]    Post-op Diagnosis:  Post-Op Diagnosis Codes:     * Tongue ulcer [K14.0]     * Squamous cell cancer of tongue [C02.9]    Procedure(s) (LRB):  GLOSSECTOMY (Left)  DISSECTION, NECK, RADICAL (Left)    Anesthesia: General    Operative Findings: see full op note. L glossectomy, ND I-IV    Estimated Blood Loss: * No values recorded between 7/12/2023  8:32 AM and 7/12/2023 11:17 AM *    Estimated Blood Loss has not been documented. EBL = 100cc.         Specimens:   Specimen (24h ago, onward)       Start     Ordered    07/12/23 1022  Specimen to Pathology, Surgery ENT  Once        Comments: Pre-op Diagnosis: Tongue ulcer [K14.0]Squamous cell cancer of tongue [C02.9]Procedure(s):GLOSSECTOMYDISSECTION, NECK, RADICAL Number of specimens: 3Name of specimens: 1. Left neck dissection, levels 2,3,4 - permanent2. Left neck dissection, level 1B - permanent3. Left partial glossectomy,  single stitch anterior, double stitch dorsal - frozen (to Pathology)     References:    Click here for ordering Quick Tip   Question Answer Comment   Procedure Type: ENT    Specimen Class: Known or suspected malignancy    Which provider would you like to cc? SHABANA SCOTT    Release to patient Immediate        07/12/23 1029                    [id number]

## 2023-07-12 NOTE — NURSING TRANSFER
Nursing Transfer Note      7/12/2023     Reason patient is being transferred: MD order    Transfer To: [id number]    Transfer via bed        Transported by PCT        Chart send with patient: Yes    Notified: spouse, daughter    Patient reassessed at: 1300 (

## 2023-07-13 LAB
ANION GAP SERPL CALC-SCNC: 9 MMOL/L (ref 8–16)
BASOPHILS # BLD AUTO: 0.01 K/UL (ref 0–0.2)
BASOPHILS NFR BLD: 0.1 % (ref 0–1.9)
BUN SERPL-MCNC: 18 MG/DL (ref 8–23)
CALCIUM SERPL-MCNC: 8.8 MG/DL (ref 8.7–10.5)
CHLORIDE SERPL-SCNC: 102 MMOL/L (ref 95–110)
CO2 SERPL-SCNC: 25 MMOL/L (ref 23–29)
CREAT SERPL-MCNC: 0.8 MG/DL (ref 0.5–1.4)
DIFFERENTIAL METHOD: ABNORMAL
EOSINOPHIL # BLD AUTO: 0 K/UL (ref 0–0.5)
EOSINOPHIL NFR BLD: 0 % (ref 0–8)
ERYTHROCYTE [DISTWIDTH] IN BLOOD BY AUTOMATED COUNT: 14.6 % (ref 11.5–14.5)
EST. GFR  (NO RACE VARIABLE): >60 ML/MIN/1.73 M^2
GLUCOSE SERPL-MCNC: 181 MG/DL (ref 70–110)
HCT VFR BLD AUTO: 36.8 % (ref 40–54)
HGB BLD-MCNC: 12.2 G/DL (ref 14–18)
IMM GRANULOCYTES # BLD AUTO: 0.07 K/UL (ref 0–0.04)
IMM GRANULOCYTES NFR BLD AUTO: 0.6 % (ref 0–0.5)
LYMPHOCYTES # BLD AUTO: 0.7 K/UL (ref 1–4.8)
LYMPHOCYTES NFR BLD: 5.8 % (ref 18–48)
MCH RBC QN AUTO: 29 PG (ref 27–31)
MCHC RBC AUTO-ENTMCNC: 33.2 G/DL (ref 32–36)
MCV RBC AUTO: 87 FL (ref 82–98)
MONOCYTES # BLD AUTO: 0.3 K/UL (ref 0.3–1)
MONOCYTES NFR BLD: 2.5 % (ref 4–15)
NEUTROPHILS # BLD AUTO: 10.8 K/UL (ref 1.8–7.7)
NEUTROPHILS NFR BLD: 91 % (ref 38–73)
NRBC BLD-RTO: 0 /100 WBC
PLATELET # BLD AUTO: 248 K/UL (ref 150–450)
PMV BLD AUTO: 9 FL (ref 9.2–12.9)
POCT GLUCOSE: 114 MG/DL (ref 70–110)
POCT GLUCOSE: 131 MG/DL (ref 70–110)
POCT GLUCOSE: 161 MG/DL (ref 70–110)
POCT GLUCOSE: 178 MG/DL (ref 70–110)
POCT GLUCOSE: 219 MG/DL (ref 70–110)
POTASSIUM SERPL-SCNC: 3.8 MMOL/L (ref 3.5–5.1)
RBC # BLD AUTO: 4.21 M/UL (ref 4.6–6.2)
SODIUM SERPL-SCNC: 136 MMOL/L (ref 136–145)
WBC # BLD AUTO: 11.83 K/UL (ref 3.9–12.7)

## 2023-07-13 PROCEDURE — 20600001 HC STEP DOWN PRIVATE ROOM

## 2023-07-13 PROCEDURE — 97161 PT EVAL LOW COMPLEX 20 MIN: CPT

## 2023-07-13 PROCEDURE — 97535 SELF CARE MNGMENT TRAINING: CPT

## 2023-07-13 PROCEDURE — 94799 UNLISTED PULMONARY SVC/PX: CPT

## 2023-07-13 PROCEDURE — 97165 OT EVAL LOW COMPLEX 30 MIN: CPT

## 2023-07-13 PROCEDURE — 94761 N-INVAS EAR/PLS OXIMETRY MLT: CPT

## 2023-07-13 PROCEDURE — 51701 INSERT BLADDER CATHETER: CPT

## 2023-07-13 PROCEDURE — 80048 BASIC METABOLIC PNL TOTAL CA: CPT | Performed by: STUDENT IN AN ORGANIZED HEALTH CARE EDUCATION/TRAINING PROGRAM

## 2023-07-13 PROCEDURE — 92610 EVALUATE SWALLOWING FUNCTION: CPT

## 2023-07-13 PROCEDURE — 63600175 PHARM REV CODE 636 W HCPCS: Performed by: STUDENT IN AN ORGANIZED HEALTH CARE EDUCATION/TRAINING PROGRAM

## 2023-07-13 PROCEDURE — 97116 GAIT TRAINING THERAPY: CPT

## 2023-07-13 PROCEDURE — 25000003 PHARM REV CODE 250: Performed by: STUDENT IN AN ORGANIZED HEALTH CARE EDUCATION/TRAINING PROGRAM

## 2023-07-13 PROCEDURE — 85025 COMPLETE CBC W/AUTO DIFF WBC: CPT | Performed by: STUDENT IN AN ORGANIZED HEALTH CARE EDUCATION/TRAINING PROGRAM

## 2023-07-13 PROCEDURE — 99900035 HC TECH TIME PER 15 MIN (STAT)

## 2023-07-13 RX ADMIN — HYDROCHLOROTHIAZIDE 12.5 MG: 12.5 TABLET ORAL at 08:07

## 2023-07-13 RX ADMIN — AMPICILLIN AND SULBACTAM 1.5 G: 1; .5 INJECTION, POWDER, FOR SOLUTION INTRAVENOUS at 10:07

## 2023-07-13 RX ADMIN — ENOXAPARIN SODIUM 40 MG: 40 INJECTION SUBCUTANEOUS at 03:07

## 2023-07-13 RX ADMIN — SENNOSIDES AND DOCUSATE SODIUM 1 TABLET: 50; 8.6 TABLET ORAL at 09:07

## 2023-07-13 RX ADMIN — AMPICILLIN AND SULBACTAM 1.5 G: 1; .5 INJECTION, POWDER, FOR SOLUTION INTRAVENOUS at 04:07

## 2023-07-13 RX ADMIN — INSULIN ASPART 2 UNITS: 100 INJECTION, SOLUTION INTRAVENOUS; SUBCUTANEOUS at 12:07

## 2023-07-13 RX ADMIN — DEXAMETHASONE SODIUM PHOSPHATE 10 MG: 10 INJECTION INTRAMUSCULAR; INTRAVENOUS at 08:07

## 2023-07-13 RX ADMIN — HYDROMORPHONE HYDROCHLORIDE 1 MG: 1 INJECTION, SOLUTION INTRAMUSCULAR; INTRAVENOUS; SUBCUTANEOUS at 12:07

## 2023-07-13 RX ADMIN — HYDROMORPHONE HYDROCHLORIDE 1 MG: 1 INJECTION, SOLUTION INTRAMUSCULAR; INTRAVENOUS; SUBCUTANEOUS at 07:07

## 2023-07-13 RX ADMIN — DEXAMETHASONE SODIUM PHOSPHATE 10 MG: 10 INJECTION INTRAMUSCULAR; INTRAVENOUS at 09:07

## 2023-07-13 RX ADMIN — SENNOSIDES AND DOCUSATE SODIUM 1 TABLET: 50; 8.6 TABLET ORAL at 08:07

## 2023-07-13 RX ADMIN — AMLODIPINE BESYLATE 5 MG: 5 TABLET ORAL at 09:07

## 2023-07-13 RX ADMIN — HYDROMORPHONE HYDROCHLORIDE 1 MG: 1 INJECTION, SOLUTION INTRAMUSCULAR; INTRAVENOUS; SUBCUTANEOUS at 04:07

## 2023-07-13 RX ADMIN — AMLODIPINE BESYLATE 5 MG: 5 TABLET ORAL at 08:07

## 2023-07-13 RX ADMIN — LOSARTAN POTASSIUM 100 MG: 50 TABLET, FILM COATED ORAL at 08:07

## 2023-07-13 RX ADMIN — AMPICILLIN AND SULBACTAM 1.5 G: 1; .5 INJECTION, POWDER, FOR SOLUTION INTRAVENOUS at 02:07

## 2023-07-13 RX ADMIN — INSULIN ASPART 4 UNITS: 100 INJECTION, SOLUTION INTRAVENOUS; SUBCUTANEOUS at 04:07

## 2023-07-13 NOTE — PT/OT/SLP EVAL
Physical Therapy Co-Evaluation    Patient Name:  Suleiman Chavez   MRN:  [id number]    Co-evaluation and co-treatment performed for this visit due to suspected patient need for two skilled therapists to ensure patient and staff safety and to accommodate for patient activity tolerance/pain management     Recommendations:     Discharge Recommendations: other (see comments)   Discharge Equipment Recommendations: other (see comments)   Barriers to discharge: None    Assessment:     Suleiman Chavez is a 83 y.o. male admitted with a medical diagnosis of Squamous cell cancer of tongue.  He presents with the following impairments/functional limitations: weakness, impaired endurance, impaired functional mobility, gait instability, impaired balance Pt. cooperative and tolerated treatment well. Pt. progressing with mobility. Pt. may benefit from continued PT as an outpatient.    Rehab Prognosis: Good; patient would benefit from acute skilled PT services to address these deficits and reach maximum level of function.    Recent Surgery: Procedure(s) (LRB):  GLOSSECTOMY (Left)  DISSECTION, NECK, RADICAL (Left) 1 Day Post-Op    Plan:     During this hospitalization, patient to be seen 4 x/week to address the identified rehab impairments via gait training, therapeutic activities, therapeutic exercises and progress toward the following goals:    Plan of Care Expires:  08/12/23    Subjective     Chief Complaint: neck discomfort  Patient/Family Comments/goals: pt. Agreeable to PT  Pain/Comfort:  Pain Rating 1: 8/10  Location 1: neck  Pain Addressed 1: Reposition, Distraction  Pain Rating Post-Intervention 1:  (pt. did not rate)    Patients cultural, spiritual, Mandaen conflicts given the current situation: no    Living Environment:  Pt. Lives with spouse in [facility] with no LYDIA  Prior to admission, patients level of function was mod. Indep. with AD.  Equipment used at home: bedside commode, shower chair, rollator, walker, rolling,  cane, straight, grab bar, wheelchair.  Upon discharge, patient will have assistance from spouse.    Objective:     Communicated with nursing prior to session.  Patient found supine with OMA drain, peripheral IV  upon PT entry to room.    General Precautions: Standard, aspiration, fall  Orthopedic Precautions:N/A   Braces: N/A  Respiratory Status: Room air    Exams:  RLE ROM: WFL  RLE Strength: WFL  LLE ROM: WFL  LLE Strength: WFL    Functional Mobility:  Bed Mobility:     Rolling Left:  stand by assistance  Scooting: stand by assistance  Supine to Sit: stand by assistance  Transfers:     Sit to Stand:  contact guard assistance with rolling walker  Gait: 120' and 10' with RW and SBA-CGA with decreased step length/jesus  Balance: fair      AM-PAC 6 CLICK MOBILITY  Total Score:18       Treatment & Education:  Discussed therapy needs, goals, and POC. Assisted pt. to/from bathroom and on/off toilet    Patient left up in chair with all lines intact and call button in reach.    GOALS:   Multidisciplinary Problems       Physical Therapy Goals          Problem: Physical Therapy    Goal Priority Disciplines Outcome Goal Variances Interventions   Physical Therapy Goal     PT, PT/OT Ongoing, Progressing     Description: Goals to be met by: 2023     Patient will increase functional independence with mobility by performin. Supine to sit with Set-up [place]  2. Sit to supine with Set-up [place]  3. Sit to stand transfer with Supervision  4. Bed to chair transfer with Supervision using LRAD  5. Gait  x 300 feet with Supervision using LRAD.   6. Lower extremity exercise program x15 reps per handout, with supervision                         History:     Past Medical History:   Diagnosis Date    Bilateral ocular hypertension     Diabetes mellitus     Glaucoma     Hypertension     Nuclear cataract 2014       Past Surgical History:   Procedure Laterality Date    CHOLECYSTECTOMY      GLOSSECTOMY Left  7/12/2023    Procedure: GLOSSECTOMY;  Surgeon: Jaxon Carmen MD;  Location: [facility] OR [address] [place] [address];  Service: ENT;  Laterality: Left;    RADICAL NECK DISSECTION Left 7/12/2023    Procedure: DISSECTION, NECK, RADICAL;  Surgeon: Jaxon Carmen MD;  Location: [facility] OR [address] [place] [address];  Service: ENT;  Laterality: Left;       Time Tracking:     PT Received On: 07/13/23  PT Start Time: 1002     PT Stop Time: 1020  PT Total Time (min): 18 min     Billable Minutes: Evaluation 10 and Gait Training 8      07/13/2023

## 2023-07-13 NOTE — PT/OT/SLP EVAL
Speech Language Pathology Evaluation  Bedside Swallow    Patient Name:  Suleiman Chavez   MRN:  [id number]  Admitting Diagnosis: Squamous cell cancer of tongue    Recommendations:                 General Recommendations:   ongoing swallowing assessment; monitoring diet tolerance  Diet recommendations:   , Full liquids, Nectar Thick   Aspiration Precautions: 1 bite/sip at a time, Alternating bites/sips, Assistance with thickening liquids, Avoid talking while eating, HOB to 90 degrees, Meds whole buried in puree, Monitor for s/s of aspiration, Remain upright 30 minutes post meal, Small bites/sips, and Strict aspiration precautions   General Precautions: Standard, aspiration, fall, nectar thick  Communication strategies:  none    Assessment:     Suleiman Chavez is a 83 y.o. male with an SLP diagnosis of Dysphagia s/p glossectomy and neck dissection.     History:     Past Medical History:   Diagnosis Date    Bilateral ocular hypertension     Diabetes mellitus     Glaucoma     Hypertension     Nuclear cataract 12/17/2014       Past Surgical History:   Procedure Laterality Date    CHOLECYSTECTOMY      GLOSSECTOMY Left 7/12/2023    Procedure: GLOSSECTOMY;  Surgeon: Jaxon Carmen MD;  Location: [facility] OR [address] [place] [address];  Service: ENT;  Laterality: Left;    RADICAL NECK DISSECTION Left 7/12/2023    Procedure: DISSECTION, NECK, RADICAL;  Surgeon: Jaxon Carmen MD;  Location: [facility] OR [address] [place] [address];  Service: ENT;  Laterality: Left;     HPI   83 y.o. male presents with  a 1-3 month history of a left-sided tongue lesion.  He reports this is somewhat painful.  He is tried over-the-counter medications such as Mylanta and Orajel with little relief.  He is a former smoker.  No other complaints.     Recent biopsy revealed squamous cell carcinoma.  PET-CT scan revealed left cervical adenopathy.  There was no evidence of distant disease.  He is scheduled for surgery next week.      Prior Intubation HX:  7/12 for surgery    Modified  "Barium Swallow: none on file    Prior diet: currently on a clear liquid diet    Subjective     "That's part of the deal." Pt stated in regards to pain upon swallow.    Pain/Comfort:  Pain Rating 1:  (did not rate; reports pain upon swallow)    Respiratory Status: Room air    Objective:     Oral Musculature Evaluation  Structural Abnormalities: anatomical changes evident s/p glossectomy  Dentition: present and adequate  Secretion Management: adequate  Mucosal Quality: dry  Mandibular Strength and Mobility: WFL  Oral Labial Strength and Mobility: WFL  Lingual Strength and Mobility: impaired left lateral movement  Velar Elevation: WFL  Buccal Strength and Mobility: WFL  Volitional Cough: adequate  Volitional Swallow: elicited  Voice Prior to PO Intake: dry, clear    Bedside Swallow Eval:   Consistencies Assessed:  Thin liquids 1/2 tsp x 1, full tsp x 5, cup sip x 1, straw sips x 2  Nectar thick liquids tsp x  1, cup sip x 2, straw sip x 2  Puree 1/2 tsp x 1, full tsp x 1      Oral Phase:   possible decreased bolus control of thin liquids    Pharyngeal Phase:   Coughing present after 1 out of 5 tsp sips of thin water, overt coughing/choking for cup sip of thin water, and delayed cough after 1 out of 2 straw sips of thin water.  No overt s/s of aspiration observed with nectar thick liquid or pureed boluses    Compensatory Strategies  None    Treatment:  Education was provided to pt regarding role of SLP, purpose of swallowing assessment, dysphagia and increased risks of aspiration s/p glossectomy, aspiration, overt s/s of aspiration, complications a/w aspiration, improved tolerance of nectar thick liquids and purees, recommendations for full liquid diet with nectar thick liquids at this time, thickening instructions, and plan for ongoing swallowing assessment. Pt expressed understanding and agreement with recs/plan. Thickener packets left at bedside. Recs d/w nurse and medical team.    Goals:   Multidisciplinary Problems "       SLP Goals          Problem: SLP    Goal Priority Disciplines Outcome   SLP Goal     SLP    Description: Speech Language Pathology Goals  Goals expected to be met by 7/20:  1. Pt will tolerate full liquid diet and nectar thick liquids without s/s of aspiration.   2. Pt will participate in ongoing swallowing assessment to determine when safe for diet advancement.                                Plan:     Patient to be seen:  4 x/week   Plan of Care expires:  08/12/23  Plan of Care reviewed with:  patient   SLP Follow-Up:  Yes       Discharge recommendations:   (tbd)     Time Tracking:     SLP Treatment Date:   07/13/23  Speech Start Time:  0938  Speech Stop Time:  1000     Speech Total Time (min):  22 min    Billable Minutes:  Eval Swallow and Oral Function 12 and Self Care/Home Management Training 10    07/13/2023

## 2023-07-13 NOTE — PLAN OF CARE
[facility]  Initial Discharge Assessment       Primary Care Provider: Caleb Negrete MD    Admission Diagnosis: Tongue ulcer [K14.0]  Squamous cell cancer of tongue [C02.9]    Admission Date: 7/12/2023  Expected Discharge Date: 7/15/2023         Payor: HU[employer] MANAGED MEDICARE / Plan: [employer] MEDICARE HMO / Product Type: Capitation /     Extended Emergency Contact Information  Primary Emergency Contact: Liat Chavez  Address: [address] [place] [address] [address] [place], [place] [id number] [place] [address]  Home Phone: [phone]  Relation: Spouse    Discharge Plan A: Home with family  Discharge Plan B: Home Health      [facility] - [facility] [address] [facility]  [address] [facility] [address]  Phone: [phone] Fax: [phone]    [id number] [address] [facility]  [address] [place] [address]  Phone: [phone] Fax: [phone]    [facility]  [facility] [address] [address]  Phone: [phone] Fax: [phone]      Initial Assessment (most recent)       Adult Discharge Assessment - 07/13/23 1609          Discharge Assessment    Assessment Type Discharge Planning Assessment     Confirmed/corrected address, phone number and insurance Yes     Confirmed Demographics Correct on Facesheet     Source of Information patient     When was your last doctors appointment? 06/19/23     People in Home spouse     Do you expect to return to your current living situation? Yes     Do you have help at home or someone to help you manage your care at home? Yes     Who are your caregiver(s) and their phone number(s)? Liat Chavez, Wife, [phone]     Prior to hospitilization cognitive status: Alert/Oriented     Current cognitive status: Alert/Oriented     Do you take prescription medications? Yes     Do you have prescription coverage? Yes     Do you have any problems affording any of your  prescribed medications? No     Is the patient taking medications as prescribed? yes     Who is going to help you get home at discharge? Liat Chavez, Wife, [phone]     How do you get to doctors appointments? car, drives self     Are you on dialysis? No     Do you take coumadin? No     Discharge Plan A Home with family     Discharge Plan B Home Health     Discharge Plan discussed with: Patient;Spouse/sig other                   The CM met with the patient and daughter at bedside to complete the DPA.  The CM placed name and contact information on the blackboard in the patient's room.  Use preferred pharmacy / bedside delivery for any necessary  medications at the time of discharge.The patient is independent with all ADLs.  The patient uses the following home equipment. Walker and cane. The patient is not on Dialysis or Coumadin. The patient's wife will provide assistance to the patient upon discharge. The patient's wife will provide transportation upon discharge . The CM will continue to follow for course of hospitalization.                 absent

## 2023-07-13 NOTE — ASSESSMENT & PLAN NOTE
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 Carmen.    Doing well post op. Lowry placed. Not tolerating CLD very well. No respiratory issues    - Continue admission to [facility]  - Cont CLD, will ask SLP to assess and assist with advancing recommendations  - PT/OT given deconditioning  - Will remove lowry and do straight cath TID at patients request  - Pain meds PRN  - Lovenox  - Resume home meds  - Please secure chat or page with any questions or concerns

## 2023-07-13 NOTE — PLAN OF CARE
Problem: Physical Therapy  Goal: Physical Therapy Goal  Description: Goals to be met by: 2023     Patient will increase functional independence with mobility by performin. Supine to sit with Set-up [place]  2. Sit to supine with Set-up [place]  3. Sit to stand transfer with Supervision  4. Bed to chair transfer with Supervision using LRAD  5. Gait  x 300 feet with Supervision using LRAD.   6. Lower extremity exercise program x15 reps per handout, with supervision    Outcome: Ongoing, Progressing

## 2023-07-13 NOTE — PROGRESS NOTES
[facility]  Otorhinolaryngology-Head & Neck Surgery  Progress Note    Subjective:     Post-Op Info:  Procedure(s) (LRB):  GLOSSECTOMY (Left)  DISSECTION, NECK, RADICAL (Left)   1 Day Post-Op  Hospital Day: 2     Interval History: NAEON. AFVSS. Having a difficult time w PO as expected. No breathing issues.     Medications:  Continuous Infusions:  Scheduled Meds:   amLODIPine  5 mg Oral BID    ampicillin-sulbactim (UNASYN) IVPB  1.5 g Intravenous Q8H    dexAMETHasone  10 mg Intravenous Q12H    enoxparin  40 mg Subcutaneous Daily    hydroCHLOROthiazide  12.5 mg Oral Daily    losartan  100 mg Oral Daily    senna-docusate 8.6-50 mg  1 tablet Oral BID    tamsulosin  2 capsule Oral QHS     PRN Meds:acetaminophen, dextrose 10%, dextrose 10%, glucagon (human recombinant), glucose, glucose, HYDROcodone-acetaminophen, HYDROmorphone, insulin aspart U-100, melatonin, ondansetron, prochlorperazine, rOPINIRole     Review of patient's allergies indicates:  No Known Allergies  Objective:     Vital Signs (24h Range):  Temp:  [97.5 °F (36.4 °C)-99.7 °F (37.6 °C)] 98.3 °F (36.8 °C)  Pulse:  [67-94] 78  Resp:  [12-35] 16  SpO2:  [93 %-100 %] 93 %  BP: (128-151)/(55-69) 133/63       Lines/Drains/Airways       Drain  Duration                  Closed/Suction Drain 07/12/23 1012 Left Neck Bulb 15 Fr. <1 day         Urethral Catheter 07/12/23 1735 Non-latex 16 Fr. <1 day              Peripheral Intravenous Line  Duration                  Peripheral IV - Single Lumen 07/12/23 0640 20 G Posterior;Proximal;Right Forearm 1 day         Peripheral IV - Single Lumen 07/12/23 0813 18 G Left;Posterior Hand <1 day                     Physical Exam  NAD  Breathing well on RA  OC patent  L partial glossectomy hemostatic, sutures in place, CDI  Tongue is soft and mobile, mostly in lateral directions, minimal protrusion  L neck soft, OMA drain w serosanguinous outout, incison CDI     Significant Labs:  BMP:   Recent Labs   Lab 07/06/23  1453    *      CO2 26   BUN 19   CREATININE 0.9   CALCIUM 9.5     CBC:   Recent Labs   Lab 07/06/23  1453   HGB 13.3*   HCT 41.4       Significant Diagnostics:  I have reviewed and interpreted all pertinent imaging results/findings within the past 24 hours.    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 Carmen.    Doing well post op. Lowry placed. Not tolerating CLD very well. No respiratory issues    - Continue admission to [facility]  - Cont CLD, will ask SLP to assess and assist with advancing recommendations  - PT/OT given deconditioning  - Will remove lowry and do straight cath TID at patients request  - Pain meds PRN  - Lovenox  - Resume home meds  - Please secure chat or page with any questions or concerns        Rich Watts MD  Otorhinolaryngology-Head & Neck Surgery  [facility]

## 2023-07-13 NOTE — PT/OT/SLP EVAL
Occupational Therapy   Evaluation and Treatment    Name: Suleiman Chavez  MRN: [id number]  Admitting Diagnosis: Squamous cell cancer of tongue  Recent Surgery: Procedure(s) (LRB):  GLOSSECTOMY (Left)  DISSECTION, NECK, RADICAL (Left) 1 Day Post-Op    Recommendations:     Discharge Recommendations: other (see comments)  Discharge Equipment Recommendations:  other (see comments) (TBD)  Barriers to discharge:  None    Assessment:     Suleiman Chavez is a 83 y.o. male with a medical diagnosis of Squamous cell cancer of tongue. Performance deficits affecting function: weakness, impaired endurance, impaired self care skills, impaired functional mobility, gait instability, impaired balance.  Pt engaged well in today's session and participated in seated/standing ADLs and functional ambulation in room/hallway with RW. Pt mildly unsteady with ambulation and standing ADLs, requiring CGA<>SBA. On this date, pt limited by pain, unsteadiness, and general weakness. Patient would benefit from continued skilled acute OT 3 x/wk to improve functional mobility, increase independence with ADLs, and address established goals. Recommending post acute therapy services once medically appropriate for discharge to increase maximal independence, reduce burden of care, and ensure safety.    Rehab Prognosis: Good; patient would benefit from acute skilled OT services to address these deficits and reach maximum level of function.       Plan:     Patient to be seen 3 x/week to address the above listed problems via self-care/home management, therapeutic activities, therapeutic exercises  Plan of Care Expires: 08/12/23  Plan of Care Reviewed with: patient    Subjective     Chief Complaint: No complaints  Patient/Family Comments/goals: Patient agreed to therapy    Occupational Profile:  Living Environment: Pt lives with his wife in a [facility] with threshold entrance. He uses a walk in shower and bedside commode at home, both with grab bars.  Previous  level of function: Independent with ADLs, ambulating with RW  Equipment Used at Home: bedside commode, shower chair, walker, rolling, rollator, cane, straight, wheelchair, grab bar    Pain/Comfort:  Pain Rating 1: 8/10  Location - Side 1: Left  Location - Orientation 1: generalized  Location 1: neck  Pain Addressed 1: Reposition, Distraction  Pain Rating Post-Intervention 1:  (Pt didn't rate)    Patients cultural, spiritual, Confucianist conflicts given the current situation: no    Objective:     Communicated with: NSG prior to session.  Patient found supine with OMA drain upon OT entry to room.    General Precautions: Standard, fall, aspiration, nectar thick  Orthopedic Precautions: N/A  Braces: N/A  Respiratory Status: Room air    Occupational Performance:    Bed Mobility:    Patient completed Rolling/Turning to Left with stand by assistance  Patient completed Scooting with SBA to place feet flat on floor  Patient completed Supine to Sit with contact guard assistance    Functional Mobility/Transfers:  Patient completed Sit <> Stand Transfer with contact guard assistance  with  rolling walker from EOB and toilet (use of grab bars)  Patient completed Bed > hallway; hallway > bathroom toilet transfer using functional ambulation technique with contact guard assistance<>SBA with rolling walker. Following bathroom tasks pt ambulated to bedside chair with CGA and RW. Ambulation was assessed for functional mobility of household distances.    Activities of Daily Living:  Grooming: contact guard assistance standing at sink to wash hands and wash face with RW  Upper Body Dressing: minimum assistance to don back gown seated EOB    Cognitive/Visual Perceptual:  Cognitive/Psychosocial Skills:     -       Oriented to: Person, Place, Time, and Situation   -       Follows Commands/attention:Follows multistep  commands  -       Communication: clear/fluent  -       Memory: No Deficits noted  -       Safety awareness/insight to  disability: intact   -       Mood/Affect/Coping skills/emotional control: Appropriate to situation  Visual/Perceptual:      -Intact      Physical Exam:  Upper Extremity Range of Motion:     -       Right Upper Extremity: WFL  -       Left Upper Extremity: WFL  Upper Extremity Strength:    -       Right Upper Extremity: WFL  -       Left Upper Extremity: WFL   Strength:    -       Right Upper Extremity: WFL  -       Left Upper Extremity: WFL    AMPAC 6 Click ADL:  AMPAC Total Score: 19    Treatment & Education:  Role of OT and POC  ADL retraining  Functional mobility training  Safety  Discharge planning  Importance EOB/OOB activity    Co-treatment performed due to patient's multiple deficits requiring two skilled therapists to appropriately and safely assess patient's strength and endurance while facilitating functional tasks in addition to accommodating for patient's activity tolerance.     Patient left up in chair with all lines intact, call button in reach, PCT present, and all needs met.    GOALS:   Multidisciplinary Problems       Occupational Therapy Goals          Problem: Occupational Therapy    Goal Priority Disciplines Outcome Interventions   Occupational Therapy Goal     OT, PT/OT Ongoing, Progressing    Description: Goals to be met by: 8/3/23    Patient will increase functional independence with ADLs by performing:    UE Dressing with Modified [place].  LE Dressing with Modified [place].  Grooming while standing with Modified [place].  Toileting from toilet with Modified [place] for hygiene and clothing management.   Toilet transfer to toilet with Modified [place].                       History:     Past Medical History:   Diagnosis Date    Bilateral ocular hypertension     Diabetes mellitus     Glaucoma     Hypertension     Nuclear cataract 12/17/2014         Past Surgical History:   Procedure Laterality Date    CHOLECYSTECTOMY      GLOSSECTOMY Left 7/12/2023    Procedure:  GLOSSECTOMY;  Surgeon: Jaxon Carmen MD;  Location: [facility] OR [address] [place] [address];  Service: ENT;  Laterality: Left;    RADICAL NECK DISSECTION Left 7/12/2023    Procedure: DISSECTION, NECK, RADICAL;  Surgeon: Jaxon Carmen MD;  Location: [facility] OR [address] [place] [address];  Service: ENT;  Laterality: Left;       Time Tracking:     OT Date of Treatment: 07/13/23  OT Start Time: 1003  OT Stop Time: 1019  OT Total Time (min): 16 min    Billable Minutes:Evaluation 8  Self Care/Home Management 8    7/13/2023

## 2023-07-13 NOTE — PLAN OF CARE
Problem: Adult Inpatient Plan of Care  Goal: Plan of Care Review  Outcome: Ongoing, Progressing  Goal: Patient-Specific Goal (Individualized)  Outcome: Ongoing, Progressing  Goal: Absence of Hospital-Acquired Illness or Injury  Outcome: Ongoing, Progressing  Goal: Optimal Comfort and Wellbeing  Outcome: Ongoing, Progressing  Goal: Readiness for Transition of Care  Outcome: Ongoing, Progressing                    [facility] Plan of Care Note        Dx: Tongue Cancer     Shift Events: NA     Goals of Care: Pain management, decrease swallowing difficulty     Neuro: WDL     Vital Signs: WDL     Respiratory: WDL     Diet: clear liquid no sugar bariatric     Is patient tolerating current diet? yes     GTTS: NA /ABX Q 8     Urine Output/Bowel Movement: see flow sheets     Drains/Tubes/Tube Feeds (include total output/shift): NA     Lines: 18 L Hand, 20 G R FA     Accuchecks: AC HS     Skin: L neck incision     Fall Risk Score: 20     Activity level? Edge of bed     Any scheduled procedures? NA     Any safety concerns? NA     Other:  NA

## 2023-07-13 NOTE — PROGRESS NOTES
Alonzo catheter removed at 0700. Patient self-caths at home. Spoke with ENT team. Requested order for as needed straight caths. Asked patient if we could stick to an every 4-6 hours schedule for cathing. Patient declined. Said he will request when he feels the urge. Called at 1700 straight cathed with 15F catheter using sterile technique . 400 cc of clear, yellow urine returned. No complaints voiced.

## 2023-07-13 NOTE — SUBJECTIVE & OBJECTIVE
Interval History: NAEON. AFVSS. Having a difficult time w PO as expected. No breathing issues.     Medications:  Continuous Infusions:  Scheduled Meds:   amLODIPine  5 mg Oral BID    ampicillin-sulbactim (UNASYN) IVPB  1.5 g Intravenous Q8H    dexAMETHasone  10 mg Intravenous Q12H    enoxparin  40 mg Subcutaneous Daily    hydroCHLOROthiazide  12.5 mg Oral Daily    losartan  100 mg Oral Daily    senna-docusate 8.6-50 mg  1 tablet Oral BID    tamsulosin  2 capsule Oral QHS     PRN Meds:acetaminophen, dextrose 10%, dextrose 10%, glucagon (human recombinant), glucose, glucose, HYDROcodone-acetaminophen, HYDROmorphone, insulin aspart U-100, melatonin, ondansetron, prochlorperazine, rOPINIRole     Review of patient's allergies indicates:  No Known Allergies  Objective:     Vital Signs (24h Range):  Temp:  [97.5 °F (36.4 °C)-99.7 °F (37.6 °C)] 98.3 °F (36.8 °C)  Pulse:  [67-94] 78  Resp:  [12-35] 16  SpO2:  [93 %-100 %] 93 %  BP: (128-151)/(55-69) 133/63       Lines/Drains/Airways       Drain  Duration                  Closed/Suction Drain 07/12/23 1012 Left Neck Bulb 15 Fr. <1 day         Urethral Catheter 07/12/23 1735 Non-latex 16 Fr. <1 day              Peripheral Intravenous Line  Duration                  Peripheral IV - Single Lumen 07/12/23 0640 20 G Posterior;Proximal;Right Forearm 1 day         Peripheral IV - Single Lumen 07/12/23 0813 18 G Left;Posterior Hand <1 day                     Physical Exam  NAD  Breathing well on RA  OC patent  L partial glossectomy hemostatic, sutures in place, CDI  Tongue is soft and mobile, mostly in lateral directions, minimal protrusion  L neck soft, OMA drain w serosanguinous outout, incison CDI     Significant Labs:  BMP:   Recent Labs   Lab 07/06/23  1453   *      CO2 26   BUN 19   CREATININE 0.9   CALCIUM 9.5     CBC:   Recent Labs   Lab 07/06/23  1453   HGB 13.3*   HCT 41.4       Significant Diagnostics:  I have reviewed and interpreted all pertinent imaging  results/findings within the past 24 hours.

## 2023-07-13 NOTE — PLAN OF CARE
Problem: Occupational Therapy  Goal: Occupational Therapy Goal  Description: Goals to be met by: 8/3/23    Patient will increase functional independence with ADLs by performing:    UE Dressing with Modified [place].  LE Dressing with Modified [place].  Grooming while standing with Modified [place].  Toileting from toilet with Modified [place] for hygiene and clothing management.   Toilet transfer to toilet with Modified [place].  Outcome: Ongoing, Progressing     Pt goals are set

## 2023-07-14 VITALS
HEART RATE: 69 BPM | DIASTOLIC BLOOD PRESSURE: 82 MMHG | WEIGHT: 189 LBS | HEIGHT: 72 IN | TEMPERATURE: 98 F | BODY MASS INDEX: 25.6 KG/M2 | OXYGEN SATURATION: 96 % | SYSTOLIC BLOOD PRESSURE: 133 MMHG | RESPIRATION RATE: 18 BRPM

## 2023-07-14 LAB
ANION GAP SERPL CALC-SCNC: 11 MMOL/L (ref 8–16)
BASOPHILS # BLD AUTO: 0.02 K/UL (ref 0–0.2)
BASOPHILS NFR BLD: 0.1 % (ref 0–1.9)
BUN SERPL-MCNC: 21 MG/DL (ref 8–23)
CALCIUM SERPL-MCNC: 9.2 MG/DL (ref 8.7–10.5)
CHLORIDE SERPL-SCNC: 104 MMOL/L (ref 95–110)
CO2 SERPL-SCNC: 19 MMOL/L (ref 23–29)
CREAT SERPL-MCNC: 0.8 MG/DL (ref 0.5–1.4)
DIFFERENTIAL METHOD: ABNORMAL
EOSINOPHIL # BLD AUTO: 0 K/UL (ref 0–0.5)
EOSINOPHIL NFR BLD: 0 % (ref 0–8)
ERYTHROCYTE [DISTWIDTH] IN BLOOD BY AUTOMATED COUNT: 14.7 % (ref 11.5–14.5)
EST. GFR  (NO RACE VARIABLE): >60 ML/MIN/1.73 M^2
GLUCOSE SERPL-MCNC: 163 MG/DL (ref 70–110)
HCT VFR BLD AUTO: 41.8 % (ref 40–54)
HGB BLD-MCNC: 13.6 G/DL (ref 14–18)
IMM GRANULOCYTES # BLD AUTO: 0.06 K/UL (ref 0–0.04)
IMM GRANULOCYTES NFR BLD AUTO: 0.4 % (ref 0–0.5)
LYMPHOCYTES # BLD AUTO: 1.6 K/UL (ref 1–4.8)
LYMPHOCYTES NFR BLD: 10.4 % (ref 18–48)
MCH RBC QN AUTO: 29.2 PG (ref 27–31)
MCHC RBC AUTO-ENTMCNC: 32.5 G/DL (ref 32–36)
MCV RBC AUTO: 90 FL (ref 82–98)
MONOCYTES # BLD AUTO: 0.8 K/UL (ref 0.3–1)
MONOCYTES NFR BLD: 5.2 % (ref 4–15)
NEUTROPHILS # BLD AUTO: 12.5 K/UL (ref 1.8–7.7)
NEUTROPHILS NFR BLD: 83.9 % (ref 38–73)
NRBC BLD-RTO: 0 /100 WBC
PLATELET # BLD AUTO: 252 K/UL (ref 150–450)
PMV BLD AUTO: 9.3 FL (ref 9.2–12.9)
POCT GLUCOSE: 127 MG/DL (ref 70–110)
POCT GLUCOSE: 147 MG/DL (ref 70–110)
POTASSIUM SERPL-SCNC: 3.9 MMOL/L (ref 3.5–5.1)
RBC # BLD AUTO: 4.66 M/UL (ref 4.6–6.2)
SODIUM SERPL-SCNC: 134 MMOL/L (ref 136–145)
WBC # BLD AUTO: 14.89 K/UL (ref 3.9–12.7)

## 2023-07-14 PROCEDURE — 80048 BASIC METABOLIC PNL TOTAL CA: CPT | Performed by: STUDENT IN AN ORGANIZED HEALTH CARE EDUCATION/TRAINING PROGRAM

## 2023-07-14 PROCEDURE — 63600175 PHARM REV CODE 636 W HCPCS: Performed by: STUDENT IN AN ORGANIZED HEALTH CARE EDUCATION/TRAINING PROGRAM

## 2023-07-14 PROCEDURE — 97116 GAIT TRAINING THERAPY: CPT | Mod: CQ

## 2023-07-14 PROCEDURE — 85025 COMPLETE CBC W/AUTO DIFF WBC: CPT | Performed by: STUDENT IN AN ORGANIZED HEALTH CARE EDUCATION/TRAINING PROGRAM

## 2023-07-14 PROCEDURE — 36415 COLL VENOUS BLD VENIPUNCTURE: CPT | Performed by: STUDENT IN AN ORGANIZED HEALTH CARE EDUCATION/TRAINING PROGRAM

## 2023-07-14 PROCEDURE — 97535 SELF CARE MNGMENT TRAINING: CPT

## 2023-07-14 PROCEDURE — 25000003 PHARM REV CODE 250: Performed by: STUDENT IN AN ORGANIZED HEALTH CARE EDUCATION/TRAINING PROGRAM

## 2023-07-14 RX ORDER — HYDROCODONE BITARTRATE AND ACETAMINOPHEN 5; 325 MG/1; MG/1
1 TABLET ORAL EVERY 6 HOURS PRN
Qty: 20 TABLET | Refills: 0 | Status: SHIPPED | OUTPATIENT
Start: 2023-07-14 | End: 2023-07-17

## 2023-07-14 RX ORDER — CEPHALEXIN 500 MG/1
500 CAPSULE ORAL EVERY 8 HOURS
Qty: 15 CAPSULE | Refills: 0 | Status: SHIPPED | OUTPATIENT
Start: 2023-07-14 | End: 2024-03-17

## 2023-07-14 RX ORDER — AMOXICILLIN 250 MG
1 CAPSULE ORAL DAILY
Qty: 60 TABLET | Refills: 0 | Status: SHIPPED | OUTPATIENT
Start: 2023-07-14

## 2023-07-14 RX ORDER — HYDROCODONE BITARTRATE AND ACETAMINOPHEN 5; 325 MG/1; MG/1
1 TABLET ORAL EVERY 4 HOURS PRN
Qty: 20 TABLET | Refills: 0 | Status: SHIPPED | OUTPATIENT
Start: 2023-07-14 | End: 2023-07-17

## 2023-07-14 RX ORDER — DOCUSATE SODIUM 100 MG/1
100 CAPSULE, LIQUID FILLED ORAL 2 TIMES DAILY PRN
Qty: 60 CAPSULE | Refills: 0 | Status: ON HOLD | OUTPATIENT
Start: 2023-07-14 | End: 2024-03-17

## 2023-07-14 RX ORDER — ONDANSETRON 8 MG/1
8 TABLET, ORALLY DISINTEGRATING ORAL EVERY 6 HOURS PRN
Qty: 10 TABLET | Refills: 0 | Status: SHIPPED | OUTPATIENT
Start: 2023-07-14 | End: 2024-03-17

## 2023-07-14 RX ADMIN — AMLODIPINE BESYLATE 5 MG: 5 TABLET ORAL at 08:07

## 2023-07-14 RX ADMIN — AMPICILLIN AND SULBACTAM 1.5 G: 1; .5 INJECTION, POWDER, FOR SOLUTION INTRAVENOUS at 09:07

## 2023-07-14 RX ADMIN — LOSARTAN POTASSIUM 100 MG: 50 TABLET, FILM COATED ORAL at 08:07

## 2023-07-14 RX ADMIN — HYDROCHLOROTHIAZIDE 12.5 MG: 12.5 TABLET ORAL at 08:07

## 2023-07-14 RX ADMIN — HYDROCODONE BITARTRATE AND ACETAMINOPHEN 1 TABLET: 5; 325 TABLET ORAL at 09:07

## 2023-07-14 RX ADMIN — SENNOSIDES AND DOCUSATE SODIUM 1 TABLET: 50; 8.6 TABLET ORAL at 08:07

## 2023-07-14 RX ADMIN — AMPICILLIN AND SULBACTAM 1.5 G: 1; .5 INJECTION, POWDER, FOR SOLUTION INTRAVENOUS at 02:07

## 2023-07-14 NOTE — PT/OT/SLP PROGRESS
Occupational Therapy      Patient Name:  Suleiman Chavez   MRN:  [id number]    Patient not seen today secondary to patient sitting on sofa, dressed, and waiting for medications/transport for discharge from this hospitalization during time of attempt.   7/14/2023

## 2023-07-14 NOTE — DISCHARGE SUMMARY
[facility]  Otorhinolaryngology-Head & Neck Surgery  Discharge Summary      Patient Name: Suleiman Chavez  MRN: [id number]  Admission Date: 7/12/2023  Hospital Length of Stay: 2 days  Discharge Date and Time:  07/14/2023 6:57 AM  Attending Physician: Jaxon Carmen MD   Discharging Provider: Rommel Coy MD  Primary Care Provider: Caleb Negrete MD    HPI:   82 yo M who is s/p partial glossectomy and ND. Doing well. Discharged POD1 with drain in place.     Procedure(s) (LRB):  GLOSSECTOMY (Left)  DISSECTION, NECK, RADICAL (Left)      Indwelling Lines/Drains at time of discharge:   Lines/Drains/Airways     Drain  Duration                Closed/Suction Drain 07/12/23 1012 Left Neck Bulb 15 Fr. 1 day              Hospital Course: No notes on file    Goals of Care Treatment Preferences:  Code Status: Full Code      Consults:     Significant Diagnostic Studies: N/A      Pending Diagnostic Studies:     Procedure Component Value Units Date/Time    Hemoglobin A1c if not done in past 3 months [[id number]]     Order Status: Sent Lab Status: No result     Specimen: Blood     Specimen to Pathology, Surgery ENT [[id number]] Collected: 07/12/23 1029    Order Status: Sent Lab Status: In process Updated: 07/12/23 1452    Specimen: Tissue         Final Active Diagnoses:    Diagnosis Date Noted POA    PRINCIPAL PROBLEM:  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 Call in 3 day(s).    Specialty: Otolaryngology  Why: For drain removal  Contact information:  [address]  [facility] [address]  [phone]                       Patient Instructions:      Diet full liquid     Other restrictions (specify):   Order Comments: No strenuous exercise, ok to shower, avoid scrubbing, pat to dry     No dressing needed   Order Comments: Routine  "drain care  Record output daily     Medications:  Reconciled Home Medications:      Medication List      START taking these medications    cephALEXin 500 MG capsule  Commonly known as: KEFLEX  Take 1 capsule (500 mg total) by mouth every 8 (eight) hours.     HYDROcodone-acetaminophen 5-325 mg per tablet  Commonly known as: NORCO  Take 1 tablet by mouth every 4 (four) hours as needed for Pain.     ondansetron 8 MG Tbdl  Commonly known as: ZOFRAN-ODT  Take 1 tablet (8 mg total) by mouth every 6 (six) hours as needed (nausea).        CHANGE how you take these medications    gabapentin 300 MG capsule  Commonly known as: NEURONTIN  TAKE 1 CAPSULE BY MOUTH THREE TIMES DAILY  What changed:   · when to take this  · reasons to take this     rOPINIRole 1 MG tablet  Commonly known as: REQUIP  TAKE 1 TABLET BY MOUTH EVERY EVENING  What changed:   · when to take this  · reasons to take this        CONTINUE taking these medications    acetaminophen 500 MG tablet  Commonly known as: TYLENOL  Take 1,000 mg by mouth 2 (two) times a day.     amLODIPine 5 MG tablet  Commonly known as: NORVASC  Take 1 tablet (5 mg total) by mouth 2 (two) times daily.     aspirin 81 MG EC tablet  Commonly known as: ECOTRIN  Take 81 mg by mouth once daily.     catheter 14-16 Fr-" Misc  1 Units by Misc.(Non-Drug; Combo Route) route 3 (three) times daily. 1 Units by Misc.(Non-Drug; Combo Route) route 4 (four) times daily, indefinitely.     d-mannose Powd  Take by mouth.     diclofenac 75 MG EC tablet  Commonly known as: VOLTAREN  Take 1 tablet (75 mg total) by mouth 2 (two) times daily as needed.     FLUAD QUAD 2022-23(65Y UP)(PF) 60 mcg (15 mcg x 4)/0.5 mL Syrg  Generic drug: flu vac 2022 65up-ipzUA26R(PF)     hydroCHLOROthiazide 12.5 MG Tab  Commonly known as: HYDRODIURIL  Take 1 tablet (12.5 mg total) by mouth once daily.     latanoprost 0.005 % ophthalmic solution  INSTILL 1 DROP INTO BOTH EYES EVERY EVENING     losartan 100 MG tablet  Commonly known " as: COZAAR  TAKE 1 TABLET BY MOUTH EVERY DAY     metFORMIN 500 MG tablet  Commonly known as: GLUCOPHAGE  TAKE 1 TABLET BY MOUTH EVERY MORNING AND 2 TABLETS EVERY EVENING     potassium chloride 10 MEQ Tbsr  Commonly known as: KLOR-CON  Take 1 tablet (10 mEq total) by mouth once daily.     PREVNAR 20 (PF) 0.5 mL Syrg injection  Generic drug: pneumoc 20-syd conj-dip cr(PF)     tamsulosin 0.4 mg Cap  Commonly known as: FLOMAX  TAKE two CAPSULES BY MOUTH EVERY EVENING     traMADoL 50 mg tablet  Commonly known as: ULTRAM  Take 1 tablet (50 mg total) by mouth 2 (two) times daily as needed.          Time spent on the discharge of patient: 20 minutes    Rommel Coy MD  Otorhinolaryngology-Head & Neck Surgery  [facility]

## 2023-07-14 NOTE — PT/OT/SLP PROGRESS
Physical Therapy Treatment    Patient Name:  Suleiman Chavez   MRN:  [id number]    Recommendations:     Discharge Recommendations: other (see comments)  Discharge Equipment Recommendations: other (see comments)  Barriers to discharge: None    Assessment:     Suleiman Chavez is a 83 y.o. male admitted with a medical diagnosis of Squamous cell cancer of tongue.  He presents with the following impairments/functional limitations: weakness, impaired endurance, impaired functional mobility, gait instability, impaired balance . Pt Progressing with PT Intervention. Pt Progressing with improving gait distance. Pt would continue to benefit from skilled PT to address overall functional mobility, goals , and to return to functional baseline.  Goals remain appropriate.    Rehab Prognosis: Good; patient would benefit from acute skilled PT services to address these deficits and reach maximum level of function.    Recent Surgery: Procedure(s) (LRB):  GLOSSECTOMY (Left)  DISSECTION, NECK, RADICAL (Left) 2 Days Post-Op    Plan:     During this hospitalization, patient to be seen 4 x/week to address the identified rehab impairments via gait training, therapeutic activities, therapeutic exercises and progress toward the following goals:    Plan of Care Expires:  08/12/23    Subjective     Patient/Family Comments/goals: I am leaving today  Pain/Comfort:  Pain Rating 1: 0/10  Pain Rating Post-Intervention 1: 0/10      Objective:     Communicated with RN prior to session.  Patient found HOB elevated with peripheral IV, OMA drain upon PT entry to room.     General Precautions: Standard, fall, aspiration  Orthopedic Precautions: N/A  Braces: N/A  Respiratory Status: Room air     Functional Mobility:  Bed Mobility:     Rolling Left:  supervision  Scooting: supervision  Supine to Sit: supervision  Sit to Supine: supervision  Transfers:     Sit to Stand:  supervision with rolling walker  Gait: 250 ft with RW with SBA/S with decreased step  length/jesus      AM-PAC 6 CLICK MOBILITY  Turning over in bed (including adjusting bedclothes, sheets and blankets)?: 3  Sitting down on and standing up from a chair with arms (e.g., wheelchair, bedside commode, etc.): 3  Moving from lying on back to sitting on the side of the bed?: 3  Moving to and from a bed to a chair (including a wheelchair)?: 3  Need to walk in hospital room?: 3  Climbing 3-5 steps with a railing?: 3  Basic Mobility Total Score: 18       Treatment & Education:  Therapist provided instruction and educated of  patient on progress, safety,d/c,PT POC,   proper body mechanics, energy conservation, and fall prevention strategies during tasks listed above, on the effects of prolonged immobility and the importance of performing OOB activity and exercises to promote healing and reduce recovery time      Updated white board with appropriate PT mobility information for medical team notification     Donned an extra gown   Pt safe to ambulate in hallway with RN or PCT assistance      Call nursing/pct to transfer to chair/use bathroom. Pt stated understanding      Bedside table in front of patient and area set up for function, convenience, and safety. RN aware of patient's mobility needs and status. Questions/concerns addressed within PTA scope of practice; patient  with no further questions. Time was provided for active listening, discussion of health disposition, and discussion of safe discharge. Pt?verbalized?agreement .    Patient left HOB elevated with all lines intact, call button in reach, nsg notified, and spouse present..    GOALS:   Multidisciplinary Problems       Physical Therapy Goals          Problem: Physical Therapy    Goal Priority Disciplines Outcome Goal Variances Interventions   Physical Therapy Goal     PT, PT/OT Ongoing, Progressing     Description: Goals to be met by: 2023     Patient will increase functional independence with mobility by performin. Supine to sit with  Set-up [place]  2. Sit to supine with Set-up [place]  3. Sit to stand transfer with Supervision  4. Bed to chair transfer with Supervision using LRAD  5. Gait  x 300 feet with Supervision using LRAD.   6. Lower extremity exercise program x15 reps per handout, with supervision                         Time Tracking:     PT Received On: 07/14/23  PT Start Time: 0919     PT Stop Time: 0930  PT Total Time (min): 11 min     Billable Minutes: Gait Training 11    Treatment Type: Treatment  PT/PTA: PTA     Number of PTA visits since last PT visit: 1 07/14/2023

## 2023-07-14 NOTE — PLAN OF CARE
Problem: Adult Inpatient Plan of Care  Goal: Plan of Care Review  Outcome: Ongoing, Progressing  Goal: Patient-Specific Goal (Individualized)  Outcome: Ongoing, Progressing  Goal: Absence of Hospital-Acquired Illness or Injury  Outcome: Ongoing, Progressing  Goal: Optimal Comfort and Wellbeing  Outcome: Ongoing, Progressing  Goal: Readiness for Transition of Care  Outcome: Ongoing, Progressing                    [facility] Plan of Care Note        Dx: Tongue Cancer     Shift Events: NA     Goals of Care: Pain management, decrease swallowing difficulty     Neuro: WDL     Vital Signs: WDL     Respiratory: WDL     Diet: clear liquid no sugar bariatric     Is patient tolerating current diet? yes     GTTS: NA /ABX Q 8     Urine Output/Bowel Movement: see flow sheets     Drains/Tubes/Tube Feeds (include total output/shift): NA     Lines: 18 L Hand, 20 G R FA     Accuchecks: AC HS     Skin: L neck incision     Fall Risk Score: 20     Activity level? Edge of bed     Any scheduled procedures? NA     Any safety concerns? NA     Other:  NA, straight cath pt once, 200 cc output.

## 2023-07-14 NOTE — PT/OT/SLP PROGRESS
Speech Language Pathology Treatment    Patient Name:  Suleiman Chavez   MRN:  [id number]  Admitting Diagnosis: Squamous cell cancer of tongue    Recommendations:                 General Recommendations:  Dysphagia therapy  Diet recommendations:   , Liquid Diet Level: Nectar Thick, Full liquids   Aspiration Precautions: Feed only when awake/alert, HOB to 90 degrees, Small bites/sips, and Standard aspiration precautions   General Precautions: Standard, aspiration, fall  Communication strategies:  none    Assessment:     Suleiman Chavez is a 83 y.o. male with an SLP diagnosis of Dysphagia.      Subjective     Awake/alert    Pain/Comfort:  Pain Rating 1: 0/10  Pain Rating Post-Intervention 1: 0/10    Respiratory Status: Room air    Objective:     Has the patient been evaluated by SLP for swallowing?   Yes  Keep patient NPO? No     Pt repositioned upright in bed. He declined PO trials, but stated he is tolerating current diet without difficulty. He is anticipating discharge this date. SLP provided education regarding diet recs, swallow precautions, and ability to trial some softer solids and thin liquids at home as tolerated. Both he and spouse verbalized understanding. Recommend continue NTL full liquid diet at this time.     Goals:   Multidisciplinary Problems       SLP Goals          Problem: SLP    Goal Priority Disciplines Outcome   SLP Goal     SLP    Description: Speech Language Pathology Goals  Goals expected to be met by 7/20:  1. Pt will tolerate full liquid diet and nectar thick liquids without s/s of aspiration.   2. Pt will participate in ongoing swallowing assessment to determine when safe for diet advancement.                                Plan:     Patient to be seen:  4 x/week   Plan of Care expires:  08/12/23  Plan of Care reviewed with:  patient   SLP Follow-Up:  Yes       Discharge recommendations:   (tbd)     Time Tracking:     SLP Treatment Date:   07/14/23  Speech Start Time:  1016  Speech Stop  Time:  1029     Speech Total Time (min):  13 min    Billable Minutes: Self Care/Home Management Training 13    07/14/2023

## 2023-07-15 ENCOUNTER — DOCUMENTATION ONLY (OUTPATIENT)
Dept: INTERNAL MEDICINE | Facility: CLINIC | Age: 83
End: 2023-07-15
Payer: MEDICARE

## 2023-07-15 RX ORDER — DOXAZOSIN 4 MG/1
4 TABLET ORAL NIGHTLY
Qty: 14 TABLET | Refills: 0 | Status: SHIPPED | OUTPATIENT
Start: 2023-07-15 | End: 2023-07-27 | Stop reason: SDUPTHER

## 2023-07-15 NOTE — PROGRESS NOTES
Internal medicine note    Patient recently had glossectomy due to squamous cell carcinoma of the tongue.    Because of such, for 2 weeks doxazosin 4 mg tablet which can be crushed was sent in ,in place of the tamsulosin capsule    He is had a history of urinary retention and would be best not to disrupt the use of alpha blocker in managing his BPH

## 2023-07-17 ENCOUNTER — PATIENT OUTREACH (OUTPATIENT)
Dept: ADMINISTRATIVE | Facility: CLINIC | Age: 83
End: 2023-07-17
Payer: MEDICARE

## 2023-07-17 ENCOUNTER — OFFICE VISIT (OUTPATIENT)
Dept: OTOLARYNGOLOGY | Facility: CLINIC | Age: 83
End: 2023-07-17
Payer: MEDICARE

## 2023-07-17 VITALS — HEART RATE: 66 BPM | DIASTOLIC BLOOD PRESSURE: 58 MMHG | SYSTOLIC BLOOD PRESSURE: 95 MMHG

## 2023-07-17 DIAGNOSIS — C02.9 SQUAMOUS CELL CANCER OF TONGUE: Primary | ICD-10-CM

## 2023-07-17 PROCEDURE — 99999 PR PBB SHADOW E&M-EST. PATIENT-LVL III: CPT | Mod: PBBFAC,,, | Performed by: NURSE PRACTITIONER

## 2023-07-17 PROCEDURE — 3078F PR MOST RECENT DIASTOLIC BLOOD PRESSURE < 80 MM HG: ICD-10-PCS | Mod: CPTII,S$GLB,, | Performed by: NURSE PRACTITIONER

## 2023-07-17 PROCEDURE — 1159F MED LIST DOCD IN RCRD: CPT | Mod: CPTII,S$GLB,, | Performed by: NURSE PRACTITIONER

## 2023-07-17 PROCEDURE — 3074F PR MOST RECENT SYSTOLIC BLOOD PRESSURE < 130 MM HG: ICD-10-PCS | Mod: CPTII,S$GLB,, | Performed by: NURSE PRACTITIONER

## 2023-07-17 PROCEDURE — 1159F PR MEDICATION LIST DOCUMENTED IN MEDICAL RECORD: ICD-10-PCS | Mod: CPTII,S$GLB,, | Performed by: NURSE PRACTITIONER

## 2023-07-17 PROCEDURE — 99999 PR PBB SHADOW E&M-EST. PATIENT-LVL III: ICD-10-PCS | Mod: PBBFAC,,, | Performed by: NURSE PRACTITIONER

## 2023-07-17 PROCEDURE — 1125F AMNT PAIN NOTED PAIN PRSNT: CPT | Mod: CPTII,S$GLB,, | Performed by: NURSE PRACTITIONER

## 2023-07-17 PROCEDURE — 3078F DIAST BP <80 MM HG: CPT | Mod: CPTII,S$GLB,, | Performed by: NURSE PRACTITIONER

## 2023-07-17 PROCEDURE — 1125F PR PAIN SEVERITY QUANTIFIED, PAIN PRESENT: ICD-10-PCS | Mod: CPTII,S$GLB,, | Performed by: NURSE PRACTITIONER

## 2023-07-17 PROCEDURE — 99024 POSTOP FOLLOW-UP VISIT: CPT | Mod: S$GLB,,, | Performed by: NURSE PRACTITIONER

## 2023-07-17 PROCEDURE — 3074F SYST BP LT 130 MM HG: CPT | Mod: CPTII,S$GLB,, | Performed by: NURSE PRACTITIONER

## 2023-07-17 PROCEDURE — 99024 PR POST-OP FOLLOW-UP VISIT: ICD-10-PCS | Mod: S$GLB,,, | Performed by: NURSE PRACTITIONER

## 2023-07-17 RX ORDER — HYDROCODONE BITARTRATE AND ACETAMINOPHEN 5; 325 MG/1; MG/1
1 TABLET ORAL EVERY 6 HOURS PRN
Qty: 10 TABLET | Refills: 0 | Status: SHIPPED | OUTPATIENT
Start: 2023-07-17 | End: 2023-09-11

## 2023-07-17 NOTE — PROGRESS NOTES
"C3 nurse spoke with Suleiman Chavez and spouse,  for a TCC post hospital discharge follow up call. Nurse offered to scheduled [facility] hospital follow-up appointment.  Spouse informs me that this phone call is "not right, we are not senile people, we know what medications to take, he had surgery on his tongue, is painful to talk and this is an aggravation.  We got great care in the hospital, they even asked us what day it was, we are not dumb people.  He has had surgery on his tongue, people should know this and know that it is painful for him to talk.  Please do not call back unless it is important."  I verbalized understanding of her frustration, sincerely apologized for the call and kindly ended the call.     Patient does not have a HOSPFU, will route a message to PCP staff.         "

## 2023-07-17 NOTE — PROGRESS NOTES
CC: post op    Oncology History   Squamous cell cancer of tongue   6/16/2023 Initial Diagnosis    Squamous cell cancer of tongue     7/12/2023 Surgery    1. Left partial glossectomy  2. Left modified neck dissection of levels 1B through 4           HPI   83 y.o. male returns for a post op visit. Doing well. Drain output <30ml in 24 hours.    Review of Systems   Constitutional: Negative for fatigue and unexpected weight change.   HENT: Per HPI.  Eyes: Negative for visual disturbance.   Respiratory: Negative for shortness of breath, hemoptysis   Cardiovascular: Negative for chest pain and palpitations.   Musculoskeletal: Negative for decreased ROM, back pain.   Skin: Negative for rash, sunburn, itching.   Neurological: Negative for dizziness and seizures.   Hematological: Negative for adenopathy. Does not bruise/bleed easily.   Endocrine: Negative for rapid weight loss/weight gain, heat/cold intolerance.     Past Medical History   Patient Active Problem List   Diagnosis    Ocular hypertension    Essential hypertension    Screen for colon cancer    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, with long-term current use of insulin    Adenomatous polyp    Family history of colon cancer    Nuclear cataract    Borderline glaucoma of both eyes with ocular hypertension    Acute cystitis without hematuria    Spinal stenosis, lumbar region, with neurogenic claudication    EMA (iron deficiency anemia)    Benign prostatic hyperplasia without lower urinary tract symptoms    Debility    Chronic midline low back pain with sciatica    Tongue mass    Tongue ulcer    Squamous cell cancer of tongue    Incomplete bladder emptying           Past Surgical History   Past Surgical History:   Procedure Laterality Date    CHOLECYSTECTOMY      GLOSSECTOMY Left 7/12/2023    Procedure: GLOSSECTOMY;  Surgeon: Jaxon Carmen MD;  Location: [facility] OR [address] [place] [address];  Service: ENT;  Laterality: Left;    RADICAL NECK DISSECTION Left  2023    Procedure: DISSECTION, NECK, RADICAL;  Surgeon: Jaxon Carmen MD;  Location: [facility] OR [address] [place] [address];  Service: ENT;  Laterality: Left;         Family History   Family History   Problem Relation Age of Onset    Cancer Mother         liver    Cancer Father         colon    No Known Problems Sister     No Known Problems Sister     Amblyopia Neg Hx     Blindness Neg Hx     Cataracts Neg Hx     Diabetes Neg Hx     Glaucoma Neg Hx     Hypertension Neg Hx     Macular degeneration Neg Hx     Retinal detachment Neg Hx     Strabismus Neg Hx     Stroke Neg Hx     Thyroid disease Neg Hx            Social History   .  Social History     Socioeconomic History    Marital status:    Occupational History     Employer: OTHER   Tobacco Use    Smoking status: Former     Types: Cigarettes     Quit date: 2004     Years since quittin.1     Passive exposure: Past    Smokeless tobacco: Never   Substance and Sexual Activity    Alcohol use: No     Alcohol/week: 0.0 standard drinks    Drug use: No         Allergies   Review of patient's allergies indicates:  No Known Allergies        Physical Exam     Vitals:    23 1456   BP: (!) 95/58   Pulse: 66           There is no height or weight on file to calculate BMI.      General: AOx3, NAD   Respiratory:  Symmetric chest rise, normal effort  Neck: Incision CDI.  No redness, drainage, or fluid collection noted.  Edges well approximated. OMA drain with small amount of serosanguinous drainage, removed without difficulty.  Face: House Brackmann I bilaterally.     PET-CT, neck CT reviewed.      Assessment/Plan  Problem List Items Addressed This Visit          Oncology    Squamous cell cancer of tongue - Primary     Doing well. Drain removed. Path pending. RTC in 2 weeks, sooner if needed.

## 2023-07-20 LAB
COMMENT: NORMAL
FINAL PATHOLOGIC DIAGNOSIS: NORMAL
FROZEN SECTION DIAGNOSIS: NORMAL
FROZEN SECTION FOOTNOTE: NORMAL
GROSS: NORMAL
Lab: NORMAL
MICROSCOPIC EXAM: NORMAL

## 2023-07-24 ENCOUNTER — HOSPITAL ENCOUNTER (EMERGENCY)
Facility: HOSPITAL | Age: 83
Discharge: HOME | End: 2023-07-24
Attending: EMERGENCY MEDICINE
Payer: MEDICARE

## 2023-07-24 ENCOUNTER — TELEPHONE (OUTPATIENT)
Dept: OTOLARYNGOLOGY | Facility: CLINIC | Age: 83
End: 2023-07-24
Payer: MEDICARE

## 2023-07-24 VITALS
RESPIRATION RATE: 16 BRPM | TEMPERATURE: 98 F | HEIGHT: 73 IN | HEART RATE: 103 BPM | OXYGEN SATURATION: 97 % | WEIGHT: 178 LBS | DIASTOLIC BLOOD PRESSURE: 66 MMHG | SYSTOLIC BLOOD PRESSURE: 102 MMHG | BODY MASS INDEX: 23.59 KG/M2

## 2023-07-24 DIAGNOSIS — R00.0 TACHYCARDIA: ICD-10-CM

## 2023-07-24 DIAGNOSIS — K56.41 FECAL IMPACTION: Primary | ICD-10-CM

## 2023-07-24 DIAGNOSIS — K59.00 CONSTIPATION: ICD-10-CM

## 2023-07-24 LAB
ALBUMIN SERPL BCP-MCNC: 3.2 G/DL (ref 3.5–5.2)
ALP SERPL-CCNC: 166 U/L (ref 55–135)
ALT SERPL W/O P-5'-P-CCNC: 66 U/L (ref 10–44)
ANION GAP SERPL CALC-SCNC: 12 MMOL/L (ref 8–16)
AST SERPL-CCNC: 48 U/L (ref 10–40)
BASOPHILS # BLD AUTO: 0.05 K/UL (ref 0–0.2)
BASOPHILS NFR BLD: 0.3 % (ref 0–1.9)
BILIRUB SERPL-MCNC: 0.8 MG/DL (ref 0.1–1)
BILIRUB UR QL STRIP: NEGATIVE
BUN SERPL-MCNC: 22 MG/DL (ref 8–23)
CALCIUM SERPL-MCNC: 9.5 MG/DL (ref 8.7–10.5)
CHLORIDE SERPL-SCNC: 101 MMOL/L (ref 95–110)
CLARITY UR REFRACT.AUTO: CLEAR
CO2 SERPL-SCNC: 26 MMOL/L (ref 23–29)
COLOR UR AUTO: YELLOW
CREAT SERPL-MCNC: 0.9 MG/DL (ref 0.5–1.4)
DIFFERENTIAL METHOD: ABNORMAL
EOSINOPHIL # BLD AUTO: 0.1 K/UL (ref 0–0.5)
EOSINOPHIL NFR BLD: 0.7 % (ref 0–8)
ERYTHROCYTE [DISTWIDTH] IN BLOOD BY AUTOMATED COUNT: 14.4 % (ref 11.5–14.5)
EST. GFR  (NO RACE VARIABLE): >60 ML/MIN/1.73 M^2
GLUCOSE SERPL-MCNC: 144 MG/DL (ref 70–110)
GLUCOSE UR QL STRIP: NEGATIVE
HCT VFR BLD AUTO: 37.9 % (ref 40–54)
HGB BLD-MCNC: 12.5 G/DL (ref 14–18)
HGB UR QL STRIP: NEGATIVE
IMM GRANULOCYTES # BLD AUTO: 0.07 K/UL (ref 0–0.04)
IMM GRANULOCYTES NFR BLD AUTO: 0.4 % (ref 0–0.5)
KETONES UR QL STRIP: NEGATIVE
LACTATE SERPL-SCNC: 1.7 MMOL/L (ref 0.5–2.2)
LEUKOCYTE ESTERASE UR QL STRIP: NEGATIVE
LYMPHOCYTES # BLD AUTO: 1.3 K/UL (ref 1–4.8)
LYMPHOCYTES NFR BLD: 7.9 % (ref 18–48)
MCH RBC QN AUTO: 28.5 PG (ref 27–31)
MCHC RBC AUTO-ENTMCNC: 33 G/DL (ref 32–36)
MCV RBC AUTO: 87 FL (ref 82–98)
MONOCYTES # BLD AUTO: 0.8 K/UL (ref 0.3–1)
MONOCYTES NFR BLD: 5.2 % (ref 4–15)
NEUTROPHILS # BLD AUTO: 13.9 K/UL (ref 1.8–7.7)
NEUTROPHILS NFR BLD: 85.5 % (ref 38–73)
NITRITE UR QL STRIP: NEGATIVE
NRBC BLD-RTO: 0 /100 WBC
PH UR STRIP: 8 [PH] (ref 5–8)
PLATELET # BLD AUTO: 300 K/UL (ref 150–450)
PMV BLD AUTO: 8.5 FL (ref 9.2–12.9)
POTASSIUM SERPL-SCNC: 4.4 MMOL/L (ref 3.5–5.1)
PROT SERPL-MCNC: 7.5 G/DL (ref 6–8.4)
PROT UR QL STRIP: ABNORMAL
RBC # BLD AUTO: 4.38 M/UL (ref 4.6–6.2)
SODIUM SERPL-SCNC: 139 MMOL/L (ref 136–145)
SP GR UR STRIP: 1.02 (ref 1–1.03)
URN SPEC COLLECT METH UR: ABNORMAL
WBC # BLD AUTO: 16.3 K/UL (ref 3.9–12.7)

## 2023-07-24 PROCEDURE — 99285 EMERGENCY DEPT VISIT HI MDM: CPT | Mod: 25,HCNC

## 2023-07-24 PROCEDURE — 81003 URINALYSIS AUTO W/O SCOPE: CPT | Mod: HCNC | Performed by: EMERGENCY MEDICINE

## 2023-07-24 PROCEDURE — 85025 COMPLETE CBC W/AUTO DIFF WBC: CPT | Mod: HCNC | Performed by: EMERGENCY MEDICINE

## 2023-07-24 PROCEDURE — 63600175 PHARM REV CODE 636 W HCPCS: Mod: HCNC | Performed by: EMERGENCY MEDICINE

## 2023-07-24 PROCEDURE — 83605 ASSAY OF LACTIC ACID: CPT | Mod: HCNC | Performed by: EMERGENCY MEDICINE

## 2023-07-24 PROCEDURE — 25000003 PHARM REV CODE 250: Mod: HCNC | Performed by: EMERGENCY MEDICINE

## 2023-07-24 PROCEDURE — 96374 THER/PROPH/DIAG INJ IV PUSH: CPT | Mod: HCNC

## 2023-07-24 PROCEDURE — 96361 HYDRATE IV INFUSION ADD-ON: CPT | Mod: HCNC

## 2023-07-24 PROCEDURE — 93005 ELECTROCARDIOGRAM TRACING: CPT | Mod: HCNC

## 2023-07-24 PROCEDURE — 80053 COMPREHEN METABOLIC PANEL: CPT | Mod: HCNC | Performed by: EMERGENCY MEDICINE

## 2023-07-24 PROCEDURE — 93010 EKG 12-LEAD: ICD-10-PCS | Mod: HCNC,,, | Performed by: INTERNAL MEDICINE

## 2023-07-24 PROCEDURE — 93010 ELECTROCARDIOGRAM REPORT: CPT | Mod: HCNC,,, | Performed by: INTERNAL MEDICINE

## 2023-07-24 RX ORDER — MIDAZOLAM HYDROCHLORIDE 1 MG/ML
1 INJECTION INTRAMUSCULAR; INTRAVENOUS
Status: COMPLETED | OUTPATIENT
Start: 2023-07-24 | End: 2023-07-24

## 2023-07-24 RX ORDER — SENNOSIDES 8.8 MG/5ML
10 LIQUID ORAL NIGHTLY
Qty: 236 ML | Refills: 0 | Status: ON HOLD | OUTPATIENT
Start: 2023-07-24 | End: 2024-03-17

## 2023-07-24 RX ORDER — PSEUDOEPHEDRINE/ACETAMINOPHEN 30MG-500MG
100 TABLET ORAL
Status: COMPLETED | OUTPATIENT
Start: 2023-07-24 | End: 2023-07-24

## 2023-07-24 RX ORDER — LIDOCAINE HYDROCHLORIDE 20 MG/ML
JELLY TOPICAL
Status: COMPLETED | OUTPATIENT
Start: 2023-07-24 | End: 2023-07-24

## 2023-07-24 RX ADMIN — MIDAZOLAM 1 MG: 1 INJECTION INTRAMUSCULAR; INTRAVENOUS at 05:07

## 2023-07-24 RX ADMIN — LIDOCAINE HYDROCHLORIDE 10 ML: 20 JELLY TOPICAL at 04:07

## 2023-07-24 RX ADMIN — Medication 100 ML: at 05:07

## 2023-07-24 RX ADMIN — Medication 1 ENEMA: at 06:07

## 2023-07-24 NOTE — FIRST PROVIDER EVALUATION
Emergency Department TeleTriage Encounter Note      CHIEF COMPLAINT    Chief Complaint   Patient presents with    Constipation     Pt stated last BM was 7/15. Pt reports taking pain medication.        VITAL SIGNS   Initial Vitals [07/24/23 1404]   BP Pulse Resp Temp SpO2   (!) 110/58 (!) 114 18 98.1 °F (36.7 °C) 97 %      MAP       --            ALLERGIES    Review of patient's allergies indicates:  No Known Allergies    PROVIDER TRIAGE NOTE  This is a teletriage evaluation of a 83 y.o. male presenting to the ED complaining of constipation. Last BM 7/15. Was on prescription pain medication. Denies vomiting. Is able to pass gas.     Initial orders will be placed and care will be transferred to an alternate provider when patient is roomed for a full evaluation. Any additional orders and the final disposition will be determined by that provider.         ORDERS  Labs Reviewed - No data to display    ED Orders (720h ago, onward)      Start Ordered     Status Ordering Provider    Unscheduled 07/24/23 1439  X-Ray Abdomen Flat And Erect  1 time imaging         Ordered MICHELE JAY              Virtual Visit Note: The provider triage portion of this emergency department evaluation and documentation was performed via [employer], a HIPAA-compliant telemedicine application, in concert with a tele-presenter in the room. A face to face patient evaluation with one of my colleagues will occur once the patient is placed in an emergency department room.      DISCLAIMER: This note was prepared with [employer] voice recognition transcription software. Garbled syntax, mangled pronouns, and other bizarre constructions may be attributed to that software system.

## 2023-07-24 NOTE — ED PROVIDER NOTES
Encounter Date: 7/24/2023       History     Chief Complaint   Patient presents with    Constipation     Pt stated last BM was 7/15. Pt reports taking pain medication.      83-year-old male with a history of hypertension, diabetes, status post radical neck dissection and partial glossectomy about 12 days ago here at [facility] for head and neck cancer, presenting with constipation, severe, no BM for about a week.  Patient has been taking opioid pain medication for his surgery related pain.  He has tried to rectal suppositories in the last day without any relief but he is not been taking any oral laxative medication.  Patient reports that he is had some leakage of liquid stool within the last day.  He is some lower abdominal cramping.  No nausea or vomiting.    The history is provided by the patient and the spouse.   Review of patient's allergies indicates:  No Known Allergies  Past Medical History:   Diagnosis Date    Bilateral ocular hypertension     Diabetes mellitus     Glaucoma     Hypertension     Nuclear cataract 12/17/2014     Past Surgical History:   Procedure Laterality Date    CHOLECYSTECTOMY      GLOSSECTOMY Left 7/12/2023    Procedure: GLOSSECTOMY;  Surgeon: Jaxon Carmen MD;  Location: [facility] OR [address] [place] [address];  Service: ENT;  Laterality: Left;    RADICAL NECK DISSECTION Left 7/12/2023    Procedure: DISSECTION, NECK, RADICAL;  Surgeon: Jaxon Carmen MD;  Location: [facility] OR [address] [place] [address];  Service: ENT;  Laterality: Left;     Family History   Problem Relation Age of Onset    Cancer Mother         liver    Cancer Father         colon    No Known Problems Sister     No Known Problems Sister     Amblyopia Neg Hx     Blindness Neg Hx     Cataracts Neg Hx     Diabetes Neg Hx     Glaucoma Neg Hx     Hypertension Neg Hx     Macular degeneration Neg Hx     Retinal detachment Neg Hx     Strabismus Neg Hx     Stroke Neg Hx     Thyroid disease Neg Hx      Social History     Tobacco Use    Smoking status: Former     Types:  Cigarettes     Quit date: 2004     Years since quittin.1     Passive exposure: Past    Smokeless tobacco: Never   Substance Use Topics    Alcohol use: No     Alcohol/week: 0.0 standard drinks    Drug use: No     Review of Systems    Physical Exam     Initial Vitals [23 1404]   BP Pulse Resp Temp SpO2   (!) 110/58 (!) 114 18 98.1 °F (36.7 °C) 97 %      MAP       --         Physical Exam    Nursing note and vitals reviewed.  Constitutional: Vital signs are normal. He appears well-developed and well-nourished. He is not diaphoretic.  Non-toxic appearance. He does not appear ill. No distress.   HENT:   Mouth/Throat: Mucous membranes are normal. Mucous membranes are not dry.   Eyes: Conjunctivae and lids are normal.   Neck: Neck supple.   Left lateral neck surgical incision site c/d/I, no erythema, warmth, induration   Normal range of motion.  Cardiovascular:    Tachycardia present.         Pulmonary/Chest: No respiratory distress.   Abdominal: Abdomen is soft. He exhibits no distension. There is no abdominal tenderness. There is no rebound and no guarding.   Genitourinary:    Genitourinary Comments: Rectal exam performed and patient with a hard stool mass within the rectal vault     Musculoskeletal:         General: No edema.      Cervical back: Normal range of motion and neck supple.     Neurological: He is alert and oriented to person, place, and time.   Skin: Skin is dry and intact. No pallor.   Stage 2 pressure ulcer to R gluteal region   Psychiatric: He has a normal mood and affect. His speech is normal and behavior is normal.       ED Course   Procedures  Labs Reviewed   COMPREHENSIVE METABOLIC PANEL - Abnormal; Notable for the following components:       Result Value    Glucose 144 (*)     Albumin 3.2 (*)     Alkaline Phosphatase 166 (*)     AST 48 (*)     ALT 66 (*)     All other components within normal limits   CBC W/ AUTO DIFFERENTIAL - Abnormal; Notable for the following components:    WBC  16.30 (*)     RBC 4.38 (*)     Hemoglobin 12.5 (*)     Hematocrit 37.9 (*)     MPV 8.5 (*)     Gran # (ANC) 13.9 (*)     Immature Grans (Abs) 0.07 (*)     Gran % 85.5 (*)     Lymph % 7.9 (*)     All other components within normal limits   URINALYSIS, REFLEX TO URINE CULTURE - Abnormal; Notable for the following components:    Protein, UA Trace (*)     All other components within normal limits    Narrative:     Specimen Source->Urine   LACTIC ACID, PLASMA          Imaging Results              X-Ray Abdomen AP 1 View (Final result)  Result time 07/24/23 16:51:23   Procedure changed from X-Ray Abdomen Flat And Erect     Final result by Barak Cortez MD (07/24/23 16:51:23)                   Impression:      1. Large amount of stool within the rectum concerning for constipation or impaction.  This likely accounts for slow flow through several upstream small bowel loops.  Differential would include developing ileus.  No findings to suggest pneumatosis.      Electronically signed by: Barak Cortez MD  Date:    07/24/2023  Time:    16:51               Narrative:    EXAMINATION:  XR ABDOMEN AP 1 VIEW    CLINICAL HISTORY:  Constipation; Constipation, unspecified    TECHNIQUE:  AP View(s) of the abdomen was performed.    COMPARISON:  PET-CT 07/07/2023, CT abdomen and pelvis 03/04/2023    FINDINGS:  Two views abdomen supine.    Air and stool is seen within the large bowel and projected over the rectum noting moderate stool in the colon however large amount of stool projects over the rectum.  There are several prominent small bowel loops throughout the abdomen and pelvis suggesting slow flow.  There is gas within the stomach.  There are no calcifications to convincingly suggest nephrolithiasis or cholelithiasis.  No findings to suggest pneumatosis.  There are degenerative changes of the osseous structures.  There is vascular calcification.                                       Medications   lactated ringers bolus 1,000 mL  (0 mLs Intravenous Stopped 7/24/23 1800)   LIDOcaine HCl 2% urojet (10 mLs Mucous Membrane Given 7/24/23 1647)   midazolam (VERSED) 1 mg/mL injection 1 mg (1 mg Intravenous Given 7/24/23 1708)   glycerin 99.5% topical solution 100 mL (100 mLs Rectal Given 7/24/23 1758)     And   sodium phosphates 19-7 gram/118 mL enema 1 enema (1 enema Rectal Given 7/24/23 1801)     And   sodium chloride 0.9% bolus 500 mL 500 mL (0 mLs Rectal Stopped 7/24/23 1800)     Medical Decision Making:   History:   Old Medical Records: I decided to obtain old medical records.  Old Records Summarized: records from clinic visits and records from previous admission(s).  Initial Assessment:   1. Severe constipation with fecal impaction, likely opioid induced  -will need to perform manual disimpaction, followed by enema  -patient needs to be started on oral laxative regimen  -will also encourage him to use non opioid medication moving forward to address pain    2. Mild tachycardia and mucous membranes dry on evaluation, suspect some dehydration in the setting of constipation, difficulty staying hydrated given surgical changes, partial glossectomy  -will check labs, start IV fluids, reassess  Clinical Tests:   Lab Tests: Ordered and Reviewed           ED Course as of 07/25/23 1115   Mon Jul 24, 2023 1715 Patient given Versed 1 mg IV and then manual disimpaction performed.  Large amount of hard stool removed from the rectal vault.  Patient tolerated very well.  Will now administer an enema.  Of note he does have a leukocytosis.  This could be normal postop but will check a UA to make sure there is no signs of a UTI. [AS]   1930 Patient disimpacted and a large volume of stool was removed.  Nurse that administered an enema and she reported a massive amount of stool passed from below.  Vital signs have improved.  I will send the patient home with liquid senna, Colace, ED return precautions. [AS]   1938 Patient reports feeling much better on  reassessment.  Patient has a leukocytosis though no signs of a wound infection from his surgery, no signs of a UTI, his lactate is negative in his repeat vital signs are normal.  Suspicion for a serious infection is low at this point in time. [AS]      ED Course User Index  [AS] Waleska Moore MD                 Clinical Impression:   Final diagnoses:  [K59.00] Constipation  [R00.0] Tachycardia  [K56.41] Fecal impaction (Primary)        ED Disposition Condition    Discharge Stable          ED Prescriptions       Medication Sig Dispense Start Date End Date Auth. Provider    sennosides 8.8 mg/5 ml (SENNA) 8.8 mg/5 mL syrup Take 10 mLs by mouth nightly. 236 mL 7/24/2023 -- Waleska Moore MD          Follow-up Information       Follow up With Specialties Details Why Contact Info    Caleb Negrete MD Internal Medicine Call in 1 day  [address]  [place] [address]  [phone]               Waleska Moore MD  07/25/23 4791

## 2023-07-25 NOTE — ED NOTES
Received bedside report from ADAIR Nieves. Assumed care of pt at this time.   Pt AAOx4, resting comfortably in bed, NAD, respirations E/UL, updated on POC, wheels locked and in low position, call bell with in reach, Comfort positioning and restroom needs were addressed. Necessary items were placed with in reach and was advised when a reassessment would take place.

## 2023-07-25 NOTE — ED NOTES
..Patient identifiers for Suleiman Chavez 83 y.o. male checked and correct.  Chief Complaint   Patient presents with    Constipation     Pt stated last BM was 7/15. Pt reports taking pain medication.      Past Medical History:   Diagnosis Date    Bilateral ocular hypertension     Diabetes mellitus     Glaucoma     Hypertension     Nuclear cataract 12/17/2014     Allergies reported: Review of patient's allergies indicates:  No Known Allergies      LOC: Patient is awake, alert, and aware of environment with an appropriate affect. Patient is oriented x 3 and speaking appropriately.  APPEARANCE: Patient resting comfortably and in no acute distress. Patient is clean and well groomed, patient's clothing is properly fastened.  HEENT: **AAO- constipated. Last BM 8 days ago. Here with wife .  SKIN: The skin is warm and dry. Patient has normal skin turgor and moist mucus membranes. Skin is intact; no bruising or breakdown noted.  MUSKULOSKELETAL: Patient is moving all extremities well, no obvious deformities noted. Pulses intact.   RESPIRATORY: Airway is open and patent. Respirations are spontaneous and non-labored with normal effort and rate, BBS=clear  CARDIAC: Patient has a normal rate and rhythm. NSR  on cardiac monitor,No peripheral edema noted.   ABDOMEN: No distention noted. Bowel sounds active in all 4 quadrants. Soft and non-tender upon palpation.  NEUROLOGICAL: . Facial expression is symmetrical. Hand grasps are equal bilaterally. Normal sensation in all extremities when touched with finger.

## 2023-07-27 ENCOUNTER — PATIENT MESSAGE (OUTPATIENT)
Dept: INTERNAL MEDICINE | Facility: CLINIC | Age: 83
End: 2023-07-27
Payer: MEDICARE

## 2023-07-27 RX ORDER — DOXAZOSIN 4 MG/1
4 TABLET ORAL NIGHTLY
Qty: 30 TABLET | Refills: 0 | Status: SHIPPED | OUTPATIENT
Start: 2023-07-27 | End: 2023-08-31 | Stop reason: SDUPTHER

## 2023-07-31 ENCOUNTER — OFFICE VISIT (OUTPATIENT)
Dept: OTOLARYNGOLOGY | Facility: CLINIC | Age: 83
End: 2023-07-31
Payer: MEDICARE

## 2023-07-31 DIAGNOSIS — C02.9 SQUAMOUS CELL CANCER OF TONGUE: Primary | ICD-10-CM

## 2023-07-31 PROBLEM — K14.8 TONGUE MASS: Status: RESOLVED | Noted: 2023-06-12 | Resolved: 2023-07-31

## 2023-07-31 PROBLEM — K14.0 TONGUE ULCER: Status: RESOLVED | Noted: 2023-06-16 | Resolved: 2023-07-31

## 2023-07-31 PROCEDURE — 99024 POSTOP FOLLOW-UP VISIT: CPT | Mod: HCNC,S$GLB,, | Performed by: NURSE PRACTITIONER

## 2023-07-31 PROCEDURE — 1126F PR PAIN SEVERITY QUANTIFIED, NO PAIN PRESENT: ICD-10-PCS | Mod: HCNC,CPTII,S$GLB, | Performed by: NURSE PRACTITIONER

## 2023-07-31 PROCEDURE — 99999 PR PBB SHADOW E&M-EST. PATIENT-LVL IV: ICD-10-PCS | Mod: PBBFAC,HCNC,, | Performed by: NURSE PRACTITIONER

## 2023-07-31 PROCEDURE — 1159F MED LIST DOCD IN RCRD: CPT | Mod: HCNC,CPTII,S$GLB, | Performed by: NURSE PRACTITIONER

## 2023-07-31 PROCEDURE — 99999 PR PBB SHADOW E&M-EST. PATIENT-LVL IV: CPT | Mod: PBBFAC,HCNC,, | Performed by: NURSE PRACTITIONER

## 2023-07-31 PROCEDURE — 99024 PR POST-OP FOLLOW-UP VISIT: ICD-10-PCS | Mod: HCNC,S$GLB,, | Performed by: NURSE PRACTITIONER

## 2023-07-31 PROCEDURE — 1159F PR MEDICATION LIST DOCUMENTED IN MEDICAL RECORD: ICD-10-PCS | Mod: HCNC,CPTII,S$GLB, | Performed by: NURSE PRACTITIONER

## 2023-07-31 PROCEDURE — 1126F AMNT PAIN NOTED NONE PRSNT: CPT | Mod: HCNC,CPTII,S$GLB, | Performed by: NURSE PRACTITIONER

## 2023-07-31 NOTE — PROGRESS NOTES
CC: post op    Oncology History   Squamous cell cancer of tongue   6/16/2023 Initial Diagnosis    Squamous cell cancer of tongue     7/12/2023 Surgery    1. Left partial glossectomy  2. Left modified neck dissection of levels 1B through 4     7/31/2023 Cancer Staged     Cancer Staging   Squamous cell cancer of tongue  Staging form: Oral Cavity, AJCC 8th Edition  - Pathologic stage from 7/31/2023: Stage ELENITA (pT1, pN2b, cM0) - Signed by Sindi Robbins NP on 7/31/2023               HPI   83 y.o. male returns for a post op visit. Doing well. He reports some weight loss but is trying to increase his calories.     Review of Systems   Constitutional: Negative for fatigue and unexpected weight change.   HENT: Per HPI.  Eyes: Negative for visual disturbance.   Respiratory: Negative for shortness of breath, hemoptysis   Cardiovascular: Negative for chest pain and palpitations.   Musculoskeletal: Negative for decreased ROM, back pain.   Skin: Negative for rash, sunburn, itching.   Neurological: Negative for dizziness and seizures.   Hematological: Negative for adenopathy. Does not bruise/bleed easily.   Endocrine: Negative for rapid weight loss/weight gain, heat/cold intolerance.     Past Medical History   Patient Active Problem List   Diagnosis    Ocular hypertension    Essential hypertension    Screen for colon cancer    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, with long-term current use of insulin    Adenomatous polyp    Family history of colon cancer    Nuclear cataract    Borderline glaucoma of both eyes with ocular hypertension    Acute cystitis without hematuria    Spinal stenosis, lumbar region, with neurogenic claudication    EMA (iron deficiency anemia)    Benign prostatic hyperplasia without lower urinary tract symptoms    Debility    Chronic midline low back pain with sciatica    Squamous cell cancer of tongue    Incomplete bladder emptying           Past Surgical History   Past Surgical  History:   Procedure Laterality Date    CHOLECYSTECTOMY      GLOSSECTOMY Left 2023    Procedure: GLOSSECTOMY;  Surgeon: Jaxon Carmen MD;  Location: [facility] OR [address] [place] [address];  Service: ENT;  Laterality: Left;    RADICAL NECK DISSECTION Left 2023    Procedure: DISSECTION, NECK, RADICAL;  Surgeon: Jaxon Carmen MD;  Location: [facility] OR [address] [place] [address];  Service: ENT;  Laterality: Left;         Family History   Family History   Problem Relation Age of Onset    Cancer Mother         liver    Cancer Father         colon    No Known Problems Sister     No Known Problems Sister     Amblyopia Neg Hx     Blindness Neg Hx     Cataracts Neg Hx     Diabetes Neg Hx     Glaucoma Neg Hx     Hypertension Neg Hx     Macular degeneration Neg Hx     Retinal detachment Neg Hx     Strabismus Neg Hx     Stroke Neg Hx     Thyroid disease Neg Hx            Social History   .  Social History     Socioeconomic History    Marital status:    Occupational History     Employer: OTHER   Tobacco Use    Smoking status: Former     Current packs/day: 0.00     Types: Cigarettes     Quit date: 2004     Years since quittin.1     Passive exposure: Past    Smokeless tobacco: Never   Substance and Sexual Activity    Alcohol use: No     Alcohol/week: 0.0 standard drinks of alcohol    Drug use: No         Allergies   Review of patient's allergies indicates:  No Known Allergies        Physical Exam     There were no vitals filed for this visit.          There is no height or weight on file to calculate BMI.      General: AOx3, NAD   Respiratory:  Symmetric chest rise, normal effort  Oral cavity: partial glossectomy site has granulated in, no bleeding or drainage  Neck: Incision CDI.  No redness, drainage, or fluid collection noted.  Edges well approximated.   Face: House Brackmann I bilaterally.     PET-CT, neck CT reviewed.      Assessment/Plan  Problem List Items Addressed This Visit          Oncology    Squamous cell cancer of tongue  - Primary     He is healign well. Path report discussed and questions answered. Referral for radiation, nutrition, and slp placed. Questions answered. RTC After completing treatment.         Relevant Orders    Ambulatory referral/consult to Radiation Oncology    Ambulatory referral/consult to Speech Therapy    Ambulatory referral/consult to Hematology/Oncology/Nutrition

## 2023-07-31 NOTE — ASSESSMENT & PLAN NOTE
He is healign well. Path report discussed and questions answered. Referral for radiation, nutrition, and slp placed. Questions answered. RTC After completing treatment.

## 2023-08-01 ENCOUNTER — TELEPHONE (OUTPATIENT)
Dept: HEMATOLOGY/ONCOLOGY | Facility: CLINIC | Age: 83
End: 2023-08-01
Payer: MEDICARE

## 2023-08-01 ENCOUNTER — PATIENT MESSAGE (OUTPATIENT)
Dept: INTERNAL MEDICINE | Facility: CLINIC | Age: 83
End: 2023-08-01
Payer: MEDICARE

## 2023-08-01 NOTE — TELEPHONE ENCOUNTER
----- Message from Sindi Robbins NP sent at 7/31/2023  3:12 PM CDT -----  Can you please schedule rad onc, nutrition, and speech therapy appointments for this patient? He is s/p partial glossectomy and planning for adjuvant radiation.    Thanks!

## 2023-08-01 NOTE — NURSING
Oncology Navigation   Intake  Date of Diagnosis: 06/12/23  Cancer Type: Head and Neck  Internal / External Referral: Internal  Date of Referral: 08/01/23  Initial Nurse Navigator Contact: 08/01/23  Referral to Initial Contact Timeline (days): 0  Date Worked: 08/01/23  First Appointment Available: 08/03/23  Appointment Date: 08/03/23  First Available Date vs. Scheduled Date (days): 0  Multiple appointments: Yes     Treatment  Current Status: Active    Surgery: Completed  Surgical Oncologist: Dr. Jaxon Carmen  Type of Surgery: Gloossectomy  Surgery Schedule Date: 07/12/23       Radiation Oncologist: Dr. Mane Lopes    Procedures: Biopsy; CT; PET scan  Biopsy Schedule Date: 06/12/23  CT Schedule Date: 07/07/23  PET Scan Schedule Date: 07/07/23    General Referrals: Dietitian; Speech Pathology  Dietitian Name: Waleska Brown  Dietitisherri Referral Date: 08/04/23    HPV: Negative    Radiation Oncologist: Dr. Mane Lopes    Support Systems: Spouse/significant other     Acuity      Follow Up  No follow-ups on file.

## 2023-08-01 NOTE — PROGRESS NOTES
[facility] Radiation Oncology Consult Note    Referring provider: Sindi Robbins NP    Assessment:  Suleiman Chavez is a 83 y.o. 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 -SM and no SARAN.  PET/CT with subcm VEL nodule, no prior axial imaging of the chest.  needs follow up  ECOG: (1) Restricted in physically strenuous activity, ambulatory and able to do work of light nature    Dentist: needs, see below  Speech: has referral  Nutrition: to see tomorrow  PEG: tolerating PO well for now, will monitor closely during RT given prior weight loss.   Tobacco: has quit smoking    Plan:  Treatment options were discussed with the patient including adjuvant radiation and no adjuvant therapy.  We discussed the goals of treatment to be curative  The risks, benefits, scheduling, alternatives to and rationale of radiation therapy were explained in detail.    After this discussion, I recommended adjuvant radiation. He would like to consider his options but was favoring proceeding with treatment. He will discuss with his family and let me know tomorrow.  Consent was obtained and all questions were answered to the best of my ability  He needs dental clearance (and I suspect extractions) and they will either see a family dentist or I offered to set them up with a local dentist but need to to this asap.   He was given our contact information, and he was told that he could call our clinic at any time if he has any questions or concerns.    HEAD & NECK INFORMED CONSENT: Acute and delayed side effects of head and neck radiation, including but not limited to fatigue, redness of the skin, desquamation, dysphagia, odynophagia, mucositis, long term difficulties with swallowing, feeding tube dependency, fibrosis, xerostomia, hypothyroidism, infection as well as rare but catastrophic injury to the spinal cord, mandible, brainstem, and carotid  arteries were discussed with the patient in great detail today.    I reviewed the rationale and goal of the proposed treatment course. The radiotherapeutic procedure with associated side effects and potential complications were explained. He and his family had the opportunity to ask questions which were answered to the best of my knowledge. The patient voiced understanding of the above and has elected to proceed with treatment. Consent form was signed and the patient was given our contact information should further questions arise.      Radiation Treatment Details:   I plan to treat the oral tongue operative bed and ipsilateral involved neck to 60 Gy in 30 fractions and the contralateral neck (given N2b and DOI > 4mm) to 54 Gy in 30 fractions.       Oncologic History:  He has a history of DM, HTN, tobacco use.   7/7/23: PET/CT with FDG avid left level II lymph node. No definitive FDG avid lesion in the tongue. No distant mets.   7/7/23: CT neck with no RP or right sided LN. Cannot visualize tongue lesion. No mandibular erosion.  7/12/23: left partial glossectomy and left neck dissection of levels IB-IV  Path: invasive SCCa, G1-2, 1.5cm with 5mm DOI. -SM by at least 7mm. No LVSI, no PNI. 2/26 LN+ with no SARAN.       Possibility of pregnancy: N/A  History of prior irradiation: No  History of prior systemic anti-cancer therapy: No  History of collagen vascular disease: No  Implanted electronic device (pacer/defib/nerve stimulator): No     History of Present Illness:  Suleiman Mayapatrizia presents today to discuss the role of radiotherapy.     He presented with non healing healing lesion on the left tongue, no trismus, otalgia, voice changes, hemoptysis. He is doing well post op, advancing his diet. Overall down 30 lbs, 9 lbs since surgery. Has discomfort following surgery but not needing anything more than PRN tylenol. Independent in ADLs. Here with daughter and wife.     Review of Systems:  ROS as above    Social  "History:  Social History     Tobacco Use    Smoking status: Former     Current packs/day: 0.00     Types: Cigarettes     Quit date: 2004     Years since quittin.1     Passive exposure: Past    Smokeless tobacco: Never   Substance Use Topics    Alcohol use: No     Alcohol/week: 0.0 standard drinks of alcohol    Drug use: No       Past Medical History:  Past Medical History:   Diagnosis Date    Bilateral ocular hypertension     Diabetes mellitus     Glaucoma     Hypertension     Nuclear cataract 2014       Past Surgical History:   Procedure Laterality Date    CHOLECYSTECTOMY      GLOSSECTOMY Left 2023    Procedure: GLOSSECTOMY;  Surgeon: Jaxon Carmen MD;  Location: [address];  Service: ENT;  Laterality: Left;    RADICAL NECK DISSECTION Left 2023    Procedure: DISSECTION, NECK, RADICAL;  Surgeon: Jaxon Carmen MD;  Location: [facility] OR [address] [place] [address];  Service: ENT;  Laterality: Left;       Cancer-related family history includes Cancer in his father and mother.    Medications:  Current Outpatient Medications on File Prior to Visit   Medication Sig Dispense Refill    acetaminophen (TYLENOL) 500 MG tablet Take 1,000 mg by mouth 2 (two) times a day.      amLODIPine (NORVASC) 5 MG tablet Take 1 tablet (5 mg total) by mouth 2 (two) times daily. 60 tablet 5    aspirin (ECOTRIN) 81 MG EC tablet Take 81 mg by mouth once daily.      catheter 14-16 Fr-" Misc 1 Units by Misc.(Non-Drug; Combo Route) route 3 (three) times daily. 1 Units by Misc.(Non-Drug; Combo Route) route 4 (four) times daily, indefinitely. (Patient not taking: Reported on 2023) 90 each 99    cephALEXin (KEFLEX) 500 MG capsule Take 1 capsule (500 mg total) by mouth every 8 (eight) hours. 15 capsule 0    d-mannose Powd Take by mouth.      diclofenac (VOLTAREN) 75 MG EC tablet Take 1 tablet (75 mg total) by mouth 2 (two) times daily as needed. (Patient not taking: Reported on 2023) 60 tablet 1    docusate sodium " (COLACE) 100 MG capsule Take 1 capsule (100 mg total) by mouth 2 (two) times daily as needed for Constipation. (Patient not taking: Reported on 7/17/2023) 60 capsule 0    doxazosin (CARDURA) 4 MG tablet Take 1 tablet (4 mg total) by mouth every evening. 30 tablet 0    FLUAD QUAD 2022-23,65Y UP,,PF, 60 mcg (15 mcg x 4)/0.5 mL Syrg       gabapentin (NEURONTIN) 300 MG capsule TAKE 1 CAPSULE BY MOUTH THREE TIMES DAILY (Patient not taking: Reported on 7/17/2023) 90 capsule 5    hydroCHLOROthiazide (HYDRODIURIL) 12.5 MG Tab Take 1 tablet (12.5 mg total) by mouth once daily. 30 tablet 5    HYDROcodone-acetaminophen (NORCO) 5-325 mg per tablet Take 1 tablet by mouth every 6 (six) hours as needed for Pain. 10 tablet 0    latanoprost 0.005 % ophthalmic solution INSTILL 1 DROP INTO BOTH EYES EVERY EVENING 7.5 mL 3    losartan (COZAAR) 100 MG tablet TAKE 1 TABLET BY MOUTH EVERY DAY 90 tablet 3    metFORMIN (GLUCOPHAGE) 500 MG tablet TAKE 1 TABLET BY MOUTH EVERY MORNING AND 2 TABLETS EVERY EVENING 270 tablet 1    ondansetron (ZOFRAN-ODT) 8 MG TbDL Dissolve 1 tablet (8 mg total) by mouth every 6 (six) hours as needed (nausea). (Patient not taking: Reported on 7/17/2023) 10 tablet 0    potassium chloride (KLOR-CON) 10 MEQ TbSR Take 1 tablet (10 mEq total) by mouth once daily. (Patient not taking: Reported on 7/17/2023) 5 tablet 0    PREVNAR 20, PF, 0.5 mL Syrg injection       rOPINIRole (REQUIP) 1 MG tablet TAKE 1 TABLET BY MOUTH EVERY EVENING (Patient not taking: Reported on 7/17/2023) 30 tablet 5    senna-docusate 8.6-50 mg (SENNA WITH DOCUSATE SODIUM) 8.6-50 mg per tablet Take 1 tablet by mouth once daily. (Patient not taking: Reported on 7/17/2023) 60 tablet 0    sennosides 8.8 mg/5 ml (SENNA) 8.8 mg/5 mL syrup Take 10 mLs by mouth nightly. 236 mL 0    tamsulosin (FLOMAX) 0.4 mg Cap TAKE two CAPSULES BY MOUTH EVERY EVENING (Patient not taking: Reported on 7/17/2023) 180 capsule 3    traMADoL (ULTRAM) 50 mg tablet Take 1 tablet  "(50 mg total) by mouth 2 (two) times daily as needed. (Patient not taking: Reported on 7/17/2023) 60 tablet 0     No current facility-administered medications on file prior to visit.       Allergies:  Review of patient's allergies indicates:  No Known Allergies    Exam:  Vitals:    08/03/23 0754   BP: (!) 116/57   Pulse: 91   Resp: 17   SpO2: 97%   Height: 6' 1" (1.854 m)     Constitutional: Pleasant 83 y.o. male in no acute distress.  Well nourished. Well groomed.   HEENT: well healing left lateral tongue operative bed. No appreciated FOM lesions, other oral tongue, buccal, tonsillar, soft palate lesions. Poor dentition. House Brackmann I  Cardiovascular: Upper extremities warm to touch  Lungs: No audible wheezing.  Normal effort.   Musculoskeletal: No gross MSK deformities. Ambulates with a walker  Skin: No rashes appreciated.  Psych: Alert and oriented with appropriate mood and affect.  Neuro:  Grossly normal.    Data Review:  Information obtained from Suleiman Chavez and via chart review.     Independent Interpretation of Test(s): PET/CT from 7/7/23 was personally reviewed.        New Lopes MD  Radiation Oncology        "

## 2023-08-01 NOTE — TELEPHONE ENCOUNTER
Called & spoke with pt's wife, scheduled this Thursday at 8am with . They are aware & agreeable. Nutrition appt already made & message sent to arrange speech.

## 2023-08-02 ENCOUNTER — TELEPHONE (OUTPATIENT)
Dept: SPEECH THERAPY | Facility: HOSPITAL | Age: 83
End: 2023-08-02
Payer: MEDICARE

## 2023-08-03 ENCOUNTER — TUMOR BOARD CONFERENCE (OUTPATIENT)
Dept: OTOLARYNGOLOGY | Facility: CLINIC | Age: 83
End: 2023-08-03
Payer: MEDICARE

## 2023-08-03 ENCOUNTER — OFFICE VISIT (OUTPATIENT)
Dept: RADIATION ONCOLOGY | Facility: CLINIC | Age: 83
End: 2023-08-03
Payer: MEDICARE

## 2023-08-03 ENCOUNTER — PATIENT MESSAGE (OUTPATIENT)
Dept: RADIATION ONCOLOGY | Facility: CLINIC | Age: 83
End: 2023-08-03

## 2023-08-03 VITALS
OXYGEN SATURATION: 97 % | BODY MASS INDEX: 23.48 KG/M2 | SYSTOLIC BLOOD PRESSURE: 116 MMHG | HEART RATE: 91 BPM | RESPIRATION RATE: 17 BRPM | HEIGHT: 73 IN | DIASTOLIC BLOOD PRESSURE: 57 MMHG

## 2023-08-03 DIAGNOSIS — C02.9 SQUAMOUS CELL CANCER OF TONGUE: ICD-10-CM

## 2023-08-03 PROCEDURE — 99999 PR PBB SHADOW E&M-EST. PATIENT-LVL IV: ICD-10-PCS | Mod: PBBFAC,HCNC,, | Performed by: STUDENT IN AN ORGANIZED HEALTH CARE EDUCATION/TRAINING PROGRAM

## 2023-08-03 PROCEDURE — 3074F PR MOST RECENT SYSTOLIC BLOOD PRESSURE < 130 MM HG: ICD-10-PCS | Mod: HCNC,CPTII,S$GLB, | Performed by: STUDENT IN AN ORGANIZED HEALTH CARE EDUCATION/TRAINING PROGRAM

## 2023-08-03 PROCEDURE — 3074F SYST BP LT 130 MM HG: CPT | Mod: HCNC,CPTII,S$GLB, | Performed by: STUDENT IN AN ORGANIZED HEALTH CARE EDUCATION/TRAINING PROGRAM

## 2023-08-03 PROCEDURE — 1159F MED LIST DOCD IN RCRD: CPT | Mod: HCNC,CPTII,S$GLB, | Performed by: STUDENT IN AN ORGANIZED HEALTH CARE EDUCATION/TRAINING PROGRAM

## 2023-08-03 PROCEDURE — 1111F PR DISCHARGE MEDS RECONCILED W/ CURRENT OUTPATIENT MED LIST: ICD-10-PCS | Mod: HCNC,CPTII,S$GLB, | Performed by: STUDENT IN AN ORGANIZED HEALTH CARE EDUCATION/TRAINING PROGRAM

## 2023-08-03 PROCEDURE — 1111F DSCHRG MED/CURRENT MED MERGE: CPT | Mod: HCNC,CPTII,S$GLB, | Performed by: STUDENT IN AN ORGANIZED HEALTH CARE EDUCATION/TRAINING PROGRAM

## 2023-08-03 PROCEDURE — 1101F PR PT FALLS ASSESS DOC 0-1 FALLS W/OUT INJ PAST YR: ICD-10-PCS | Mod: HCNC,CPTII,S$GLB, | Performed by: STUDENT IN AN ORGANIZED HEALTH CARE EDUCATION/TRAINING PROGRAM

## 2023-08-03 PROCEDURE — 1101F PT FALLS ASSESS-DOCD LE1/YR: CPT | Mod: HCNC,CPTII,S$GLB, | Performed by: STUDENT IN AN ORGANIZED HEALTH CARE EDUCATION/TRAINING PROGRAM

## 2023-08-03 PROCEDURE — 99205 PR OFFICE/OUTPT VISIT, NEW, LEVL V, 60-74 MIN: ICD-10-PCS | Mod: HCNC,S$GLB,, | Performed by: STUDENT IN AN ORGANIZED HEALTH CARE EDUCATION/TRAINING PROGRAM

## 2023-08-03 PROCEDURE — 3078F DIAST BP <80 MM HG: CPT | Mod: HCNC,CPTII,S$GLB, | Performed by: STUDENT IN AN ORGANIZED HEALTH CARE EDUCATION/TRAINING PROGRAM

## 2023-08-03 PROCEDURE — 99499 UNLISTED E&M SERVICE: CPT | Mod: HCNC,S$GLB,, | Performed by: STUDENT IN AN ORGANIZED HEALTH CARE EDUCATION/TRAINING PROGRAM

## 2023-08-03 PROCEDURE — 3078F PR MOST RECENT DIASTOLIC BLOOD PRESSURE < 80 MM HG: ICD-10-PCS | Mod: HCNC,CPTII,S$GLB, | Performed by: STUDENT IN AN ORGANIZED HEALTH CARE EDUCATION/TRAINING PROGRAM

## 2023-08-03 PROCEDURE — 99999 PR PBB SHADOW E&M-EST. PATIENT-LVL IV: CPT | Mod: PBBFAC,HCNC,, | Performed by: STUDENT IN AN ORGANIZED HEALTH CARE EDUCATION/TRAINING PROGRAM

## 2023-08-03 PROCEDURE — 99499 RISK ADDL DX/OHS AUDIT: ICD-10-PCS | Mod: HCNC,S$GLB,, | Performed by: STUDENT IN AN ORGANIZED HEALTH CARE EDUCATION/TRAINING PROGRAM

## 2023-08-03 PROCEDURE — 1126F AMNT PAIN NOTED NONE PRSNT: CPT | Mod: HCNC,CPTII,S$GLB, | Performed by: STUDENT IN AN ORGANIZED HEALTH CARE EDUCATION/TRAINING PROGRAM

## 2023-08-03 PROCEDURE — 3288F PR FALLS RISK ASSESSMENT DOCUMENTED: ICD-10-PCS | Mod: HCNC,CPTII,S$GLB, | Performed by: STUDENT IN AN ORGANIZED HEALTH CARE EDUCATION/TRAINING PROGRAM

## 2023-08-03 PROCEDURE — 1159F PR MEDICATION LIST DOCUMENTED IN MEDICAL RECORD: ICD-10-PCS | Mod: HCNC,CPTII,S$GLB, | Performed by: STUDENT IN AN ORGANIZED HEALTH CARE EDUCATION/TRAINING PROGRAM

## 2023-08-03 PROCEDURE — 1126F PR PAIN SEVERITY QUANTIFIED, NO PAIN PRESENT: ICD-10-PCS | Mod: HCNC,CPTII,S$GLB, | Performed by: STUDENT IN AN ORGANIZED HEALTH CARE EDUCATION/TRAINING PROGRAM

## 2023-08-03 PROCEDURE — 99205 OFFICE O/P NEW HI 60 MIN: CPT | Mod: HCNC,S$GLB,, | Performed by: STUDENT IN AN ORGANIZED HEALTH CARE EDUCATION/TRAINING PROGRAM

## 2023-08-03 PROCEDURE — 3288F FALL RISK ASSESSMENT DOCD: CPT | Mod: HCNC,CPTII,S$GLB, | Performed by: STUDENT IN AN ORGANIZED HEALTH CARE EDUCATION/TRAINING PROGRAM

## 2023-08-03 NOTE — PROGRESS NOTES
Presenting Hospital / Clinic: [facility]  Virtual Tumor Board Conference: Virtual  Presenter: Dr. Carmen  Date Presented to Tumor Board: 08/03/23  Specialties Present: Medical Oncology; Radiation Oncology; Pathology; Navigation; Head and Neck; Radiology; Speech Pathology  Presentation at Cancer Conference: Prospective  Cancer Type: Head and neck cancer  Recommended Plan Note: adjuvant radiation

## 2023-08-04 ENCOUNTER — CLINICAL SUPPORT (OUTPATIENT)
Dept: HEMATOLOGY/ONCOLOGY | Facility: CLINIC | Age: 83
End: 2023-08-04
Payer: MEDICARE

## 2023-08-04 DIAGNOSIS — C02.9 SQUAMOUS CELL CANCER OF TONGUE: Primary | ICD-10-CM

## 2023-08-04 DIAGNOSIS — Z71.3 NUTRITIONAL COUNSELING: ICD-10-CM

## 2023-08-04 PROCEDURE — 97802 MEDICAL NUTRITION INDIV IN: CPT | Mod: 95,,, | Performed by: DIETITIAN, REGISTERED

## 2023-08-04 PROCEDURE — 97802 PR MED NUTR THER, 1ST, INDIV, EA 15 MIN: ICD-10-PCS | Mod: 95,,, | Performed by: DIETITIAN, REGISTERED

## 2023-08-04 NOTE — PROGRESS NOTES
"Oncology Nutrition Assessment for Medical Nutrition Therapy  Initial Visit    Suleiman Chavez   1940    Referring Provider: Sindi Robbins NP      Reason for Visit: nutrition counseling and education    The patient location is: [place]  The chief complaint leading to consultation is: nutrition referral    Visit type: audiovisual    Face to Face time with patient: 13 minutes  20 minutes of total time spent on the encounter, which includes face to face time and non-face to face time preparing to see the patient (eg, review of tests), Obtaining and/or reviewing separately obtained history, Documenting clinical information in the electronic or other health record, Independently interpreting results (not separately reported) and communicating results to the patient/family/caregiver, or Care coordination (not separately reported).     Each patient to whom he or she provides medical services by telemedicine is:  (1) informed of the relationship between the physician and patient and the respective role of any other health care provider with respect to management of the patient; and (2) notified that he or she may decline to receive medical services by telemedicine and may withdraw from such care at any time.    PMHx:   Past Medical History:   Diagnosis Date    Bilateral ocular hypertension     Diabetes mellitus     Glaucoma     Hypertension     Nuclear cataract 12/17/2014       Nutrition Assessment    This is a 83 y.o.male with a medical diagnosis of SCC of tongue s/p partial glossectomy and L neck dissection 7/12/23. Plan for adjuvant radiation. He reports he has been able to eat soft foods (grits, eggs, beans, rice, chopped up meatballs & spaghetti). He is supplementing with 3-4 Ensure Max Protein/day. He reports doing pretty well with water intake.     Weight: 84.9 kg (187 lb 2.7 oz) - 7/11 clinic weight  Height: 6' 1" (1.854 m)  BMI: 23.48    Usual BW: 210lb  Weight Change: 25lb loss over 9 months    Allergies: " "Patient has no known allergies.    Current Medications:  Current Outpatient Medications:     acetaminophen (TYLENOL) 500 MG tablet, Take 1,000 mg by mouth 2 (two) times a day., Disp: , Rfl:     amLODIPine (NORVASC) 5 MG tablet, Take 1 tablet (5 mg total) by mouth 2 (two) times daily., Disp: 60 tablet, Rfl: 5    aspirin (ECOTRIN) 81 MG EC tablet, Take 81 mg by mouth once daily., Disp: , Rfl:     catheter 14-16 Fr-" Misc, 1 Units by Misc.(Non-Drug; Combo Route) route 3 (three) times daily. 1 Units by Misc.(Non-Drug; Combo Route) route 4 (four) times daily, indefinitely. (Patient not taking: Reported on 7/17/2023), Disp: 90 each, Rfl: 99    cephALEXin (KEFLEX) 500 MG capsule, Take 1 capsule (500 mg total) by mouth every 8 (eight) hours., Disp: 15 capsule, Rfl: 0    d-mannose Powd, Take by mouth., Disp: , Rfl:     diclofenac (VOLTAREN) 75 MG EC tablet, Take 1 tablet (75 mg total) by mouth 2 (two) times daily as needed. (Patient not taking: Reported on 7/17/2023), Disp: 60 tablet, Rfl: 1    docusate sodium (COLACE) 100 MG capsule, Take 1 capsule (100 mg total) by mouth 2 (two) times daily as needed for Constipation. (Patient not taking: Reported on 7/17/2023), Disp: 60 capsule, Rfl: 0    doxazosin (CARDURA) 4 MG tablet, Take 1 tablet (4 mg total) by mouth every evening., Disp: 30 tablet, Rfl: 0    FLUAD QUAD 2022-23,65Y UP,,PF, 60 mcg (15 mcg x 4)/0.5 mL Syrg, , Disp: , Rfl:     gabapentin (NEURONTIN) 300 MG capsule, TAKE 1 CAPSULE BY MOUTH THREE TIMES DAILY (Patient not taking: Reported on 7/17/2023), Disp: 90 capsule, Rfl: 5    hydroCHLOROthiazide (HYDRODIURIL) 12.5 MG Tab, Take 1 tablet (12.5 mg total) by mouth once daily., Disp: 30 tablet, Rfl: 5    HYDROcodone-acetaminophen (NORCO) 5-325 mg per tablet, Take 1 tablet by mouth every 6 (six) hours as needed for Pain., Disp: 10 tablet, Rfl: 0    latanoprost 0.005 % ophthalmic solution, INSTILL 1 DROP INTO BOTH EYES EVERY EVENING, Disp: 7.5 mL, Rfl: 3    losartan " (COZAAR) 100 MG tablet, TAKE 1 TABLET BY MOUTH EVERY DAY, Disp: 90 tablet, Rfl: 3    metFORMIN (GLUCOPHAGE) 500 MG tablet, TAKE 1 TABLET BY MOUTH EVERY MORNING AND 2 TABLETS EVERY EVENING, Disp: 270 tablet, Rfl: 1    ondansetron (ZOFRAN-ODT) 8 MG TbDL, Dissolve 1 tablet (8 mg total) by mouth every 6 (six) hours as needed (nausea). (Patient not taking: Reported on 7/17/2023), Disp: 10 tablet, Rfl: 0    potassium chloride (KLOR-CON) 10 MEQ TbSR, Take 1 tablet (10 mEq total) by mouth once daily. (Patient not taking: Reported on 7/17/2023), Disp: 5 tablet, Rfl: 0    PREVNAR 20, PF, 0.5 mL Syrg injection, , Disp: , Rfl:     rOPINIRole (REQUIP) 1 MG tablet, TAKE 1 TABLET BY MOUTH EVERY EVENING (Patient not taking: Reported on 7/17/2023), Disp: 30 tablet, Rfl: 5    senna-docusate 8.6-50 mg (SENNA WITH DOCUSATE SODIUM) 8.6-50 mg per tablet, Take 1 tablet by mouth once daily. (Patient not taking: Reported on 7/17/2023), Disp: 60 tablet, Rfl: 0    sennosides 8.8 mg/5 ml (SENNA) 8.8 mg/5 mL syrup, Take 10 mLs by mouth nightly., Disp: 236 mL, Rfl: 0    tamsulosin (FLOMAX) 0.4 mg Cap, TAKE two CAPSULES BY MOUTH EVERY EVENING (Patient not taking: Reported on 7/17/2023), Disp: 180 capsule, Rfl: 3    traMADoL (ULTRAM) 50 mg tablet, Take 1 tablet (50 mg total) by mouth 2 (two) times daily as needed. (Patient not taking: Reported on 7/17/2023), Disp: 60 tablet, Rfl: 0    Food/medication interactions noted: none    Vitamins/Supplements: none reported    Labs: Reviewed    Nutrition Diagnosis    Problem: moderate malnutrition  Etiology: difficulty chewing  Signs/Symptoms: reports of inadequate PO intake, weight loss, and both fat & muscle wasting present    Nutrition Intervention    Nutrition Prescription   2123 kcals (25kcal/kg)  102g protein (1.2g/kg)   2123mL fluid (25mL/kg)    Recommendations:  Eat small frequent meals/snacks  Make meals/snacks high in calories and protein - examples reviewed  Continue to supplement with 3-4  Ensure/day (try 2 Ensure Plus and 1-2 Ensure Max Protein)  Drink at least 64oz fluid/day    Materials Provided/Reviewed: Soft and Moist High-Protein Menu Ideas handout and Ensure coupons (list emailed and coupons mailed)    Nutrition Monitoring and Evaluation    Monitor: diet education needs, energy intake, and weight status    Goals: weight maintenance    Follow up: weekly in Radiation Therapy    Communication to referring provider/care team: note available in chart    Counseling time: 13 minutes    Waleska Brown MS, RD, LDN

## 2023-08-08 ENCOUNTER — CLINICAL SUPPORT (OUTPATIENT)
Dept: SPEECH THERAPY | Facility: HOSPITAL | Age: 83
End: 2023-08-08
Payer: MEDICARE

## 2023-08-08 DIAGNOSIS — C02.9 SQUAMOUS CELL CANCER OF TONGUE: ICD-10-CM

## 2023-08-08 DIAGNOSIS — R47.1 DYSARTHRIA: Primary | ICD-10-CM

## 2023-08-08 PROCEDURE — 92610 EVALUATE SWALLOWING FUNCTION: CPT | Mod: GN,HCNC | Performed by: SPEECH-LANGUAGE PATHOLOGIST

## 2023-08-08 NOTE — PROGRESS NOTES
REFERRING PHYSICIAN:  Sindi Robbins NP, Head and Neck Surgical Oncology  LENGTH OF VISIT:  1 hour    REASON FOR REFERRAL:  Suleiman Chavez, age 83, was referred by NP Sindi Robbins and Dr. Jaxon Carmen, head and neck surgical oncologist, for pre-treatment assessment and counseling in anticipation of XRT scheduled tentatively to begin 8/23/23 to treat L oral tongue cancer.  He was accompanied by his wife.    Mr. Chavez had a partial glossectomy 7/12/23 with Dr. Carmen with no reconstruction (closed primarily or by secondary intention).   He has returned to a mechanical soft diet with thin liquids, but reported problems with food sometimes getting stuck in posterior L of the his mouth and being unable to dislodge it with his tongue.    He is scheduled to have all his remaining teeth extracted tomorrow in anticipation of radiation therapy.    MEDICAL HISTORY:  Past Medical History:   Diagnosis Date    Bilateral ocular hypertension     Diabetes mellitus     Glaucoma     Hypertension     Nuclear cataract 12/17/2014       SURGICAL HISTORY:  Past Surgical History:   Procedure Laterality Date    CHOLECYSTECTOMY      GLOSSECTOMY Left 7/12/2023    Procedure: GLOSSECTOMY;  Surgeon: Jaxon Carmen MD;  Location: [facility] OR [address] [place] [address];  Service: ENT;  Laterality: Left;    RADICAL NECK DISSECTION Left 7/12/2023    Procedure: DISSECTION, NECK, RADICAL;  Surgeon: Jaxon Carmen MD;  Location: [facility] OR [address] [place] [address];  Service: ENT;  Laterality: Left;       ONCOLOGIC and TREATMENT HISTORY of problem for which patient is referred:  Oncology History   Squamous cell cancer of tongue   6/16/2023 Initial Diagnosis    Squamous cell cancer of tongue     7/12/2023 Surgery    1. Left partial glossectomy  2. Left modified neck dissection of levels 1B through 4     7/31/2023 Cancer Staged     Cancer Staging   Squamous cell cancer of tongue  Staging form: Oral Cavity, AJCC 8th Edition  - Pathologic stage from 7/31/2023: Stage ELENITA (pT1, pN2b,  cM0) - Signed by Sindi Robbins NP on 7/31/2023 7/31/2023 Cancer Staged    Staging form: Oral Cavity, AJCC 8th Edition  - Pathologic stage from 7/31/2023: Stage ELENITA (pT1, pN2b, cM0)           SWALLOWING HISTORY:  As above.  Prior to surgery had lost weight b/c of pain associated with eating due to lesion (down about 30 lbs).  Reported that he has no pain with swallowing now.  Has returned to swallowing his pills whole now.  Drinking 3-4 Ensure/day (2 high protein, 2 high calorie).    Before onset of pain with lesion, he consumed a regular consistency diet with thin liquids.     FAMILY HISTORY:  Family History   Problem Relation Age of Onset    Cancer Mother         liver    Cancer Father         colon    No Known Problems Sister     No Known Problems Sister     Amblyopia Neg Hx     Blindness Neg Hx     Cataracts Neg Hx     Diabetes Neg Hx     Glaucoma Neg Hx     Hypertension Neg Hx     Macular degeneration Neg Hx     Retinal detachment Neg Hx     Strabismus Neg Hx     Stroke Neg Hx     Thyroid disease Neg Hx        SOCIAL HISTORY:  Mr. Chavez served 7 years in the [employer] and is retired from sales.  He enjoys making knives, going to the [employer], and playing [employer] and [employer].  He and Mrs. Chavez live in [place].  They have a daughter who is a  with [employer].  Her  had H&N cancer and was treated with CRT and has been very supportive of mr. Chavez.  The couple has two young adult children who have both graduated in civil engineering from [employer].    Tobacco use:  Former smoker; quit 5/28/2004 per EMR.  ETOH use:  No per EMR.    BEHAVIOR:  Mr. Chavez was a very pleasant man who ambulated with a walker.  He was seen with his wife.  His affect and social interaction were appropriate for situation and setting.  He was fully cooperative for the assessment. Results are considered indicative of his current levels of speech and swallowing functioning.    HEARING:  Subjectively, within  normal limits.    ASSESSMENT:  Oral Peripheral:     Mandible: 55 mm via TheraBite measuring tool using gum to gum placement.  Average with dentition is 40 mm.  Still with some pain in L TMJ with opening widely since surgery.   Lips:  within normal limits  for rounding, spreading.  No evidence of L lower lip weakness s/p ND.   Tongue:  Healed surgical site on L lateral oral tongue.  Tongue tip deviates slightly to L and entire tongue deviates slightly to L on protrusion.  Easily able to lateralize to L, but not able to meet the R oral commissure (without help from the lip), elevation, retroflexion.    Velum and Hard Palate:  within normal limits    Reflexes:  Gag: n/a Swallow: +   Dentition:  Irregular and in poor condition; all remaining teeth to be extracted tomorrow.     Oropharynx: within normal limits     Speech:  100% intelligible with subtle distortions related to tongue ROM and strength post-op.      Swallowing:  Deferred direct today.  No concerns.    Voice/Resonance:  Within normal limits.       COUNSELING:  Suleiman Chavez and his wife were engaged in discussion of normal swallowing function, possible effects of XRT, and therapeutic intervention.    Reviewed the basics of the normal pharyngeal swallow using Follow the Swallow.    Possible side effects related to treatment:  Xerostomia:   Provided written resources for OTC and homemade (baking soda rinses) treatments for dry mouth.    Irritation:  Discussed that the treatment itself can cause irritation to the tissues plus certain tastes and textures may be found to be irritating as well.    Changes in taste: Discussed this possibility and the resource of Nutrition to assist in making diet changes that may help address this.  Radiation fibrosis: Described this effect and the possible need for continued use of swallowing exercises for an extended time (including lifelong) after completion of XRT to keep the structures involved in swallowing functioning at  their maximum potential for safe swallowing.    Lymphedema:  Described this and its possible emergence following surgery and/or XRT and that it is treatable.    Reviewed and provided written instructions for swallowing strategies and exercises as follows:  Strategies/techniques:  Appropriate seating  Smaller size of sips and bites  Swallowing one sip and bite at a time  Rate of eating and drinking  Dry swallows  Alternate liquid and solid swallows    Exercises:  Reviewed each and its rationale in supporting various aspects of swallowing function.  Suleiman Chavez was instructed to initiate these 1-2 weeks prior to onset of XRT and continue to perform them 4 times per day, 10 repetitions throughout his XRT and 3 months beyond:  Effortful swallow (as a stand-along exercise and to used with first 5 bites of any meal/snack)  Open widely (yawn)       Discussed the goal of swallowing whatever is possible, as much as possible throughout  treatment for the best possible outcome (which may be relative to a given patient) and using a PEG tube supplementally to be sure he is obtaining optimum nutrition and calories to adequately maintain his strength and stamina. At this point, he reported that Dr. Lopes will monitor for the need for a PEG.  Discussed that he may need to change the consistency of what he can swallow (e.g., if chewable solids are too hard/painful/irritating, change to pureed foods or liquids) and this is perfectly acceptable within the goal of continuing to swallow throughout the treatment and beyond.  Advised that most patients begin to experience swallowing problems about half-way through XRT.  Should he begin to exhibit any swallowing problems such as signs/symptoms of aspiration, a Modified Barium Swallow Study may be ordered to assess his swallowing function.  Reiterated that the swallowing exercises are done in an effort to prevent or limit swallowing function problems during or after XRT and that he  should continue to do them even if he does not think he is having a problem with swallowing safely.    IMPRESSIONS:   This 82 yo man appears to present with   Mild peripheral dysarthria s/p surgery.  Swallowing function within functional limits; unable to tolerate regular consistency diet s/p surgery and will have remaining teeth extracted tomorrow.  At-risk worsening speech and/or swallowing function during/after XRT.  S/p L partial glossectomy and L MND 7/12/23.  History Stage ELENITA (pT1, pN2b, cM0) of L oral tongue      RECOMMENDATIONS/PLAN OF CARE:  It is felt that Mr. Chavez would benefit from  1.  Continuation of his current mechanical soft consistency diet with thin liquids (unlikely to progress to regular masticated solids after extractions tomorrow and before he might otherwise need to change diet consistencies during XRT) using the above strategies and common aspiration precautions, including, but not limited to  monitoring for any signs/symptoms of aspiration (such as wet/gurgly voice that does not clear with coughing, inability to make any voice sounds, any persistent coughing with oral intake, otherwise unexplained fever, unexplained increased or new difficulty or discomfort breathing, unexplained increase in  sleepiness/lethargy/significant fatigue, unexplained increase or new onset confusion or change in cognitive functioning, or any other unexplained change in health or well-being that could be related to swallowing).  2.  Changing diet consistency as needed to facilitate swallowing.  3.  Begin swallowing exercises 1-2 weeks prior to onset of XRT and continue 4x/day through at least 3 months after completion of treatment.  4.  Monitor weight and hydration.  5.  Return to my clinic at mid-point of radiation and again 1 month after completion of treatment in conjunction with return to Dr. Carmen's clinic.  6.  Consider course of ST to address peripheral dysarthria and lingual ROM once weekly with home  program for 4-8 weeks.

## 2023-08-09 ENCOUNTER — PATIENT MESSAGE (OUTPATIENT)
Dept: SPEECH THERAPY | Facility: HOSPITAL | Age: 83
End: 2023-08-09
Payer: MEDICARE

## 2023-08-09 ENCOUNTER — DOCUMENTATION ONLY (OUTPATIENT)
Dept: RADIATION ONCOLOGY | Facility: CLINIC | Age: 83
End: 2023-08-09
Payer: MEDICARE

## 2023-08-09 NOTE — PATIENT INSTRUCTIONS
Swallowing:  Do 10 repetitions of each, 4x/day.  Effortful swallow  Open widely (yawn)  Also, use an effortful swallow with the first 5 bites of any meal/snack.    Tongue exercises (2-3x/day):  Stick tongue out and hold 10 seconds. Do 10.  Move tongue to each corner of mouth and hold for 10 seconds.  Do 10.  Trace arches with tongue; hold each extreme for 5 seconds.  Do 10.

## 2023-08-09 NOTE — PLAN OF CARE
IMPRESSIONS:   This 82 yo man appears to present with   Mild peripheral dysarthria s/p surgery.  Swallowing function within functional limits; unable to tolerate regular consistency diet s/p surgery and will have remaining teeth extracted tomorrow.  At-risk worsening speech and/or swallowing function during/after XRT.  S/p L partial glossectomy and L MND 7/12/23.  History Stage ELENITA (pT1, pN2b, cM0) of L oral tongue      RECOMMENDATIONS/PLAN OF CARE:  It is felt that Mr. Chavez would benefit from  1.  Continuation of his current mechanical soft consistency diet with thin liquids (unlikely to progress to regular masticated solids after extractions tomorrow and before he might otherwise need to change diet consistencies during XRT) using the above strategies and common aspiration precautions, including, but not limited to  monitoring for any signs/symptoms of aspiration (such as wet/gurgly voice that does not clear with coughing, inability to make any voice sounds, any persistent coughing with oral intake, otherwise unexplained fever, unexplained increased or new difficulty or discomfort breathing, unexplained increase in  sleepiness/lethargy/significant fatigue, unexplained increase or new onset confusion or change in cognitive functioning, or any other unexplained change in health or well-being that could be related to swallowing).  2.  Changing diet consistency as needed to facilitate swallowing.  3.  Begin swallowing exercises 1-2 weeks prior to onset of XRT and continue 4x/day through at least 3 months after completion of treatment.  4.  Monitor weight and hydration.  5.  Return to my clinic at mid-point of radiation and again 1 month after completion of treatment in conjunction with return to Dr. Carmen's clinic.  6.  Consider course of ST to address peripheral dysarthria and lingual ROM once weekly with home program for 4-8 weeks.

## 2023-08-09 NOTE — PROGRESS NOTES
I spoke with Dr. Dos Santos of dentistry today. He underwent extraction of all remaining teeth today. Will plan for CT simulation 8/16 with plan to start RT 8/23.    New Lopes MD  Radiation Oncology

## 2023-08-11 ENCOUNTER — PATIENT MESSAGE (OUTPATIENT)
Dept: RADIATION ONCOLOGY | Facility: CLINIC | Age: 83
End: 2023-08-11
Payer: MEDICARE

## 2023-08-16 ENCOUNTER — HOSPITAL ENCOUNTER (OUTPATIENT)
Dept: RADIATION THERAPY | Facility: HOSPITAL | Age: 83
Discharge: HOME | End: 2023-08-16
Payer: MEDICARE

## 2023-08-16 PROCEDURE — 77263 PR  RADIATION THERAPY PLAN COMPLEX: ICD-10-PCS | Mod: HCNC,,, | Performed by: STUDENT IN AN ORGANIZED HEALTH CARE EDUCATION/TRAINING PROGRAM

## 2023-08-16 PROCEDURE — 77014 PR  CT GUIDANCE PLACEMENT RAD THERAPY FIELDS: CPT | Mod: 26,HCNC,, | Performed by: STUDENT IN AN ORGANIZED HEALTH CARE EDUCATION/TRAINING PROGRAM

## 2023-08-16 PROCEDURE — 77334 PR  RADN TREATMENT AID(S) COMPLX: ICD-10-PCS | Mod: 26,HCNC,, | Performed by: STUDENT IN AN ORGANIZED HEALTH CARE EDUCATION/TRAINING PROGRAM

## 2023-08-16 PROCEDURE — 77334 RADIATION TREATMENT AID(S): CPT | Mod: TC,HCNC | Performed by: STUDENT IN AN ORGANIZED HEALTH CARE EDUCATION/TRAINING PROGRAM

## 2023-08-16 PROCEDURE — 77263 THER RADIOLOGY TX PLNG CPLX: CPT | Mod: HCNC,,, | Performed by: STUDENT IN AN ORGANIZED HEALTH CARE EDUCATION/TRAINING PROGRAM

## 2023-08-16 PROCEDURE — 77014 PR  CT GUIDANCE PLACEMENT RAD THERAPY FIELDS: ICD-10-PCS | Mod: 26,HCNC,, | Performed by: STUDENT IN AN ORGANIZED HEALTH CARE EDUCATION/TRAINING PROGRAM

## 2023-08-16 PROCEDURE — 77334 RADIATION TREATMENT AID(S): CPT | Mod: 26,HCNC,, | Performed by: STUDENT IN AN ORGANIZED HEALTH CARE EDUCATION/TRAINING PROGRAM

## 2023-08-16 PROCEDURE — 77014 HC CT GUIDANCE RADIATION THERAPY FLDS PLACEMENT: CPT | Mod: TC,HCNC | Performed by: STUDENT IN AN ORGANIZED HEALTH CARE EDUCATION/TRAINING PROGRAM

## 2023-08-17 ENCOUNTER — PATIENT MESSAGE (OUTPATIENT)
Dept: RADIATION ONCOLOGY | Facility: CLINIC | Age: 83
End: 2023-08-17
Payer: MEDICARE

## 2023-08-22 PROCEDURE — 77301 PR  INTEN MOD RADIOTHER PLAN W/DOSE VOL HIST: ICD-10-PCS | Mod: 26,HCNC,, | Performed by: STUDENT IN AN ORGANIZED HEALTH CARE EDUCATION/TRAINING PROGRAM

## 2023-08-22 PROCEDURE — 77301 RADIOTHERAPY DOSE PLAN IMRT: CPT | Mod: 26,HCNC,, | Performed by: STUDENT IN AN ORGANIZED HEALTH CARE EDUCATION/TRAINING PROGRAM

## 2023-08-22 PROCEDURE — 77301 RADIOTHERAPY DOSE PLAN IMRT: CPT | Mod: TC,HCNC | Performed by: STUDENT IN AN ORGANIZED HEALTH CARE EDUCATION/TRAINING PROGRAM

## 2023-08-23 PROCEDURE — 77300 RADIATION THERAPY DOSE PLAN: CPT | Mod: 26,HCNC,, | Performed by: STUDENT IN AN ORGANIZED HEALTH CARE EDUCATION/TRAINING PROGRAM

## 2023-08-23 PROCEDURE — 77300 PR RADIATION THERAPY,DOSIMETRY PLAN: ICD-10-PCS | Mod: 26,HCNC,, | Performed by: STUDENT IN AN ORGANIZED HEALTH CARE EDUCATION/TRAINING PROGRAM

## 2023-08-23 PROCEDURE — 77338 DESIGN MLC DEVICE FOR IMRT: CPT | Mod: 26,HCNC,, | Performed by: STUDENT IN AN ORGANIZED HEALTH CARE EDUCATION/TRAINING PROGRAM

## 2023-08-23 PROCEDURE — 77338 DESIGN MLC DEVICE FOR IMRT: CPT | Mod: TC,HCNC,59 | Performed by: STUDENT IN AN ORGANIZED HEALTH CARE EDUCATION/TRAINING PROGRAM

## 2023-08-23 PROCEDURE — 77338 PR  MLC IMRT DESIGN & CONSTRUCTION PER IMRT PLAN: ICD-10-PCS | Mod: 26,HCNC,, | Performed by: STUDENT IN AN ORGANIZED HEALTH CARE EDUCATION/TRAINING PROGRAM

## 2023-08-23 PROCEDURE — 77300 RADIATION THERAPY DOSE PLAN: CPT | Mod: TC,HCNC | Performed by: STUDENT IN AN ORGANIZED HEALTH CARE EDUCATION/TRAINING PROGRAM

## 2023-08-23 PROCEDURE — 77014 PR  CT GUIDANCE PLACEMENT RAD THERAPY FIELDS: CPT | Mod: 26,HCNC,, | Performed by: STUDENT IN AN ORGANIZED HEALTH CARE EDUCATION/TRAINING PROGRAM

## 2023-08-23 PROCEDURE — 77014 PR  CT GUIDANCE PLACEMENT RAD THERAPY FIELDS: ICD-10-PCS | Mod: 26,HCNC,, | Performed by: STUDENT IN AN ORGANIZED HEALTH CARE EDUCATION/TRAINING PROGRAM

## 2023-08-23 PROCEDURE — 77386 HC IMRT, COMPLEX: CPT | Mod: HCNC | Performed by: STUDENT IN AN ORGANIZED HEALTH CARE EDUCATION/TRAINING PROGRAM

## 2023-08-23 PROCEDURE — 77417 THER RADIOLOGY PORT IMAGE(S): CPT | Mod: HCNC | Performed by: STUDENT IN AN ORGANIZED HEALTH CARE EDUCATION/TRAINING PROGRAM

## 2023-08-23 PROCEDURE — 77427 RADIATION TX MANAGEMENT X5: CPT | Mod: HCNC,,, | Performed by: STUDENT IN AN ORGANIZED HEALTH CARE EDUCATION/TRAINING PROGRAM

## 2023-08-23 PROCEDURE — 77427 PR CHG RADIATION,MANGEMENT,5 TX'S: ICD-10-PCS | Mod: HCNC,,, | Performed by: STUDENT IN AN ORGANIZED HEALTH CARE EDUCATION/TRAINING PROGRAM

## 2023-08-24 PROCEDURE — 77014 PR  CT GUIDANCE PLACEMENT RAD THERAPY FIELDS: CPT | Mod: 26,HCNC,, | Performed by: STUDENT IN AN ORGANIZED HEALTH CARE EDUCATION/TRAINING PROGRAM

## 2023-08-24 PROCEDURE — 77014 PR  CT GUIDANCE PLACEMENT RAD THERAPY FIELDS: ICD-10-PCS | Mod: 26,HCNC,, | Performed by: STUDENT IN AN ORGANIZED HEALTH CARE EDUCATION/TRAINING PROGRAM

## 2023-08-24 PROCEDURE — 77386 HC IMRT, COMPLEX: CPT | Mod: HCNC | Performed by: STUDENT IN AN ORGANIZED HEALTH CARE EDUCATION/TRAINING PROGRAM

## 2023-08-25 PROCEDURE — 77386 HC IMRT, COMPLEX: CPT | Mod: HCNC | Performed by: STUDENT IN AN ORGANIZED HEALTH CARE EDUCATION/TRAINING PROGRAM

## 2023-08-25 PROCEDURE — 77014 PR  CT GUIDANCE PLACEMENT RAD THERAPY FIELDS: CPT | Mod: 26,HCNC,, | Performed by: STUDENT IN AN ORGANIZED HEALTH CARE EDUCATION/TRAINING PROGRAM

## 2023-08-25 PROCEDURE — 77014 PR  CT GUIDANCE PLACEMENT RAD THERAPY FIELDS: ICD-10-PCS | Mod: 26,HCNC,, | Performed by: STUDENT IN AN ORGANIZED HEALTH CARE EDUCATION/TRAINING PROGRAM

## 2023-08-28 ENCOUNTER — CLINICAL SUPPORT (OUTPATIENT)
Dept: HEMATOLOGY/ONCOLOGY | Facility: CLINIC | Age: 83
End: 2023-08-28
Payer: MEDICARE

## 2023-08-28 ENCOUNTER — DOCUMENTATION ONLY (OUTPATIENT)
Dept: RADIATION ONCOLOGY | Facility: CLINIC | Age: 83
End: 2023-08-28
Payer: MEDICARE

## 2023-08-28 DIAGNOSIS — Z71.3 NUTRITIONAL COUNSELING: ICD-10-CM

## 2023-08-28 DIAGNOSIS — C02.9 SQUAMOUS CELL CANCER OF TONGUE: Primary | ICD-10-CM

## 2023-08-28 PROCEDURE — 77014 PR  CT GUIDANCE PLACEMENT RAD THERAPY FIELDS: CPT | Mod: 26,HCNC,, | Performed by: STUDENT IN AN ORGANIZED HEALTH CARE EDUCATION/TRAINING PROGRAM

## 2023-08-28 PROCEDURE — 77014 PR  CT GUIDANCE PLACEMENT RAD THERAPY FIELDS: ICD-10-PCS | Mod: 26,HCNC,, | Performed by: STUDENT IN AN ORGANIZED HEALTH CARE EDUCATION/TRAINING PROGRAM

## 2023-08-28 PROCEDURE — 77386 HC IMRT, COMPLEX: CPT | Mod: HCNC | Performed by: STUDENT IN AN ORGANIZED HEALTH CARE EDUCATION/TRAINING PROGRAM

## 2023-08-28 NOTE — PLAN OF CARE
Day 4 of outpatient radiation to tongue. Weighs 183.9 lbs. C/o of dry mouth. No trouble swallowing. Education done. Spouse present.

## 2023-08-28 NOTE — PROGRESS NOTES
"Oncology Nutrition  Radiation Oncology Weekly Check     Treatment Day: 4    Suleiman Chavez   1940    Reason for Visit: weekly visit due to high nutritional risk radiation therapy treatment (SCC of tongue)    Nutrition Assessment    He reports that he has been working to eat more and gain weight. His wife is working to add more calories to meals (using half& half in soup). He is eating more soft high protein foods (mashed sardines, scrambled eggs, mashed beans) and continues to supplement with Ensure Plus/High Protein. He drinks 1-2 16oz bottles of water/day as well as coffee. He reports issues with constipation, taking senna and stool softener. He also reports issues with dry mouth, using Biotene spray.    Weight: 183.9 lb  Height: 6' 1"    Usual BW: 210lb  Weight Change: 25lb loss over 9 months    Nutrition Impact Symptoms    Xerostomia    Nutrition Recommendations    Eat small frequent meals/snacks  Make meals/snacks high in calories and protein - examples reviewed  Continue to supplement with 3-4 Ensure/day (try 2 Ensure Plus and 1-2 Ensure Max Protein)  Increase fluid intake to at least 64oz fluid/day    Follow up: weekly in Radiation Therapy    Counseling time: 5 minutes    Waleska Brown, MS, RD, LDN  " bleeding from ears

## 2023-08-29 PROCEDURE — 77386 HC IMRT, COMPLEX: CPT | Mod: HCNC | Performed by: STUDENT IN AN ORGANIZED HEALTH CARE EDUCATION/TRAINING PROGRAM

## 2023-08-29 PROCEDURE — 77014 PR  CT GUIDANCE PLACEMENT RAD THERAPY FIELDS: CPT | Mod: 26,HCNC,, | Performed by: STUDENT IN AN ORGANIZED HEALTH CARE EDUCATION/TRAINING PROGRAM

## 2023-08-29 PROCEDURE — 77336 RADIATION PHYSICS CONSULT: CPT | Mod: HCNC | Performed by: STUDENT IN AN ORGANIZED HEALTH CARE EDUCATION/TRAINING PROGRAM

## 2023-08-29 PROCEDURE — 77014 PR  CT GUIDANCE PLACEMENT RAD THERAPY FIELDS: ICD-10-PCS | Mod: 26,HCNC,, | Performed by: STUDENT IN AN ORGANIZED HEALTH CARE EDUCATION/TRAINING PROGRAM

## 2023-08-30 PROCEDURE — 77386 HC IMRT, COMPLEX: CPT | Mod: HCNC | Performed by: STUDENT IN AN ORGANIZED HEALTH CARE EDUCATION/TRAINING PROGRAM

## 2023-08-30 PROCEDURE — 77014 PR  CT GUIDANCE PLACEMENT RAD THERAPY FIELDS: CPT | Mod: 26,HCNC,, | Performed by: STUDENT IN AN ORGANIZED HEALTH CARE EDUCATION/TRAINING PROGRAM

## 2023-08-30 PROCEDURE — 77014 PR  CT GUIDANCE PLACEMENT RAD THERAPY FIELDS: ICD-10-PCS | Mod: 26,HCNC,, | Performed by: STUDENT IN AN ORGANIZED HEALTH CARE EDUCATION/TRAINING PROGRAM

## 2023-08-31 ENCOUNTER — PATIENT MESSAGE (OUTPATIENT)
Dept: INTERNAL MEDICINE | Facility: CLINIC | Age: 83
End: 2023-08-31
Payer: MEDICARE

## 2023-08-31 PROCEDURE — 77014 PR  CT GUIDANCE PLACEMENT RAD THERAPY FIELDS: CPT | Mod: 26,HCNC,, | Performed by: STUDENT IN AN ORGANIZED HEALTH CARE EDUCATION/TRAINING PROGRAM

## 2023-08-31 PROCEDURE — 77014 PR  CT GUIDANCE PLACEMENT RAD THERAPY FIELDS: ICD-10-PCS | Mod: 26,HCNC,, | Performed by: STUDENT IN AN ORGANIZED HEALTH CARE EDUCATION/TRAINING PROGRAM

## 2023-08-31 PROCEDURE — 77386 HC IMRT, COMPLEX: CPT | Mod: HCNC | Performed by: STUDENT IN AN ORGANIZED HEALTH CARE EDUCATION/TRAINING PROGRAM

## 2023-08-31 RX ORDER — DOXAZOSIN 4 MG/1
4 TABLET ORAL NIGHTLY
Qty: 30 TABLET | Refills: 0 | Status: SHIPPED | OUTPATIENT
Start: 2023-08-31 | End: 2023-10-02 | Stop reason: SDUPTHER

## 2023-08-31 NOTE — TELEPHONE ENCOUNTER
No care due was identified.  [facility] Embedded Care Due Messages. Reference number: [id number].   8/31/2023 12:24:16 PM CDT

## 2023-09-01 ENCOUNTER — HOSPITAL ENCOUNTER (OUTPATIENT)
Dept: RADIATION THERAPY | Facility: HOSPITAL | Age: 83
Discharge: HOME | End: 2023-09-01
Attending: STUDENT IN AN ORGANIZED HEALTH CARE EDUCATION/TRAINING PROGRAM
Payer: MEDICARE

## 2023-09-01 PROCEDURE — 77427 PR CHG RADIATION,MANGEMENT,5 TX'S: ICD-10-PCS | Mod: HCNC,,, | Performed by: STUDENT IN AN ORGANIZED HEALTH CARE EDUCATION/TRAINING PROGRAM

## 2023-09-01 PROCEDURE — 77386 HC IMRT, COMPLEX: CPT | Mod: HCNC | Performed by: STUDENT IN AN ORGANIZED HEALTH CARE EDUCATION/TRAINING PROGRAM

## 2023-09-01 PROCEDURE — 77427 RADIATION TX MANAGEMENT X5: CPT | Mod: HCNC,,, | Performed by: STUDENT IN AN ORGANIZED HEALTH CARE EDUCATION/TRAINING PROGRAM

## 2023-09-01 PROCEDURE — 77014 PR  CT GUIDANCE PLACEMENT RAD THERAPY FIELDS: CPT | Mod: 26,HCNC,, | Performed by: STUDENT IN AN ORGANIZED HEALTH CARE EDUCATION/TRAINING PROGRAM

## 2023-09-01 PROCEDURE — 77014 PR  CT GUIDANCE PLACEMENT RAD THERAPY FIELDS: ICD-10-PCS | Mod: 26,HCNC,, | Performed by: STUDENT IN AN ORGANIZED HEALTH CARE EDUCATION/TRAINING PROGRAM

## 2023-09-05 ENCOUNTER — DOCUMENTATION ONLY (OUTPATIENT)
Dept: RADIATION ONCOLOGY | Facility: CLINIC | Age: 83
End: 2023-09-05
Payer: MEDICARE

## 2023-09-05 PROCEDURE — 77014 PR  CT GUIDANCE PLACEMENT RAD THERAPY FIELDS: CPT | Mod: 26,HCNC,, | Performed by: STUDENT IN AN ORGANIZED HEALTH CARE EDUCATION/TRAINING PROGRAM

## 2023-09-05 PROCEDURE — 77014 PR  CT GUIDANCE PLACEMENT RAD THERAPY FIELDS: ICD-10-PCS | Mod: 26,HCNC,, | Performed by: STUDENT IN AN ORGANIZED HEALTH CARE EDUCATION/TRAINING PROGRAM

## 2023-09-05 PROCEDURE — 77386 HC IMRT, COMPLEX: CPT | Mod: HCNC | Performed by: STUDENT IN AN ORGANIZED HEALTH CARE EDUCATION/TRAINING PROGRAM

## 2023-09-05 NOTE — PLAN OF CARE
Day 9 outpatient radiation to tongue. Tolerating treatment well. Weighs 186 lbs. Up 3 lbs.Spouse present.

## 2023-09-06 PROCEDURE — 77386 HC IMRT, COMPLEX: CPT | Mod: HCNC | Performed by: STUDENT IN AN ORGANIZED HEALTH CARE EDUCATION/TRAINING PROGRAM

## 2023-09-06 PROCEDURE — 77014 PR  CT GUIDANCE PLACEMENT RAD THERAPY FIELDS: CPT | Mod: 26,HCNC,, | Performed by: STUDENT IN AN ORGANIZED HEALTH CARE EDUCATION/TRAINING PROGRAM

## 2023-09-06 PROCEDURE — 77014 PR  CT GUIDANCE PLACEMENT RAD THERAPY FIELDS: ICD-10-PCS | Mod: 26,HCNC,, | Performed by: STUDENT IN AN ORGANIZED HEALTH CARE EDUCATION/TRAINING PROGRAM

## 2023-09-06 PROCEDURE — 77336 RADIATION PHYSICS CONSULT: CPT | Mod: HCNC | Performed by: STUDENT IN AN ORGANIZED HEALTH CARE EDUCATION/TRAINING PROGRAM

## 2023-09-07 PROCEDURE — 77014 PR  CT GUIDANCE PLACEMENT RAD THERAPY FIELDS: ICD-10-PCS | Mod: 26,HCNC,, | Performed by: STUDENT IN AN ORGANIZED HEALTH CARE EDUCATION/TRAINING PROGRAM

## 2023-09-07 PROCEDURE — 77427 PR CHG RADIATION,MANGEMENT,5 TX'S: ICD-10-PCS | Mod: HCNC,,, | Performed by: STUDENT IN AN ORGANIZED HEALTH CARE EDUCATION/TRAINING PROGRAM

## 2023-09-07 PROCEDURE — 77014 PR  CT GUIDANCE PLACEMENT RAD THERAPY FIELDS: CPT | Mod: 26,HCNC,, | Performed by: STUDENT IN AN ORGANIZED HEALTH CARE EDUCATION/TRAINING PROGRAM

## 2023-09-07 PROCEDURE — 77427 RADIATION TX MANAGEMENT X5: CPT | Mod: HCNC,,, | Performed by: STUDENT IN AN ORGANIZED HEALTH CARE EDUCATION/TRAINING PROGRAM

## 2023-09-07 PROCEDURE — 77386 HC IMRT, COMPLEX: CPT | Mod: HCNC | Performed by: STUDENT IN AN ORGANIZED HEALTH CARE EDUCATION/TRAINING PROGRAM

## 2023-09-08 PROCEDURE — 77014 PR  CT GUIDANCE PLACEMENT RAD THERAPY FIELDS: CPT | Mod: 26,HCNC,, | Performed by: STUDENT IN AN ORGANIZED HEALTH CARE EDUCATION/TRAINING PROGRAM

## 2023-09-08 PROCEDURE — 77014 PR  CT GUIDANCE PLACEMENT RAD THERAPY FIELDS: ICD-10-PCS | Mod: 26,HCNC,, | Performed by: STUDENT IN AN ORGANIZED HEALTH CARE EDUCATION/TRAINING PROGRAM

## 2023-09-08 PROCEDURE — 77386 HC IMRT, COMPLEX: CPT | Mod: HCNC | Performed by: STUDENT IN AN ORGANIZED HEALTH CARE EDUCATION/TRAINING PROGRAM

## 2023-09-11 ENCOUNTER — DOCUMENTATION ONLY (OUTPATIENT)
Dept: RADIATION ONCOLOGY | Facility: CLINIC | Age: 83
End: 2023-09-11
Payer: MEDICARE

## 2023-09-11 DIAGNOSIS — K12.33 ORAL MUCOSITIS DUE TO RADIATION: ICD-10-CM

## 2023-09-11 DIAGNOSIS — C02.9 SQUAMOUS CELL CANCER OF TONGUE: Primary | ICD-10-CM

## 2023-09-11 PROCEDURE — 77014 PR  CT GUIDANCE PLACEMENT RAD THERAPY FIELDS: CPT | Mod: 26,HCNC,, | Performed by: STUDENT IN AN ORGANIZED HEALTH CARE EDUCATION/TRAINING PROGRAM

## 2023-09-11 PROCEDURE — 77014 PR  CT GUIDANCE PLACEMENT RAD THERAPY FIELDS: ICD-10-PCS | Mod: 26,HCNC,, | Performed by: STUDENT IN AN ORGANIZED HEALTH CARE EDUCATION/TRAINING PROGRAM

## 2023-09-11 PROCEDURE — 77386 HC IMRT, COMPLEX: CPT | Mod: HCNC | Performed by: STUDENT IN AN ORGANIZED HEALTH CARE EDUCATION/TRAINING PROGRAM

## 2023-09-11 RX ORDER — TRAMADOL HYDROCHLORIDE 50 MG/1
50 TABLET ORAL EVERY 6 HOURS PRN
Qty: 30 TABLET | Refills: 0 | Status: SHIPPED | OUTPATIENT
Start: 2023-09-11 | End: 2024-01-27 | Stop reason: SDUPTHER

## 2023-09-11 NOTE — PLAN OF CARE
Day 13 of outpatient radiation to tongue. Wife present. Weight down 3 lbs. C/o sore tongue and decreased taste buds.

## 2023-09-12 PROCEDURE — 77386 HC IMRT, COMPLEX: CPT | Mod: HCNC | Performed by: STUDENT IN AN ORGANIZED HEALTH CARE EDUCATION/TRAINING PROGRAM

## 2023-09-13 PROCEDURE — 77014 PR  CT GUIDANCE PLACEMENT RAD THERAPY FIELDS: ICD-10-PCS | Mod: 26,HCNC,, | Performed by: STUDENT IN AN ORGANIZED HEALTH CARE EDUCATION/TRAINING PROGRAM

## 2023-09-13 PROCEDURE — 77014 PR  CT GUIDANCE PLACEMENT RAD THERAPY FIELDS: CPT | Mod: 26,HCNC,, | Performed by: STUDENT IN AN ORGANIZED HEALTH CARE EDUCATION/TRAINING PROGRAM

## 2023-09-13 PROCEDURE — 77336 RADIATION PHYSICS CONSULT: CPT | Mod: HCNC | Performed by: STUDENT IN AN ORGANIZED HEALTH CARE EDUCATION/TRAINING PROGRAM

## 2023-09-13 PROCEDURE — 77386 HC IMRT, COMPLEX: CPT | Mod: HCNC | Performed by: STUDENT IN AN ORGANIZED HEALTH CARE EDUCATION/TRAINING PROGRAM

## 2023-09-14 PROCEDURE — 77014 PR  CT GUIDANCE PLACEMENT RAD THERAPY FIELDS: ICD-10-PCS | Mod: 26,HCNC,, | Performed by: STUDENT IN AN ORGANIZED HEALTH CARE EDUCATION/TRAINING PROGRAM

## 2023-09-14 PROCEDURE — 77014 PR  CT GUIDANCE PLACEMENT RAD THERAPY FIELDS: CPT | Mod: 26,HCNC,, | Performed by: STUDENT IN AN ORGANIZED HEALTH CARE EDUCATION/TRAINING PROGRAM

## 2023-09-14 PROCEDURE — 77427 PR CHG RADIATION,MANGEMENT,5 TX'S: ICD-10-PCS | Mod: HCNC,,, | Performed by: STUDENT IN AN ORGANIZED HEALTH CARE EDUCATION/TRAINING PROGRAM

## 2023-09-14 PROCEDURE — 77386 HC IMRT, COMPLEX: CPT | Mod: HCNC | Performed by: STUDENT IN AN ORGANIZED HEALTH CARE EDUCATION/TRAINING PROGRAM

## 2023-09-14 PROCEDURE — 77427 RADIATION TX MANAGEMENT X5: CPT | Mod: HCNC,,, | Performed by: STUDENT IN AN ORGANIZED HEALTH CARE EDUCATION/TRAINING PROGRAM

## 2023-09-15 PROCEDURE — 77386 HC IMRT, COMPLEX: CPT | Mod: HCNC | Performed by: STUDENT IN AN ORGANIZED HEALTH CARE EDUCATION/TRAINING PROGRAM

## 2023-09-15 PROCEDURE — 77014 PR  CT GUIDANCE PLACEMENT RAD THERAPY FIELDS: ICD-10-PCS | Mod: 26,HCNC,, | Performed by: STUDENT IN AN ORGANIZED HEALTH CARE EDUCATION/TRAINING PROGRAM

## 2023-09-15 PROCEDURE — 77014 PR  CT GUIDANCE PLACEMENT RAD THERAPY FIELDS: CPT | Mod: 26,HCNC,, | Performed by: STUDENT IN AN ORGANIZED HEALTH CARE EDUCATION/TRAINING PROGRAM

## 2023-09-19 ENCOUNTER — DOCUMENTATION ONLY (OUTPATIENT)
Dept: RADIATION ONCOLOGY | Facility: CLINIC | Age: 83
End: 2023-09-19
Payer: MEDICARE

## 2023-09-19 PROCEDURE — 77014 PR  CT GUIDANCE PLACEMENT RAD THERAPY FIELDS: ICD-10-PCS | Mod: 26,HCNC,, | Performed by: STUDENT IN AN ORGANIZED HEALTH CARE EDUCATION/TRAINING PROGRAM

## 2023-09-19 PROCEDURE — 77386 HC IMRT, COMPLEX: CPT | Mod: HCNC | Performed by: STUDENT IN AN ORGANIZED HEALTH CARE EDUCATION/TRAINING PROGRAM

## 2023-09-19 PROCEDURE — 77014 PR  CT GUIDANCE PLACEMENT RAD THERAPY FIELDS: CPT | Mod: 26,HCNC,, | Performed by: STUDENT IN AN ORGANIZED HEALTH CARE EDUCATION/TRAINING PROGRAM

## 2023-09-19 RX ORDER — AMLODIPINE BESYLATE 5 MG/1
5 TABLET ORAL 2 TIMES DAILY
Qty: 60 TABLET | Refills: 5 | Status: SHIPPED | OUTPATIENT
Start: 2023-09-19

## 2023-09-19 NOTE — TELEPHONE ENCOUNTER
No care due was identified.  [facility] Embedded Care Due Messages. Reference number: [id number].   9/19/2023 8:02:27 AM CDT

## 2023-09-19 NOTE — PLAN OF CARE
Day 18 of outpatient radiation to tongue. Spouse present. Complains of sore tongue. Not eating much this week. Drinking ensure 4x a day and milkshakes. Denies any trouble swallowing. Weight down 2-3 lbs. 181 lbs (82.2 kg).

## 2023-09-20 PROCEDURE — 77014 PR  CT GUIDANCE PLACEMENT RAD THERAPY FIELDS: ICD-10-PCS | Mod: 26,HCNC,, | Performed by: STUDENT IN AN ORGANIZED HEALTH CARE EDUCATION/TRAINING PROGRAM

## 2023-09-20 PROCEDURE — 77386 HC IMRT, COMPLEX: CPT | Mod: HCNC | Performed by: STUDENT IN AN ORGANIZED HEALTH CARE EDUCATION/TRAINING PROGRAM

## 2023-09-20 PROCEDURE — 77014 PR  CT GUIDANCE PLACEMENT RAD THERAPY FIELDS: CPT | Mod: 26,HCNC,, | Performed by: STUDENT IN AN ORGANIZED HEALTH CARE EDUCATION/TRAINING PROGRAM

## 2023-09-21 PROCEDURE — 77014 PR  CT GUIDANCE PLACEMENT RAD THERAPY FIELDS: ICD-10-PCS | Mod: 26,HCNC,, | Performed by: STUDENT IN AN ORGANIZED HEALTH CARE EDUCATION/TRAINING PROGRAM

## 2023-09-21 PROCEDURE — 77014 PR  CT GUIDANCE PLACEMENT RAD THERAPY FIELDS: CPT | Mod: 26,HCNC,, | Performed by: STUDENT IN AN ORGANIZED HEALTH CARE EDUCATION/TRAINING PROGRAM

## 2023-09-21 PROCEDURE — 77336 RADIATION PHYSICS CONSULT: CPT | Mod: HCNC | Performed by: STUDENT IN AN ORGANIZED HEALTH CARE EDUCATION/TRAINING PROGRAM

## 2023-09-21 PROCEDURE — 77386 HC IMRT, COMPLEX: CPT | Mod: HCNC | Performed by: STUDENT IN AN ORGANIZED HEALTH CARE EDUCATION/TRAINING PROGRAM

## 2023-09-22 PROCEDURE — 77386 HC IMRT, COMPLEX: CPT | Mod: HCNC | Performed by: STUDENT IN AN ORGANIZED HEALTH CARE EDUCATION/TRAINING PROGRAM

## 2023-09-22 PROCEDURE — 77427 PR CHG RADIATION,MANGEMENT,5 TX'S: ICD-10-PCS | Mod: HCNC,,, | Performed by: STUDENT IN AN ORGANIZED HEALTH CARE EDUCATION/TRAINING PROGRAM

## 2023-09-22 PROCEDURE — 77014 PR  CT GUIDANCE PLACEMENT RAD THERAPY FIELDS: ICD-10-PCS | Mod: 26,HCNC,, | Performed by: STUDENT IN AN ORGANIZED HEALTH CARE EDUCATION/TRAINING PROGRAM

## 2023-09-22 PROCEDURE — 77014 PR  CT GUIDANCE PLACEMENT RAD THERAPY FIELDS: CPT | Mod: 26,HCNC,, | Performed by: STUDENT IN AN ORGANIZED HEALTH CARE EDUCATION/TRAINING PROGRAM

## 2023-09-22 PROCEDURE — 77427 RADIATION TX MANAGEMENT X5: CPT | Mod: HCNC,,, | Performed by: STUDENT IN AN ORGANIZED HEALTH CARE EDUCATION/TRAINING PROGRAM

## 2023-09-25 ENCOUNTER — DOCUMENTATION ONLY (OUTPATIENT)
Dept: RADIATION ONCOLOGY | Facility: CLINIC | Age: 83
End: 2023-09-25
Payer: MEDICARE

## 2023-09-25 PROCEDURE — 77386 HC IMRT, COMPLEX: CPT | Mod: HCNC | Performed by: STUDENT IN AN ORGANIZED HEALTH CARE EDUCATION/TRAINING PROGRAM

## 2023-09-25 PROCEDURE — 77014 PR  CT GUIDANCE PLACEMENT RAD THERAPY FIELDS: ICD-10-PCS | Mod: 26,HCNC,, | Performed by: STUDENT IN AN ORGANIZED HEALTH CARE EDUCATION/TRAINING PROGRAM

## 2023-09-25 PROCEDURE — 77014 PR  CT GUIDANCE PLACEMENT RAD THERAPY FIELDS: CPT | Mod: 26,HCNC,, | Performed by: STUDENT IN AN ORGANIZED HEALTH CARE EDUCATION/TRAINING PROGRAM

## 2023-09-25 NOTE — PLAN OF CARE
Day 22 of outpatient radiation treatment to tongue. Weighs 181 lbs/ 82.2 kg. Weight stable from last week. Complain of sore tongue. Eating soups, milkshakes and drinking ensure and boost. Spouse present.

## 2023-09-26 PROCEDURE — 77386 HC IMRT, COMPLEX: CPT | Mod: HCNC | Performed by: STUDENT IN AN ORGANIZED HEALTH CARE EDUCATION/TRAINING PROGRAM

## 2023-09-26 PROCEDURE — 77014 PR  CT GUIDANCE PLACEMENT RAD THERAPY FIELDS: CPT | Mod: 26,HCNC,, | Performed by: STUDENT IN AN ORGANIZED HEALTH CARE EDUCATION/TRAINING PROGRAM

## 2023-09-26 PROCEDURE — 77014 PR  CT GUIDANCE PLACEMENT RAD THERAPY FIELDS: ICD-10-PCS | Mod: 26,HCNC,, | Performed by: STUDENT IN AN ORGANIZED HEALTH CARE EDUCATION/TRAINING PROGRAM

## 2023-09-27 PROCEDURE — 77386 HC IMRT, COMPLEX: CPT | Mod: HCNC | Performed by: STUDENT IN AN ORGANIZED HEALTH CARE EDUCATION/TRAINING PROGRAM

## 2023-09-27 PROCEDURE — 77014 PR  CT GUIDANCE PLACEMENT RAD THERAPY FIELDS: ICD-10-PCS | Mod: 26,HCNC,, | Performed by: STUDENT IN AN ORGANIZED HEALTH CARE EDUCATION/TRAINING PROGRAM

## 2023-09-27 PROCEDURE — 77014 PR  CT GUIDANCE PLACEMENT RAD THERAPY FIELDS: CPT | Mod: 26,HCNC,, | Performed by: STUDENT IN AN ORGANIZED HEALTH CARE EDUCATION/TRAINING PROGRAM

## 2023-09-28 PROCEDURE — 77386 HC IMRT, COMPLEX: CPT | Mod: HCNC | Performed by: STUDENT IN AN ORGANIZED HEALTH CARE EDUCATION/TRAINING PROGRAM

## 2023-09-28 PROCEDURE — 77014 PR  CT GUIDANCE PLACEMENT RAD THERAPY FIELDS: ICD-10-PCS | Mod: 26,HCNC,, | Performed by: STUDENT IN AN ORGANIZED HEALTH CARE EDUCATION/TRAINING PROGRAM

## 2023-09-28 PROCEDURE — 77336 RADIATION PHYSICS CONSULT: CPT | Mod: HCNC | Performed by: STUDENT IN AN ORGANIZED HEALTH CARE EDUCATION/TRAINING PROGRAM

## 2023-09-28 PROCEDURE — 77014 PR  CT GUIDANCE PLACEMENT RAD THERAPY FIELDS: CPT | Mod: 26,HCNC,, | Performed by: STUDENT IN AN ORGANIZED HEALTH CARE EDUCATION/TRAINING PROGRAM

## 2023-09-29 PROCEDURE — 77014 PR  CT GUIDANCE PLACEMENT RAD THERAPY FIELDS: CPT | Mod: 26,HCNC,, | Performed by: STUDENT IN AN ORGANIZED HEALTH CARE EDUCATION/TRAINING PROGRAM

## 2023-09-29 PROCEDURE — 77014 PR  CT GUIDANCE PLACEMENT RAD THERAPY FIELDS: ICD-10-PCS | Mod: 26,HCNC,, | Performed by: STUDENT IN AN ORGANIZED HEALTH CARE EDUCATION/TRAINING PROGRAM

## 2023-09-29 PROCEDURE — 77427 RADIATION TX MANAGEMENT X5: CPT | Mod: HCNC,,, | Performed by: STUDENT IN AN ORGANIZED HEALTH CARE EDUCATION/TRAINING PROGRAM

## 2023-09-29 PROCEDURE — 77386 HC IMRT, COMPLEX: CPT | Mod: HCNC | Performed by: STUDENT IN AN ORGANIZED HEALTH CARE EDUCATION/TRAINING PROGRAM

## 2023-09-29 PROCEDURE — 77427 PR CHG RADIATION,MANGEMENT,5 TX'S: ICD-10-PCS | Mod: HCNC,,, | Performed by: STUDENT IN AN ORGANIZED HEALTH CARE EDUCATION/TRAINING PROGRAM

## 2023-10-02 ENCOUNTER — HOSPITAL ENCOUNTER (OUTPATIENT)
Dept: RADIATION THERAPY | Facility: HOSPITAL | Age: 83
Discharge: HOME | End: 2023-10-02
Attending: STUDENT IN AN ORGANIZED HEALTH CARE EDUCATION/TRAINING PROGRAM
Payer: MEDICARE

## 2023-10-02 ENCOUNTER — CLINICAL SUPPORT (OUTPATIENT)
Dept: HEMATOLOGY/ONCOLOGY | Facility: CLINIC | Age: 83
End: 2023-10-02
Payer: MEDICARE

## 2023-10-02 ENCOUNTER — DOCUMENTATION ONLY (OUTPATIENT)
Dept: RADIATION ONCOLOGY | Facility: CLINIC | Age: 83
End: 2023-10-02
Payer: MEDICARE

## 2023-10-02 DIAGNOSIS — Z71.3 NUTRITIONAL COUNSELING: Primary | ICD-10-CM

## 2023-10-02 DIAGNOSIS — C02.9 SQUAMOUS CELL CANCER OF TONGUE: ICD-10-CM

## 2023-10-02 PROCEDURE — 77014 PR  CT GUIDANCE PLACEMENT RAD THERAPY FIELDS: CPT | Mod: 26,HCNC,, | Performed by: STUDENT IN AN ORGANIZED HEALTH CARE EDUCATION/TRAINING PROGRAM

## 2023-10-02 PROCEDURE — 77386 HC IMRT, COMPLEX: CPT | Mod: HCNC | Performed by: STUDENT IN AN ORGANIZED HEALTH CARE EDUCATION/TRAINING PROGRAM

## 2023-10-02 PROCEDURE — 77014 PR  CT GUIDANCE PLACEMENT RAD THERAPY FIELDS: ICD-10-PCS | Mod: 26,HCNC,, | Performed by: STUDENT IN AN ORGANIZED HEALTH CARE EDUCATION/TRAINING PROGRAM

## 2023-10-02 NOTE — PROGRESS NOTES
"Oncology Nutrition  Radiation Oncology Weekly Check     Treatment Day: 27    Suleiman Chavez   1940    Reason for Visit: weekly visit due to high nutritional risk radiation therapy treatment (SCC of tongue)    Nutrition Assessment    He reports that it is getting harder to eat but trying his best. He had been relying on soups his wife makes with half & half but lately has not been able to tolerate. He is relying more on Ensure Plus, drinking 4-5/day. He drinks 1-2 16oz bottles of water/day as well as coffee. He also reports issues with dry mouth, using water to spray his mouth (finds it works just as good as Biotene).    Weight: 179 lb  Height: 6' 1"    Usual BW: 210lb  Weight Change: 4lb loss over 1 month    Nutrition Impact Symptoms    Xerostomia    Nutrition Recommendations    Eat small frequent meals/snacks  Make meals/snacks high in calories and protein - examples reviewed  Continue to supplement with 4-5 Ensure Plus/day  Increase fluid intake to at least 64oz fluid/day    Follow up: weekly in Radiation Therapy    Counseling time: 5 minutes    Waleska Brown, MS, RD, LDN  "

## 2023-10-02 NOTE — PLAN OF CARE
Day 27 of outpatient radiation to tongue. Weighs 81.6 kg/ 179 lbs. Down 2 lbs. Wife present. C/o sore on tongue. Drinking boost/ ensure.

## 2023-10-02 NOTE — TELEPHONE ENCOUNTER
No care due was identified.  [facility] Embedded Care Due Messages. Reference number: [id number].   10/02/2023 2:35:12 PM CDT

## 2023-10-03 PROCEDURE — 77014 PR  CT GUIDANCE PLACEMENT RAD THERAPY FIELDS: ICD-10-PCS | Mod: 26,HCNC,, | Performed by: STUDENT IN AN ORGANIZED HEALTH CARE EDUCATION/TRAINING PROGRAM

## 2023-10-03 PROCEDURE — 77386 HC IMRT, COMPLEX: CPT | Mod: HCNC | Performed by: STUDENT IN AN ORGANIZED HEALTH CARE EDUCATION/TRAINING PROGRAM

## 2023-10-03 PROCEDURE — 77014 PR  CT GUIDANCE PLACEMENT RAD THERAPY FIELDS: CPT | Mod: 26,HCNC,, | Performed by: STUDENT IN AN ORGANIZED HEALTH CARE EDUCATION/TRAINING PROGRAM

## 2023-10-03 RX ORDER — DOXAZOSIN 4 MG/1
4 TABLET ORAL NIGHTLY
Qty: 30 TABLET | Refills: 2 | Status: SHIPPED | OUTPATIENT
Start: 2023-10-03 | End: 2023-12-21

## 2023-10-04 PROCEDURE — 77014 PR  CT GUIDANCE PLACEMENT RAD THERAPY FIELDS: CPT | Mod: 26,HCNC,, | Performed by: STUDENT IN AN ORGANIZED HEALTH CARE EDUCATION/TRAINING PROGRAM

## 2023-10-04 PROCEDURE — 77386 HC IMRT, COMPLEX: CPT | Mod: HCNC | Performed by: STUDENT IN AN ORGANIZED HEALTH CARE EDUCATION/TRAINING PROGRAM

## 2023-10-04 PROCEDURE — 77014 PR  CT GUIDANCE PLACEMENT RAD THERAPY FIELDS: ICD-10-PCS | Mod: 26,HCNC,, | Performed by: STUDENT IN AN ORGANIZED HEALTH CARE EDUCATION/TRAINING PROGRAM

## 2023-10-05 PROCEDURE — 77014 PR  CT GUIDANCE PLACEMENT RAD THERAPY FIELDS: CPT | Mod: 26,HCNC,, | Performed by: STUDENT IN AN ORGANIZED HEALTH CARE EDUCATION/TRAINING PROGRAM

## 2023-10-05 PROCEDURE — 77336 RADIATION PHYSICS CONSULT: CPT | Mod: HCNC | Performed by: STUDENT IN AN ORGANIZED HEALTH CARE EDUCATION/TRAINING PROGRAM

## 2023-10-05 PROCEDURE — 77014 PR  CT GUIDANCE PLACEMENT RAD THERAPY FIELDS: ICD-10-PCS | Mod: 26,HCNC,, | Performed by: STUDENT IN AN ORGANIZED HEALTH CARE EDUCATION/TRAINING PROGRAM

## 2023-10-05 PROCEDURE — 77386 HC IMRT, COMPLEX: CPT | Mod: HCNC | Performed by: STUDENT IN AN ORGANIZED HEALTH CARE EDUCATION/TRAINING PROGRAM

## 2023-10-09 ENCOUNTER — OFFICE VISIT (OUTPATIENT)
Dept: RADIATION ONCOLOGY | Facility: CLINIC | Age: 83
End: 2023-10-09
Payer: MEDICARE

## 2023-10-09 VITALS
DIASTOLIC BLOOD PRESSURE: 54 MMHG | OXYGEN SATURATION: 100 % | HEART RATE: 101 BPM | SYSTOLIC BLOOD PRESSURE: 124 MMHG | WEIGHT: 178.38 LBS | RESPIRATION RATE: 20 BRPM | BODY MASS INDEX: 23.53 KG/M2

## 2023-10-09 DIAGNOSIS — C02.9 SQUAMOUS CELL CANCER OF TONGUE: Primary | ICD-10-CM

## 2023-10-09 PROCEDURE — 99499 NO LOS: ICD-10-PCS | Mod: HCNC,S$GLB,, | Performed by: STUDENT IN AN ORGANIZED HEALTH CARE EDUCATION/TRAINING PROGRAM

## 2023-10-09 PROCEDURE — 99999 PR PBB SHADOW E&M-EST. PATIENT-LVL IV: ICD-10-PCS | Mod: PBBFAC,HCNC,, | Performed by: STUDENT IN AN ORGANIZED HEALTH CARE EDUCATION/TRAINING PROGRAM

## 2023-10-09 PROCEDURE — 99999 PR PBB SHADOW E&M-EST. PATIENT-LVL IV: CPT | Mod: PBBFAC,HCNC,, | Performed by: STUDENT IN AN ORGANIZED HEALTH CARE EDUCATION/TRAINING PROGRAM

## 2023-10-09 PROCEDURE — 99499 UNLISTED E&M SERVICE: CPT | Mod: HCNC,S$GLB,, | Performed by: STUDENT IN AN ORGANIZED HEALTH CARE EDUCATION/TRAINING PROGRAM

## 2023-10-09 NOTE — PROGRESS NOTES
[facility] Department of Radiation Oncology  Follow Up Visit Note    Assessment  Suleiman Chavez is a 83 y.o. male with a group stage ELENITA, [id number] squamous cell carcinoma of the left tongue s/p partial glossectomy and left neck dissection on 7/12/23 followed by adjuvant radiotherapy to 60 Gy in 30 fractions, completed 10/5/23  Doing very well post adjuvant radiotherapy. Tolerating liquid diet PO. Weight stable compared to last week (179)  HR 90-100s. No CP, SOB, leg swelling or pain. Suspect component of reduced fluid intake. Glucose 148 today  ECOG: (1) Restricted in physically strenuous activity, ambulatory and able to do work of light nature    Plan:  He declined IVF and he wants push PO fluid intake at home. They have Gatorade.  He has been holding some of his BP meds, he is working with his PCP  Encourage continued po intake. They are planning to advance to softs this week  RTC 2 weeks (he wants to do virtual rather than in person, but they know that I will see them any time if needed)  Will help him re-establish care with H&N surgery  Post treatment PET/CT ordered          Oncologic History:  He has a history of DM, HTN, tobacco use.   7/7/23: PET/CT with FDG avid left level II lymph node. No definitive FDG avid lesion in the tongue. No distant mets.   7/7/23: CT neck with no RP or right sided LN. Cannot visualize tongue lesion. No mandibular erosion.  7/12/23: left partial glossectomy and left neck dissection of levels IB-IV  Path: invasive SCCa, G1-2, 1.5cm with 5mm DOI. -SM by at least 7mm. No LVSI, no PNI. 2/26 LN+ with no SARAN.   8/23/23 - 10/5/23: adjuvant radiation to 60 Gy in 30 fractions      Interval History  The patient presents today for a toxicity check    He is still using the boost (4 per day) and a large milk shake (~1800 calories). He had tongue burning over the weekend but it is better today. No otalgia. Has tenderness in the tongue and throat (improved over last 2 days). No hemoptysis. Not  using the tramadol.     No LH, dizziness, CP, SOB.     Review of Systems   ROS as above    Social History:  Social History     Tobacco Use    Smoking status: Former     Current packs/day: 0.00     Types: Cigarettes     Quit date: 2004     Years since quittin.3     Passive exposure: Past    Smokeless tobacco: Never   Substance Use Topics    Alcohol use: No     Alcohol/week: 0.0 standard drinks of alcohol    Drug use: No           Exam:  Vitals:    10/09/23 0950   BP: (!) 124/54   Pulse: 101   Resp: 20   SpO2: 100%   Weight: 80.9 kg (178 lb 5.6 oz)     HR 98 on re-check    Constitutional: Pleasant 83 y.o. male in no acute distress.  Well nourished. Well groomed.   HEENT: patchy mucositis centered over the tongue. No thrush. Skin with mild erythema but intact. Skin is dry.  Lymph: post op changes in the left neck. No appreciated cervical adenopathy.  Lungs: No audible wheezing.  Normal effort.   Musculoskeletal: No gross MSK deformities. No LE swelling or tenderness  Skin: No rashes appreciated.   Psych: Alert and oriented with appropriate mood and affect.  Neuro:  Grossly normal.    Data Review:  Information obtained from Suleiman Mayapatrizia, his wife and via chart review.           New Lopes MD  Radiation Oncology

## 2023-10-23 NOTE — PROGRESS NOTES
"[facility] Department of Radiation Oncology  Follow Up Visit Note - VIRTUAL Visit    Patient location: Home, in [place]    Assessment  Suleiman Chavez is a 83 y.o. male with a group stage ELENITA, [id number] squamous cell carcinoma of the left tongue s/p partial glossectomy and left neck dissection on 23 followed by adjuvant radiotherapy to 60 Gy in 30 fractions, completed 10/5/23  Clinically doing great. Acute toxicities of radiotherapy are improving.  ECOG: (1) Restricted in physically strenuous activity, ambulatory and able to do work of light nature    Plan:  Will help re-establish with H&N surgery.  RTC with post treatment PET/CT in early January.  BP management per PCP.          Oncologic History:  He has a history of DM, HTN, tobacco use.   23: PET/CT with FDG avid left level II lymph node. No definitive FDG avid lesion in the tongue. No distant mets.   23: CT neck with no RP or right sided LN. Cannot visualize tongue lesion. No mandibular erosion.  23: left partial glossectomy and left neck dissection of levels IB-IV  Path: invasive SCCa, G1-2, 1.5cm with 5mm DOI. -SM by at least 7mm. No LVSI, no PNI.  LN+ with no SARAN.   23 - 10/5/23: adjuvant radiation to 60 Gy in 30 fractions      Interval History  The patient presents today for a toxicity check    Since last visit, tongue is feeling much better, less "stinging sensation," energy is improving, starting to eat eggs, pasta, soups.  Dry mouth is improving, but still with dysgeusia. He is not needing pain meds. No otalgia or throat pain, hemoptysis. No lightheadedness or falls. BP running 110s-60-70s, managed by his PCP. Still on antihypertensives      Review of Systems   ROS as above    Social History:  Social History     Tobacco Use    Smoking status: Former     Current packs/day: 0.00     Types: Cigarettes     Quit date: 2004     Years since quittin.4     Passive exposure: Past    Smokeless tobacco: Never   Substance Use " Topics    Alcohol use: No     Alcohol/week: 0.0 standard drinks of alcohol    Drug use: No           Exam: VIRTUAL VISIT  There were no vitals filed for this visit.      Constitutional: Pleasant 83 y.o. male in no acute distress.  Well nourished. Well groomed.   Lungs:  Normal effort.   Psych: Alert and oriented with appropriate mood and affect.    Data Review:  Information obtained from Suleiman Chavez, his wife and via chart review.           New Lopes MD  Radiation Oncology

## 2023-10-24 ENCOUNTER — OFFICE VISIT (OUTPATIENT)
Dept: RADIATION ONCOLOGY | Facility: CLINIC | Age: 83
End: 2023-10-24
Payer: MEDICARE

## 2023-10-24 DIAGNOSIS — C02.9 SQUAMOUS CELL CANCER OF TONGUE: Primary | ICD-10-CM

## 2023-10-24 PROCEDURE — 99499 UNLISTED E&M SERVICE: CPT | Mod: HCNC,95,, | Performed by: STUDENT IN AN ORGANIZED HEALTH CARE EDUCATION/TRAINING PROGRAM

## 2023-10-24 PROCEDURE — 99499 NO LOS: ICD-10-PCS | Mod: HCNC,95,, | Performed by: STUDENT IN AN ORGANIZED HEALTH CARE EDUCATION/TRAINING PROGRAM

## 2023-10-24 NOTE — Clinical Note
Would you guys mind seeing him in a few weeks to a month. Following up partial glossectomy and adjuvant radiation. He completed 3 weeks ago

## 2023-11-21 ENCOUNTER — OFFICE VISIT (OUTPATIENT)
Dept: OTOLARYNGOLOGY | Facility: CLINIC | Age: 83
End: 2023-11-21
Payer: MEDICARE

## 2023-11-21 VITALS
BODY MASS INDEX: 23.56 KG/M2 | SYSTOLIC BLOOD PRESSURE: 114 MMHG | WEIGHT: 178.56 LBS | DIASTOLIC BLOOD PRESSURE: 66 MMHG | HEART RATE: 76 BPM

## 2023-11-21 DIAGNOSIS — C02.9 SQUAMOUS CELL CANCER OF TONGUE: Primary | ICD-10-CM

## 2023-11-21 PROCEDURE — 1159F PR MEDICATION LIST DOCUMENTED IN MEDICAL RECORD: ICD-10-PCS | Mod: HCNC,CPTII,S$GLB, | Performed by: OTOLARYNGOLOGY

## 2023-11-21 PROCEDURE — 99213 PR OFFICE/OUTPT VISIT, EST, LEVL III, 20-29 MIN: ICD-10-PCS | Mod: HCNC,S$GLB,, | Performed by: OTOLARYNGOLOGY

## 2023-11-21 PROCEDURE — 1160F PR REVIEW ALL MEDS BY PRESCRIBER/CLIN PHARMACIST DOCUMENTED: ICD-10-PCS | Mod: HCNC,CPTII,S$GLB, | Performed by: OTOLARYNGOLOGY

## 2023-11-21 PROCEDURE — 1126F PR PAIN SEVERITY QUANTIFIED, NO PAIN PRESENT: ICD-10-PCS | Mod: HCNC,CPTII,S$GLB, | Performed by: OTOLARYNGOLOGY

## 2023-11-21 PROCEDURE — 1160F RVW MEDS BY RX/DR IN RCRD: CPT | Mod: HCNC,CPTII,S$GLB, | Performed by: OTOLARYNGOLOGY

## 2023-11-21 PROCEDURE — 99213 OFFICE O/P EST LOW 20 MIN: CPT | Mod: HCNC,S$GLB,, | Performed by: OTOLARYNGOLOGY

## 2023-11-21 PROCEDURE — 1126F AMNT PAIN NOTED NONE PRSNT: CPT | Mod: HCNC,CPTII,S$GLB, | Performed by: OTOLARYNGOLOGY

## 2023-11-21 PROCEDURE — 99999 PR PBB SHADOW E&M-EST. PATIENT-LVL IV: ICD-10-PCS | Mod: PBBFAC,HCNC,, | Performed by: OTOLARYNGOLOGY

## 2023-11-21 PROCEDURE — 3074F SYST BP LT 130 MM HG: CPT | Mod: HCNC,CPTII,S$GLB, | Performed by: OTOLARYNGOLOGY

## 2023-11-21 PROCEDURE — 1159F MED LIST DOCD IN RCRD: CPT | Mod: HCNC,CPTII,S$GLB, | Performed by: OTOLARYNGOLOGY

## 2023-11-21 PROCEDURE — 3074F PR MOST RECENT SYSTOLIC BLOOD PRESSURE < 130 MM HG: ICD-10-PCS | Mod: HCNC,CPTII,S$GLB, | Performed by: OTOLARYNGOLOGY

## 2023-11-21 PROCEDURE — 3078F PR MOST RECENT DIASTOLIC BLOOD PRESSURE < 80 MM HG: ICD-10-PCS | Mod: HCNC,CPTII,S$GLB, | Performed by: OTOLARYNGOLOGY

## 2023-11-21 PROCEDURE — 99999 PR PBB SHADOW E&M-EST. PATIENT-LVL IV: CPT | Mod: PBBFAC,HCNC,, | Performed by: OTOLARYNGOLOGY

## 2023-11-21 PROCEDURE — 3078F DIAST BP <80 MM HG: CPT | Mod: HCNC,CPTII,S$GLB, | Performed by: OTOLARYNGOLOGY

## 2023-11-27 ENCOUNTER — LAB VISIT (OUTPATIENT)
Dept: LAB | Facility: HOSPITAL | Age: 83
End: 2023-11-27
Attending: INTERNAL MEDICINE
Payer: MEDICARE

## 2023-11-27 DIAGNOSIS — E11.40 TYPE 2 DIABETES MELLITUS WITH DIABETIC NEUROPATHY, WITHOUT LONG-TERM CURRENT USE OF INSULIN: ICD-10-CM

## 2023-11-27 LAB
ALBUMIN SERPL BCP-MCNC: 3.1 G/DL (ref 3.5–5.2)
ALP SERPL-CCNC: 112 U/L (ref 55–135)
ALT SERPL W/O P-5'-P-CCNC: 17 U/L (ref 10–44)
ANION GAP SERPL CALC-SCNC: 7 MMOL/L (ref 8–16)
AST SERPL-CCNC: 19 U/L (ref 10–40)
BASOPHILS # BLD AUTO: 0.02 K/UL (ref 0–0.2)
BASOPHILS NFR BLD: 0.4 % (ref 0–1.9)
BILIRUB SERPL-MCNC: 0.4 MG/DL (ref 0.1–1)
BUN SERPL-MCNC: 15 MG/DL (ref 8–23)
CALCIUM SERPL-MCNC: 9 MG/DL (ref 8.7–10.5)
CHLORIDE SERPL-SCNC: 104 MMOL/L (ref 95–110)
CO2 SERPL-SCNC: 28 MMOL/L (ref 23–29)
CREAT SERPL-MCNC: 0.8 MG/DL (ref 0.5–1.4)
DIFFERENTIAL METHOD: ABNORMAL
EOSINOPHIL # BLD AUTO: 0.2 K/UL (ref 0–0.5)
EOSINOPHIL NFR BLD: 5.4 % (ref 0–8)
ERYTHROCYTE [DISTWIDTH] IN BLOOD BY AUTOMATED COUNT: 14.5 % (ref 11.5–14.5)
EST. GFR  (NO RACE VARIABLE): >60 ML/MIN/1.73 M^2
ESTIMATED AVG GLUCOSE: 111 MG/DL (ref 68–131)
GLUCOSE SERPL-MCNC: 105 MG/DL (ref 70–110)
HBA1C MFR BLD: 5.5 % (ref 4–5.6)
HCT VFR BLD AUTO: 37 % (ref 40–54)
HGB BLD-MCNC: 11.9 G/DL (ref 14–18)
IMM GRANULOCYTES # BLD AUTO: 0.02 K/UL (ref 0–0.04)
IMM GRANULOCYTES NFR BLD AUTO: 0.4 % (ref 0–0.5)
LYMPHOCYTES # BLD AUTO: 0.9 K/UL (ref 1–4.8)
LYMPHOCYTES NFR BLD: 19.5 % (ref 18–48)
MCH RBC QN AUTO: 29.5 PG (ref 27–31)
MCHC RBC AUTO-ENTMCNC: 32.2 G/DL (ref 32–36)
MCV RBC AUTO: 92 FL (ref 82–98)
MONOCYTES # BLD AUTO: 0.4 K/UL (ref 0.3–1)
MONOCYTES NFR BLD: 9.6 % (ref 4–15)
NEUTROPHILS # BLD AUTO: 2.9 K/UL (ref 1.8–7.7)
NEUTROPHILS NFR BLD: 64.7 % (ref 38–73)
NRBC BLD-RTO: 0 /100 WBC
PLATELET # BLD AUTO: 194 K/UL (ref 150–450)
PMV BLD AUTO: 9.1 FL (ref 9.2–12.9)
POTASSIUM SERPL-SCNC: 4.5 MMOL/L (ref 3.5–5.1)
PROT SERPL-MCNC: 6.7 G/DL (ref 6–8.4)
RBC # BLD AUTO: 4.04 M/UL (ref 4.6–6.2)
SODIUM SERPL-SCNC: 139 MMOL/L (ref 136–145)
WBC # BLD AUTO: 4.47 K/UL (ref 3.9–12.7)

## 2023-11-27 PROCEDURE — 83036 HEMOGLOBIN GLYCOSYLATED A1C: CPT | Mod: HCNC | Performed by: INTERNAL MEDICINE

## 2023-11-27 PROCEDURE — 36415 COLL VENOUS BLD VENIPUNCTURE: CPT | Mod: HCNC | Performed by: INTERNAL MEDICINE

## 2023-11-27 PROCEDURE — 80053 COMPREHEN METABOLIC PANEL: CPT | Mod: HCNC | Performed by: INTERNAL MEDICINE

## 2023-11-27 PROCEDURE — 85025 COMPLETE CBC W/AUTO DIFF WBC: CPT | Mod: HCNC | Performed by: INTERNAL MEDICINE

## 2023-12-02 ENCOUNTER — PATIENT MESSAGE (OUTPATIENT)
Dept: RADIATION ONCOLOGY | Facility: CLINIC | Age: 83
End: 2023-12-02
Payer: MEDICARE

## 2023-12-02 ENCOUNTER — NURSE TRIAGE (OUTPATIENT)
Dept: ADMINISTRATIVE | Facility: CLINIC | Age: 83
End: 2023-12-02
Payer: MEDICARE

## 2023-12-02 NOTE — TELEPHONE ENCOUNTER
Reason for Disposition   Nursing judgment or information in reference    Additional Information   Negative: Nursing judgment, per information in Reference    Protocols used: No Guideline [id number]  Pt stated he had a tumor removed from the side of his tongue and radiation in July 2023. Pt states on yesterday he started having pain in the same spot where the tumor was removed. Pt denies difficulty breathing or swallowing. States he is a pt of Dr. Carmen and Dr. Lopes. Pt refused a virtual visit or Urgent Care. Advised this message will go to the Providers to contact him when the office opens. Instructed to call OOC back if symptoms worsen. Pt verbalized understanding.

## 2023-12-04 ENCOUNTER — OFFICE VISIT (OUTPATIENT)
Dept: RADIATION ONCOLOGY | Facility: CLINIC | Age: 83
End: 2023-12-04
Payer: MEDICARE

## 2023-12-04 VITALS
BODY MASS INDEX: 24.28 KG/M2 | WEIGHT: 183.19 LBS | OXYGEN SATURATION: 98 % | HEART RATE: 74 BPM | HEIGHT: 73 IN | DIASTOLIC BLOOD PRESSURE: 72 MMHG | SYSTOLIC BLOOD PRESSURE: 159 MMHG | TEMPERATURE: 98 F

## 2023-12-04 DIAGNOSIS — C02.9 SQUAMOUS CELL CANCER OF TONGUE: Primary | ICD-10-CM

## 2023-12-04 PROCEDURE — 99999 PR PBB SHADOW E&M-EST. PATIENT-LVL IV: ICD-10-PCS | Mod: PBBFAC,HCNC,, | Performed by: STUDENT IN AN ORGANIZED HEALTH CARE EDUCATION/TRAINING PROGRAM

## 2023-12-04 PROCEDURE — 99024 PR POST-OP FOLLOW-UP VISIT: ICD-10-PCS | Mod: HCNC,S$GLB,, | Performed by: STUDENT IN AN ORGANIZED HEALTH CARE EDUCATION/TRAINING PROGRAM

## 2023-12-04 PROCEDURE — 99024 POSTOP FOLLOW-UP VISIT: CPT | Mod: HCNC,S$GLB,, | Performed by: STUDENT IN AN ORGANIZED HEALTH CARE EDUCATION/TRAINING PROGRAM

## 2023-12-04 PROCEDURE — 99999 PR PBB SHADOW E&M-EST. PATIENT-LVL IV: CPT | Mod: PBBFAC,HCNC,, | Performed by: STUDENT IN AN ORGANIZED HEALTH CARE EDUCATION/TRAINING PROGRAM

## 2023-12-04 RX ORDER — LIDOCAINE HYDROCHLORIDE 20 MG/ML
SOLUTION OROPHARYNGEAL EVERY 6 HOURS
Qty: 100 ML | Refills: 1 | Status: ON HOLD | OUTPATIENT
Start: 2023-12-04 | End: 2024-03-17

## 2023-12-04 NOTE — Clinical Note
Had tongue pain in the left oral tongue resection bed starting last friday. Path review had pretty widely negative margins. Pain has improved over the past 2 days and may have been brought about by eating acidic foods (tomato soup, orange juice etc). Told him to call us if doesn't get better or worsens

## 2023-12-04 NOTE — PROGRESS NOTES
[facility]  Follow Up Visit Note      Assessment  Suleiman Chavez is a 83 y.o. 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 No otalgia. No appreciated ulcer or recurrent disease on exam with post op and RT changes in the area.  ECOG: (1) Restricted in physically strenuous activity, ambulatory and able to do work of light nature    Plan:  To avoid acidic foods as this may have precipitated this. Topical lidocaine refilled. He will let me or Dr. Carmen know if pain persists or worsens over the next week.   If resolves, he is scheduled to see Dr. Carmen next month. I will see him after his post treatment PET/CT          Oncologic History:  He has a history of DM, HTN, tobacco use.   7/7/23: PET/CT with FDG avid left level II lymph node. No definitive FDG avid lesion in the tongue. No distant mets.   7/7/23: CT neck with no RP or right sided LN. Cannot visualize tongue lesion. No mandibular erosion.  7/12/23: left partial glossectomy and left neck dissection of levels IB-IV  Path: invasive SCCa, G1-2, 1.5cm with 5mm DOI. -SM by at least 7mm. No LVSI, no PNI. 2/26 LN+ with no SARAN.   8/23/23 - 10/5/23: adjuvant radiation to 60 Gy in 30 fractions      Interval History  The patient presents today as an add on for tongue pain since Friday.     He reports left lateral tongue pain since Friday, improving over the past 2 days. He was eating lots of soups, notably tomato soup as well as drinking orange juice. The area is on the left lateral tongue that rubs near his gum. No otalgia or throat pain. No neck masses. Weight up. Doing mostly ensure and softs due to taste and lack of teeth. He has no other complaints.       Review of Systems   ROS as above    Social  "History:  Social History     Tobacco Use    Smoking status: Former     Current packs/day: 0.00     Types: Cigarettes     Quit date: 2004     Years since quittin.5     Passive exposure: Past    Smokeless tobacco: Never   Substance Use Topics    Alcohol use: No     Alcohol/week: 0.0 standard drinks of alcohol    Drug use: No           Exam: VIRTUAL VISIT  Vitals:    23 1127   BP: (!) 159/72   BP Location: Right arm   Patient Position: Sitting   BP Method: Large (Automatic)   Pulse: 74   Temp: 97.8 °F (36.6 °C)   SpO2: 98%   Weight: 83.1 kg (183 lb 3.2 oz)   Height: 6' 1" (1.854 m)         Exam:  Vitals:    23 1127   BP: (!) 159/72   BP Location: Right arm   Patient Position: Sitting   BP Method: Large (Automatic)   Pulse: 74   Temp: 97.8 °F (36.6 °C)   SpO2: 98%   Weight: 83.1 kg (183 lb 3.2 oz)   Height: 6' 1" (1.854 m)     Constitutional: Pleasant 83 y.o. male in no acute distress.  Well nourished. Well groomed.   HEENT: no appreciated mucositis or thrush. Incision appears well healed on left lateral tongue. No appreciated mass in the left lateral tongue, gingiva or FOM. There is a rough area on the posterolateral oral tongue adjacent to the gum that he notes was the area of pain.   Lymph: no appreciated cervical adenopathy  Lungs: No audible wheezing.  Normal effort.   Musculoskeletal: No gross MSK deformities. Ambulates well  Skin: No rashes appreciated.   Psych: Alert and oriented with appropriate mood and affect.  Neuro:   Grossly normal.      Data Review:  Information obtained from Suleiman Chavez, his wife and via chart review.           New Lopes MD  Radiation Oncology        "

## 2023-12-07 ENCOUNTER — PATIENT MESSAGE (OUTPATIENT)
Dept: HEMATOLOGY/ONCOLOGY | Facility: CLINIC | Age: 83
End: 2023-12-07
Payer: MEDICARE

## 2023-12-29 NOTE — PROGRESS NOTES
[facility]  Follow Up Visit Note      Assessment  Suleiman Chavez is a 83 y.o. 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   Post treatment PET/CT from today with PRAKASH on final report. On personal review, there is uptake superior to the left transverse process of C1. No prior avidity in this region on prior PET or noted adenopathy on CT neck 7/2023 and this was in RT field. Suspect muscle uptake but will further characterize with MRI.  There is also a possible ulcer in the left superior BOT (603 image 65), I do not palpate a mass and there is no TTP in this region.  ECOG: (1) Restricted in physically strenuous activity, ambulatory and able to do work of light nature    Plan:  MRI maxillofacial  RTC 4-6 weeks  To see H&N surgery later today.   Wants to proceed with dentures, and is planning to see dentistry in February.   Submental edema: speech referral          Oncologic History:  He has a history of DM, HTN, tobacco use.   7/7/23: PET/CT with FDG avid left level II lymph node. No definitive FDG avid lesion in the tongue. No distant mets.   7/7/23: CT neck with no RP or right sided LN. Cannot visualize tongue lesion. No mandibular erosion.  7/12/23: left partial glossectomy and left neck dissection of levels IB-IV  Path: invasive SCCa, G1-2, 1.5cm with 5mm DOI. -SM by at least 7mm. No LVSI, no PNI. 2/26 LN+ with no SARAN.   8/23/23 - 10/5/23: adjuvant radiation to 60 Gy in 30 fractions       Possibility of pregnancy: N/A  History of prior irradiation: Yes: as above  History of prior systemic anti-cancer therapy: No  History of collagen vascular disease: No  Implanted electronic device (pacer/defib/nerve stimulator): No     Interval History  The patient presents today for follow up with  "post treatment PET/CT.    Still doing soft diet as edentulous. Taste is slowly improving. Still with dry mouth. No pharyngeal phase dysphagia, otalgia, no mouth sores or tender areas of the tongue. The tenderness in the tongue at last visit resolved shortly after our last visit. He presents with his wife      Review of Systems   ROS as above    Social History:  Social History     Tobacco Use    Smoking status: Former     Current packs/day: 0.00     Types: Cigarettes     Quit date: 2004     Years since quittin.6     Passive exposure: Past    Smokeless tobacco: Never   Substance Use Topics    Alcohol use: No     Alcohol/week: 0.0 standard drinks of alcohol    Drug use: No           Exam: VIRTUAL VISIT  Vitals:    24 0949   BP: 128/80   BP Location: Right arm   Patient Position: Sitting   BP Method: Large (Automatic)   Pulse: 84   Temp: 97.7 °F (36.5 °C)   SpO2: 97%   Weight: 82.5 kg (181 lb 14.1 oz)   Height: 6' 1" (1.854 m)           Exam:  Vitals:    24 0949   BP: 128/80   BP Location: Right arm   Patient Position: Sitting   BP Method: Large (Automatic)   Pulse: 84   Temp: 97.7 °F (36.5 °C)   SpO2: 97%   Weight: 82.5 kg (181 lb 14.1 oz)   Height: 6' 1" (1.854 m)       Constitutional: Pleasant 83 y.o. male in no acute distress.  Well nourished. Well groomed.   HEENT: post op and RT changes in the left oral tongue tongue, without appreciated lesion. BOT without mass, not TTP. + submental edema  Lymph: left neck post op changes with no appreciated cervical adenopathy bilaterally  Lungs: No audible wheezing.  Normal effort.   Musculoskeletal: No gross MSK deformities. Ambulates well  Skin: No rashes appreciated.   Psych: Alert and oriented with appropriate mood and affect.  Neuro:   Grossly normal.      Data Review:  Information obtained from Suleiman Chavez, his wife and via chart review.           New Lopes MD  Radiation Oncology        "

## 2024-01-02 ENCOUNTER — PATIENT MESSAGE (OUTPATIENT)
Dept: OTOLARYNGOLOGY | Facility: CLINIC | Age: 84
End: 2024-01-02
Payer: MEDICARE

## 2024-01-09 ENCOUNTER — OFFICE VISIT (OUTPATIENT)
Dept: RADIATION ONCOLOGY | Facility: CLINIC | Age: 84
End: 2024-01-09
Payer: MEDICARE

## 2024-01-09 ENCOUNTER — OFFICE VISIT (OUTPATIENT)
Dept: OTOLARYNGOLOGY | Facility: CLINIC | Age: 84
End: 2024-01-09
Payer: MEDICARE

## 2024-01-09 ENCOUNTER — HOSPITAL ENCOUNTER (OUTPATIENT)
Dept: RADIOLOGY | Facility: HOSPITAL | Age: 84
Discharge: HOME | End: 2024-01-09
Attending: STUDENT IN AN ORGANIZED HEALTH CARE EDUCATION/TRAINING PROGRAM
Payer: MEDICARE

## 2024-01-09 VITALS
WEIGHT: 181.88 LBS | SYSTOLIC BLOOD PRESSURE: 128 MMHG | DIASTOLIC BLOOD PRESSURE: 80 MMHG | HEART RATE: 84 BPM | BODY MASS INDEX: 24 KG/M2

## 2024-01-09 VITALS
WEIGHT: 181.88 LBS | HEIGHT: 73 IN | SYSTOLIC BLOOD PRESSURE: 128 MMHG | OXYGEN SATURATION: 97 % | TEMPERATURE: 98 F | HEART RATE: 84 BPM | DIASTOLIC BLOOD PRESSURE: 80 MMHG | BODY MASS INDEX: 24.11 KG/M2

## 2024-01-09 DIAGNOSIS — C02.9 SQUAMOUS CELL CANCER OF TONGUE: Primary | ICD-10-CM

## 2024-01-09 DIAGNOSIS — R53.83 FATIGUE: ICD-10-CM

## 2024-01-09 DIAGNOSIS — I89.0 LYMPHEDEMA: ICD-10-CM

## 2024-01-09 DIAGNOSIS — C02.9 SQUAMOUS CELL CANCER OF TONGUE: ICD-10-CM

## 2024-01-09 LAB — POCT GLUCOSE: 131 MG/DL (ref 70–110)

## 2024-01-09 PROCEDURE — 99999 PR PBB SHADOW E&M-EST. PATIENT-LVL IV: CPT | Mod: PBBFAC,HCNC,, | Performed by: OTOLARYNGOLOGY

## 2024-01-09 PROCEDURE — 3079F DIAST BP 80-89 MM HG: CPT | Mod: HCNC,CPTII,S$GLB, | Performed by: OTOLARYNGOLOGY

## 2024-01-09 PROCEDURE — 3074F SYST BP LT 130 MM HG: CPT | Mod: HCNC,CPTII,S$GLB, | Performed by: STUDENT IN AN ORGANIZED HEALTH CARE EDUCATION/TRAINING PROGRAM

## 2024-01-09 PROCEDURE — 3079F DIAST BP 80-89 MM HG: CPT | Mod: HCNC,CPTII,S$GLB, | Performed by: STUDENT IN AN ORGANIZED HEALTH CARE EDUCATION/TRAINING PROGRAM

## 2024-01-09 PROCEDURE — 1159F MED LIST DOCD IN RCRD: CPT | Mod: HCNC,CPTII,S$GLB, | Performed by: OTOLARYNGOLOGY

## 2024-01-09 PROCEDURE — 78815 PET IMAGE W/CT SKULL-THIGH: CPT | Mod: TC,HCNC,PS

## 2024-01-09 PROCEDURE — 99213 OFFICE O/P EST LOW 20 MIN: CPT | Mod: 25,HCNC,S$GLB, | Performed by: OTOLARYNGOLOGY

## 2024-01-09 PROCEDURE — 1101F PT FALLS ASSESS-DOCD LE1/YR: CPT | Mod: HCNC,CPTII,S$GLB, | Performed by: STUDENT IN AN ORGANIZED HEALTH CARE EDUCATION/TRAINING PROGRAM

## 2024-01-09 PROCEDURE — 1126F AMNT PAIN NOTED NONE PRSNT: CPT | Mod: HCNC,CPTII,S$GLB, | Performed by: STUDENT IN AN ORGANIZED HEALTH CARE EDUCATION/TRAINING PROGRAM

## 2024-01-09 PROCEDURE — 99213 OFFICE O/P EST LOW 20 MIN: CPT | Mod: HCNC,S$GLB,, | Performed by: STUDENT IN AN ORGANIZED HEALTH CARE EDUCATION/TRAINING PROGRAM

## 2024-01-09 PROCEDURE — 3074F SYST BP LT 130 MM HG: CPT | Mod: HCNC,CPTII,S$GLB, | Performed by: OTOLARYNGOLOGY

## 2024-01-09 PROCEDURE — 1126F AMNT PAIN NOTED NONE PRSNT: CPT | Mod: HCNC,CPTII,S$GLB, | Performed by: OTOLARYNGOLOGY

## 2024-01-09 PROCEDURE — 1160F RVW MEDS BY RX/DR IN RCRD: CPT | Mod: HCNC,CPTII,S$GLB, | Performed by: OTOLARYNGOLOGY

## 2024-01-09 PROCEDURE — 78815 PET IMAGE W/CT SKULL-THIGH: CPT | Mod: 26,PS,HCNC, | Performed by: STUDENT IN AN ORGANIZED HEALTH CARE EDUCATION/TRAINING PROGRAM

## 2024-01-09 PROCEDURE — 99999 PR PBB SHADOW E&M-EST. PATIENT-LVL III: CPT | Mod: PBBFAC,HCNC,, | Performed by: STUDENT IN AN ORGANIZED HEALTH CARE EDUCATION/TRAINING PROGRAM

## 2024-01-09 PROCEDURE — 3288F FALL RISK ASSESSMENT DOCD: CPT | Mod: HCNC,CPTII,S$GLB, | Performed by: STUDENT IN AN ORGANIZED HEALTH CARE EDUCATION/TRAINING PROGRAM

## 2024-01-09 PROCEDURE — 31575 DIAGNOSTIC LARYNGOSCOPY: CPT | Mod: HCNC,S$GLB,, | Performed by: OTOLARYNGOLOGY

## 2024-01-09 PROCEDURE — A9552 F18 FDG: HCPCS | Mod: HCNC

## 2024-01-09 NOTE — PROGRESS NOTES
Chief Complaint   Patient presents with    6 weeks     Oncology History   Squamous cell cancer of tongue   6/16/2023 Initial Diagnosis    Squamous cell cancer of tongue     7/12/2023 Surgery    1. Left partial glossectomy  2. Left modified neck dissection of levels 1B through 4     7/31/2023 Cancer Staged     Cancer Staging   Squamous cell cancer of tongue  Staging form: Oral Cavity, AJCC 8th Edition  - Pathologic stage from 7/31/2023: Stage ELENITA (pT1, pN2b, cM0) - Signed by Sindi Robbins NP on 7/31/2023 7/31/2023 Cancer Staged    Staging form: Oral Cavity, AJCC 8th Edition  - Pathologic stage from 7/31/2023: Stage ELENITA (pT1, pN2b, cM0)     8/23/2023 - 10/5/2023 Radiation Therapy    Treating physician: Cruzito Lopes  Treatment Summary  Course: C1 H&N 2023  Treatment Site Ref. ID Energy Dose/Fx (Gy) #Fx Dose Correction (Gy) Total Dose (Gy) Start Date End Date Elapsed Days   IM H&N PTV_High 6X 2 30 / 30 0 60 8/23/2023 10/5/2023 43            HPI   83 y.o. male presents with the above treatment history.  He is doing well overall.  PET/CT with diffuse L neck uptake.    Review of Systems   Constitutional: Negative for fatigue and unexpected weight change.   HENT: Per HPI.  Eyes: Negative for visual disturbance.   Respiratory: Negative for shortness of breath, hemoptysis   Cardiovascular: Negative for chest pain and palpitations.   Musculoskeletal: Negative for decreased ROM, back pain.   Skin: Negative for rash, sunburn, itching.   Neurological: Negative for dizziness and seizures.   Hematological: Negative for adenopathy. Does not bruise/bleed easily.   Endocrine: Negative for rapid weight loss/weight gain, heat/cold intolerance.     Past Medical History   Patient Active Problem List   Diagnosis    Ocular hypertension    Essential hypertension    Screen for colon cancer    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, with long-term current use of insulin    Adenomatous polyp    Family history of  colon cancer    Nuclear cataract    Borderline glaucoma of both eyes with ocular hypertension    Acute cystitis without hematuria    Spinal stenosis, lumbar region, with neurogenic claudication    EMA (iron deficiency anemia)    Benign prostatic hyperplasia without lower urinary tract symptoms    Debility    Chronic midline low back pain with sciatica    Squamous cell cancer of tongue    Incomplete bladder emptying           Past Surgical History   Past Surgical History:   Procedure Laterality Date    CHOLECYSTECTOMY      GLOSSECTOMY Left 2023    Procedure: GLOSSECTOMY;  Surgeon: Jaxon Carmen MD;  Location: [facility] OR [address] [place] [address];  Service: ENT;  Laterality: Left;    RADICAL NECK DISSECTION Left 2023    Procedure: DISSECTION, NECK, RADICAL;  Surgeon: Jaxon Carmen MD;  Location: [facility] OR [address] [place] [address];  Service: ENT;  Laterality: Left;         Family History   Family History   Problem Relation Age of Onset    Cancer Mother         liver    Cancer Father         colon    No Known Problems Sister     No Known Problems Sister     Amblyopia Neg Hx     Blindness Neg Hx     Cataracts Neg Hx     Diabetes Neg Hx     Glaucoma Neg Hx     Hypertension Neg Hx     Macular degeneration Neg Hx     Retinal detachment Neg Hx     Strabismus Neg Hx     Stroke Neg Hx     Thyroid disease Neg Hx            Social History   .  Social History     Socioeconomic History    Marital status:    Occupational History     Employer: OTHER   Tobacco Use    Smoking status: Former     Current packs/day: 0.00     Types: Cigarettes     Quit date: 2004     Years since quittin.6     Passive exposure: Past    Smokeless tobacco: Never   Substance and Sexual Activity    Alcohol use: No     Alcohol/week: 0.0 standard drinks of alcohol    Drug use: No         Allergies   Review of patient's allergies indicates:  No Known Allergies        Physical Exam     Vitals:    24 1303   BP: 128/80   Pulse: 84         Body mass index is  24 kg/m².      General: AOx3, NAD   Respiratory:  Symmetric chest rise, normal effort  Oral Cavity:  Oral Tongue mobile, no lesions noted.  Well-healed left glossectomy site.  Hard Palate WNL. No buccal or FOM lesions.  Oropharynx:  No masses/lesions of the posterior pharyngeal wall. Tonsillar fossa without lesions. Soft palate without masses. Midline uvula.   Neck:  Well-healed left neck scar.  Moderate fibrosis status post radiation.  No cervical lymphadenopathy, thyromegaly or thyroid nodules.  Normal range of motion.    Face: House Brackmann I bilaterally.     Flex Naso Doris Hypo Procedures #2    Procedure:  Diagnostic flexible nasopharyngoscopy, laryngoscopy and hypopharyngoscopy:    Routine preparation with local atomizer with 1% neosynephrine/pontocaine with customary flexible endoscope.    Nasopharynx:  No lesions.   Mucosa:  No lesions.   Adenoids:  Present.  Posterior Choanae:  Patent.  Eustachian Tubes:  Patent.  Posterior pharynx:  No lesions.  Larynx/hypopharynx:   Epiglottis:  No lesions, without edema.   AE Folds:  No lesions.   Vocal cords:  No polyps, nodules, ulcers or lesions.   Mobility:  Equal and normal bilateral.   Hypopharynx:  No lesions.   Piriform sinus:  No pooling, no lesions.   Post Cricoid:  No erythema, no edema.      Assessment/Plan  Problem List Items Addressed This Visit          Oncology    Squamous cell cancer of tongue - Primary     PRAKASH.  MRI per Dr. Lopes to further assess findings in neck on PET. RTC 6 weeks.         Relevant Orders    TSH     Other Visit Diagnoses       Fatigue        Relevant Orders    TSH

## 2024-01-18 ENCOUNTER — TELEPHONE (OUTPATIENT)
Dept: SPEECH THERAPY | Facility: HOSPITAL | Age: 84
End: 2024-01-18
Payer: MEDICARE

## 2024-01-27 ENCOUNTER — HOSPITAL ENCOUNTER (OUTPATIENT)
Dept: RADIOLOGY | Facility: HOSPITAL | Age: 84
Discharge: HOME | End: 2024-01-27
Attending: STUDENT IN AN ORGANIZED HEALTH CARE EDUCATION/TRAINING PROGRAM
Payer: MEDICARE

## 2024-01-27 DIAGNOSIS — C02.9 SQUAMOUS CELL CANCER OF TONGUE: ICD-10-CM

## 2024-01-27 DIAGNOSIS — K12.33 ORAL MUCOSITIS DUE TO RADIATION: ICD-10-CM

## 2024-01-27 PROCEDURE — 70543 MRI ORBT/FAC/NCK W/O &W/DYE: CPT | Mod: TC

## 2024-01-27 PROCEDURE — 70543 MRI ORBT/FAC/NCK W/O &W/DYE: CPT | Mod: 26,,, | Performed by: RADIOLOGY

## 2024-01-27 PROCEDURE — 25500020 PHARM REV CODE 255: Performed by: STUDENT IN AN ORGANIZED HEALTH CARE EDUCATION/TRAINING PROGRAM

## 2024-01-27 PROCEDURE — A9585 GADOBUTROL INJECTION: HCPCS | Performed by: STUDENT IN AN ORGANIZED HEALTH CARE EDUCATION/TRAINING PROGRAM

## 2024-01-27 RX ORDER — GADOBUTROL 604.72 MG/ML
9 INJECTION INTRAVENOUS
Status: COMPLETED | OUTPATIENT
Start: 2024-01-27 | End: 2024-01-27

## 2024-01-27 RX ADMIN — GADOBUTROL 9 ML: 604.72 INJECTION INTRAVENOUS at 12:01

## 2024-01-29 ENCOUNTER — TELEPHONE (OUTPATIENT)
Dept: RADIATION ONCOLOGY | Facility: HOSPITAL | Age: 84
End: 2024-01-29
Payer: MEDICARE

## 2024-01-29 RX ORDER — TRAMADOL HYDROCHLORIDE 50 MG/1
50 TABLET ORAL EVERY 6 HOURS PRN
Qty: 30 TABLET | Refills: 0 | Status: SHIPPED | OUTPATIENT
Start: 2024-01-29 | End: 2024-04-10 | Stop reason: SDUPTHER

## 2024-01-29 NOTE — TELEPHONE ENCOUNTER
Called  Kathy and reviewed results of MRI with PRAKASH.     He has no H&N complaints. Still eating softs but this is due to lack of teeth.     Requested tramadol Rx but this is for back pain. Recommended he discuss this with his PCP

## 2024-01-30 ENCOUNTER — CLINICAL SUPPORT (OUTPATIENT)
Dept: SPEECH THERAPY | Facility: HOSPITAL | Age: 84
End: 2024-01-30
Attending: STUDENT IN AN ORGANIZED HEALTH CARE EDUCATION/TRAINING PROGRAM
Payer: MEDICARE

## 2024-01-30 DIAGNOSIS — R13.12 DYSPHAGIA, OROPHARYNGEAL: Primary | ICD-10-CM

## 2024-01-30 DIAGNOSIS — Z98.890 S/P PARTIAL GLOSSECTOMY: ICD-10-CM

## 2024-01-30 DIAGNOSIS — Z92.3 HISTORY OF HEAD AND NECK RADIATION: ICD-10-CM

## 2024-01-30 DIAGNOSIS — C02.9 SQUAMOUS CELL CANCER OF TONGUE: ICD-10-CM

## 2024-01-30 DIAGNOSIS — I89.0 LYMPHEDEMA: ICD-10-CM

## 2024-01-30 PROCEDURE — 92610 EVALUATE SWALLOWING FUNCTION: CPT | Mod: GN | Performed by: SPEECH-LANGUAGE PATHOLOGIST

## 2024-01-30 NOTE — PROGRESS NOTES
REFERRING PHYSICIAN:  New Lopes M.D., Radiation Oncologist    REASON FOR REFERRAL:  Post-XRT and lymphedema.    Mr. Chavez, age 83, was referred by Dr. New Lopes, radiation oncologist, for post-XRT f/u and lymphedema evaluation.  He was accompanied by his wife.    Mr. Chavez has a history of partial glossectomy and LMND 7/12/23 with Dr. Carmen with no reconstruction (closed primarily or by secondary intention) and adjuvant XRT (60 Gy; completed 10/5/23) to treat Stage ELENITA (pT1, pN2b, cM0) of the L oral tongue.  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    MEDICAL HISTORY:  Past Medical History:   Diagnosis Date    Bilateral ocular hypertension     Diabetes mellitus     Glaucoma     Hypertension     Nuclear cataract 12/17/2014       History of carotid artery/other circulation issues?   Blood flow: Negative   Angioedema:  Negative             Carotid hypersensitivity:   Negative      History of trauma (vs surgery, RT, CRT, or chemo):   Negative     History of other possible etiologies for edema:  none.    SURGICAL HISTORY:  Past Surgical History:   Procedure Laterality Date    CHOLECYSTECTOMY      GLOSSECTOMY Left 7/12/2023    Procedure: GLOSSECTOMY;  Surgeon: Jaxon Carmen MD;  Location: [facility] OR [address] [place] [address];  Service: ENT;  Laterality: Left;    RADICAL NECK DISSECTION Left 7/12/2023    Procedure: DISSECTION, NECK, RADICAL;  Surgeon: Jaxon Carmen MD;  Location: [facility] OR [address] [place] [address];  Service: ENT;  Laterality: Left;       ONCOLOGIC and TREATMENT HISTORY of problem for which patient is referred:  Oncology History   Squamous cell cancer of tongue   6/16/2023 Initial Diagnosis    Squamous cell cancer of tongue     7/12/2023 Surgery    1. Left partial glossectomy  2. Left modified neck dissection of levels 1B through 4     7/31/2023 Cancer Staged     Cancer Staging   Squamous cell cancer of tongue  Staging form: Oral Cavity, AJCC 8th Edition  - Pathologic stage from 7/31/2023: Stage ELENITA  (pT1, pN2b, cM0) - Signed by Sindi Robbins NP on 7/31/2023 7/31/2023 Cancer Staged    Staging form: Oral Cavity, AJCC 8th Edition  - Pathologic stage from 7/31/2023: Stage ELENITA (pT1, pN2b, cM0)     8/23/2023 - 10/5/2023 Radiation Therapy    Treating physician: Cruzito Lopes  Treatment Summary  Course: C1 H&N 2023  Treatment Site Ref. ID Energy Dose/Fx (Gy) #Fx Dose Correction (Gy) Total Dose (Gy) Start Date End Date Elapsed Days   IM H&N PTV_High 6X 2 30 / 30 0 60 8/23/2023 10/5/2023 43          Time post-treatment: 2.5 months    Side effects related to treatment:  Xerostomia: Positive; uses spritzes of water as needed.  Denied dry mouth wakes him from sleeping.   Irritation: Negative   Changes in taste: Positive; contributor to limited variety of PO intake, but also limited by edentulousness and xerostomia.  Primarily, things taste off or bland; denied metallic or foul tastes.  Lymphedema:  Positive  Radiation Fibrosis:  negative.     SWALLOWING HISTORY:  Seen s/p surgery 8/8/23 and at that time had returned to a mechanical soft diet with thin liquids.  Now primarily depending on cream soups and Ensure Plus (350-lyndsey, 3-4x/day) to meet nutrition needs, but is also trying other pureed or mechanical soft foods.  Main issue he noted is not having teeth vs any issues managing boluses with the tongue.  Also contributing is dysgeusia.    LYMPHEDEMA HISTORY:  Onset:  About 3 weeks ago.  Stable  Functional deficits affecting:  swallowing and ROM    Pattern of swelling   a) over time:  stable  b) over a given day: stable    FAMILY HISTORY:  Family History   Problem Relation Age of Onset    Cancer Mother         liver    Cancer Father         colon    No Known Problems Sister     No Known Problems Sister     Amblyopia Neg Hx     Blindness Neg Hx     Cataracts Neg Hx     Diabetes Neg Hx     Glaucoma Neg Hx     Hypertension Neg Hx     Macular degeneration Neg Hx     Retinal detachment Neg Hx     Strabismus Neg Hx      "Stroke Neg Hx     Thyroid disease Neg Hx        SOCIAL HISTORY:   and Mrs. Chavez live in [place].  Caregivers available to support/help perform MLD and/or compression/wrap?  Yes    Tobacco use:  Former smoker per EMR  ETOH use:  No per EMR.    BEHAVIOR:  Mr. Chavez remained a delightful man with appropriate affect and social interaction for setting.  He was fully cooperative for assessment today. Results considered indicative of current levels of functioning.    HEARING:  Wears hearing aids.    ASSESSMENT:  Lymphedema:   General appearance of face/neck:    Asymmetry:  Positive; L side (site of surgery) more attenuated than R.      Wounds: Negative     Irritation:  Negative    Photographs:  deferred     From patient:  deferred     Location of edema:  Site of H&N Edema ("+" = present; blank space = absent)      Face   unilateral   bilateral      lower lip   upper lip      nose   mid-face                         cheek   R     L                         jowl   R     L               upper eyelid   R     L                lower eyelid   R     L      forehead    + Submental     + R >  + L     bilateral     none        + Neck     + R >   + L     bilateral     none          Intraoral       R     L     bilateral     none     cheek     tongue     palate  (  1°   2 °)      floor of mouth        Supraclavicular fossa         Unchanged from initial                 Scarring present:  Positive      Type:  minimal         Staging:  MDACC Stage 1b/Foldi Stage 1 (Pitting edema that is quickly reversible. No fibrosis or tissue changes. Improves during day and worsens at night.)     Presence of irregularities not explained by lymphedema:  No       Pain:  Negative      Measurements:  Facial Measurements (mm) Right Left   1.  Tragus to mental protuberance       2.  Tragus to mouth angle       7.  Mandibular angle to mental protuberance       3.  Mandibular angle to nasal wing       4.  Mandibular angle to internal eye corner       5.  " Mandibular angle to external eye corner       6.  Mental protuberance to internal eye corner       Total       Lower lip protrusion     Lower lip circumference     Upper lip circumference         Other Measurements  (mm)    Diagonal circumference  (around chin to crown of head) 66.5   Submental circumference  (1 in front of tragus to largest submental prominence) 65       Alternative mandibular angle     Mandibular angle to mandibular angle     Tragus angle to tragus angle         Horizontal neck circumference    Superior (just below mandible) 49.5   Middle (midway between top & bottom) 44.75   Inferior (lowest circumferential location) 42.25                      Oral Peripheral:   Subjectively, unchanged significantly from post-op visit.  Lymphedema effects:  none    Speech:  100% intelligible with mild distortions.    Lymphedema effects:  not likely    Swallowing:  deferred direct.  Based on report, suspect oral phase issues are related primarily to edentulousness and xerostomia.  May have pharyngeal swelling to compliment submental and neck swelling.   Lymphedema effects:  possible    Voice/Resonance:  within normal limits     Lymphedema effects:  none    Other areas impacted?:  ROM:   Positive; mild limitation     Respiration:   Negative     Vision:  Negative   Hearing:  Negative     COUNSELING:  The above findings were reviewed with Suleiman Chavez along with a suggested course for treatment:  Complete Decongestive Therapy using anterior MLD for 8-12 weeks with a home program.  He was amenable to this.    THERAPEUTIC INTERVENTION:  Compression:   Pre-MLD compression to the edematous area(s) was provided for 10 minutes (30 minutes in home program) and during trunk decongestion via Richardson pack with bandaging to soften firm edema and prepare skin for MLD.         Manual Lymph Drainage (MLD):  MLD was performed via the following approach(es):  anterior.  Subjectively, softer and smaller afterwards.  Trained   And Mrs. Chavez in technique and sequence.      Compression:   Post-MLD compression to the edematous area(s) was provided via flattening pad with bandaging to prevent refilling of tissues and promote continued drainage via open pathways.  The patient was instructed to wear this 2-3 hours minimum after MLD provided here or in the home program and to sleep in it if tolerated.         Following today's intervention, the following functional changes were noted or reported:  Swallowing:  Unchanged      ROM:  Unchanged        IMPRESSIONS:  This 83 y.o. man appears to present with  Oropharyngeal dyspahgia  Head and neck lymphdema  S/p XRT (60 Gy, completed 10/5/23)  S/p partial glossectomy and LMND.  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  Follow up with Carmen Lopes and  Kermit as directed.        Long-term goals:  Mr. Chavez will have  1.  Reduced swelling by 2% or more per measurements.  2.  Be able to expand his diet and reduce Ensure Plus to 0 or 1 per day.    Short-term objectives:  Mr. Chavez will  1.  Perform home program 1x/day by report.  2.  Demonstrate ability to perform the following components (independently or with assistance of a family member) with % accuracy:              A.  Donning/doffing compression pads/bandages.              B.  Determining need for and adjusting fit of compression pads/bandages.              C.  Performing correct sequence of MLD.              D.  Performing MLD technique correctly (skin stretching vs sliding or deep pressure).   3.  Trade pureed or mechanical soft foods for Ensure.

## 2024-01-30 NOTE — PLAN OF CARE
IMPRESSIONS:  This 83 y.o. man appears to present with  Oropharyngeal dyspahgia  Head and neck lymphdema  S/p XRT (60 Gy, completed 10/5/23)  S/p partial glossectomy and LMND.  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  Follow up with Carmen Lopes and  Kermit as directed.        Long-term goals:  Mr. Chavez will have  1.  Reduced swelling by 2% or more per measurements.  2.  Be able to expand his diet and reduce Ensure Plus to 0 or 1 per day.    Short-term objectives:  Mr. Chavez will  1.  Perform home program 1x/day by report.  2.  Demonstrate ability to perform the following components (independently or with assistance of a family member) with % accuracy:              A.  Donning/doffing compression pads/bandages.              B.  Determining need for and adjusting fit of compression pads/bandages.              C.  Performing correct sequence of MLD.              D.  Performing MLD technique correctly (skin stretching vs sliding or deep pressure).   3.  Trade pureed or mechanical soft foods for Ensure.

## 2024-02-05 ENCOUNTER — PATIENT MESSAGE (OUTPATIENT)
Dept: HEMATOLOGY/ONCOLOGY | Facility: CLINIC | Age: 84
End: 2024-02-05
Payer: MEDICARE

## 2024-02-05 NOTE — TELEPHONE ENCOUNTER
Refill Routing Note   Medication(s) are not appropriate for processing by [facility] for the following reason(s):        Outside of protocol    [facility] action(s):  Route               Appointments  past 12m or future 3m with PCP    Date Provider   Last Visit   4/6/2023 Caleb Negrete MD   Next Visit   Visit date not found Caleb Negrete MD   ED visits in past 90 days: 0        Note composed:8:42 AM 02/05/2024

## 2024-02-06 ENCOUNTER — CLINICAL SUPPORT (OUTPATIENT)
Dept: SPEECH THERAPY | Facility: HOSPITAL | Age: 84
End: 2024-02-06
Payer: MEDICARE

## 2024-02-06 DIAGNOSIS — I89.0 LYMPHEDEMA: ICD-10-CM

## 2024-02-06 DIAGNOSIS — R13.12 DYSPHAGIA, OROPHARYNGEAL: Primary | ICD-10-CM

## 2024-02-06 DIAGNOSIS — Z92.3 HISTORY OF HEAD AND NECK RADIATION: ICD-10-CM

## 2024-02-06 DIAGNOSIS — Z98.890 S/P PARTIAL GLOSSECTOMY: ICD-10-CM

## 2024-02-06 DIAGNOSIS — C02.9 SQUAMOUS CELL CANCER OF TONGUE: ICD-10-CM

## 2024-02-06 PROCEDURE — 92526 ORAL FUNCTION THERAPY: CPT | Mod: GN,HCNC | Performed by: SPEECH-LANGUAGE PATHOLOGIST

## 2024-02-06 RX ORDER — ROPINIROLE 1 MG/1
TABLET, FILM COATED ORAL
Qty: 30 TABLET | Refills: 5 | Status: SHIPPED | OUTPATIENT
Start: 2024-02-06

## 2024-02-06 NOTE — PROGRESS NOTES
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    INTERVAL HISTORY:  Performed home program daily.  Reported seeing improvement.    Continues to try some variety of soft consistencies, but found eggs and grits bland    INTERVENTION:    Lymphedema Therapy:  Submental and central neck subjectively softer and smaller than on presentation last week.    Compression:   Pre-MLD compression to the edematous area(s) was provided for 10 minutes (30 minutes in home program) and during trunk decongestion via Richardson pack with bandaging to soften firm edema and prepare skin for MLD.         Manual Lymph Drainage (MLD):  MLD was performed via the following approach(es):  anterior.  Mr. Chavez performed the forward sequence with need for minor adjustments.  Clinician performed the reverse sequence x3. Subjectively, softer and smaller afterwards.      Compression:   Post-MLD compression to the edematous area(s) was provided via flattening pad with bandaging to prevent refilling of tissues and promote continued drainage via open pathways.  The patient was instructed to wear this 2-3 hours minimum after MLD provided here or in the home program and to sleep in it if tolerated.         Following today's intervention, the following functional changes were noted or reported:    Swallowing:  Unchanged      ROM:  Unchanged        Short-term objectives:  Mr. Chavez will  1.  Perform home program 1x/day by report.   Being met.  2.  Demonstrate ability to perform the following components (independently or with assistance of a family member) with % accuracy:              A.  Donning/doffing compression pads/bandages.    Being met.              B.   Determining need for and adjusting fit of compression pads/bandages.    Being met.              C.  Performing correct sequence of MLD.    Being met.              D.  Performing MLD technique correctly (skin stretching vs sliding or deep pressure).     Benefited from minor adjustments in technique.  3.  Trade pureed or mechanical soft foods for Ensure.   Tried some mechanical soft foods.  Has not yet taken adequate volume to reduce Ensure.      IMPRESSIONS:  This 83 y.o. man appears to present with  Oropharyngeal dyspahgia  Head and neck lymphdema  S/p XRT (60 Gy, completed 10/5/23)  S/p partial glossectomy and LMND.  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  Follow up with Carmen Lopes and  Kermit as directed.        Long-term goals:  Mr. Chavez will have  1.  Reduced swelling by 2% or more per measurements.  2.  Be able to expand his diet and reduce Ensure Plus to 0 or 1 per day.

## 2024-02-14 ENCOUNTER — CLINICAL SUPPORT (OUTPATIENT)
Dept: SPEECH THERAPY | Facility: HOSPITAL | Age: 84
End: 2024-02-14
Payer: MEDICARE

## 2024-02-14 DIAGNOSIS — Z98.890 S/P PARTIAL GLOSSECTOMY: ICD-10-CM

## 2024-02-14 DIAGNOSIS — Z92.3 HISTORY OF HEAD AND NECK RADIATION: ICD-10-CM

## 2024-02-14 DIAGNOSIS — I89.0 LYMPHEDEMA: ICD-10-CM

## 2024-02-14 DIAGNOSIS — C02.9 SQUAMOUS CELL CANCER OF TONGUE: ICD-10-CM

## 2024-02-14 DIAGNOSIS — R13.12 DYSPHAGIA, OROPHARYNGEAL: Primary | ICD-10-CM

## 2024-02-14 PROCEDURE — 92526 ORAL FUNCTION THERAPY: CPT | Mod: GN,HCNC | Performed by: SPEECH-LANGUAGE PATHOLOGIST

## 2024-02-14 NOTE — PROGRESS NOTES
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    INTERVAL HISTORY:  Performed home program daily.  Reported seeing improvement.    Continues to try some variety of soft consistencies.    Going to see Dr. Jamie Dos Santos, [facility], to see if ready for dentures.    Finding co-pays for various visits to be adding up.    INTERVENTION:    Lymphedema Therapy:  Submental and central neck subjectively softer and smaller than on presentation last week.    Compression:    Pre-MLD compression to the edematous area(s) was provided for 10 minutes (30 minutes in home program) and during trunk decongestion via Richardson pack with bandaging to soften firm edema and prepare skin for MLD.         Manual Lymph Drainage (MLD):  MLD was performed via the following approach(es):  anterior.  Mr. Chavez performed the forward sequence with need for minor adjustments.  Clinician performed the reverse sequence x3. Subjectively, softer and smaller afterwards.      Compression:   Post-MLD compression to the edematous area(s) was provided via flattening pad with bandaging to prevent refilling of tissues and promote continued drainage via open pathways.  The patient was instructed to wear this 2-3 hours minimum after MLD provided here or in the home program and to sleep in it if tolerated.         Following today's intervention, the following functional changes were noted or reported:    Swallowing:  Unchanged      ROM:  Unchanged        Short-term objectives:  Mr. Chavez will  1.  Perform home program 1x/day by report.   Being met.  2.  Demonstrate ability to perform the following components (independently or with assistance of a family member) with % accuracy:               A.  Donning/doffing compression pads/bandages.    Being met.              B.  Determining need for and adjusting fit of compression pads/bandages.    Being met.              C.  Performing correct sequence of MLD.    Being met.              D.  Performing MLD technique correctly (skin stretching vs sliding or deep pressure).     Benefited from minor adjustments in technique.  3.  Trade pureed or mechanical soft foods for Ensure.   Tried some mechanical soft foods.  Has not yet taken adequate volume to reduce Ensure.      IMPRESSIONS:  This 83 y.o. man appears to present with  Oropharyngeal dyspahgia  Head and neck lymphdema  S/p XRT (60 Gy, completed 10/5/23)  S/p partial glossectomy and LMND.  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  As Mr. Chavez has demonstrated reliable execution of home program, change to every other week schedule is reasonable and will help with reduce his co-pay burden.  Follow up with Carmen Lopes and  Kermit as directed.        Long-term goals:  Mr. Chavez will have  1.  Reduced swelling by 2% or more per measurements.  2.  Be able to expand his diet and reduce Ensure Plus to 0 or 1 per day.

## 2024-02-20 ENCOUNTER — OFFICE VISIT (OUTPATIENT)
Dept: OTOLARYNGOLOGY | Facility: CLINIC | Age: 84
End: 2024-02-20
Payer: MEDICARE

## 2024-02-20 ENCOUNTER — TELEPHONE (OUTPATIENT)
Dept: HEMATOLOGY/ONCOLOGY | Facility: CLINIC | Age: 84
End: 2024-02-20
Payer: MEDICARE

## 2024-02-20 VITALS
SYSTOLIC BLOOD PRESSURE: 135 MMHG | WEIGHT: 178.38 LBS | HEIGHT: 73 IN | BODY MASS INDEX: 23.64 KG/M2 | HEART RATE: 75 BPM | DIASTOLIC BLOOD PRESSURE: 60 MMHG

## 2024-02-20 DIAGNOSIS — C02.9 SQUAMOUS CELL CANCER OF TONGUE: Primary | ICD-10-CM

## 2024-02-20 PROCEDURE — 1160F RVW MEDS BY RX/DR IN RCRD: CPT | Mod: HCNC,CPTII,S$GLB, | Performed by: OTOLARYNGOLOGY

## 2024-02-20 PROCEDURE — 99213 OFFICE O/P EST LOW 20 MIN: CPT | Mod: HCNC,S$GLB,, | Performed by: OTOLARYNGOLOGY

## 2024-02-20 PROCEDURE — 1159F MED LIST DOCD IN RCRD: CPT | Mod: HCNC,CPTII,S$GLB, | Performed by: OTOLARYNGOLOGY

## 2024-02-20 PROCEDURE — 99999 PR PBB SHADOW E&M-EST. PATIENT-LVL IV: CPT | Mod: PBBFAC,HCNC,, | Performed by: OTOLARYNGOLOGY

## 2024-02-20 PROCEDURE — 1126F AMNT PAIN NOTED NONE PRSNT: CPT | Mod: HCNC,CPTII,S$GLB, | Performed by: OTOLARYNGOLOGY

## 2024-02-20 PROCEDURE — 3078F DIAST BP <80 MM HG: CPT | Mod: HCNC,CPTII,S$GLB, | Performed by: OTOLARYNGOLOGY

## 2024-02-20 PROCEDURE — 3075F SYST BP GE 130 - 139MM HG: CPT | Mod: HCNC,CPTII,S$GLB, | Performed by: OTOLARYNGOLOGY

## 2024-02-20 NOTE — ASSESSMENT & PLAN NOTE
ELVIA Mckeon revealed BRCA germline mutation.  Genetics referral declined. He has no biological children so I feel that this is a reasonable choice.

## 2024-02-20 NOTE — TELEPHONE ENCOUNTER
Called patient to schedule genetic appointment, stated he wanted to speak with Dr. Arroyo, before scheduling.

## 2024-02-20 NOTE — PROGRESS NOTES
No chief complaint on file.    Oncology History   Squamous cell cancer of tongue   6/16/2023 Initial Diagnosis    Squamous cell cancer of tongue     7/12/2023 Surgery    1. Left partial glossectomy  2. Left modified neck dissection of levels 1B through 4     7/31/2023 Cancer Staged     Cancer Staging   Squamous cell cancer of tongue  Staging form: Oral Cavity, AJCC 8th Edition  - Pathologic stage from 7/31/2023: Stage ELENITA (pT1, pN2b, cM0) - Signed by Sindi Robbins NP on 7/31/2023 7/31/2023 Cancer Staged    Staging form: Oral Cavity, AJCC 8th Edition  - Pathologic stage from 7/31/2023: Stage ELENITA (pT1, pN2b, cM0)     8/23/2023 - 10/5/2023 Radiation Therapy    Treating physician: Cruzito Lopes  Treatment Summary  Course: C1 H&N 2023  Treatment Site Ref. ID Energy Dose/Fx (Gy) #Fx Dose Correction (Gy) Total Dose (Gy) Start Date End Date Elapsed Days   IM H&N PTV_High 6X 2 30 / 30 0 60 8/23/2023 10/5/2023 43            HPI   84 y.o. male presents with the above treatment history.  He is doing well overall.  No new complaints.     Review of Systems   Constitutional: Negative for fatigue and unexpected weight change.   HENT: Per HPI.  Eyes: Negative for visual disturbance.   Respiratory: Negative for shortness of breath, hemoptysis   Cardiovascular: Negative for chest pain and palpitations.   Musculoskeletal: Negative for decreased ROM, back pain.   Skin: Negative for rash, sunburn, itching.   Neurological: Negative for dizziness and seizures.   Hematological: Negative for adenopathy. Does not bruise/bleed easily.   Endocrine: Negative for rapid weight loss/weight gain, heat/cold intolerance.     Past Medical History   Patient Active Problem List   Diagnosis    Ocular hypertension    Essential hypertension    Screen for colon cancer    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, with long-term current use of insulin    Adenomatous polyp    Family history of colon cancer    Nuclear cataract     Borderline glaucoma of both eyes with ocular hypertension    Acute cystitis without hematuria    Spinal stenosis, lumbar region, with neurogenic claudication    EMA (iron deficiency anemia)    Benign prostatic hyperplasia without lower urinary tract symptoms    Debility    Chronic midline low back pain with sciatica    Squamous cell cancer of tongue    Incomplete bladder emptying           Past Surgical History   Past Surgical History:   Procedure Laterality Date    CHOLECYSTECTOMY      GLOSSECTOMY Left 2023    Procedure: GLOSSECTOMY;  Surgeon: Jaxon Carmen MD;  Location: [facility] OR [address] [place] [address];  Service: ENT;  Laterality: Left;    RADICAL NECK DISSECTION Left 2023    Procedure: DISSECTION, NECK, RADICAL;  Surgeon: Jaxon Carmen MD;  Location: [facility] OR [address] [place] [address];  Service: ENT;  Laterality: Left;         Family History   Family History   Problem Relation Age of Onset    Cancer Mother         liver    Cancer Father         colon    No Known Problems Sister     No Known Problems Sister     Amblyopia Neg Hx     Blindness Neg Hx     Cataracts Neg Hx     Diabetes Neg Hx     Glaucoma Neg Hx     Hypertension Neg Hx     Macular degeneration Neg Hx     Retinal detachment Neg Hx     Strabismus Neg Hx     Stroke Neg Hx     Thyroid disease Neg Hx            Social History   .  Social History     Socioeconomic History    Marital status:    Occupational History     Employer: OTHER   Tobacco Use    Smoking status: Former     Current packs/day: 0.00     Types: Cigarettes     Quit date: 2004     Years since quittin.7     Passive exposure: Past    Smokeless tobacco: Never   Substance and Sexual Activity    Alcohol use: No     Alcohol/week: 0.0 standard drinks of alcohol    Drug use: No         Allergies   Review of patient's allergies indicates:  No Known Allergies        Physical Exam     Vitals:    24 1315   BP: 135/60   Pulse: 75         Body mass index is 23.53 kg/m².      General: AOx3, NAD    Respiratory:  Symmetric chest rise, normal effort  Oral Cavity:  Oral Tongue mobile, no lesions noted.  Well-healed left glossectomy site.  Hard Palate WNL. No buccal or FOM lesions.  Oropharynx:  No masses/lesions of the posterior pharyngeal wall. Tonsillar fossa without lesions. Soft palate without masses. Midline uvula.   Neck:  Well-healed left neck scar.  Moderate fibrosis status post radiation.  No cervical lymphadenopathy, thyromegaly or thyroid nodules.  Normal range of motion.    Face: House Brackmann I bilaterally.         Assessment/Plan  Problem List Items Addressed This Visit          Oncology    Squamous cell cancer of tongue - Primary     PRAKASH.  [place] revealed BRCA germline mutation.  Genetics referral declined. He has no biological children so I feel that this is a reasonable choice.

## 2024-02-23 ENCOUNTER — PATIENT MESSAGE (OUTPATIENT)
Dept: HEMATOLOGY/ONCOLOGY | Facility: CLINIC | Age: 84
End: 2024-02-23
Payer: MEDICARE

## 2024-02-27 ENCOUNTER — CLINICAL SUPPORT (OUTPATIENT)
Dept: SPEECH THERAPY | Facility: HOSPITAL | Age: 84
End: 2024-02-27
Payer: MEDICARE

## 2024-02-27 DIAGNOSIS — R13.12 DYSPHAGIA, OROPHARYNGEAL: Primary | ICD-10-CM

## 2024-02-27 DIAGNOSIS — Z92.3 HISTORY OF HEAD AND NECK RADIATION: ICD-10-CM

## 2024-02-27 DIAGNOSIS — Z98.890 S/P PARTIAL GLOSSECTOMY: ICD-10-CM

## 2024-02-27 DIAGNOSIS — I89.0 LYMPHEDEMA: ICD-10-CM

## 2024-02-27 PROCEDURE — 92526 ORAL FUNCTION THERAPY: CPT | Mod: GN,HCNC | Performed by: SPEECH-LANGUAGE PATHOLOGIST

## 2024-02-27 NOTE — PROGRESS NOTES
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    INTERVAL HISTORY:  Performed home program daily.  Reported seeing improvement.    Continues to try some variety of soft consistencies.    Going to see Dr. Jamie Dos Santos, [facility], tomorrow to see if ready for dentures.      INTERVENTION:    Lymphedema Therapy:  Submental and central neck subjectively softer and smaller than on presentation last visit.    Compression:    Pre-MLD compression to the edematous area(s) was provided for 10 minutes (30 minutes in home program) and during trunk decongestion via Richardson pack with bandaging to soften firm edema and prepare skin for MLD.         Manual Lymph Drainage (MLD):  MLD was performed via the following approach(es):  anterior.  Mr. Chavez performed the forward sequence with need for minor adjustments.  Clinician performed the reverse sequence x3. Subjectively, softer and smaller afterwards.      Compression:   Post-MLD compression to the edematous area(s) was provided via flattening pad with bandaging to prevent refilling of tissues and promote continued drainage via open pathways.  The patient was instructed to wear this 2-3 hours minimum after MLD provided here or in the home program and to sleep in it if tolerated.         Following today's intervention, the following functional changes were noted or reported:    Swallowing:  Unchanged      ROM:  Unchanged        Short-term objectives:  Mr. Chavez will  1.  Perform home program 1x/day by report.   Being met.  2.  Demonstrate ability to perform the following components (independently or with assistance of a family member) with % accuracy:              A.  Donning/doffing compression  pads/bandages.    Being met.              B.  Determining need for and adjusting fit of compression pads/bandages.    Being met.              C.  Performing correct sequence of MLD.    Being met.              D.  Performing MLD technique correctly (skin stretching vs sliding or deep pressure).     Benefited from minor adjustments in technique.  3.  Trade pureed or mechanical soft foods for Ensure.   Tried some mechanical soft foods.  Has not yet taken adequate volume to reduce Ensure.      IMPRESSIONS:  This 83 y.o. man appears to present with  Oropharyngeal dyspahgia  Head and neck lymphdema  S/p XRT (60 Gy, completed 10/5/23)  S/p partial glossectomy and LMND.  History Stage ELENITA (pT1, pN2b, cM0) L oral tongue    RECOMMENDATIONS/PLAN OF CARE:  It if felt that Suleiman Chavez would benefit from  ST on an every other week basis for 5-9 weeks with a home program to address dysphagia and lymphedema.  As Mr. Chavez has demonstrated reliable execution of home program, change to every other week schedule is reasonable and will help with reduce his co-pay burden.  Follow up with Carmen Lopes and  Kermit as directed.        Long-term goals:  Mr. Chavez will have  1.  Reduced swelling by 2% or more per measurements.  2.  Be able to expand his diet and reduce Ensure Plus to 0 or 1 per day.

## 2024-03-12 ENCOUNTER — CLINICAL SUPPORT (OUTPATIENT)
Dept: SPEECH THERAPY | Facility: HOSPITAL | Age: 84
End: 2024-03-12
Payer: MEDICARE

## 2024-03-12 ENCOUNTER — PATIENT MESSAGE (OUTPATIENT)
Dept: SPEECH THERAPY | Facility: HOSPITAL | Age: 84
End: 2024-03-12

## 2024-03-12 DIAGNOSIS — I89.0 LYMPHEDEMA: ICD-10-CM

## 2024-03-12 DIAGNOSIS — C02.9 SQUAMOUS CELL CANCER OF TONGUE: ICD-10-CM

## 2024-03-12 DIAGNOSIS — Z92.3 HISTORY OF HEAD AND NECK RADIATION: ICD-10-CM

## 2024-03-12 DIAGNOSIS — R13.12 DYSPHAGIA, OROPHARYNGEAL: Primary | ICD-10-CM

## 2024-03-12 DIAGNOSIS — Z98.890 S/P PARTIAL GLOSSECTOMY: ICD-10-CM

## 2024-03-12 PROCEDURE — 92526 ORAL FUNCTION THERAPY: CPT | Mod: GN,HCNC | Performed by: SPEECH-LANGUAGE PATHOLOGIST

## 2024-03-12 NOTE — PROGRESS NOTES
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    INTERVAL HISTORY:  Performed home program daily.      Continues to try some variety of soft consistencies.    Saw Dr. Jamie Dos Santos, DDS, 2/28/24 and has begun process to obtain new dentures.  Dr. Dos Santos also introduced him to a new xerostomia product (mouthwash) that Mr. Chavez has found to work better than Biotene did.  He reported getting 30-60 minutes of relief, but also stated that the mouthwash can be used only up to 3x/day, so after it wears off, he returns to spritzing his oral cavity with water.      INTERVENTION:    Lymphedema Therapy:  Submental area subjectively still with density; central neck reduced.  Mrs. Chavez tightened the bandage last week.    Compression:    Pre-MLD compression to the edematous area(s) was provided for 10 minutes (30 minutes in home program) and during trunk decongestion via Richardson pack with bandaging to soften firm edema and prepare skin for MLD.         Manual Lymph Drainage (MLD):  MLD was performed via the following approach(es):  anterior.  Clinician performed the forward and reverse sequence (x3). Subjectively, softer and smaller afterwards.      Compression:   Post-MLD compression to the edematous area(s) was provided via flattening pad with bandaging to prevent refilling of tissues and promote continued drainage via open pathways.  The patient was instructed to wear this 2-3 hours minimum after MLD provided here or in the home program and to sleep in it if tolerated.         Following today's intervention, the following functional changes were noted or reported:    Swallowing:  Unchanged; dentures pending.      ROM:  Unchanged         Short-term objectives:  Mr. Chavez will  1.  Perform home program 1x/day by report.   Being met.  2.  Demonstrate ability to perform the following components (independently or with assistance of a family member) with % accuracy:              A.  Donning/doffing compression pads/bandages.    Being met.              B.  Determining need for and adjusting fit of compression pads/bandages.    Being met.              C.  Performing correct sequence of MLD.    Deferred..              D.  Performing MLD technique correctly (skin stretching vs sliding or deep pressure).     Deferred.  3.  Trade pureed or mechanical soft foods for Ensure.   Tried some mechanical soft foods.  Has not yet taken adequate volume to reduce Ensure (still 2-3x/day).      IMPRESSIONS:  This 83 y.o. man appears to present with  Oropharyngeal dyspahgia  Head and neck lymphdema  S/p XRT (60 Gy, completed 10/5/23)  S/p partial glossectomy and LMND.  History Stage ELENITA (pT1, pN2b, cM0) L oral tongue    RECOMMENDATIONS/PLAN OF CARE:  It if felt that Suleiman Chavez would benefit from  ST on an every other week basis for 4-8 weeks with a home program to address dysphagia and lymphedema.  As Mr. Chavez has demonstrated reliable execution of home program, change to every other week schedule is reasonable and will help with reduce his co-pay burden.  Follow up with Carmen Lopes and  Kermit as directed.        Long-term goals:  Mr. Chavez will have  1.  Reduced swelling by 2% or more per measurements.  2.  Be able to expand his diet and reduce Ensure Plus to 0 or 1 per day.

## 2024-03-17 ENCOUNTER — HOSPITAL ENCOUNTER (OUTPATIENT)
Facility: HOSPITAL | Age: 84
Discharge: HOME | End: 2024-03-19
Attending: EMERGENCY MEDICINE | Admitting: STUDENT IN AN ORGANIZED HEALTH CARE EDUCATION/TRAINING PROGRAM
Payer: MEDICARE

## 2024-03-17 DIAGNOSIS — R00.0 TACHYCARDIA: ICD-10-CM

## 2024-03-17 DIAGNOSIS — R07.9 CHEST PAIN: ICD-10-CM

## 2024-03-17 DIAGNOSIS — N39.0 UTI (URINARY TRACT INFECTION): ICD-10-CM

## 2024-03-17 DIAGNOSIS — Z87.898 HISTORY OF URINARY RETENTION: Primary | Chronic | ICD-10-CM

## 2024-03-17 PROBLEM — H40.053 BORDERLINE GLAUCOMA OF BOTH EYES WITH OCULAR HYPERTENSION: Chronic | Status: ACTIVE | Noted: 2017-05-22

## 2024-03-17 PROBLEM — R53.81 DEBILITY: Chronic | Status: ACTIVE | Noted: 2022-11-09

## 2024-03-17 PROBLEM — N40.0 BENIGN PROSTATIC HYPERPLASIA WITHOUT LOWER URINARY TRACT SYMPTOMS: Chronic | Status: ACTIVE | Noted: 2022-11-08

## 2024-03-17 PROBLEM — T83.511A URINARY TRACT INFECTION ASSOCIATED WITH CATHETERIZATION OF URINARY TRACT: Status: ACTIVE | Noted: 2024-03-17

## 2024-03-17 PROBLEM — C02.9 SQUAMOUS CELL CANCER OF TONGUE: Chronic | Status: ACTIVE | Noted: 2023-06-16

## 2024-03-17 LAB
ALBUMIN SERPL BCP-MCNC: 3 G/DL (ref 3.5–5.2)
ALLENS TEST: ABNORMAL
ALLENS TEST: ABNORMAL
ALP SERPL-CCNC: 158 U/L (ref 55–135)
ALT SERPL W/O P-5'-P-CCNC: 15 U/L (ref 10–44)
ANION GAP SERPL CALC-SCNC: 8 MMOL/L (ref 8–16)
AST SERPL-CCNC: 15 U/L (ref 10–40)
BACTERIA #/AREA URNS AUTO: ABNORMAL /HPF
BASOPHILS # BLD AUTO: 0.04 K/UL (ref 0–0.2)
BASOPHILS NFR BLD: 0.4 % (ref 0–1.9)
BILIRUB SERPL-MCNC: 0.4 MG/DL (ref 0.1–1)
BILIRUB UR QL STRIP: NEGATIVE
BUN SERPL-MCNC: 15 MG/DL (ref 8–23)
CALCIUM SERPL-MCNC: 8.9 MG/DL (ref 8.7–10.5)
CHLORIDE SERPL-SCNC: 105 MMOL/L (ref 95–110)
CLARITY UR REFRACT.AUTO: ABNORMAL
CO2 SERPL-SCNC: 21 MMOL/L (ref 23–29)
COLOR UR AUTO: YELLOW
CREAT SERPL-MCNC: 0.7 MG/DL (ref 0.5–1.4)
DIFFERENTIAL METHOD BLD: ABNORMAL
EOSINOPHIL # BLD AUTO: 0.1 K/UL (ref 0–0.5)
EOSINOPHIL NFR BLD: 0.5 % (ref 0–8)
ERYTHROCYTE [DISTWIDTH] IN BLOOD BY AUTOMATED COUNT: 13.9 % (ref 11.5–14.5)
EST. GFR  (NO RACE VARIABLE): >60 ML/MIN/1.73 M^2
ESTIMATED AVG GLUCOSE: 120 MG/DL (ref 68–131)
GLUCOSE SERPL-MCNC: 187 MG/DL (ref 70–110)
GLUCOSE UR QL STRIP: NEGATIVE
HBA1C MFR BLD: 5.8 % (ref 4–5.6)
HCO3 UR-SCNC: 21.9 MMOL/L (ref 24–28)
HCT VFR BLD AUTO: 35 % (ref 40–54)
HGB BLD-MCNC: 11.6 G/DL (ref 14–18)
HGB UR QL STRIP: ABNORMAL
HYALINE CASTS UR QL AUTO: 0 /LPF
IMM GRANULOCYTES # BLD AUTO: 0.04 K/UL (ref 0–0.04)
IMM GRANULOCYTES NFR BLD AUTO: 0.4 % (ref 0–0.5)
KETONES UR QL STRIP: NEGATIVE
LACTATE SERPL-SCNC: 1.1 MMOL/L (ref 0.5–2.2)
LDH SERPL L TO P-CCNC: 2.96 MMOL/L (ref 0.5–2.2)
LEUKOCYTE ESTERASE UR QL STRIP: ABNORMAL
LYMPHOCYTES # BLD AUTO: 0.4 K/UL (ref 1–4.8)
LYMPHOCYTES NFR BLD: 3.6 % (ref 18–48)
MCH RBC QN AUTO: 29.8 PG (ref 27–31)
MCHC RBC AUTO-ENTMCNC: 33.1 G/DL (ref 32–36)
MCV RBC AUTO: 90 FL (ref 82–98)
MICROSCOPIC COMMENT: ABNORMAL
MONOCYTES # BLD AUTO: 0.8 K/UL (ref 0.3–1)
MONOCYTES NFR BLD: 7.7 % (ref 4–15)
NEUTROPHILS # BLD AUTO: 9 K/UL (ref 1.8–7.7)
NEUTROPHILS NFR BLD: 87.4 % (ref 38–73)
NITRITE UR QL STRIP: POSITIVE
NRBC BLD-RTO: 0 /100 WBC
OHS QRS DURATION: 96 MS
OHS QTC CALCULATION: 439 MS
PCO2 BLDA: 37.8 MMHG (ref 35–45)
PH SMN: 7.37 [PH] (ref 7.35–7.45)
PH UR STRIP: 8 [PH] (ref 5–8)
PLATELET # BLD AUTO: 180 K/UL (ref 150–450)
PMV BLD AUTO: 9.4 FL (ref 9.2–12.9)
PO2 BLDA: 47 MMHG (ref 40–60)
POC BE: -3 MMOL/L
POC SATURATED O2: 82 % (ref 95–100)
POC TCO2: 23 MMOL/L (ref 24–29)
POCT GLUCOSE: 128 MG/DL (ref 70–110)
POCT GLUCOSE: 165 MG/DL (ref 70–110)
POTASSIUM SERPL-SCNC: 3.9 MMOL/L (ref 3.5–5.1)
PROT SERPL-MCNC: 6.6 G/DL (ref 6–8.4)
PROT UR QL STRIP: ABNORMAL
RBC # BLD AUTO: 3.89 M/UL (ref 4.6–6.2)
RBC #/AREA URNS AUTO: 10 /HPF (ref 0–4)
SAMPLE: ABNORMAL
SAMPLE: ABNORMAL
SITE: ABNORMAL
SITE: ABNORMAL
SODIUM SERPL-SCNC: 134 MMOL/L (ref 136–145)
SP GR UR STRIP: 1.02 (ref 1–1.03)
SQUAMOUS #/AREA URNS AUTO: 0 /HPF
TROPONIN I SERPL DL<=0.01 NG/ML-MCNC: <0.006 NG/ML (ref 0–0.03)
URN SPEC COLLECT METH UR: ABNORMAL
WBC # BLD AUTO: 10.29 K/UL (ref 3.9–12.7)
WBC #/AREA URNS AUTO: >100 /HPF (ref 0–5)

## 2024-03-17 PROCEDURE — 82803 BLOOD GASES ANY COMBINATION: CPT | Mod: HCNC

## 2024-03-17 PROCEDURE — G0378 HOSPITAL OBSERVATION PER HR: HCPCS | Mod: HCNC

## 2024-03-17 PROCEDURE — 96361 HYDRATE IV INFUSION ADD-ON: CPT | Mod: HCNC

## 2024-03-17 PROCEDURE — 83605 ASSAY OF LACTIC ACID: CPT | Mod: 91,HCNC

## 2024-03-17 PROCEDURE — 82962 GLUCOSE BLOOD TEST: CPT | Mod: HCNC

## 2024-03-17 PROCEDURE — 93005 ELECTROCARDIOGRAM TRACING: CPT | Mod: HCNC

## 2024-03-17 PROCEDURE — 63600175 PHARM REV CODE 636 W HCPCS: Mod: HCNC

## 2024-03-17 PROCEDURE — 99285 EMERGENCY DEPT VISIT HI MDM: CPT | Mod: 25,HCNC

## 2024-03-17 PROCEDURE — 87077 CULTURE AEROBIC IDENTIFY: CPT | Mod: HCNC | Performed by: EMERGENCY MEDICINE

## 2024-03-17 PROCEDURE — 84484 ASSAY OF TROPONIN QUANT: CPT | Mod: HCNC | Performed by: EMERGENCY MEDICINE

## 2024-03-17 PROCEDURE — 96372 THER/PROPH/DIAG INJ SC/IM: CPT

## 2024-03-17 PROCEDURE — 83036 HEMOGLOBIN GLYCOSYLATED A1C: CPT | Mod: HCNC

## 2024-03-17 PROCEDURE — 81001 URINALYSIS AUTO W/SCOPE: CPT | Mod: HCNC | Performed by: EMERGENCY MEDICINE

## 2024-03-17 PROCEDURE — 80053 COMPREHEN METABOLIC PANEL: CPT | Mod: HCNC | Performed by: EMERGENCY MEDICINE

## 2024-03-17 PROCEDURE — 99900035 HC TECH TIME PER 15 MIN (STAT): Mod: HCNC

## 2024-03-17 PROCEDURE — 87086 URINE CULTURE/COLONY COUNT: CPT | Mod: HCNC | Performed by: EMERGENCY MEDICINE

## 2024-03-17 PROCEDURE — 83605 ASSAY OF LACTIC ACID: CPT | Mod: HCNC

## 2024-03-17 PROCEDURE — 63600175 PHARM REV CODE 636 W HCPCS: Mod: HCNC | Performed by: EMERGENCY MEDICINE

## 2024-03-17 PROCEDURE — 87040 BLOOD CULTURE FOR BACTERIA: CPT | Mod: 59,HCNC | Performed by: EMERGENCY MEDICINE

## 2024-03-17 PROCEDURE — 96365 THER/PROPH/DIAG IV INF INIT: CPT | Mod: HCNC

## 2024-03-17 PROCEDURE — 93010 ELECTROCARDIOGRAM REPORT: CPT | Mod: HCNC,,, | Performed by: INTERNAL MEDICINE

## 2024-03-17 PROCEDURE — 25000003 PHARM REV CODE 250: Mod: HCNC

## 2024-03-17 PROCEDURE — 85025 COMPLETE CBC W/AUTO DIFF WBC: CPT | Mod: HCNC | Performed by: EMERGENCY MEDICINE

## 2024-03-17 PROCEDURE — 25000003 PHARM REV CODE 250: Mod: HCNC | Performed by: EMERGENCY MEDICINE

## 2024-03-17 PROCEDURE — 87154 CUL TYP ID BLD PTHGN 6+ TRGT: CPT | Mod: HCNC | Performed by: EMERGENCY MEDICINE

## 2024-03-17 PROCEDURE — 87088 URINE BACTERIA CULTURE: CPT | Mod: HCNC | Performed by: EMERGENCY MEDICINE

## 2024-03-17 PROCEDURE — 51798 US URINE CAPACITY MEASURE: CPT | Mod: HCNC

## 2024-03-17 PROCEDURE — 87186 SC STD MICRODIL/AGAR DIL: CPT | Mod: HCNC | Performed by: EMERGENCY MEDICINE

## 2024-03-17 RX ORDER — AMOXICILLIN 250 MG
1 CAPSULE ORAL 2 TIMES DAILY
Status: DISCONTINUED | OUTPATIENT
Start: 2024-03-17 | End: 2024-03-19 | Stop reason: HOSPADM

## 2024-03-17 RX ORDER — LATANOPROST 50 UG/ML
1 SOLUTION/ DROPS OPHTHALMIC EVERY EVENING
Status: DISCONTINUED | OUTPATIENT
Start: 2024-03-17 | End: 2024-03-19 | Stop reason: HOSPADM

## 2024-03-17 RX ORDER — NALOXONE HCL 0.4 MG/ML
0.02 VIAL (ML) INJECTION
Status: DISCONTINUED | OUTPATIENT
Start: 2024-03-17 | End: 2024-03-19 | Stop reason: HOSPADM

## 2024-03-17 RX ORDER — TRAMADOL HYDROCHLORIDE 50 MG/1
50 TABLET ORAL EVERY 6 HOURS PRN
Status: DISCONTINUED | OUTPATIENT
Start: 2024-03-17 | End: 2024-03-19 | Stop reason: HOSPADM

## 2024-03-17 RX ORDER — GABAPENTIN 300 MG/1
300 CAPSULE ORAL 3 TIMES DAILY
Status: DISCONTINUED | OUTPATIENT
Start: 2024-03-17 | End: 2024-03-19 | Stop reason: HOSPADM

## 2024-03-17 RX ORDER — ONDANSETRON HYDROCHLORIDE 2 MG/ML
4 INJECTION, SOLUTION INTRAVENOUS EVERY 8 HOURS PRN
Status: DISCONTINUED | OUTPATIENT
Start: 2024-03-17 | End: 2024-03-19 | Stop reason: HOSPADM

## 2024-03-17 RX ORDER — ROPINIROLE 1 MG/1
1 TABLET, FILM COATED ORAL NIGHTLY
Status: DISCONTINUED | OUTPATIENT
Start: 2024-03-17 | End: 2024-03-19 | Stop reason: HOSPADM

## 2024-03-17 RX ORDER — GLUCAGON 1 MG
1 KIT INJECTION
Status: DISCONTINUED | OUTPATIENT
Start: 2024-03-17 | End: 2024-03-19 | Stop reason: HOSPADM

## 2024-03-17 RX ORDER — POLYETHYLENE GLYCOL 3350 17 G/17G
17 POWDER, FOR SOLUTION ORAL DAILY
Status: DISCONTINUED | OUTPATIENT
Start: 2024-03-17 | End: 2024-03-19 | Stop reason: HOSPADM

## 2024-03-17 RX ORDER — TAMSULOSIN HYDROCHLORIDE 0.4 MG/1
2 CAPSULE ORAL NIGHTLY
Status: DISCONTINUED | OUTPATIENT
Start: 2024-03-17 | End: 2024-03-19 | Stop reason: HOSPADM

## 2024-03-17 RX ORDER — ASPIRIN 81 MG/1
81 TABLET ORAL DAILY
Status: DISCONTINUED | OUTPATIENT
Start: 2024-03-17 | End: 2024-03-19 | Stop reason: HOSPADM

## 2024-03-17 RX ORDER — IBUPROFEN 200 MG
16 TABLET ORAL
Status: DISCONTINUED | OUTPATIENT
Start: 2024-03-17 | End: 2024-03-19 | Stop reason: HOSPADM

## 2024-03-17 RX ORDER — SODIUM CHLORIDE 0.9 % (FLUSH) 0.9 %
10 SYRINGE (ML) INJECTION EVERY 12 HOURS PRN
Status: DISCONTINUED | OUTPATIENT
Start: 2024-03-17 | End: 2024-03-19 | Stop reason: HOSPADM

## 2024-03-17 RX ORDER — LOSARTAN POTASSIUM 50 MG/1
100 TABLET ORAL DAILY
Status: DISCONTINUED | OUTPATIENT
Start: 2024-03-17 | End: 2024-03-19 | Stop reason: HOSPADM

## 2024-03-17 RX ORDER — IBUPROFEN 200 MG
24 TABLET ORAL
Status: DISCONTINUED | OUTPATIENT
Start: 2024-03-17 | End: 2024-03-19 | Stop reason: HOSPADM

## 2024-03-17 RX ORDER — ENOXAPARIN SODIUM 100 MG/ML
40 INJECTION SUBCUTANEOUS EVERY 24 HOURS
Status: DISCONTINUED | OUTPATIENT
Start: 2024-03-17 | End: 2024-03-19 | Stop reason: HOSPADM

## 2024-03-17 RX ORDER — INSULIN ASPART 100 [IU]/ML
0-5 INJECTION, SOLUTION INTRAVENOUS; SUBCUTANEOUS
Status: DISCONTINUED | OUTPATIENT
Start: 2024-03-17 | End: 2024-03-19 | Stop reason: HOSPADM

## 2024-03-17 RX ORDER — AMLODIPINE BESYLATE 5 MG/1
5 TABLET ORAL 2 TIMES DAILY
Status: DISCONTINUED | OUTPATIENT
Start: 2024-03-17 | End: 2024-03-19 | Stop reason: HOSPADM

## 2024-03-17 RX ORDER — ALUMINUM HYDROXIDE, MAGNESIUM HYDROXIDE, AND SIMETHICONE 1200; 120; 1200 MG/30ML; MG/30ML; MG/30ML
30 SUSPENSION ORAL 4 TIMES DAILY PRN
Status: DISCONTINUED | OUTPATIENT
Start: 2024-03-17 | End: 2024-03-19 | Stop reason: HOSPADM

## 2024-03-17 RX ORDER — DOXAZOSIN 2 MG/1
4 TABLET ORAL NIGHTLY
Status: DISCONTINUED | OUTPATIENT
Start: 2024-03-17 | End: 2024-03-17

## 2024-03-17 RX ORDER — ACETAMINOPHEN 325 MG/1
650 TABLET ORAL EVERY 4 HOURS PRN
Status: DISCONTINUED | OUTPATIENT
Start: 2024-03-17 | End: 2024-03-19 | Stop reason: HOSPADM

## 2024-03-17 RX ADMIN — SODIUM CHLORIDE, POTASSIUM CHLORIDE, SODIUM LACTATE AND CALCIUM CHLORIDE 500 ML: 600; 310; 30; 20 INJECTION, SOLUTION INTRAVENOUS at 05:03

## 2024-03-17 RX ADMIN — AMLODIPINE BESYLATE 5 MG: 5 TABLET ORAL at 10:03

## 2024-03-17 RX ADMIN — ENOXAPARIN SODIUM 40 MG: 40 INJECTION SUBCUTANEOUS at 04:03

## 2024-03-17 RX ADMIN — GABAPENTIN 300 MG: 300 CAPSULE ORAL at 10:03

## 2024-03-17 RX ADMIN — ROPINIROLE HYDROCHLORIDE 1 MG: 1 TABLET, FILM COATED ORAL at 10:03

## 2024-03-17 RX ADMIN — ASPIRIN 81 MG: 81 TABLET, COATED ORAL at 10:03

## 2024-03-17 RX ADMIN — DOCUSATE SODIUM AND SENNOSIDES 1 TABLET: 8.6; 5 TABLET, FILM COATED ORAL at 10:03

## 2024-03-17 RX ADMIN — SODIUM CHLORIDE, POTASSIUM CHLORIDE, SODIUM LACTATE AND CALCIUM CHLORIDE 500 ML: 600; 310; 30; 20 INJECTION, SOLUTION INTRAVENOUS at 01:03

## 2024-03-17 RX ADMIN — TAMSULOSIN HYDROCHLORIDE 0.8 MG: 0.4 CAPSULE ORAL at 10:03

## 2024-03-17 RX ADMIN — LATANOPROST 1 DROP: 50 SOLUTION OPHTHALMIC at 10:03

## 2024-03-17 RX ADMIN — LOSARTAN POTASSIUM 100 MG: 50 TABLET, FILM COATED ORAL at 10:03

## 2024-03-17 RX ADMIN — CEFTRIAXONE 1 G: 1 INJECTION, POWDER, FOR SOLUTION INTRAMUSCULAR; INTRAVENOUS at 03:03

## 2024-03-17 NOTE — ASSESSMENT & PLAN NOTE
84 y.o. y/o male with a PMHx of BPH (self cath TID) who presented with symptoms of urinary frequency and was admitted with concerns for catheter associated UTI. UA significant for with nitrites, leukocyte esterase and pyuria. No signs of pyelonephritis. Lactate at 2.96 on admission likely 2/2 to hypovolemia. S/p 1 L of IVF in ED. Previous Ucx grew Klebsiella and patient completed course of Cipro.    - Continue IV Rocephin  - Follow up Ucx, Bcx  - Trend CBC  - Strict I/Os  - F/u repeat Lactate

## 2024-03-17 NOTE — SUBJECTIVE & OBJECTIVE
"Past Medical History:   Diagnosis Date    Bilateral ocular hypertension     Diabetes mellitus     Glaucoma     Hypertension     Nuclear cataract 12/17/2014       Past Surgical History:   Procedure Laterality Date    CHOLECYSTECTOMY      GLOSSECTOMY Left 7/12/2023    Procedure: GLOSSECTOMY;  Surgeon: Jaxon Carmen MD;  Location: [facility] OR [address] [place] [address];  Service: ENT;  Laterality: Left;    RADICAL NECK DISSECTION Left 7/12/2023    Procedure: DISSECTION, NECK, RADICAL;  Surgeon: Jaxon Carmen MD;  Location: [facility] OR [address] [place] [address];  Service: ENT;  Laterality: Left;       Review of patient's allergies indicates:  No Known Allergies    No current facility-administered medications on file prior to encounter.     Current Outpatient Medications on File Prior to Encounter   Medication Sig    acetaminophen (TYLENOL) 500 MG tablet Take 1,000 mg by mouth 2 (two) times a day.    amLODIPine (NORVASC) 5 MG tablet TAKE 1 TABLET BY MOUTH TWICE DAILY    aspirin (ECOTRIN) 81 MG EC tablet Take 81 mg by mouth once daily.    catheter 14-16 Fr-" Misc 1 Units by Misc.(Non-Drug; Combo Route) route 3 (three) times daily. 1 Units by Misc.(Non-Drug; Combo Route) route 4 (four) times daily, indefinitely.    d-mannose Powd Take by mouth.    diclofenac (VOLTAREN) 75 MG EC tablet Take 1 tablet (75 mg total) by mouth 2 (two) times daily as needed.    docusate sodium (COLACE) 100 MG capsule Take 1 capsule (100 mg total) by mouth 2 (two) times daily as needed for Constipation.    doxazosin (CARDURA) 4 MG tablet TAKE 1 TABLET BY MOUTH EVERY EVENING    duke's soln (benadryl 30 mL, mylanta 30 mL, LIDOcaine 30 mL, nystatin 30 mL) 120mL Take 5 mLs by mouth every 4 (four) hours as needed (mouth, throat pain).    gabapentin (NEURONTIN) 300 MG capsule Take 1 capsule (300 mg total) by mouth 3 (three) times daily.    latanoprost 0.005 % ophthalmic solution INSTILL 1 DROP INTO BOTH EYES EVERY EVENING    LIDOcaine HCl 2% (LIDOCAINE VISCOUS) 2 % Soln by Mucous " Membrane route every 6 (six) hours.    losartan (COZAAR) 100 MG tablet TAKE 1 TABLET BY MOUTH EVERY DAY    metFORMIN (GLUCOPHAGE) 500 MG tablet TAKE 1 TABLET BY MOUTH EVERY MORNING AND 2 TABLETS EVERY EVENING    rOPINIRole (REQUIP) 1 MG tablet TAKE 1 TABLET BY MOUTH EVERY EVENING    senna-docusate 8.6-50 mg (SENNA WITH DOCUSATE SODIUM) 8.6-50 mg per tablet Take 1 tablet by mouth once daily.    tamsulosin (FLOMAX) 0.4 mg Cap TAKE two CAPSULES BY MOUTH EVERY EVENING    traMADoL (ULTRAM) 50 mg tablet Take 1 tablet (50 mg total) by mouth every 6 (six) hours as needed for Pain.    sennosides 8.8 mg/5 ml (SENNA) 8.8 mg/5 mL syrup Take 10 mLs by mouth nightly.    [DISCONTINUED] cephALEXin (KEFLEX) 500 MG capsule Take 1 capsule (500 mg total) by mouth every 8 (eight) hours.    [DISCONTINUED] FLUAD QUAD ,65Y UP,,PF, 60 mcg (15 mcg x 4)/0.5 mL Syrg     [DISCONTINUED] hydroCHLOROthiazide (HYDRODIURIL) 12.5 MG Tab Take 1 tablet (12.5 mg total) by mouth once daily.    [DISCONTINUED] ondansetron (ZOFRAN-ODT) 8 MG TbDL Dissolve 1 tablet (8 mg total) by mouth every 6 (six) hours as needed (nausea). (Patient not taking: Reported on 2023)    [DISCONTINUED] PREVNAR 20, PF, 0.5 mL Syrg injection      Family History       Problem Relation (Age of Onset)    Cancer Mother, Father    No Known Problems Sister, Sister          Tobacco Use    Smoking status: Former     Current packs/day: 0.00     Types: Cigarettes     Quit date: 2004     Years since quittin.8     Passive exposure: Past    Smokeless tobacco: Never   Substance and Sexual Activity    Alcohol use: No     Alcohol/week: 0.0 standard drinks of alcohol    Drug use: No    Sexual activity: Not on file     Review of Systems   Constitutional:  Negative for chills, fatigue and fever.   Gastrointestinal:  Negative for abdominal pain, constipation, diarrhea and nausea.   Genitourinary:  Positive for frequency. Negative for decreased urine volume, difficulty urinating,  dysuria, flank pain, hematuria, penile discharge and urgency.   Neurological:  Positive for weakness. Negative for dizziness and numbness.   Psychiatric/Behavioral:  Negative for confusion.    All other systems reviewed and are negative.    Objective:     Vital Signs (Most Recent):  Temp: 98.1 °F (36.7 °C) (03/17/24 0018)  Pulse: 81 (03/17/24 0202)  Resp: 14 (03/17/24 0202)  BP: (!) 161/69 (03/17/24 0202)  SpO2: 96 % (03/17/24 0202) Vital Signs (24h Range):  Temp:  [98.1 °F (36.7 °C)] 98.1 °F (36.7 °C)  Pulse:  [] 81  Resp:  [14-18] 14  SpO2:  [96 %] 96 %  BP: (139-161)/(61-69) 161/69     Weight: 83 kg (183 lb)  Body mass index is 24.82 kg/m².     Physical Exam  Vitals and nursing note reviewed.   Constitutional:       Appearance: Normal appearance.   HENT:      Head: Atraumatic.      Right Ear: External ear normal.      Left Ear: External ear normal.   Eyes:      Extraocular Movements: Extraocular movements intact.      Conjunctiva/sclera: Conjunctivae normal.   Cardiovascular:      Rate and Rhythm: Normal rate and regular rhythm.      Pulses: Normal pulses.      Heart sounds: Normal heart sounds.   Pulmonary:      Effort: Pulmonary effort is normal.      Breath sounds: Normal breath sounds. No wheezing or rales.   Abdominal:      General: Abdomen is flat. There is no distension.      Palpations: Abdomen is soft.      Tenderness: There is no abdominal tenderness. There is no guarding or rebound.   Musculoskeletal:      Cervical back: Normal range of motion.   Skin:     General: Skin is warm and dry.      Capillary Refill: Capillary refill takes less than 2 seconds.   Neurological:      General: No focal deficit present.      Mental Status: He is alert and oriented to person, place, and time.      Cranial Nerves: No cranial nerve deficit.      Sensory: No sensory deficit.      Motor: No weakness.   Psychiatric:         Mood and Affect: Mood normal.         Behavior: Behavior normal.                Significant  Labs: All pertinent labs within the past 24 hours have been reviewed.    Significant Imaging: I have reviewed all pertinent imaging results/findings within the past 24 hours.   no SOB

## 2024-03-17 NOTE — ED NOTES
Assumed care for pt after recieving report from nightshift RN. Pt. resting in bed in NAD, RR e/u. Vital signs stable and within desired limits at this time of assessment. Pt. offered bathroom assistance and denies need at this time. Explanation of care/wait provided. Pt verbalizes no needs at this time. Bed in low, locked position with rails up and call bell in reach. Pt's white board updated with today's care team and plan.     Patient identifiers for Suleiman Chavez 84 y.o. male checked and correct.  Chief Complaint   Patient presents with    Urinary Frequency     Pt complaining of frequent urination x1 day and difficulty ambulating and standing up. Pt states he usually gets weak when he gets infections and he has had these symptoms before and was diagnosed with a UTI     Past Medical History:   Diagnosis Date    Bilateral ocular hypertension     Diabetes mellitus     Glaucoma     Hypertension     Nuclear cataract 12/17/2014     Allergies reported: Review of patient's allergies indicates:  No Known Allergies      LOC: Patient is awake, alert, and aware of environment with an appropriate affect. Patient is oriented x 4 and speaking appropriately.  APPEARANCE: Patient resting comfortably and in no acute distress. Patient is clean and well groomed, patient's clothing is properly fastened.  HEENT: WDL  SKIN: The skin is warm and dry. Patient has normal skin turgor and moist mucus membranes.   MUSKULOSKELETAL: Patient is moving all extremities well, no obvious deformities noted. Pulses intact.   RESPIRATORY: Airway is open and patent. Respirations are spontaneous and non-labored with normal effort and rate.  CARDIAC: Patient has a normal rate and rhythm. 96 on cardiac monitor. No peripheral edema noted.   ABDOMEN: No distention noted. Soft and non-tender upon palpation.  NEUROLOGICAL: pupils 3mm, PERRL. Facial expression is symmetrical. Hand grasps are equal bilaterally. Normal sensation in all extremities when  touched with finger.

## 2024-03-17 NOTE — ED NOTES
Suleiman ZORAIDA Chavez, an 84 y.o. male presents to the ED c/o urinary frequency and weakness. Pt self caths at home. States sx feel similar to when he has had UTIs in the past.       Review of patient's allergies indicates:  No Known Allergies  Chief Complaint   Patient presents with    Urinary Frequency     Pt complaining of frequent urination x1 day and difficulty ambulating and standing up. Pt states he usually gets weak when he gets infections and he has had these symptoms before and was diagnosed with a UTI     Past Medical History:   Diagnosis Date    Bilateral ocular hypertension     Diabetes mellitus     Glaucoma     Hypertension     Nuclear cataract 12/17/2014

## 2024-03-17 NOTE — ED NOTES
Care assumed at this time. Pt is A&Ox4, observed resting in no acute distress. V/S stable, call bell within reach, bed in low locked position. Wife at bedside; urinal provided

## 2024-03-17 NOTE — ASSESSMENT & PLAN NOTE
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

## 2024-03-17 NOTE — PHARMACY MED REC
"  Admission Medication History     The home medication history was taken by Aj Hinton.    You may go to "Admission" then "Reconcile Home Medications" tabs to review and/or act upon these items.     The home medication list has been updated by the Pharmacy department.   Please read ALL comments highlighted in yellow.   Please address this information as you see fit.    Feel free to contact us if you have any questions or require assistance.      The medications listed below were removed from the home medication list. Please reorder if appropriate:  Patient reports no longer taking the following medication(s):  DICLOFENAC 75 MG TABLET  DOCUSATE SODIUM 100 MG CAPSULE  LIDOCAINE VISCOUS HCL 2 % SOLUTION  SENNOSIDES 8.8 MG/5 ML SYRUP    Medications listed below were obtained from: Patient/family and Analytic software- [employer] Medications   Medication Sig    acetaminophen (TYLENOL) 500 MG tablet   Take 1,000 mg by mouth 2 (two) times daily as needed for pain.    amLODIPine (NORVASC) 5 MG tablet   Take 1 tablet by mouth twice daily.    aspirin (ECOTRIN) 81 MG EC tablet   Take 81 mg by mouth once daily.    d-mannose Powd     Take 1 capsule by mouth in the morning then take 2 capsules by mouth every evening.    gabapentin (NEURONTIN) 300 MG capsule   Take 1 capsule (300 mg total) by mouth 3 (three) times daily.    latanoprost 0.005 % ophthalmic solution   Instill 1 drop into both eyes every evening.    losartan (COZAAR) 100 MG tablet   Take 1 tablet by mouth once daily.    metFORMIN (GLUCOPHAGE) 500 MG tablet   Take 1 tablet by mouth every morning and 2 tablets by mouth every evening.    senna-docusate 8.6-50 mg (SENNA WITH DOCUSATE SODIUM) 8.6-50 mg per tablet   Take 1 tablet by mouth 2 (two) times a day.    tamsulosin (FLOMAX) 0.4 mg Cap   Take 2 capsules by mouth every evening.    catheter 14-16 Fr-" Misc     1 Units by Misc.(Non-Drug; Combo Route) route 3 (three) times daily.     doxazosin (CARDURA) 4 MG " tablet   Take 1 tablet by mouth every evening.(Patient not taking: Reported on 3/17/2024)    duke's soln (benadryl 30 mL, mylanta 30 mL, LIDOcaine 30 mL, nystatin 30 mL) 120mL   Take 5 mLs by mouth every 4 (four) hours as needed for mouth, throat pain.   (Patient not taking: Reported on 3/17/2024)    rOPINIRole (REQUIP) 1 MG tablet   Take 1 tablet by mouth every evening.  (Patient not taking: Reported on 3/17/2024)    traMADoL (ULTRAM) 50 mg tablet     Take 1 tablet (50 mg total) by mouth every 6 (six) hours as needed for pain.   (Patient not taking: Reported on 3/17/2024)       Potential issues to be addressed PRIOR TO DISCHARGE  Please discuss with the patient barriers to adherence with medication treatment plans  Patient requires education regarding drug therapies     Aj Hinton  EXT [phone]                .

## 2024-03-17 NOTE — ED NOTES
Telemetry Verification   Patient placed on Telemetry Box  Verified by Douglas Lopez # [id number]   Monitor Tech Hajar   Rate 72   Rhythm NSR

## 2024-03-17 NOTE — SUBJECTIVE & OBJECTIVE
"Past Medical History:   Diagnosis Date    Bilateral ocular hypertension     Diabetes mellitus     Glaucoma     Hypertension     Nuclear cataract 12/17/2014       Past Surgical History:   Procedure Laterality Date    CHOLECYSTECTOMY      GLOSSECTOMY Left 7/12/2023    Procedure: GLOSSECTOMY;  Surgeon: Jaxon Carmen MD;  Location: [facility] OR [address] [place] [address];  Service: ENT;  Laterality: Left;    RADICAL NECK DISSECTION Left 7/12/2023    Procedure: DISSECTION, NECK, RADICAL;  Surgeon: Jaxon Carmen MD;  Location: [facility] OR [address] [place] [address];  Service: ENT;  Laterality: Left;       Review of patient's allergies indicates:  No Known Allergies    No current facility-administered medications on file prior to encounter.     Current Outpatient Medications on File Prior to Encounter   Medication Sig    acetaminophen (TYLENOL) 500 MG tablet Take 1,000 mg by mouth 2 (two) times a day.    amLODIPine (NORVASC) 5 MG tablet TAKE 1 TABLET BY MOUTH TWICE DAILY    aspirin (ECOTRIN) 81 MG EC tablet Take 81 mg by mouth once daily.    catheter 14-16 Fr-" Misc 1 Units by Misc.(Non-Drug; Combo Route) route 3 (three) times daily. 1 Units by Misc.(Non-Drug; Combo Route) route 4 (four) times daily, indefinitely.    d-mannose Powd Take by mouth.    diclofenac (VOLTAREN) 75 MG EC tablet Take 1 tablet (75 mg total) by mouth 2 (two) times daily as needed.    docusate sodium (COLACE) 100 MG capsule Take 1 capsule (100 mg total) by mouth 2 (two) times daily as needed for Constipation.    doxazosin (CARDURA) 4 MG tablet TAKE 1 TABLET BY MOUTH EVERY EVENING    duke's soln (benadryl 30 mL, mylanta 30 mL, LIDOcaine 30 mL, nystatin 30 mL) 120mL Take 5 mLs by mouth every 4 (four) hours as needed (mouth, throat pain).    gabapentin (NEURONTIN) 300 MG capsule Take 1 capsule (300 mg total) by mouth 3 (three) times daily.    latanoprost 0.005 % ophthalmic solution INSTILL 1 DROP INTO BOTH EYES EVERY EVENING    LIDOcaine HCl 2% (LIDOCAINE VISCOUS) 2 % Soln by Mucous " Membrane route every 6 (six) hours.    losartan (COZAAR) 100 MG tablet TAKE 1 TABLET BY MOUTH EVERY DAY    metFORMIN (GLUCOPHAGE) 500 MG tablet TAKE 1 TABLET BY MOUTH EVERY MORNING AND 2 TABLETS EVERY EVENING    rOPINIRole (REQUIP) 1 MG tablet TAKE 1 TABLET BY MOUTH EVERY EVENING    senna-docusate 8.6-50 mg (SENNA WITH DOCUSATE SODIUM) 8.6-50 mg per tablet Take 1 tablet by mouth once daily.    tamsulosin (FLOMAX) 0.4 mg Cap TAKE two CAPSULES BY MOUTH EVERY EVENING    traMADoL (ULTRAM) 50 mg tablet Take 1 tablet (50 mg total) by mouth every 6 (six) hours as needed for Pain.    sennosides 8.8 mg/5 ml (SENNA) 8.8 mg/5 mL syrup Take 10 mLs by mouth nightly.    [DISCONTINUED] cephALEXin (KEFLEX) 500 MG capsule Take 1 capsule (500 mg total) by mouth every 8 (eight) hours.    [DISCONTINUED] FLUAD QUAD ,65Y UP,,PF, 60 mcg (15 mcg x 4)/0.5 mL Syrg     [DISCONTINUED] hydroCHLOROthiazide (HYDRODIURIL) 12.5 MG Tab Take 1 tablet (12.5 mg total) by mouth once daily.    [DISCONTINUED] ondansetron (ZOFRAN-ODT) 8 MG TbDL Dissolve 1 tablet (8 mg total) by mouth every 6 (six) hours as needed (nausea). (Patient not taking: Reported on 2023)    [DISCONTINUED] PREVNAR 20, PF, 0.5 mL Syrg injection      Family History       Problem Relation (Age of Onset)    Cancer Mother, Father    No Known Problems Sister, Sister          Tobacco Use    Smoking status: Former     Current packs/day: 0.00     Types: Cigarettes     Quit date: 2004     Years since quittin.8     Passive exposure: Past    Smokeless tobacco: Never   Substance and Sexual Activity    Alcohol use: No     Alcohol/week: 0.0 standard drinks of alcohol    Drug use: No    Sexual activity: Not on file     Review of Systems   Constitutional:  Negative for chills, fatigue and fever.   Gastrointestinal:  Negative for abdominal pain, constipation, diarrhea and nausea.   Genitourinary:  Positive for frequency. Negative for decreased urine volume, difficulty urinating,  dysuria, flank pain, hematuria, penile discharge and urgency.   Neurological:  Positive for weakness. Negative for dizziness and numbness.   Psychiatric/Behavioral:  Negative for confusion.    All other systems reviewed and are negative.    Objective:     Vital Signs (Most Recent):  Temp: 98.1 °F (36.7 °C) (03/17/24 0018)  Pulse: 82 (03/17/24 0402)  Resp: 18 (03/17/24 0402)  BP: (!) 151/67 (03/17/24 0402)  SpO2: 95 % (03/17/24 0402) Vital Signs (24h Range):  Temp:  [98.1 °F (36.7 °C)] 98.1 °F (36.7 °C)  Pulse:  [] 82  Resp:  [14-18] 18  SpO2:  [95 %-96 %] 95 %  BP: (139-161)/(61-69) 151/67     Weight: 83 kg (183 lb)  Body mass index is 24.82 kg/m².     Physical Exam  Vitals and nursing note reviewed.   Constitutional:       Appearance: Normal appearance.   HENT:      Head: Atraumatic.      Right Ear: External ear normal.      Left Ear: External ear normal.   Eyes:      Extraocular Movements: Extraocular movements intact.      Conjunctiva/sclera: Conjunctivae normal.   Cardiovascular:      Rate and Rhythm: Normal rate and regular rhythm.      Pulses: Normal pulses.      Heart sounds: Normal heart sounds.   Pulmonary:      Effort: Pulmonary effort is normal.      Breath sounds: Normal breath sounds. No wheezing or rales.   Abdominal:      General: Abdomen is flat. There is no distension.      Palpations: Abdomen is soft.      Tenderness: There is no abdominal tenderness. There is no guarding or rebound.   Musculoskeletal:      Cervical back: Normal range of motion.   Skin:     General: Skin is warm and dry.      Capillary Refill: Capillary refill takes less than 2 seconds.   Neurological:      General: No focal deficit present.      Mental Status: He is alert and oriented to person, place, and time.      Cranial Nerves: No cranial nerve deficit.      Sensory: No sensory deficit.      Motor: No weakness.   Psychiatric:         Mood and Affect: Mood normal.         Behavior: Behavior normal.                 Significant Labs: All pertinent labs within the past 24 hours have been reviewed.    Significant Imaging: I have reviewed all pertinent imaging results/findings within the past 24 hours.

## 2024-03-17 NOTE — PLAN OF CARE
Problem: Adult Inpatient Plan of Care  Goal: Plan of Care Review  Outcome: Ongoing, Progressing  Flowsheets (Taken 3/17/2024 5210)  Plan of Care Reviewed With:   patient   spouse  Goal: Patient-Specific Goal (Individualized)  Outcome: Ongoing, Progressing  Goal: Absence of Hospital-Acquired Illness or Injury  Outcome: Ongoing, Progressing  Goal: Optimal Comfort and Wellbeing  Outcome: Ongoing, Progressing     Problem: Diabetes Comorbidity  Goal: Blood Glucose Level Within Targeted Range  Outcome: Ongoing, Progressing

## 2024-03-17 NOTE — ASSESSMENT & PLAN NOTE
Hx of dysphagia 2/2 head and neck radiation and glossectomy and lack of dentures.     - full liquid diet

## 2024-03-17 NOTE — ASSESSMENT & PLAN NOTE
84 y.o. y/o male with a PMHx of BPH (self cath TID) who presented with symptoms of urinary frequency and was admitted with concerns for catheter associated UTI. UA significant for with nitrites, leukocyte esterase and pyuria. No signs of pyelonephritis. Lactate at 2.96 on admission likely 2/2 to hypovolemia. S/p 1 L of IVF in ED.    - Continue IV Rocephin  - Follow up Ucx, Bcx  - Trend CBC  - Strict I/Os  - F/u repeat Lactate

## 2024-03-17 NOTE — ED PROVIDER NOTES
"History:  Suleiman Chavez is a 84 y.o. male who presents to the ED with Urinary Frequency (Pt complaining of frequent urination x1 day and difficulty ambulating and standing up. Pt states he usually gets weak when he gets infections and he has had these symptoms before and was diagnosed with a UTI)    Described as 84-year-old male presenting to the emergency department with generalized weakness, concern for possible urinary tract infection.  He reports he urinate spontaneously, but also straight caths 3 times a day due to chronic urinary retention.  He has had increased urinary frequency over the past 2 days.  Today, he grew too weak to ambulate, in his wife was concerned he had a urinary tract infection as he was had similar symptoms in the past and brought him to the ER for further evaluation.  He denies any chest pain or shortness of breath, lightheadedness, nausea, vomiting, diarrhea, or any other acute symptoms.    Review of Systems: All systems reviewed and are negative except as per history of present illness.    Medications:   Previous Medications    ACETAMINOPHEN (TYLENOL) 500 MG TABLET    Take 1,000 mg by mouth 2 (two) times a day.    AMLODIPINE (NORVASC) 5 MG TABLET    TAKE 1 TABLET BY MOUTH TWICE DAILY    ASPIRIN (ECOTRIN) 81 MG EC TABLET    Take 81 mg by mouth once daily.    CATHETER 14-16 FR-" MISC    1 Units by Misc.(Non-Drug; Combo Route) route 3 (three) times daily. 1 Units by Misc.(Non-Drug; Combo Route) route 4 (four) times daily, indefinitely.    D-MANNOSE POWD    Take by mouth.    DICLOFENAC (VOLTAREN) 75 MG EC TABLET    Take 1 tablet (75 mg total) by mouth 2 (two) times daily as needed.    DOCUSATE SODIUM (COLACE) 100 MG CAPSULE    Take 1 capsule (100 mg total) by mouth 2 (two) times daily as needed for Constipation.    DOXAZOSIN (CARDURA) 4 MG TABLET    TAKE 1 TABLET BY MOUTH EVERY EVENING    DUKE'S SOLN (BENADRYL 30 ML, MYLANTA 30 ML, LIDOCAINE 30 ML, NYSTATIN 30 ML) 120ML    Take 5 mLs by " mouth every 4 (four) hours as needed (mouth, throat pain).    GABAPENTIN (NEURONTIN) 300 MG CAPSULE    Take 1 capsule (300 mg total) by mouth 3 (three) times daily.    LATANOPROST 0.005 % OPHTHALMIC SOLUTION    INSTILL 1 DROP INTO BOTH EYES EVERY EVENING    LIDOCAINE HCL 2% (LIDOCAINE VISCOUS) 2 % SOLN    by Mucous Membrane route every 6 (six) hours.    LOSARTAN (COZAAR) 100 MG TABLET    TAKE 1 TABLET BY MOUTH EVERY DAY    METFORMIN (GLUCOPHAGE) 500 MG TABLET    TAKE 1 TABLET BY MOUTH EVERY MORNING AND 2 TABLETS EVERY EVENING    ROPINIROLE (REQUIP) 1 MG TABLET    TAKE 1 TABLET BY MOUTH EVERY EVENING    SENNA-DOCUSATE 8.6-50 MG (SENNA WITH DOCUSATE SODIUM) 8.6-50 MG PER TABLET    Take 1 tablet by mouth once daily.    SENNOSIDES 8.8 MG/5 ML (SENNA) 8.8 MG/5 ML SYRUP    Take 10 mLs by mouth nightly.    TAMSULOSIN (FLOMAX) 0.4 MG CAP    TAKE two CAPSULES BY MOUTH EVERY EVENING    TRAMADOL (ULTRAM) 50 MG TABLET    Take 1 tablet (50 mg total) by mouth every 6 (six) hours as needed for Pain.       PMH:   Past Medical History:   Diagnosis Date    Bilateral ocular hypertension     Diabetes mellitus     Glaucoma     Hypertension     Nuclear cataract 12/17/2014     PSH:   Past Surgical History:   Procedure Laterality Date    CHOLECYSTECTOMY      GLOSSECTOMY Left 7/12/2023    Procedure: GLOSSECTOMY;  Surgeon: Jaxon Carmen MD;  Location: [address];  Service: ENT;  Laterality: Left;    RADICAL NECK DISSECTION Left 7/12/2023    Procedure: DISSECTION, NECK, RADICAL;  Surgeon: Jaxon Carmen MD;  Location: [address];  Service: ENT;  Laterality: Left;     Allergies: He has No Known Allergies.  Social History: Marital Status: . He  reports that he quit smoking about 19 years ago. His smoking use included cigarettes. He has been exposed to tobacco smoke. He has never used smokeless tobacco.. He  reports no history of alcohol use..       Exam:  VITAL SIGNS:   Vitals:    03/17/24 0123 03/17/24 0202  03/17/24 0402 03/17/24 0713   BP: (!) 155/67 (!) 161/69 (!) 151/67 126/67   BP Location:    Right arm   Patient Position:    Lying   Pulse: 86 81 82 101   Resp: 17 14 18 19   Temp:    98.2 °F (36.8 °C)   TempSrc:    Oral   SpO2: 96% 96% 95% 96%   Weight:       Height:         Const: Awake and alert, NAD   Head: Atraumatic  Eyes: Normal Conjunctiva  ENT: Normal External Ears, Nose and Mouth.  Neck: Full range of motion. No meningismus.  Resp: Normal respiratory effort, No distress, CTAB  Cardio: Equal and intact distal pulses, RRR  Abd: Soft, non tender, non distended.   Skin: No petechiae or rashes  Ext: No cyanosis, or edema  Neur: Awake and alert, strength and sensation intact, cranial nerves intact  Psych: Normal Mood and Affect    Data:  Results for orders placed or performed during the hospital encounter of 03/17/24   CBC auto differential   Result Value Ref Range    WBC 10.29 3.90 - 12.70 K/uL    RBC 3.89 (L) 4.60 - 6.20 M/uL    Hemoglobin 11.6 (L) 14.0 - 18.0 g/dL    Hematocrit 35.0 (L) 40.0 - 54.0 %    MCV 90 82 - 98 fL    MCH 29.8 27.0 - 31.0 pg    MCHC 33.1 32.0 - 36.0 g/dL    RDW 13.9 11.5 - 14.5 %    Platelets 180 150 - 450 K/uL    MPV 9.4 9.2 - 12.9 fL    Immature Granulocytes 0.4 0.0 - 0.5 %    Gran # (ANC) 9.0 (H) 1.8 - 7.7 K/uL    Immature Grans (Abs) 0.04 0.00 - 0.04 K/uL    Lymph # 0.4 (L) 1.0 - 4.8 K/uL    Mono # 0.8 0.3 - 1.0 K/uL    Eos # 0.1 0.0 - 0.5 K/uL    Baso # 0.04 0.00 - 0.20 K/uL    nRBC 0 0 /100 WBC    Gran % 87.4 (H) 38.0 - 73.0 %    Lymph % 3.6 (L) 18.0 - 48.0 %    Mono % 7.7 4.0 - 15.0 %    Eosinophil % 0.5 0.0 - 8.0 %    Basophil % 0.4 0.0 - 1.9 %    Differential Method Automated    Comprehensive metabolic panel   Result Value Ref Range    Sodium 134 (L) 136 - 145 mmol/L    Potassium 3.9 3.5 - 5.1 mmol/L    Chloride 105 95 - 110 mmol/L    CO2 21 (L) 23 - 29 mmol/L    Glucose 187 (H) 70 - 110 mg/dL    BUN 15 8 - 23 mg/dL    Creatinine 0.7 0.5 - 1.4 mg/dL    Calcium 8.9 8.7 - 10.5  mg/dL    Total Protein 6.6 6.0 - 8.4 g/dL    Albumin 3.0 (L) 3.5 - 5.2 g/dL    Total Bilirubin 0.4 0.1 - 1.0 mg/dL    Alkaline Phosphatase 158 (H) 55 - 135 U/L    AST 15 10 - 40 U/L    ALT 15 10 - 44 U/L    eGFR >60.0 >60 mL/min/1.73 m^2    Anion Gap 8 8 - 16 mmol/L   Urinalysis, Reflex to Urine Culture Urine, Catheterized    Specimen: Urine   Result Value Ref Range    Specimen UA Urine, Catheterized     Color, UA Yellow Yellow, Straw, Neetu    Appearance, UA Hazy (A) Clear    pH, UA 8.0 5.0 - 8.0    Specific Gravity, UA 1.020 1.005 - 1.030    Protein, UA 1+ (A) Negative    Glucose, UA Negative Negative    Ketones, UA Negative Negative    Bilirubin (UA) Negative Negative    Occult Blood UA Trace (A) Negative    Nitrite, UA Positive (A) Negative    Leukocytes, UA 2+ (A) Negative   Troponin I   Result Value Ref Range    Troponin I <0.006 0.000 - 0.026 ng/mL   Urinalysis Microscopic   Result Value Ref Range    RBC, UA 10 (H) 0 - 4 /hpf    WBC, UA >100 (H) 0 - 5 /hpf    Bacteria Occasional None-Occ /hpf    Squam Epithel, UA 0 /hpf    Hyaline Casts, UA 0 0-1/lpf /lpf    Microscopic Comment SEE COMMENT    Lactic acid, plasma   Result Value Ref Range    Lactate (Lactic Acid) 1.1 0.5 - 2.2 mmol/L   Hemoglobin A1c if not done in past 3 months   Result Value Ref Range    Hemoglobin A1C 5.8 (H) 4.0 - 5.6 %    Estimated Avg Glucose 120 68 - 131 mg/dL   ISTAT PROCEDURE   Result Value Ref Range    POC PH 7.371 7.35 - 7.45    POC PCO2 37.8 35 - 45 mmHg    POC PO2 47 40 - 60 mmHg    POC HCO3 21.9 (L) 24 - 28 mmol/L    POC BE -3 (L) -2 to 2 mmol/L    POC SATURATED O2 82 95 - 100 %    POC TCO2 23 (L) 24 - 29 mmol/L    Sample VENOUS     Site Other     Allens Test N/A    ISTAT Lactate   Result Value Ref Range    POC Lactate 2.96 (H) 0.5 - 2.2 mmol/L    Sample VENOUS     Site Other     Allens Test N/A    POCT glucose   Result Value Ref Range    POCT Glucose 128 (H) 70 - 110 mg/dL     Imaging Results              X-Ray Chest AP Portable  (Final result)  Result time 24 01:12:47      Final result by Atiya Trejo MD (24 01:12:47)                   Impression:      No acute abnormality.      Electronically signed by: Atiya Trejo  Date:    2024  Time:    01:12               Narrative:    EXAMINATION:  XR CHEST AP PORTABLE    CLINICAL HISTORY:  Sepsis;    TECHNIQUE:  Single frontal view of the chest was performed.    COMPARISON:  2022 chest x-ray    FINDINGS:  Cardiac silhouette is stable and nonenlarged.  Lungs appear clear.  There is no pleural effusion or pneumothorax.  Degenerative changes of the spine and shoulders noted.                                    Labs & Imaging studies were reviewed independently by me.     Medical Decision Makin-year-old male presenting to the emergency department with generalized weakness and concern for possible urinary tract infection with increased urinary frequency.  He had no focal neurologic deficits on examination, doubt CVA/TIA.  Abdominal examination is benign. Doubt cholecystitis, appendicitis, pancreatitis, SBO, diverticulitis, or any other acute abdominal surgical emergency.  Chest x-ray negative for pneumonia.  UA does show urinary tract infection, likely the cause of his weakness.  He was started on IV fluids and Rocephin with an elevated lactic acid.  Plan on admission to hospital medicine for further treatment.    Clinical Impression:  1. Tachycardia    2. UTI (urinary tract infection)    3. Chest pain                 Kerri Hinojosa MD  [id number]

## 2024-03-17 NOTE — H&P
[facility] - Emergency Dept  [facility] Medicine  History & Physical    Patient Name: Suleiman Chavez  MRN: [id number]  Patient Class: OP- Observation  Admission Date: 3/17/2024  Attending Physician: Lance Olea, *   Primary Care Provider: Caleb Negrete MD         Patient information was obtained from patient and ER records.     Subjective:     Principal Problem:Urinary tract infection associated with catheterization of urinary tract    Chief Complaint:   Chief Complaint   Patient presents with    Urinary Frequency     Pt complaining of frequent urination x1 day and difficulty ambulating and standing up. Pt states he usually gets weak when he gets infections and he has had these symptoms before and was diagnosed with a UTI        HPI: 85 y/o M with a PMHx of glaucoma, HTN, T2DM, SCC of the tongue, BPH, diabetic neuropathy, spinal stenosis and iron deficiency anemia who present to the ED for urinary frequency for the past day. He also reports increased weakness for the past day to the point where he was having trouble standing. He denies any urinary pain, urinary incontinence, fever, chills, abd pain, nausea, vomiting, diarrhea, fatigue, confusion, or worsening back pain He reports that he has had UTIs in the past that have presented with similarly. He self caths himself 3 times a day due to urinary retention from his BPH. He follows with urology outpatient. He is able to ambulated with a walker at baseline.    In the ED, patient was tachycardiac otherwise stable. CBC and CMP in the ED significant for Hgb 11.6,  otherwise unremarkable. Troponin unremarkable. UA significant for WBC >100/hpf, 2+ leukocyte esterase, positive nitrites, trace blood and RBC 10/hpf. POCT Lactate 2.96. CXR unremarkable. Patient given Rocephin and 1 L of IVF in the ED and admitted for UTI.     Past Medical History:   Diagnosis Date    Bilateral ocular hypertension     Diabetes mellitus     Glaucoma     Hypertension      "Nuclear cataract 12/17/2014       Past Surgical History:   Procedure Laterality Date    CHOLECYSTECTOMY      GLOSSECTOMY Left 7/12/2023    Procedure: GLOSSECTOMY;  Surgeon: Jaxon Carmen MD;  Location: [facility] OR [address] [place] [address];  Service: ENT;  Laterality: Left;    RADICAL NECK DISSECTION Left 7/12/2023    Procedure: DISSECTION, NECK, RADICAL;  Surgeon: Jaxon Carmen MD;  Location: [facility] OR [address] [place] [address];  Service: ENT;  Laterality: Left;       Review of patient's allergies indicates:  No Known Allergies    No current facility-administered medications on file prior to encounter.     Current Outpatient Medications on File Prior to Encounter   Medication Sig    acetaminophen (TYLENOL) 500 MG tablet Take 1,000 mg by mouth 2 (two) times a day.    amLODIPine (NORVASC) 5 MG tablet TAKE 1 TABLET BY MOUTH TWICE DAILY    aspirin (ECOTRIN) 81 MG EC tablet Take 81 mg by mouth once daily.    catheter 14-16 Fr-" Misc 1 Units by Misc.(Non-Drug; Combo Route) route 3 (three) times daily. 1 Units by Misc.(Non-Drug; Combo Route) route 4 (four) times daily, indefinitely.    d-mannose Powd Take by mouth.    diclofenac (VOLTAREN) 75 MG EC tablet Take 1 tablet (75 mg total) by mouth 2 (two) times daily as needed.    docusate sodium (COLACE) 100 MG capsule Take 1 capsule (100 mg total) by mouth 2 (two) times daily as needed for Constipation.    doxazosin (CARDURA) 4 MG tablet TAKE 1 TABLET BY MOUTH EVERY EVENING    duke's soln (benadryl 30 mL, mylanta 30 mL, LIDOcaine 30 mL, nystatin 30 mL) 120mL Take 5 mLs by mouth every 4 (four) hours as needed (mouth, throat pain).    gabapentin (NEURONTIN) 300 MG capsule Take 1 capsule (300 mg total) by mouth 3 (three) times daily.    latanoprost 0.005 % ophthalmic solution INSTILL 1 DROP INTO BOTH EYES EVERY EVENING    LIDOcaine HCl 2% (LIDOCAINE VISCOUS) 2 % Soln by Mucous Membrane route every 6 (six) hours.    losartan (COZAAR) 100 MG tablet TAKE 1 TABLET BY MOUTH EVERY DAY    metFORMIN (GLUCOPHAGE) " 500 MG tablet TAKE 1 TABLET BY MOUTH EVERY MORNING AND 2 TABLETS EVERY EVENING    rOPINIRole (REQUIP) 1 MG tablet TAKE 1 TABLET BY MOUTH EVERY EVENING    senna-docusate 8.6-50 mg (SENNA WITH DOCUSATE SODIUM) 8.6-50 mg per tablet Take 1 tablet by mouth once daily.    tamsulosin (FLOMAX) 0.4 mg Cap TAKE two CAPSULES BY MOUTH EVERY EVENING    traMADoL (ULTRAM) 50 mg tablet Take 1 tablet (50 mg total) by mouth every 6 (six) hours as needed for Pain.    sennosides 8.8 mg/5 ml (SENNA) 8.8 mg/5 mL syrup Take 10 mLs by mouth nightly.    [DISCONTINUED] cephALEXin (KEFLEX) 500 MG capsule Take 1 capsule (500 mg total) by mouth every 8 (eight) hours.    [DISCONTINUED] FLUAD QUAD ,65Y UP,,PF, 60 mcg (15 mcg x 4)/0.5 mL Syrg     [DISCONTINUED] hydroCHLOROthiazide (HYDRODIURIL) 12.5 MG Tab Take 1 tablet (12.5 mg total) by mouth once daily.    [DISCONTINUED] ondansetron (ZOFRAN-ODT) 8 MG TbDL Dissolve 1 tablet (8 mg total) by mouth every 6 (six) hours as needed (nausea). (Patient not taking: Reported on 2023)    [DISCONTINUED] PREVNAR 20, PF, 0.5 mL Syrg injection      Family History       Problem Relation (Age of Onset)    Cancer Mother, Father    No Known Problems Sister, Sister          Tobacco Use    Smoking status: Former     Current packs/day: 0.00     Types: Cigarettes     Quit date: 2004     Years since quittin.8     Passive exposure: Past    Smokeless tobacco: Never   Substance and Sexual Activity    Alcohol use: No     Alcohol/week: 0.0 standard drinks of alcohol    Drug use: No    Sexual activity: Not on file     Review of Systems   Constitutional:  Negative for chills, fatigue and fever.   Gastrointestinal:  Negative for abdominal pain, constipation, diarrhea and nausea.   Genitourinary:  Positive for frequency. Negative for decreased urine volume, difficulty urinating, dysuria, flank pain, hematuria, penile discharge and urgency.   Neurological:  Positive for weakness. Negative for dizziness and  numbness.   Psychiatric/Behavioral:  Negative for confusion.    All other systems reviewed and are negative.    Objective:     Vital Signs (Most Recent):  Temp: 98.1 °F (36.7 °C) (03/17/24 0018)  Pulse: 82 (03/17/24 0402)  Resp: 18 (03/17/24 0402)  BP: (!) 151/67 (03/17/24 0402)  SpO2: 95 % (03/17/24 0402) Vital Signs (24h Range):  Temp:  [98.1 °F (36.7 °C)] 98.1 °F (36.7 °C)  Pulse:  [] 82  Resp:  [14-18] 18  SpO2:  [95 %-96 %] 95 %  BP: (139-161)/(61-69) 151/67     Weight: 83 kg (183 lb)  Body mass index is 24.82 kg/m².     Physical Exam  Vitals and nursing note reviewed.   Constitutional:       Appearance: Normal appearance.   HENT:      Head: Atraumatic.      Right Ear: External ear normal.      Left Ear: External ear normal.   Eyes:      Extraocular Movements: Extraocular movements intact.      Conjunctiva/sclera: Conjunctivae normal.   Cardiovascular:      Rate and Rhythm: Normal rate and regular rhythm.      Pulses: Normal pulses.      Heart sounds: Normal heart sounds.   Pulmonary:      Effort: Pulmonary effort is normal.      Breath sounds: Normal breath sounds. No wheezing or rales.   Abdominal:      General: Abdomen is flat. There is no distension.      Palpations: Abdomen is soft.      Tenderness: There is no abdominal tenderness. There is no guarding or rebound.   Musculoskeletal:      Cervical back: Normal range of motion.   Skin:     General: Skin is warm and dry.      Capillary Refill: Capillary refill takes less than 2 seconds.   Neurological:      General: No focal deficit present.      Mental Status: He is alert and oriented to person, place, and time.      Cranial Nerves: No cranial nerve deficit.      Sensory: No sensory deficit.      Motor: No weakness.   Psychiatric:         Mood and Affect: Mood normal.         Behavior: Behavior normal.                Significant Labs: All pertinent labs within the past 24 hours have been reviewed.    Significant Imaging: I have reviewed all pertinent  imaging results/findings within the past 24 hours.  Assessment/Plan:     * Urinary tract infection associated with catheterization of urinary tract  84 y.o. y/o male with a PMHx of BPH (self cath TID) who presented with symptoms of urinary frequency and was admitted with concerns for catheter associated UTI. UA significant for with nitrites, leukocyte esterase and pyuria. No signs of pyelonephritis. Lactate at 2.96 on admission likely 2/2 to hypovolemia. S/p 1 L of IVF in ED. Previous Ucx grew Klebsiella and patient completed course of Cipro.    - Continue IV Rocephin  - Follow up Ucx, Bcx  - Trend CBC  - Strict I/Os  - F/u repeat Lactate      History of dysphagia  Hx of dysphagia 2/2 head and neck radiation and glossectomy and lack of dentures.     - full liquid diet       History of urinary retention  History of urinary retention/incontinence. Followed by urology outpatient. Home meds includes Flomax, CIC TID w/ 14 Fr catheter and D-mannose    - Continue Flomax  - Straight cath PRN.       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 Last A1c at 5.5 on 11/23.     - Hold oral antihyperglycemics while inpatient  - Low dose sliding scale insulin PRN  - Blood glucose goal of 140-180     Essential hypertension  Patient with a hx of hypertension currently controlled on Amlodipine and Losartan.    - Optimal BP control with SBP goal <140 and DBP goal <90  - Continue home Amlodipine and Losartan  - IV Labetalol or IV Hydralazine PNR is SBP >180 and DBP of >120        VTE  Risk Mitigation (From admission, onward)           Ordered     enoxaparin injection 40 mg  Daily         03/17/24 0409     IP VTE HIGH RISK PATIENT  Once         03/17/24 0409     Place sequential compression device  Until discontinued         03/17/24 0409                    Joanna Elliott DO  Department of Hospital Medicine  [facility] - Emergency Dept

## 2024-03-17 NOTE — ASSESSMENT & PLAN NOTE
Patient with a hx of hypertension currently controlled on Amlodipine and Losartan.    - Optimal BP control with SBP goal <140 and DBP goal <90  - Continue home Amlodipine and Losartan  - IV Labetalol or IV Hydralazine PNR is SBP >180 and DBP of >120

## 2024-03-17 NOTE — NURSING
Nurses Note -- 4 Eyes      3/17/2024   4:19 PM      Skin assessed during: Admit      [x] No Altered Skin Integrity Present    []Prevention Measures Documented      [] Yes- Altered Skin Integrity Present or Discovered   [] LDA Added if Not in Epic (Describe Wound)   [] New Altered Skin Integrity was Present on Admit and Documented in LDA   [] Wound Image Taken    Wound Care Consulted? No    Attending Nurse:  Nirali Pope RN    Second RN/Staff Member: [facility]

## 2024-03-17 NOTE — H&P
[facility] - Emergency Dept  [facility] Medicine  History & Physical    Patient Name: Suleiman Chavez  MRN: [id number]  Patient Class: OP- Observation  Admission Date: 3/17/2024  Attending Physician: Lance Olea, *   Primary Care Provider: Caleb Negrete MD         Patient information was obtained from patient and ER records.     Subjective:     Principal Problem:Urinary tract infection associated with catheterization of urinary tract    Chief Complaint:   Chief Complaint   Patient presents with    Urinary Frequency     Pt complaining of frequent urination x1 day and difficulty ambulating and standing up. Pt states he usually gets weak when he gets infections and he has had these symptoms before and was diagnosed with a UTI        HPI: 83 y/o M with a PMHx of glaucoma, HTN, T2DM, SCC of the tongue, BPH, diabetic neuropathy, spinal stenosis and iron deficiency anemia who present to the ED for urinary frequency for the past day. He also reports increased weakness for the past day to the point where he was having trouble standing. He denies any urinary pain, urinary incontinence, fever, chills, abd pain, nausea, vomiting, diarrhea, fatigue, confusion, or worsening back pain He reports that he has had UTIs in the past that have presented with similarly. He self caths himself 3 times a day due to urinary retention from his BPH. He follows with urology outpatient. He is able to ambulated with a walker at baseline.    In the ED, patient was tachycardiac otherwise stable. CBC and CMP in the ED significant for Hgb 11.6,  otherwise unremarkable. Troponin unremarkable. UA significant for WBC >100/hpf, 2+ leukocyte esterase, positive nitrites, trace blood and RBC 10/hpf. POCT Lactate 2.96. CXR unremarkable. Patient given Rocephin and 1 L of IVF in the ED and admitted for UTI.     No new subjective & objective note has been filed under this hospital service since the last note was  generated.    Assessment/Plan:     * Urinary tract infection associated with catheterization of urinary tract  84 y.o. y/o male with a PMHx of BPH (self cath TID) who presented with symptoms of urinary frequency and was admitted with concerns for catheter associated UTI. UA significant for with nitrites, leukocyte esterase and pyuria. No signs of pyelonephritis. Lactate at 2.96 on admission likely 2/2 to hypovolemia. S/p 1 L of IVF in ED. Previous Ucx grew Klebsiella and patient completed course of Cipro.    - Continue IV Rocephin  - Follow up Ucx, Bcx  - Trend CBC  - Strict I/Os  - F/u repeat Lactate      History of dysphagia  Hx of dysphagia 2/2 head and neck radiation and glossectomy and lack of dentures.     - full liquid diet       History of urinary retention  History of urinary retention/incontinence. Followed by urology outpatient. Home meds includes Flomax, CIC TID w/ 14 Fr catheter and D-mannose    - Continue Flomax  - Straight cath PRN.       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 Last A1c at 5.5 on 11/23.     - Hold oral antihyperglycemics while inpatient  - Low dose sliding scale insulin PRN  - Blood glucose goal of 140-180     Essential hypertension  Patient with a hx of hypertension currently controlled on Amlodipine and Losartan.    - Optimal BP control with SBP goal <140 and DBP goal <90  - Continue home Amlodipine and Losartan  - IV Labetalol or IV Hydralazine PNR is SBP >180 and DBP of >120        VTE Risk Mitigation (From admission, onward)            Ordered     enoxaparin injection 40 mg  Daily         03/17/24 0409     IP VTE HIGH RISK PATIENT  Once         03/17/24 0409     Place sequential compression device  Until discontinued         03/17/24 0409                         Joanna Elliott DO  Department of Hospital Medicine  [facility] - Emergency Dept

## 2024-03-17 NOTE — NURSING
03/17/24 1552   Vital Signs   Temp 98.7 °F (37.1 °C)   Temp #2 98.6 °F (37 °C)   Pulse 78   Resp 18   SpO2 96 %   Device (Oxygen Therapy) room air   /60   Height and Weight   Height 6' (1.829 m)   Weight 82.6 kg (182 lb 1.6 oz)   Weight Method Bed Scale   BSA (Calculated - sq m) 2.05 sq meters   BMI (Calculated) 24.7   Weight in (lb) to have BMI = 25 183.9     Received pt from ED via wheelchair. Pt w/ spontaneous non-labored breathing. Pt AAOx4. Pt on room air. Heart rhythm regular,  abdominal sounds active. Pt hooked to portable telemetry box, Pt denies pain, chest pain, SOB, dizziness and n/v. Pt oriented to room and call light,vitals signs stables fall risk reviewed and comfort measure utilized, pt verbalized understanding, family remains at bedside.  One BM, pt reports that he self cath at home 3 times a day. Bladder scan performed volume of 212 ml. MD made aware. Alonzo catheter in place per order, output of 200 ml. Plan of care ongoing

## 2024-03-17 NOTE — ASSESSMENT & PLAN NOTE
History of urinary retention/incontinence. Followed by urology outpatient. Home meds includes Flomax, CIC TID w/ 14 Fr catheter and D-mannose    - Continue Flomax  - Straight cath PRN.

## 2024-03-17 NOTE — HPI
83 y/o M with a PMHx of glaucoma, HTN, T2DM, SCC of the tongue, BPH, diabetic neuropathy, spinal stenosis and iron deficiency anemia who present to the ED for urinary frequency for the past day. He also reports increased weakness for the past day to the point where he was having trouble standing. He denies any urinary pain, urinary incontinence, fever, chills, abd pain, nausea, vomiting, diarrhea, fatigue, confusion, or worsening back pain He reports that he has had UTIs in the past that have presented with similarly. He self caths himself 3 times a day due to urinary retention from his BPH. He follows with urology outpatient. He is able to ambulated with a walker at baseline.    In the ED, patient was tachycardiac otherwise stable. CBC and CMP in the ED significant for Hgb 11.6,  otherwise unremarkable. Troponin unremarkable. UA significant for WBC >100/hpf, 2+ leukocyte esterase, positive nitrites, trace blood and RBC 10/hpf. POCT Lactate 2.96. CXR unremarkable. Patient given Rocephin and 1 L of IVF in the ED and admitted for UTI.

## 2024-03-17 NOTE — ASSESSMENT & PLAN NOTE
Patient with a hx of T2DM currently controlled on Metformin. Last A1c at 5.5 on 11/23.     - Hold oral antihyperglycemics while inpatient  - Low dose sliding scale insulin PRN  - Blood glucose goal of 140-180

## 2024-03-18 LAB
ACINETOBACTER CALCOACETICUS/BAUMANNII COMPLEX: NOT DETECTED
ALBUMIN SERPL BCP-MCNC: 2.6 G/DL (ref 3.5–5.2)
ALP SERPL-CCNC: 128 U/L (ref 55–135)
ALT SERPL W/O P-5'-P-CCNC: 11 U/L (ref 10–44)
ANION GAP SERPL CALC-SCNC: 8 MMOL/L (ref 8–16)
AST SERPL-CCNC: 11 U/L (ref 10–40)
BACTEROIDES FRAGILIS: NOT DETECTED
BASOPHILS # BLD AUTO: 0.02 K/UL (ref 0–0.2)
BASOPHILS NFR BLD: 0.2 % (ref 0–1.9)
BILIRUB SERPL-MCNC: 0.4 MG/DL (ref 0.1–1)
BUN SERPL-MCNC: 15 MG/DL (ref 8–23)
CALCIUM SERPL-MCNC: 8.7 MG/DL (ref 8.7–10.5)
CANDIDA ALBICANS: NOT DETECTED
CANDIDA AURIS: NOT DETECTED
CANDIDA GLABRATA: NOT DETECTED
CANDIDA KRUSEI: NOT DETECTED
CANDIDA PARAPSILOSIS: NOT DETECTED
CANDIDA TROPICALIS: NOT DETECTED
CHLORIDE SERPL-SCNC: 102 MMOL/L (ref 95–110)
CO2 SERPL-SCNC: 21 MMOL/L (ref 23–29)
CREAT SERPL-MCNC: 0.7 MG/DL (ref 0.5–1.4)
CRYPTOCOCCUS NEOFORMANS/GATTII: NOT DETECTED
CTX-M GENE (ESBL PRODUCER): ABNORMAL
DIFFERENTIAL METHOD BLD: ABNORMAL
ENTEROBACTER CLOACAE COMPLEX: NOT DETECTED
ENTEROBACTERALES: NOT DETECTED
ENTEROCOCCUS FAECALIS: NOT DETECTED
ENTEROCOCCUS FAECIUM: NOT DETECTED
EOSINOPHIL # BLD AUTO: 0 K/UL (ref 0–0.5)
EOSINOPHIL NFR BLD: 0.3 % (ref 0–8)
ERYTHROCYTE [DISTWIDTH] IN BLOOD BY AUTOMATED COUNT: 13.8 % (ref 11.5–14.5)
ESCHERICHIA COLI: NOT DETECTED
EST. GFR  (NO RACE VARIABLE): >60 ML/MIN/1.73 M^2
GLUCOSE SERPL-MCNC: 141 MG/DL (ref 70–110)
HAEMOPHILUS INFLUENZAE: NOT DETECTED
HCT VFR BLD AUTO: 32.6 % (ref 40–54)
HGB BLD-MCNC: 10.9 G/DL (ref 14–18)
IMM GRANULOCYTES # BLD AUTO: 0.04 K/UL (ref 0–0.04)
IMM GRANULOCYTES NFR BLD AUTO: 0.4 % (ref 0–0.5)
IMP GENE (CARBAPENEM RESISTANT): ABNORMAL
KLEBSIELLA AEROGENES: NOT DETECTED
KLEBSIELLA OXYTOCA: NOT DETECTED
KLEBSIELLA PNEUMONIAE GROUP: NOT DETECTED
KPC RESISTANCE GENE (CARBAPENEM): ABNORMAL
LISTERIA MONOCYTOGENES: NOT DETECTED
LYMPHOCYTES # BLD AUTO: 0.6 K/UL (ref 1–4.8)
LYMPHOCYTES NFR BLD: 5.6 % (ref 18–48)
MAGNESIUM SERPL-MCNC: 1.4 MG/DL (ref 1.6–2.6)
MCH RBC QN AUTO: 30 PG (ref 27–31)
MCHC RBC AUTO-ENTMCNC: 33.4 G/DL (ref 32–36)
MCR-1: ABNORMAL
MCV RBC AUTO: 90 FL (ref 82–98)
MEC A/C AND MREJ (MRSA): ABNORMAL
MEC A/C: ABNORMAL
MONOCYTES # BLD AUTO: 0.7 K/UL (ref 0.3–1)
MONOCYTES NFR BLD: 6 % (ref 4–15)
NDM GENE (CARBAPENEM RESISTANT): ABNORMAL
NEISSERIA MENINGITIDIS: NOT DETECTED
NEUTROPHILS # BLD AUTO: 9.6 K/UL (ref 1.8–7.7)
NEUTROPHILS NFR BLD: 87.5 % (ref 38–73)
NRBC BLD-RTO: 0 /100 WBC
OXA-48-LIKE (CARBAPENEM RESISTANT): ABNORMAL
PHOSPHATE SERPL-MCNC: 3 MG/DL (ref 2.7–4.5)
PLATELET # BLD AUTO: 155 K/UL (ref 150–450)
PMV BLD AUTO: 9.8 FL (ref 9.2–12.9)
POCT GLUCOSE: 127 MG/DL (ref 70–110)
POCT GLUCOSE: 140 MG/DL (ref 70–110)
POCT GLUCOSE: 159 MG/DL (ref 70–110)
POCT GLUCOSE: 191 MG/DL (ref 70–110)
POTASSIUM SERPL-SCNC: 3.6 MMOL/L (ref 3.5–5.1)
PROT SERPL-MCNC: 6 G/DL (ref 6–8.4)
PROTEUS SPECIES: NOT DETECTED
PSEUDOMONAS AERUGINOSA: NOT DETECTED
RBC # BLD AUTO: 3.63 M/UL (ref 4.6–6.2)
SALMONELLA SP: NOT DETECTED
SERRATIA MARCESCENS: NOT DETECTED
SODIUM SERPL-SCNC: 131 MMOL/L (ref 136–145)
STAPHYLOCOCCUS AUREUS: NOT DETECTED
STAPHYLOCOCCUS EPIDERMIDIS: NOT DETECTED
STAPHYLOCOCCUS LUGDUNESIS: NOT DETECTED
STAPHYLOCOCCUS SPECIES: DETECTED
STENOTROPHOMONAS MALTOPHILIA: NOT DETECTED
STREPTOCOCCUS AGALACTIAE: NOT DETECTED
STREPTOCOCCUS PNEUMONIAE: NOT DETECTED
STREPTOCOCCUS PYOGENES: NOT DETECTED
STREPTOCOCCUS SPECIES: NOT DETECTED
VAN A/B (VRE GENE): ABNORMAL
VIM GENE (CARBAPENEM RESISTANT): ABNORMAL
WBC # BLD AUTO: 10.92 K/UL (ref 3.9–12.7)

## 2024-03-18 PROCEDURE — 36415 COLL VENOUS BLD VENIPUNCTURE: CPT | Mod: HCNC,XB | Performed by: STUDENT IN AN ORGANIZED HEALTH CARE EDUCATION/TRAINING PROGRAM

## 2024-03-18 PROCEDURE — 80053 COMPREHEN METABOLIC PANEL: CPT | Mod: HCNC

## 2024-03-18 PROCEDURE — 96366 THER/PROPH/DIAG IV INF ADDON: CPT

## 2024-03-18 PROCEDURE — 25000003 PHARM REV CODE 250: Mod: HCNC

## 2024-03-18 PROCEDURE — 96372 THER/PROPH/DIAG INJ SC/IM: CPT

## 2024-03-18 PROCEDURE — 97166 OT EVAL MOD COMPLEX 45 MIN: CPT | Mod: HCNC

## 2024-03-18 PROCEDURE — 85025 COMPLETE CBC W/AUTO DIFF WBC: CPT | Mod: HCNC

## 2024-03-18 PROCEDURE — 87040 BLOOD CULTURE FOR BACTERIA: CPT | Mod: 59,HCNC | Performed by: STUDENT IN AN ORGANIZED HEALTH CARE EDUCATION/TRAINING PROGRAM

## 2024-03-18 PROCEDURE — 96367 TX/PROPH/DG ADDL SEQ IV INF: CPT

## 2024-03-18 PROCEDURE — 83735 ASSAY OF MAGNESIUM: CPT | Mod: HCNC

## 2024-03-18 PROCEDURE — 97162 PT EVAL MOD COMPLEX 30 MIN: CPT | Mod: HCNC

## 2024-03-18 PROCEDURE — 84100 ASSAY OF PHOSPHORUS: CPT | Mod: HCNC

## 2024-03-18 PROCEDURE — G0378 HOSPITAL OBSERVATION PER HR: HCPCS | Mod: HCNC

## 2024-03-18 PROCEDURE — 63600175 PHARM REV CODE 636 W HCPCS: Mod: HCNC

## 2024-03-18 PROCEDURE — 36415 COLL VENOUS BLD VENIPUNCTURE: CPT | Mod: HCNC

## 2024-03-18 PROCEDURE — 97116 GAIT TRAINING THERAPY: CPT | Mod: HCNC

## 2024-03-18 PROCEDURE — 96376 TX/PRO/DX INJ SAME DRUG ADON: CPT

## 2024-03-18 RX ORDER — MAGNESIUM SULFATE HEPTAHYDRATE 40 MG/ML
2 INJECTION, SOLUTION INTRAVENOUS ONCE
Status: COMPLETED | OUTPATIENT
Start: 2024-03-18 | End: 2024-03-18

## 2024-03-18 RX ADMIN — ROPINIROLE HYDROCHLORIDE 1 MG: 1 TABLET, FILM COATED ORAL at 08:03

## 2024-03-18 RX ADMIN — MAGNESIUM SULFATE HEPTAHYDRATE 2 G: 40 INJECTION, SOLUTION INTRAVENOUS at 10:03

## 2024-03-18 RX ADMIN — TAMSULOSIN HYDROCHLORIDE 0.8 MG: 0.4 CAPSULE ORAL at 08:03

## 2024-03-18 RX ADMIN — LOSARTAN POTASSIUM 100 MG: 50 TABLET, FILM COATED ORAL at 10:03

## 2024-03-18 RX ADMIN — AMLODIPINE BESYLATE 5 MG: 5 TABLET ORAL at 08:03

## 2024-03-18 RX ADMIN — DOCUSATE SODIUM AND SENNOSIDES 1 TABLET: 8.6; 5 TABLET, FILM COATED ORAL at 08:03

## 2024-03-18 RX ADMIN — GABAPENTIN 300 MG: 300 CAPSULE ORAL at 08:03

## 2024-03-18 RX ADMIN — AMLODIPINE BESYLATE 5 MG: 5 TABLET ORAL at 10:03

## 2024-03-18 RX ADMIN — GABAPENTIN 300 MG: 300 CAPSULE ORAL at 03:03

## 2024-03-18 RX ADMIN — CEFTRIAXONE 1 G: 1 INJECTION, POWDER, FOR SOLUTION INTRAMUSCULAR; INTRAVENOUS at 05:03

## 2024-03-18 RX ADMIN — GABAPENTIN 300 MG: 300 CAPSULE ORAL at 10:03

## 2024-03-18 RX ADMIN — DOCUSATE SODIUM AND SENNOSIDES 1 TABLET: 8.6; 5 TABLET, FILM COATED ORAL at 10:03

## 2024-03-18 RX ADMIN — LATANOPROST 1 DROP: 50 SOLUTION OPHTHALMIC at 08:03

## 2024-03-18 RX ADMIN — ASPIRIN 81 MG: 81 TABLET, COATED ORAL at 10:03

## 2024-03-18 RX ADMIN — ENOXAPARIN SODIUM 40 MG: 40 INJECTION SUBCUTANEOUS at 05:03

## 2024-03-18 NOTE — PLAN OF CARE
[facility] - Med Surg ([facility])  Discharge Assessment    Primary Care Provider: Caleb Negrete MD     Discharge Assessment (most recent)       BRIEF DISCHARGE ASSESSMENT - 03/18/24 1327          Discharge Planning    Assessment Type Discharge Planning Brief Assessment     Resource/Environmental Concerns none     Support Systems Spouse/significant other;Family members     Assistance Needed Pt/spouse denies     Equipment Currently Used at Home cane, straight;bedside commode;shower chair;walker, rolling;rollator;grab bar;wheelchair     Current Living Arrangements home     Care Facility Name N/A     Patient/Family Anticipates Transition to home with family;home     Patient/Family Anticipated Services at Transition none     DME Needed Upon Discharge  none     Discharge Plan A Home with family;Home     Discharge Plan B Home with family;Home Health        Physical Activity    On average, how many days per week do you engage in moderate to strenuous exercise (like a brisk walk)? 2 days     On average, how many minutes do you engage in exercise at this level? 20 min        Financial Resource Strain    How hard is it for you to pay for the very basics like food, housing, medical care, and heating? Not very hard        Housing Stability    In the last 12 months, was there a time when you were not able to pay the mortgage or rent on time? No     In the last 12 months, was there a time when you did not have a steady place to sleep or slept in a shelter (including now)? No        Transportation Needs    In the past 12 months, has lack of transportation kept you from medical appointments or from getting medications? No     In the past 12 months, has lack of transportation kept you from meetings, work, or from getting things needed for daily living? No        Food Insecurity    Within the past 12 months, you worried that your food would run out before you got the money to buy more. Never true     Within the past 12 months,  the food you bought just didn't last and you didn't have money to get more. Never true        Stress    Do you feel stress - tense, restless, nervous, or anxious, or unable to sleep at night because your mind is troubled all the time - these days? Only a little        Social Connections    In a typical week, how many times do you talk on the phone with family, friends, or neighbors? Three times a week     How often do you get together with friends or relatives? Once a week     How often do you attend Protestant or Anglican services? Patient declined     Do you belong to any clubs or organizations such as Protestant groups, [employer], [employer] or athletic groups, or school groups? Patient declined     How often do you attend meetings of the clubs or organizations you belong to? Patient declined     Are you , , , , never , or living with a partner?         Alcohol Use    Q1: How often do you have a drink containing alcohol? Patient declined     Q2: How many drinks containing alcohol do you have on a typical day when you are drinking? Patient declined     Q3: How often do you have six or more drinks on one occasion? Patient declined                       SW spoke with patient/spouse in [id number] for Discharge Planning Assessment. Per patient, Pt lives spouse in a single family home on a [facility] foundation with one steps to porch and point of entry.  Patient was independent with ADLS and DID use DME or in-home assistive equipment. Pt is not on dialysis  or coumadin,  takes medications as prescribed / keeps refilled / has resources for all daily and prescriptive needs. Preferred pharmacy is [facility] -  Wants any necessary medication sent to preferred pharmacy.   Will have help from  Spouse and other immediate family upon discharge - Spouse to provide transportation home.  All questions addressed. Unit and SW direct numbers provided. Will continue to follow for course of  hospitalization    3/17/2024 12:47 AM  UTI (urinary tract infection) [N39.0]  Tachycardia [R00.0]  Chest pain [R07.9]    PCP: Caleb Negrete MD    PHARMACY:   [facility] [place] [address]  Phone: [phone] Fax: [phone]    [id number] [place] [address]  Phone: [phone] Fax: [phone]    [place] [address]  Phone: [phone] Fax: [phone]      Payor: [employer] MANAGED MEDICARE / Plan: HU[employer] MEDICARE HMO / Product Type: Capitation /       Enedelia Cano LMSW  PRN - [facility]  [id number]

## 2024-03-18 NOTE — ASSESSMENT & PLAN NOTE
History of urinary retention/incontinence. Followed by urology outpatient. Home meds includes Flomax, CIC TID w/ 14 Fr catheter and D-mannose    - Continue Flomax  - Alonzo catheter placed

## 2024-03-18 NOTE — ASSESSMENT & PLAN NOTE
84 y.o. y/o male with a PMHx of BPH (self cath TID) who presented with symptoms of urinary frequency and was admitted with concerns for catheter associated UTI. UA significant for with nitrites, leukocyte esterase and pyuria. No signs of pyelonephritis. Lactate at 2.96 on admission likely 2/2 to hypovolemia. S/p 1 L of IVF in ED. Previous Ucx grew Klebsiella and patient completed course of Cipro.    - Continue IV Rocephin  - Urine culture growing GNR  - Trend CBC  - Strict I/Os

## 2024-03-18 NOTE — ASSESSMENT & PLAN NOTE
Patient with Chronic debility due to chronic unspecified fatigue. Latest AMPAC and GEMS scores have been reviewed. Evaluation for etiology is complete. Plan includes progressive mobility protocol initated.

## 2024-03-18 NOTE — HOSPITAL COURSE
Mr. Chavez was admitted to [facility] with concern for UTI. He was started on IV Ceftriaxone and had a lowry catheter placed due to chronic retention. Urine Cx growing pansensitive Klebsiella. Blood cultures negative. Reports improvement in symptoms since starting antibiotics. On day of discharge, patient afebrile, hemodynamically stable, and agreeable to discharge. Plan to send with 4 more days of cefdinir 300 mg BID for a total of 7 days of coverage. Close follow up coordinated with patient's PCP. All questions were answered, and continued plan of care following discharge acknowledged by patient.     Vitals:    03/19/24 0434 03/19/24 0814 03/19/24 1130 03/19/24 1137   BP: 126/61 131/63  (!) 147/66   BP Location: Left arm Left arm  Left arm   Patient Position: Lying Lying  Lying   Pulse: 72 82 87 79   Resp: 20 18  18   Temp: 98.7 °F (37.1 °C) 98.2 °F (36.8 °C)  98 °F (36.7 °C)   TempSrc: Oral Oral  Oral   SpO2: 96% (!) 94%  95%   Weight:       Height:           Physical Exam  Vitals and nursing note reviewed.   Constitutional:       Appearance: Normal appearance.   HENT:      Head: Atraumatic.      Right Ear: External ear normal.      Left Ear: External ear normal.   Eyes:      Extraocular Movements: Extraocular movements intact.      Conjunctiva/sclera: Conjunctivae normal.   Cardiovascular:      Rate and Rhythm: Normal rate and regular rhythm.      Pulses: Normal pulses.      Heart sounds: Normal heart sounds.   Pulmonary:      Effort: Pulmonary effort is normal.      Breath sounds: Normal breath sounds. No wheezing or rales.   Abdominal:      General: Abdomen is flat. There is no distension.      Palpations: Abdomen is soft.      Tenderness: There is no abdominal tenderness. There is no guarding or rebound.   Musculoskeletal:      Cervical back: Normal range of motion.   Skin:     General: Skin is warm and dry.      Capillary Refill: Capillary refill takes less than 2 seconds.   Neurological:      General: No focal  deficit present.      Mental Status: He is alert and oriented to person, place, and time.      Cranial Nerves: No cranial nerve deficit.      Sensory: No sensory deficit.      Motor: No weakness.   Psychiatric:         Mood and Affect: Mood normal.         Behavior: Behavior normal.

## 2024-03-18 NOTE — PROGRESS NOTES
[facility] - [facility] Surg ([address] Medicine  Progress Note    Patient Name: Suleiman Chavez  MRN: [id number]  Patient Class: OP- Observation   Admission Date: 3/17/2024  Length of Stay: 0 days  Attending Physician: Lance Olea, *  Primary Care Provider: Caleb Negrete MD        Subjective:     Principal Problem:Urinary tract infection associated with catheterization of urinary tract        HPI:  85 y/o M with a PMHx of glaucoma, HTN, T2DM, SCC of the tongue, BPH, diabetic neuropathy, spinal stenosis and iron deficiency anemia who present to the ED for urinary frequency for the past day. He also reports increased weakness for the past day to the point where he was having trouble standing. He denies any urinary pain, urinary incontinence, fever, chills, abd pain, nausea, vomiting, diarrhea, fatigue, confusion, or worsening back pain He reports that he has had UTIs in the past that have presented with similarly. He self caths himself 3 times a day due to urinary retention from his BPH. He follows with urology outpatient. He is able to ambulated with a walker at baseline.    In the ED, patient was tachycardiac otherwise stable. CBC and CMP in the ED significant for Hgb 11.6,  otherwise unremarkable. Troponin unremarkable. UA significant for WBC >100/hpf, 2+ leukocyte esterase, positive nitrites, trace blood and RBC 10/hpf. POCT Lactate 2.96. CXR unremarkable. Patient given Rocephin and 1 L of IVF in the ED and admitted for UTI.     Overview/Hospital Course:  Mr. Chavez was [place] [facility] [place] concern for [place]. He was started on IV Ceftriaxone and had a lowry catheter placed due to chronic retention. Urine Cx growing GNR.     Interval History: Continuing IV Ceftriaxone. Urine Cx growing GNR with 1/4 bottles growing staph.     Review of Systems   Constitutional:  Positive for fatigue. Negative for chills and fever.   Gastrointestinal:  Negative for abdominal pain, constipation, diarrhea  and nausea.   Genitourinary:  Positive for frequency. Negative for decreased urine volume, difficulty urinating, dysuria, flank pain, hematuria, penile discharge and urgency.   Neurological:  Negative for dizziness, weakness and numbness.   Psychiatric/Behavioral:  Negative for confusion.    All other systems reviewed and are negative.    Objective:     Vital Signs (Most Recent):  Temp: 98.7 °F (37.1 °C) (03/18/24 0815)  Pulse: 62 (03/18/24 0815)  Resp: 18 (03/18/24 0815)  BP: (!) 135/58 (03/18/24 0815)  SpO2: 96 % (03/18/24 0815) Vital Signs (24h Range):  Temp:  [98.5 °F (36.9 °C)-99.9 °F (37.7 °C)] 98.7 °F (37.1 °C)  Pulse:  [] 62  Resp:  [18-19] 18  SpO2:  [94 %-97 %] 96 %  BP: (112-165)/(53-70) 135/58     Weight: 82.6 kg (182 lb 1.6 oz)  Body mass index is 24.7 kg/m².    Intake/Output Summary (Last 24 hours) at 3/18/2024 1008  Last data filed at 3/18/2024 0512  Gross per 24 hour   Intake 362 ml   Output 700 ml   Net -338 ml         Physical Exam  Vitals and nursing note reviewed.   Constitutional:       Appearance: Normal appearance.   HENT:      Head: Atraumatic.      Right Ear: External ear normal.      Left Ear: External ear normal.   Eyes:      Extraocular Movements: Extraocular movements intact.      Conjunctiva/sclera: Conjunctivae normal.   Cardiovascular:      Rate and Rhythm: Normal rate and regular rhythm.      Pulses: Normal pulses.      Heart sounds: Normal heart sounds.   Pulmonary:      Effort: Pulmonary effort is normal.      Breath sounds: Normal breath sounds. No wheezing or rales.   Abdominal:      General: Abdomen is flat. There is no distension.      Palpations: Abdomen is soft.      Tenderness: There is no abdominal tenderness. There is no guarding or rebound.   Musculoskeletal:      Cervical back: Normal range of motion.   Skin:     General: Skin is warm and dry.      Capillary Refill: Capillary refill takes less than 2 seconds.   Neurological:      General: No focal deficit present.       Mental Status: He is alert and oriented to person, place, and time.      Cranial Nerves: No cranial nerve deficit.      Sensory: No sensory deficit.      Motor: No weakness.   Psychiatric:         Mood and Affect: Mood normal.         Behavior: Behavior normal.             Significant Labs: All pertinent labs within the past 24 hours have been reviewed.    Significant Imaging: I have reviewed all pertinent imaging results/findings within the past 24 hours.    Assessment/Plan:      * Urinary tract infection associated with catheterization of urinary tract  84 y.o. y/o male with a PMHx of BPH (self cath TID) who presented with symptoms of urinary frequency and was admitted with concerns for catheter associated UTI. UA significant for with nitrites, leukocyte esterase and pyuria. No signs of pyelonephritis. Lactate at 2.96 on admission likely 2/2 to hypovolemia. S/p 1 L of IVF in ED. Previous Ucx grew Klebsiella and patient completed course of Cipro.    - Continue IV Rocephin  - Urine culture growing GNR  - Trend CBC  - Strict I/Os      History of dysphagia  Hx of dysphagia 2/2 head and neck radiation and glossectomy and lack of dentures.     - full liquid diet       History of urinary retention  History of urinary retention/incontinence. Followed by urology outpatient. Home meds includes Flomax, CIC TID w/ 14 Fr catheter and D-mannose    - Continue Flomax  - Alonzo catheter placed      Squamous cell cancer of tongue  Hx of squamous cell cancer of the tongue s/p left partial glossectomy and left modified neck dissection on 7/2023 and radiation.    Debility  Patient with Chronic debility due to chronic unspecified fatigue. Latest AMPAC and GEMS scores have been reviewed. Evaluation for etiology is complete. Plan includes progressive mobility protocol initated.    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 Last A1c at 5.5 on 11/23.     - Hold oral antihyperglycemics while inpatient  - Low dose sliding scale insulin PRN  - Blood glucose goal of 140-180     Essential hypertension  Patient with a hx of hypertension currently controlled on Amlodipine and Losartan.    - Continue home Amlodipine and Losartan  - IV Labetalol or IV Hydralazine PNR is SBP >180 and DBP of >120        VTE Risk Mitigation (From admission, onward)           Ordered     enoxaparin injection 40 mg  Daily         03/17/24 0409     IP VTE HIGH RISK PATIENT  Once         03/17/24 0409     Place sequential compression device  Until discontinued         03/17/24 0409                    Discharge Planning   RAVIN: 3/18/2024     Code Status: Full Code   Is the patient medically ready for discharge?: No    Reason for patient still in hospital (select all that apply): Patient trending condition                     Shay Roberts MD  Department of Hospital Medicine   [facility] - Med Surg ([place])

## 2024-03-18 NOTE — PLAN OF CARE
Problem: Physical Therapy  Goal: Physical Therapy Goal  Description: Goals to be met by: 3/28     Patient will increase functional independence with mobility by performin. Supine to sit with Modified [place]  2. Sit to supine with Modified [place]  3. Sit to stand transfer with Modified [place]  4. Gait  x 200 feet with Modified [place] using LRAD.     Outcome: Ongoing, Progressing   Evaluation completed, initiated plan of care.   Karen Lemus, PT  3/18/2024

## 2024-03-18 NOTE — SUBJECTIVE & OBJECTIVE
Interval History: Continuing IV Ceftriaxone. Urine Cx growing GNR with 1/4 bottles growing staph.     Review of Systems   Constitutional:  Positive for fatigue. Negative for chills and fever.   Gastrointestinal:  Negative for abdominal pain, constipation, diarrhea and nausea.   Genitourinary:  Positive for frequency. Negative for decreased urine volume, difficulty urinating, dysuria, flank pain, hematuria, penile discharge and urgency.   Neurological:  Negative for dizziness, weakness and numbness.   Psychiatric/Behavioral:  Negative for confusion.    All other systems reviewed and are negative.    Objective:     Vital Signs (Most Recent):  Temp: 98.7 °F (37.1 °C) (03/18/24 0815)  Pulse: 62 (03/18/24 0815)  Resp: 18 (03/18/24 0815)  BP: (!) 135/58 (03/18/24 0815)  SpO2: 96 % (03/18/24 0815) Vital Signs (24h Range):  Temp:  [98.5 °F (36.9 °C)-99.9 °F (37.7 °C)] 98.7 °F (37.1 °C)  Pulse:  [] 62  Resp:  [18-19] 18  SpO2:  [94 %-97 %] 96 %  BP: (112-165)/(53-70) 135/58     Weight: 82.6 kg (182 lb 1.6 oz)  Body mass index is 24.7 kg/m².    Intake/Output Summary (Last 24 hours) at 3/18/2024 1008  Last data filed at 3/18/2024 0512  Gross per 24 hour   Intake 362 ml   Output 700 ml   Net -338 ml         Physical Exam  Vitals and nursing note reviewed.   Constitutional:       Appearance: Normal appearance.   HENT:      Head: Atraumatic.      Right Ear: External ear normal.      Left Ear: External ear normal.   Eyes:      Extraocular Movements: Extraocular movements intact.      Conjunctiva/sclera: Conjunctivae normal.   Cardiovascular:      Rate and Rhythm: Normal rate and regular rhythm.      Pulses: Normal pulses.      Heart sounds: Normal heart sounds.   Pulmonary:      Effort: Pulmonary effort is normal.      Breath sounds: Normal breath sounds. No wheezing or rales.   Abdominal:      General: Abdomen is flat. There is no distension.      Palpations: Abdomen is soft.      Tenderness: There is no abdominal  tenderness. There is no guarding or rebound.   Musculoskeletal:      Cervical back: Normal range of motion.   Skin:     General: Skin is warm and dry.      Capillary Refill: Capillary refill takes less than 2 seconds.   Neurological:      General: No focal deficit present.      Mental Status: He is alert and oriented to person, place, and time.      Cranial Nerves: No cranial nerve deficit.      Sensory: No sensory deficit.      Motor: No weakness.   Psychiatric:         Mood and Affect: Mood normal.         Behavior: Behavior normal.             Significant Labs: All pertinent labs within the past 24 hours have been reviewed.    Significant Imaging: I have reviewed all pertinent imaging results/findings within the past 24 hours.

## 2024-03-18 NOTE — PT/OT/SLP EVAL
"Occupational Therapy  Co- Evaluation & Co-Treatment    Name: Suleiman Chavez  MRN: [id number]  Admitting Diagnosis: Urinary tract infection associated with catheterization of urinary tract  Recent Surgery: * No surgery found *      Recommendations:     Discharge Recommendations: No Therapy Indicated  Discharge Equipment Recommendations:  none  Barriers to discharge:  None    Assessment:     Suleiman Chavez is a 84 y.o. male with a medical diagnosis of Urinary tract infection associated with catheterization of urinary tract.  He presents with ability to complete bed mobility, functional transfers, and standing grooming activities with light physical assistance. Patient presenting with kyphotic posturing in stance, reporting this is his baseline secondary to ongoing back issues. Patient is at functional baseline. Performance deficits affecting function: weakness, impaired endurance, impaired functional mobility, impaired self care skills, decreased safety awareness.      Rehab Prognosis: Good; patient would benefit from acute skilled OT services to address these deficits and reach maximum level of function.       Plan:     Patient to be seen 3 x/week to address the above listed problems via self-care/home management, therapeutic activities, therapeutic exercises, neuromuscular re-education  Plan of Care Expires: 04/01/24  Plan of Care Reviewed with: patient, spouse    Subjective     Chief Complaint: back pain   Patient/Family Comments/goals: "I'm ready to get up"      Occupational Profile:  Living Environment: Patient lives with spouse in [facility] with threshold step to enter; using walk in shower with shower chair  Previous level of function: Patient reports they were independent with ADLs and IADLs prior to hospitalization; ambulating with SC/RW in home and rollator within community  Roles and Routines:   Equipment Used at Home: walker, rolling, cane, straight, bedside commode, grab bar, rollator, wheelchair, " shower chair  Assistance upon Discharge: wife    Pain/Comfort:  Pain Rating 1: 0/10  Pain Rating Post-Intervention 1: 0/10    Patients cultural, spiritual, Rastafarian conflicts given the current situation: no    Objective:   Co-evaluation and treatment necessary due to patient's medical complexity requiring two skilled therapy for patient safety, activity tolerance, and appropriate progression towards functional goals.      Communicated with: nursing prior to session.  Patient found HOB elevated with telemetry, lowry catheter upon OT entry to room. Patient's wife present in room during session.     General Precautions: Standard, fall  Orthopedic Precautions: N/A  Braces: N/A  Respiratory Status: Room air    Occupational Performance:    Bed Mobility:    Patient completed Supine to Sit with stand by assistance    Functional Mobility/Transfers:  Patient completed Sit <> Stand Transfer with stand by assistance  with  rolling walker   Functional Mobility: patient ambulated to/from bathroom with CGA-SBA with RW    Activities of Daily Living:  Grooming: contact guard assistance progressing to stand by assistance to wash face and brush teeth in stance  Upper Body Dressing: contact guard assistance to don gown like jacket    Cognitive/Visual Perceptual:  Cognitive/Psychosocial Skills:     -       Oriented to: Person, Place, Time, and Situation   -       Follows Commands/attention:Follows one-step commands  -       Communication: clear/fluent  -       Memory: No Deficits noted  -       Safety awareness/insight to disability: intact   -       Mood/Affect/Coping skills/emotional control: Appropriate to situation    Physical Exam:  Dominant hand:    -       R  Upper Extremity Range of Motion:     -       Right Upper Extremity: WFL  -       Left Upper Extremity: WFL  Upper Extremity Strength:    -       Right Upper Extremity: WFL  -       Left Upper Extremity: WFL   Strength:    -       Right Upper Extremity: WFL  -        Left Upper Extremity: WFL  Fine Motor Coordination:    -       Intact    AMPAC 6 Click ADL:  AMPAC Total Score: 22    Treatment & Education:    Patient educated on:   -purpose of OT and OT POC  -facilitation and education on proper body mechanics, energy conservation, and safety  -importance of early mobility and out of bed activities with staff assist  -overall benefits of therapy     All questions answered within OT scope and to patient's satisfaction    Patient left up in chair with all lines intact, call button in reach, and family present    GOALS:   Multidisciplinary Problems       Occupational Therapy Goals          Problem: Occupational Therapy    Goal Priority Disciplines Outcome Interventions   Occupational Therapy Goal     OT, PT/OT Ongoing, Progressing    Description: Goals to be met by: 4/1/24     Patient will increase functional independence with ADLs by performing:    LE Dressing with Supervision.  Grooming while standing at sink with Supervision.  Toileting from toilet with Supervision for hygiene and clothing management.   [place] with BUE HEP to improve activity tolerance to complete ADLs and IADLs  Within home ambulation distances with  supervision and LRAD necessary to complete ADLs.                           History:     Past Medical History:   Diagnosis Date    Bilateral ocular hypertension     Diabetes mellitus     Glaucoma     Hypertension     Nuclear cataract 12/17/2014         Past Surgical History:   Procedure Laterality Date    CHOLECYSTECTOMY      GLOSSECTOMY Left 7/12/2023    Procedure: GLOSSECTOMY;  Surgeon: Jaxon Carmen MD;  Location: [address];  Service: ENT;  Laterality: Left;    RADICAL NECK DISSECTION Left 7/12/2023    Procedure: DISSECTION, NECK, RADICAL;  Surgeon: Jaxon Carmen MD;  Location: [address];  Service: ENT;  Laterality: Left;       Time Tracking:     OT Date of Treatment: 03/18/24  OT Start Time: 0928  OT Stop Time: 0941  OT Total Time  (min): 13 min    Billable Minutes:Evaluation 13    3/18/2024

## 2024-03-18 NOTE — PLAN OF CARE
Problem: Occupational Therapy  Goal: Occupational Therapy Goal  Description: Goals to be met by: 4/1/24     Patient will increase functional independence with ADLs by performing:    LE Dressing with Supervision.  Grooming while standing at sink with Supervision.  Toileting from toilet with Supervision for hygiene and clothing management.   [place] with BUE HEP to improve activity tolerance to complete ADLs and IADLs  Within home ambulation distances with  supervision and LRAD necessary to complete ADLs.      Outcome: Ongoing, Progressing   Patient tolerated OT eval. Goals and POC established. See note for further details.

## 2024-03-18 NOTE — ASSESSMENT & PLAN NOTE
Patient with a hx of hypertension currently controlled on Amlodipine and Losartan.    - Continue home Amlodipine and Losartan  - IV Labetalol or IV Hydralazine PNR is SBP >180 and DBP of >120

## 2024-03-18 NOTE — PT/OT/SLP EVAL
"Physical Therapy Evaluation  Co-evaluation with OT due to acuity of condition, level of skilled assist needed for assessment of safety with mobility.   Patient Name:  Suleiman Chavez   MRN:  [id number]    Recommendations:     Discharge Recommendations: No Therapy Indicated   Discharge Equipment Recommendations: none   Barriers to discharge: None    Assessment:     Suleiman Chavez is a 84 y.o. male admitted with a medical diagnosis of Urinary tract infection associated with catheterization of urinary tract.  He presents with the following impairments/functional limitations: weakness, impaired endurance, impaired self care skills, impaired functional mobility, gait instability. The patient reports mild weakness due to prolonged bed rest. He appears near his functional baseline. He ambulated room level with RW and stand by assistance, very kyphotic posture. Patient cued for safety with mobility, patient turning to sit in chair prematurely, cued to keep RW close during transfers and back up fully to seat surface.     Rehab Prognosis: Good; patient would benefit from acute skilled PT services to address these deficits and reach maximum level of function.    Recent Surgery: * No surgery found *      Plan:     During this hospitalization, patient to be seen 3 x/week to address the identified rehab impairments via gait training, therapeutic activities, therapeutic exercises, neuromuscular re-education and progress toward the following goals:    Plan of Care Expires:  04/17/24    Subjective     Chief Complaint: "I haven't been out of bed here, I need a walker"  Patient/Family Comments/goals: return to PLOF   Pain/Comfort:  Pain Rating 1: 0/10    Patients cultural, spiritual, Anabaptism conflicts given the current situation: no    Living Environment:  The patient lives in [facility], [address] [place] [address].   Prior to admission, patients level of function was modified independent with RW house hold distances, rollator community distances.  " Equipment used at home: bedside commode, shower chair, walker, rolling, rollator, cane, straight, grab bar, wheelchair.  DME owned (not currently used): none.  Upon discharge, patient will have assistance from spouse.    Objective:     Communicated with RN prior to session.  Patient found HOB elevated with telemetry  upon PT entry to room. Wife at bedside.     General Precautions: Standard, fall  Orthopedic Precautions:N/A   Braces: N/A  Respiratory Status: Room air    Exams:    Cognitive Exam  Patient is A&O x4 and follows 100% of one -step commands    Fine Motor Coordination   -       WNL     Postural Exam Patient presented with the following abnormalities:    -       Rounded shoulders  -       Forward head  -       Kyphosis  -       Posterior pelvic tilt     Sensation    -       Light touch intact ROBERT LE   Skin Integrity/Edema     -       Skin integrity: visibly intact  -       Edema: NA   R LE ROM WNL   R LE Strength 4/5 hip flexion, knee ext/flex, and ankle DF/PF   L LE ROM WNL   L LE Strength  4/5 hip flexion, knee ext/flex, and ankle DF/PF       Functional Mobility:    Bed Mobility  Supine to Sit on the R side:  stand by assistance   Sit to supine: stand by assistance    Transfers Sit to Stand:  stand by assistance    Gait  Gait Distance: 10 x2 ft with RW   Assistance Level: contact guard assist progressing to stand by assistance   Description: kyphotic posture, short shuffling steps, decreased foot clearance, decreased gait speed          AM-PAC 6 CLICK MOBILITY  Total Score:23       Treatment & Education:  Patient and spouse educated on:  -role of therapy  -goals of session  -PT POC  -benefits of out of bed mobility and consequences of immobility  -calling for staff assist to mobilize safely  Patient agreeable to mobilize with therapy.      Gait training: cued for upright posture, reciprocal strides, cued to ambulate inside RW MOUNIKA, pacing for energy conservation     Patient performed standing ADLs  4  minutes with stand by assistance  and unilateral UE support. Performed standing weight shift within MOUNIKA. No loss of balance.      Patient encouraged to ambulate, sit up in chair 3x/day to prevent deconditioning during hospitalization. Patient verbalized understanding and agreement to mobilize only with RN assist for safety.     Patient left up in chair with all lines intact and call button in reach.    GOALS:   Multidisciplinary Problems       Physical Therapy Goals          Problem: Physical Therapy    Goal Priority Disciplines Outcome Goal Variances Interventions   Physical Therapy Goal     PT, PT/OT Ongoing, Progressing     Description: Goals to be met by: 3/28     Patient will increase functional independence with mobility by performin. Supine to sit with Modified [place]  2. Sit to supine with Modified [place]  3. Sit to stand transfer with Modified [place]  4. Gait  x 200 feet with Modified [place] using LRAD.                          History:     Past Medical History:   Diagnosis Date    Bilateral ocular hypertension     Diabetes mellitus     Glaucoma     Hypertension     Nuclear cataract 2014       Past Surgical History:   Procedure Laterality Date    CHOLECYSTECTOMY      GLOSSECTOMY Left 2023    Procedure: GLOSSECTOMY;  Surgeon: Jaxon Carmen MD;  Location: [facility] OR [address] [place] [address];  Service: ENT;  Laterality: Left;    RADICAL NECK DISSECTION Left 2023    Procedure: DISSECTION, NECK, RADICAL;  Surgeon: Jaxon Carmen MD;  Location: [facility] OR [address] [place] [address];  Service: ENT;  Laterality: Left;       Time Tracking:     PT Received On: 24  PT Start Time: 930     PT Stop Time: 944  PT Total Time (min): 14 min     Billable Minutes: Evaluation 6 and Gait Training 8      2024

## 2024-03-19 VITALS
HEART RATE: 79 BPM | HEIGHT: 72 IN | SYSTOLIC BLOOD PRESSURE: 147 MMHG | DIASTOLIC BLOOD PRESSURE: 66 MMHG | TEMPERATURE: 98 F | BODY MASS INDEX: 24.67 KG/M2 | WEIGHT: 182.13 LBS | OXYGEN SATURATION: 95 % | RESPIRATION RATE: 18 BRPM

## 2024-03-19 LAB
ALBUMIN SERPL BCP-MCNC: 2.3 G/DL (ref 3.5–5.2)
ALP SERPL-CCNC: 111 U/L (ref 55–135)
ALT SERPL W/O P-5'-P-CCNC: 10 U/L (ref 10–44)
ANION GAP SERPL CALC-SCNC: 7 MMOL/L (ref 8–16)
AST SERPL-CCNC: 11 U/L (ref 10–40)
BACTERIA UR CULT: ABNORMAL
BASOPHILS # BLD AUTO: 0.03 K/UL (ref 0–0.2)
BASOPHILS NFR BLD: 0.4 % (ref 0–1.9)
BILIRUB SERPL-MCNC: 0.3 MG/DL (ref 0.1–1)
BUN SERPL-MCNC: 16 MG/DL (ref 8–23)
CALCIUM SERPL-MCNC: 8.5 MG/DL (ref 8.7–10.5)
CHLORIDE SERPL-SCNC: 105 MMOL/L (ref 95–110)
CO2 SERPL-SCNC: 22 MMOL/L (ref 23–29)
CREAT SERPL-MCNC: 0.7 MG/DL (ref 0.5–1.4)
DIFFERENTIAL METHOD BLD: ABNORMAL
EOSINOPHIL # BLD AUTO: 0.2 K/UL (ref 0–0.5)
EOSINOPHIL NFR BLD: 3 % (ref 0–8)
ERYTHROCYTE [DISTWIDTH] IN BLOOD BY AUTOMATED COUNT: 13.8 % (ref 11.5–14.5)
EST. GFR  (NO RACE VARIABLE): >60 ML/MIN/1.73 M^2
GLUCOSE SERPL-MCNC: 133 MG/DL (ref 70–110)
HCT VFR BLD AUTO: 31.5 % (ref 40–54)
HGB BLD-MCNC: 10.4 G/DL (ref 14–18)
IMM GRANULOCYTES # BLD AUTO: 0.02 K/UL (ref 0–0.04)
IMM GRANULOCYTES NFR BLD AUTO: 0.3 % (ref 0–0.5)
LYMPHOCYTES # BLD AUTO: 0.7 K/UL (ref 1–4.8)
LYMPHOCYTES NFR BLD: 10 % (ref 18–48)
MAGNESIUM SERPL-MCNC: 1.7 MG/DL (ref 1.6–2.6)
MCH RBC QN AUTO: 29.7 PG (ref 27–31)
MCHC RBC AUTO-ENTMCNC: 33 G/DL (ref 32–36)
MCV RBC AUTO: 90 FL (ref 82–98)
MONOCYTES # BLD AUTO: 0.5 K/UL (ref 0.3–1)
MONOCYTES NFR BLD: 7 % (ref 4–15)
NEUTROPHILS # BLD AUTO: 5.3 K/UL (ref 1.8–7.7)
NEUTROPHILS NFR BLD: 79.3 % (ref 38–73)
NRBC BLD-RTO: 0 /100 WBC
PHOSPHATE SERPL-MCNC: 2.8 MG/DL (ref 2.7–4.5)
PLATELET # BLD AUTO: 150 K/UL (ref 150–450)
PMV BLD AUTO: 9.4 FL (ref 9.2–12.9)
POCT GLUCOSE: 122 MG/DL (ref 70–110)
POCT GLUCOSE: 156 MG/DL (ref 70–110)
POTASSIUM SERPL-SCNC: 3.4 MMOL/L (ref 3.5–5.1)
PROT SERPL-MCNC: 5.8 G/DL (ref 6–8.4)
RBC # BLD AUTO: 3.5 M/UL (ref 4.6–6.2)
SODIUM SERPL-SCNC: 134 MMOL/L (ref 136–145)
WBC # BLD AUTO: 6.71 K/UL (ref 3.9–12.7)

## 2024-03-19 PROCEDURE — 80053 COMPREHEN METABOLIC PANEL: CPT | Mod: HCNC

## 2024-03-19 PROCEDURE — 25000003 PHARM REV CODE 250: Mod: HCNC

## 2024-03-19 PROCEDURE — 85025 COMPLETE CBC W/AUTO DIFF WBC: CPT | Mod: HCNC

## 2024-03-19 PROCEDURE — 63600175 PHARM REV CODE 636 W HCPCS: Mod: HCNC

## 2024-03-19 PROCEDURE — 97116 GAIT TRAINING THERAPY: CPT | Mod: HCNC,CQ

## 2024-03-19 PROCEDURE — 36415 COLL VENOUS BLD VENIPUNCTURE: CPT | Mod: HCNC

## 2024-03-19 PROCEDURE — 97530 THERAPEUTIC ACTIVITIES: CPT | Mod: HCNC,CQ

## 2024-03-19 PROCEDURE — 84100 ASSAY OF PHOSPHORUS: CPT | Mod: HCNC

## 2024-03-19 PROCEDURE — G0378 HOSPITAL OBSERVATION PER HR: HCPCS | Mod: HCNC

## 2024-03-19 PROCEDURE — 96376 TX/PRO/DX INJ SAME DRUG ADON: CPT

## 2024-03-19 PROCEDURE — 83735 ASSAY OF MAGNESIUM: CPT | Mod: HCNC

## 2024-03-19 RX ORDER — CEFDINIR 300 MG/1
300 CAPSULE ORAL EVERY 12 HOURS
Qty: 8 CAPSULE | Refills: 0 | Status: SHIPPED | OUTPATIENT
Start: 2024-03-20 | End: 2024-03-24

## 2024-03-19 RX ADMIN — LOSARTAN POTASSIUM 100 MG: 50 TABLET, FILM COATED ORAL at 10:03

## 2024-03-19 RX ADMIN — DOCUSATE SODIUM AND SENNOSIDES 1 TABLET: 8.6; 5 TABLET, FILM COATED ORAL at 10:03

## 2024-03-19 RX ADMIN — POTASSIUM BICARBONATE 40 MEQ: 391 TABLET, EFFERVESCENT ORAL at 10:03

## 2024-03-19 RX ADMIN — GABAPENTIN 300 MG: 300 CAPSULE ORAL at 02:03

## 2024-03-19 RX ADMIN — GABAPENTIN 300 MG: 300 CAPSULE ORAL at 10:03

## 2024-03-19 RX ADMIN — CEFTRIAXONE 1 G: 1 INJECTION, POWDER, FOR SOLUTION INTRAMUSCULAR; INTRAVENOUS at 04:03

## 2024-03-19 RX ADMIN — ASPIRIN 81 MG: 81 TABLET, COATED ORAL at 10:03

## 2024-03-19 RX ADMIN — AMLODIPINE BESYLATE 5 MG: 5 TABLET ORAL at 10:03

## 2024-03-19 NOTE — PLAN OF CARE
Follow up with Caleb Negrete MD  CHW called to schedule pcp f/u, Randa sent message to nurse to call patient with appt. [id number]  [facility] [address]  [phone]

## 2024-03-19 NOTE — PLAN OF CARE
SW placed OPCM referral via [facility].    ROBERTO CARLOS will continue to follow.                ROCÍO Greene, LMSW  [facility]  Case Management  Ext. [phone]

## 2024-03-19 NOTE — DISCHARGE SUMMARY
[facility] - Med Surg ([place] [id number])  [facility] Medicine  Discharge Summary      Patient Name: Suleiman Chavez  MRN: [id number]  RAFFI: [id number]  Patient Class: OP- Observation  Admission Date: 3/17/2024  Hospital Length of Stay: 0 days  Discharge Date and Time:  03/19/2024 1:31 PM  Attending Physician: Lance Olea, *   Discharging Provider: Bam Bernal MD  Primary Care Provider: Caleb Negrete MD  [facility] Medicine Team: [place] [id number] Bam Bernal MD  Primary Care Team: [facility] 5    HPI:   85 y/o M with a PMHx of glaucoma, HTN, T2DM, SCC of the tongue, BPH, diabetic neuropathy, spinal stenosis and iron deficiency anemia who present to the ED for urinary frequency for the past day. He also reports increased weakness for the past day to the point where he was having trouble standing. He denies any urinary pain, urinary incontinence, fever, chills, abd pain, nausea, vomiting, diarrhea, fatigue, confusion, or worsening back pain He reports that he has had UTIs in the past that have presented with similarly. He self caths himself 3 times a day due to urinary retention from his BPH. He follows with urology outpatient. He is able to ambulated with a walker at baseline.    In the ED, patient was tachycardiac otherwise stable. CBC and CMP in the ED significant for Hgb 11.6,  otherwise unremarkable. Troponin unremarkable. UA significant for WBC >100/hpf, 2+ leukocyte esterase, positive nitrites, trace blood and RBC 10/hpf. POCT Lactate 2.96. CXR unremarkable. Patient given Rocephin and 1 L of IVF in the ED and admitted for UTI.     * No surgery found *      Hospital Course:   Mr. Chavez was admitted to [facility] with concern for UTI. He was started on IV Ceftriaxone and had a lowry catheter placed due to chronic retention. Urine Cx growing pansensitive Klebsiella. Blood cultures negative. Reports improvement in symptoms since starting antibiotics. On day of discharge, patient afebrile, hemodynamically  stable, and agreeable to discharge. Alonzo catheter removed. Plan to send with 4 more days of cefdinir 300 mg BID for a total of 7 days of coverage. Close follow up coordinated with patient's PCP. All questions were answered, and continued plan of care following discharge acknowledged by patient.     Vitals:    03/19/24 0434 03/19/24 0814 03/19/24 1130 03/19/24 1137   BP: 126/61 131/63  (!) 147/66   BP Location: Left arm Left arm  Left arm   Patient Position: Lying Lying  Lying   Pulse: 72 82 87 79   Resp: 20 18 18   Temp: 98.7 °F (37.1 °C) 98.2 °F (36.8 °C)  98 °F (36.7 °C)   TempSrc: Oral Oral  Oral   SpO2: 96% (!) 94%  95%   Weight:       Height:           Physical Exam  Vitals and nursing note reviewed.   Constitutional:       Appearance: Normal appearance.   HENT:      Head: Atraumatic.      Right Ear: External ear normal.      Left Ear: External ear normal.   Eyes:      Extraocular Movements: Extraocular movements intact.      Conjunctiva/sclera: Conjunctivae normal.   Cardiovascular:      Rate and Rhythm: Normal rate and regular rhythm.      Pulses: Normal pulses.      Heart sounds: Normal heart sounds.   Pulmonary:      Effort: Pulmonary effort is normal.      Breath sounds: Normal breath sounds. No wheezing or rales.   Abdominal:      General: Abdomen is flat. There is no distension.      Palpations: Abdomen is soft.      Tenderness: There is no abdominal tenderness. There is no guarding or rebound.   Musculoskeletal:      Cervical back: Normal range of motion.   Skin:     General: Skin is warm and dry.      Capillary Refill: Capillary refill takes less than 2 seconds.   Neurological:      General: No focal deficit present.      Mental Status: He is alert and oriented to person, place, and time.      Cranial Nerves: No cranial nerve deficit.      Sensory: No sensory deficit.      Motor: No weakness.   Psychiatric:         Mood and Affect: Mood normal.         Behavior: Behavior normal.      Goals of Care  Treatment Preferences:  Code Status: Full Code      Consults:   Consults (From admission, onward)          Status Ordering Provider     Inpatient consult to Social Work  Once        Provider:  (Not yet assigned)    Acknowledged SYED MARKHAM            No new Assessment & Plan notes have been filed under this hospital service since the last note was generated.  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    Specialty: Internal Medicine  Why: CHW called to schedule pcp f/u, Randa sent message to nurse to call patient with appt.  Contact information:  [address] [facility] [address] [phone]                           Patient Instructions:   No discharge procedures on file.    Significant Diagnostic Studies: N/A    Pending Diagnostic Studies:       None           Medications:  Reconciled Home Medications:      Medication List        START taking these medications      cefdinir 300 MG capsule  Commonly known as:  "OMNICEF  Take 1 capsule (300 mg total) by mouth every 12 (twelve) hours. for 4 days  Start taking on: March 20, 2024            CONTINUE taking these medications      acetaminophen 500 MG tablet  Commonly known as: TYLENOL  Take 1,000 mg by mouth 2 (two) times daily as needed for Pain.     amLODIPine 5 MG tablet  Commonly known as: NORVASC  TAKE 1 TABLET BY MOUTH TWICE DAILY     aspirin 81 MG EC tablet  Commonly known as: ECOTRIN  Take 81 mg by mouth once daily.     catheter 14-16 Fr-" Misc  1 Units by Misc.(Non-Drug; Combo Route) route 3 (three) times daily. 1 Units by Misc.(Non-Drug; Combo Route) route 4 (four) times daily, indefinitely.     d-mannose Powd  Take 1 capsule by mouth in the morning then take 2 capsules by mouth every evening.     gabapentin 300 MG capsule  Commonly known as: NEURONTIN  Take 1 capsule (300 mg total) by mouth 3 (three) times daily.     latanoprost 0.005 % ophthalmic solution  INSTILL 1 DROP INTO BOTH EYES EVERY EVENING     losartan 100 MG tablet  Commonly known as: COZAAR  TAKE 1 TABLET BY MOUTH EVERY DAY     metFORMIN 500 MG tablet  Commonly known as: GLUCOPHAGE  TAKE 1 TABLET BY MOUTH EVERY MORNING AND 2 TABLETS EVERY EVENING     STOOL SOFTENER-LAXATIVE 8.6-50 mg per tablet  Generic drug: senna-docusate 8.6-50 mg  Take 1 tablet by mouth once daily.     tamsulosin 0.4 mg Cap  Commonly known as: FLOMAX  TAKE two CAPSULES BY MOUTH EVERY EVENING            ASK your doctor about these medications      doxazosin 4 MG tablet  Commonly known as: CARDURA  TAKE 1 TABLET BY MOUTH EVERY EVENING     DUKE'S SOLUTION (BENADRYL 30 ML, MYLANTA 30 ML, LIDOCAINE 30 ML, NYSTATIN 30 ML)  Take 5 mLs by mouth every 4 (four) hours as needed (mouth, throat pain).     rOPINIRole 1 MG tablet  Commonly known as: REQUIP  TAKE 1 TABLET BY MOUTH EVERY EVENING     traMADoL 50 mg tablet  Commonly known as: ULTRAM  Take 1 tablet (50 mg total) by mouth every 6 (six) hours as needed for Pain.                  Time " spent on the discharge of patient: 36 minutes         Bam Bernal MD  Department of Hospital Medicine  [facility] ([place]

## 2024-03-19 NOTE — PT/OT/SLP PROGRESS
Physical Therapy Treatment    Patient Name:  Suleiman Chavez   MRN:  [id number]    Recommendations:     Discharge Recommendations: No Therapy Indicated  Discharge Equipment Recommendations: none  Barriers to discharge: None    Assessment:     Suleiman Chavez is a 84 y.o. male admitted with a medical diagnosis of Urinary tract infection associated with catheterization of urinary tract.  He presents with the following impairments/functional limitations: weakness, impaired endurance, impaired self care skills, impaired functional mobility, gait instability. Pt able to demo GT at CGA/SBA levels and bed mobility at Min A for supine to sit and SBA for sit to supine. Pt with need for verbal cues occasionally for RW mgmt and increased safety prior to transitional movements. Pt will continue to benefit from skilled PT services in order to further promote functional mobility, endurance, and BLE strengthening. Pt remains appropriate for acute PT treatment at this time.      Rehab Prognosis: Good; patient would benefit from acute skilled PT services to address these deficits and reach maximum level of function.    Recent Surgery: * No surgery found *      Plan:     During this hospitalization, patient to be seen 3 x/week to address the identified rehab impairments via gait training, therapeutic activities, therapeutic exercises, neuromuscular re-education and progress toward the following goals:    Plan of Care Expires:  04/17/24    Subjective     Chief Complaint: none stated  Patient/Family Comments/goals: to go home  Pain/Comfort:  Pain Rating 1: 0/10  Pain Rating Post-Intervention 1: 0/10      Objective:     Communicated with RN prior to session.  Patient found HOB elevated with telemetry, lowry catheter upon PT entry to room.     General Precautions: Standard, fall  Orthopedic Precautions: N/A  Braces: N/A  Respiratory Status: Room air     Functional Mobility:  Bed Mobility:     Supine to Sit: minimum assistance (HOB  "elevated, bed rail utilized, HHA); "Can I see your hand real quick?"  Sit to Supine: stand by assistance (HOB elevated)  Transfers:     Sit to Stand:  stand by assistance with rolling walker  Bed to Chair: stand by assistance with  rolling walker  using  ~6 ft Step Transfer  Gait: 35 ft + 35 ft in room environment using RW with CGA/SBA; cues to keep RW close to COG and reach back prior to sit      AM-PAC 6 CLICK MOBILITY  Turning over in bed (including adjusting bedclothes, sheets and blankets)?: 4  Sitting down on and standing up from a chair with arms (e.g., wheelchair, bedside commode, etc.): 4  Moving from lying on back to sitting on the side of the bed?: 3  Moving to and from a bed to a chair (including a wheelchair)?: 4  Need to walk in hospital room?: 3  Climbing 3-5 steps with a railing?: 3  Basic Mobility Total Score: 21       Treatment & Education:  Patient educated on:   -purpose of PT and PT POC  -facilitation and education on proper body mechanics, energy conservation, and generalized safety awareness  -importance of early mobility and out of bed activities with staff assist  -overall benefits of therapy        Patient left supine with HOB elevated with call button in reach and all lines intact.    GOALS:   Multidisciplinary Problems       Physical Therapy Goals          Problem: Physical Therapy    Goal Priority Disciplines Outcome Goal Variances Interventions   Physical Therapy Goal     PT, PT/OT Ongoing, Progressing     Description: Goals to be met by: 3/28     Patient will increase functional independence with mobility by performin. Supine to sit with Modified [place]  2. Sit to supine with Modified [place]  3. Sit to stand transfer with Modified [place]  4. Gait  x 200 feet with Modified [place] using LRAD.                          Time Tracking:     PT Received On: 24  PT Start Time: 1159     PT Stop Time: 1213  PT Total Time (min): 14 min     Billable Minutes: " Therapeutic Activity 14    Treatment Type: Treatment  PT/PTA: PTA     Number of PTA visits since last PT visit: 1 03/19/2024

## 2024-03-20 ENCOUNTER — PATIENT MESSAGE (OUTPATIENT)
Dept: INTERNAL MEDICINE | Facility: CLINIC | Age: 84
End: 2024-03-20
Payer: MEDICARE

## 2024-03-20 LAB
BACTERIA BLD CULT: ABNORMAL

## 2024-03-20 NOTE — PLAN OF CARE
[facility] ([place] [facility])  Discharge Final Note    Primary Care Provider: Caleb Negrete MD    Expected Discharge Date: 3/19/2024    Final Discharge Note (most recent)       Final Note - 03/20/24 0922          Final Note    Assessment Type Final Discharge Note (P)      Anticipated Discharge Disposition Home or Self Care (P)      What phone number can be called within the next 1-3 days to see how you are doing after discharge? [phone] (P)         Post-Acute Status    Post-Acute Authorization Other (P)      Other Status No Post-Acute Service Needs (P)                      Important Message from Medicare             Contact Info       Caleb Negrete MD   Specialty: Internal Medicine   Relationship: PCP - General    [address]  [facility] [address]   Phone: [phone]       Next Steps: Follow up    Instructions: CHW called to schedule pcp f/u, Randa sent message to nurse to call patient with appt.          Patient dc home.                  ROCÍO Greene, LMSW  [facility]  Case Management  Ext. [phone]

## 2024-03-22 ENCOUNTER — OUTPATIENT CASE MANAGEMENT (OUTPATIENT)
Dept: ADMINISTRATIVE | Facility: OTHER | Age: 84
End: 2024-03-22
Payer: MEDICARE

## 2024-03-22 LAB — BACTERIA BLD CULT: NORMAL

## 2024-03-22 NOTE — PROGRESS NOTES
Outpatient Care Management  Patient Does Not Consent    Patient: Suleiman Chavez  MRN:  [id number]  Date of Service:  3/22/2024  Completed by:  Deidra Banuelos RN    Chief Complaint   Patient presents with    Case Closure    OPCM Enrollment Call     3/22/24  OPCM services declined at this time. OPCM RN provided contact information. Case closure.        Patient Summary

## 2024-03-23 LAB
BACTERIA BLD CULT: NORMAL
BACTERIA BLD CULT: NORMAL

## 2024-04-01 ENCOUNTER — CLINICAL SUPPORT (OUTPATIENT)
Dept: SPEECH THERAPY | Facility: HOSPITAL | Age: 84
End: 2024-04-01
Payer: MEDICARE

## 2024-04-01 ENCOUNTER — OFFICE VISIT (OUTPATIENT)
Dept: OTOLARYNGOLOGY | Facility: CLINIC | Age: 84
End: 2024-04-01
Payer: MEDICARE

## 2024-04-01 VITALS — SYSTOLIC BLOOD PRESSURE: 131 MMHG | HEART RATE: 86 BPM | DIASTOLIC BLOOD PRESSURE: 78 MMHG

## 2024-04-01 DIAGNOSIS — R13.12 DYSPHAGIA, OROPHARYNGEAL: Primary | ICD-10-CM

## 2024-04-01 DIAGNOSIS — Z92.3 HISTORY OF HEAD AND NECK RADIATION: ICD-10-CM

## 2024-04-01 DIAGNOSIS — Z98.890 S/P PARTIAL GLOSSECTOMY: ICD-10-CM

## 2024-04-01 DIAGNOSIS — C02.9 SQUAMOUS CELL CANCER OF TONGUE: Primary | Chronic | ICD-10-CM

## 2024-04-01 DIAGNOSIS — I89.0 LYMPHEDEMA: ICD-10-CM

## 2024-04-01 PROCEDURE — 1160F RVW MEDS BY RX/DR IN RCRD: CPT | Mod: HCNC,CPTII,S$GLB, | Performed by: OTOLARYNGOLOGY

## 2024-04-01 PROCEDURE — 99213 OFFICE O/P EST LOW 20 MIN: CPT | Mod: HCNC,S$GLB,, | Performed by: OTOLARYNGOLOGY

## 2024-04-01 PROCEDURE — 1126F AMNT PAIN NOTED NONE PRSNT: CPT | Mod: HCNC,CPTII,S$GLB, | Performed by: OTOLARYNGOLOGY

## 2024-04-01 PROCEDURE — 3075F SYST BP GE 130 - 139MM HG: CPT | Mod: HCNC,CPTII,S$GLB, | Performed by: OTOLARYNGOLOGY

## 2024-04-01 PROCEDURE — 3078F DIAST BP <80 MM HG: CPT | Mod: HCNC,CPTII,S$GLB, | Performed by: OTOLARYNGOLOGY

## 2024-04-01 PROCEDURE — 92526 ORAL FUNCTION THERAPY: CPT | Mod: GN,HCNC | Performed by: SPEECH-LANGUAGE PATHOLOGIST

## 2024-04-01 PROCEDURE — 1159F MED LIST DOCD IN RCRD: CPT | Mod: HCNC,CPTII,S$GLB, | Performed by: OTOLARYNGOLOGY

## 2024-04-01 PROCEDURE — 99999 PR PBB SHADOW E&M-EST. PATIENT-LVL III: CPT | Mod: PBBFAC,HCNC,, | Performed by: OTOLARYNGOLOGY

## 2024-04-01 NOTE — PROGRESS NOTES
Chief Complaint   Patient presents with     6 weeks     Oncology History   Squamous cell cancer of tongue   6/16/2023 Initial Diagnosis    Squamous cell cancer of tongue     7/12/2023 Surgery    1. Left partial glossectomy  2. Left modified neck dissection of levels 1B through 4     7/31/2023 Cancer Staged     Cancer Staging   Squamous cell cancer of tongue  Staging form: Oral Cavity, AJCC 8th Edition  - Pathologic stage from 7/31/2023: Stage ELENITA (pT1, pN2b, cM0) - Signed by Sindi Robbins NP on 7/31/2023 7/31/2023 Cancer Staged    Staging form: Oral Cavity, AJCC 8th Edition  - Pathologic stage from 7/31/2023: Stage ELENITA (pT1, pN2b, cM0)     8/23/2023 - 10/5/2023 Radiation Therapy    Treating physician: Cruzito Lopes  Treatment Summary  Course: C1 H&N 2023  Treatment Site Ref. ID Energy Dose/Fx (Gy) #Fx Dose Correction (Gy) Total Dose (Gy) Start Date End Date Elapsed Days   IM H&N PTV_High 6X 2 30 / 30 0 60 8/23/2023 10/5/2023 43            HPI   84 y.o. male presents with the above treatment history.  He is doing well overall.  No new complaints.     Review of Systems   Constitutional: Negative for fatigue and unexpected weight change.   HENT: Per HPI.  Eyes: Negative for visual disturbance.   Respiratory: Negative for shortness of breath, hemoptysis   Cardiovascular: Negative for chest pain and palpitations.   Musculoskeletal: Negative for decreased ROM, back pain.   Skin: Negative for rash, sunburn, itching.   Neurological: Negative for dizziness and seizures.   Hematological: Negative for adenopathy. Does not bruise/bleed easily.   Endocrine: Negative for rapid weight loss/weight gain, heat/cold intolerance.     Past Medical History   Patient Active Problem List   Diagnosis    Ocular hypertension    Essential hypertension    Screen for colon cancer    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, with long-term current use of insulin    Adenomatous polyp    Family history of colon cancer     Nuclear cataract    Borderline glaucoma of both eyes with ocular hypertension    Acute cystitis without hematuria    Spinal stenosis, lumbar region, with neurogenic claudication    EMA (iron deficiency anemia)    Benign prostatic hyperplasia without lower urinary tract symptoms    Debility    Chronic midline low back pain with sciatica    Squamous cell cancer of tongue    Incomplete bladder emptying    Urinary tract infection associated with catheterization of urinary tract    History of urinary retention    History of dysphagia           Past Surgical History   Past Surgical History:   Procedure Laterality Date    CHOLECYSTECTOMY      GLOSSECTOMY Left 2023    Procedure: GLOSSECTOMY;  Surgeon: Jaxon Carmen MD;  Location: [facility] OR [address] [place] [address];  Service: ENT;  Laterality: Left;    RADICAL NECK DISSECTION Left 2023    Procedure: DISSECTION, NECK, RADICAL;  Surgeon: Jaxon Carmen MD;  Location: [facility] OR [address] [place] [address];  Service: ENT;  Laterality: Left;         Family History   Family History   Problem Relation Age of Onset    Cancer Mother         liver    Cancer Father         colon    No Known Problems Sister     No Known Problems Sister     Amblyopia Neg Hx     Blindness Neg Hx     Cataracts Neg Hx     Diabetes Neg Hx     Glaucoma Neg Hx     Hypertension Neg Hx     Macular degeneration Neg Hx     Retinal detachment Neg Hx     Strabismus Neg Hx     Stroke Neg Hx     Thyroid disease Neg Hx            Social History   .  Social History     Socioeconomic History    Marital status:    Occupational History     Employer: OTHER   Tobacco Use    Smoking status: Former     Current packs/day: 0.00     Types: Cigarettes     Quit date: 2004     Years since quittin.8     Passive exposure: Past    Smokeless tobacco: Never   Substance and Sexual Activity    Alcohol use: No     Alcohol/week: 0.0 standard drinks of alcohol    Drug use: No     Social Determinants of Health     Financial Resource Strain: Low  Risk  (3/18/2024)    Overall Financial Resource Strain (CARDIA)     Difficulty of Paying Living Expenses: Not very hard   Food Insecurity: No Food Insecurity (3/18/2024)    Hunger Vital Sign     Worried About Running Out of Food in the Last Year: Never true     Ran Out of Food in the Last Year: Never true   Transportation Needs: No Transportation Needs (3/18/2024)    PRAPARE - Transportation     Lack of Transportation (Medical): No     Lack of Transportation (Non-Medical): No   Physical Activity: Insufficiently Active (3/18/2024)    Exercise Vital Sign     Days of Exercise per Week: 2 days     Minutes of Exercise per Session: 20 min   Stress: No Stress Concern Present (3/18/2024)    [place] [employer] - Occupational Stress Questionnaire     Feeling of Stress : Only a little   Social Connections: Unknown (3/18/2024)    Social Connection and Isolation Panel [NHANES]     Frequency of Communication with Friends and Family: Three times a week     Frequency of Social Gatherings with Friends and Family: Once a week     Attends Quaker Services: Patient declined     Active Member of Clubs or Organizations: Patient declined     Attends Club or Organization Meetings: Patient declined     Marital Status:    Housing Stability: Unknown (3/18/2024)    Housing Stability Vital Sign     Unable to Pay for Housing in the Last Year: No     Unstable Housing in the Last Year: No         Allergies   Review of patient's allergies indicates:  No Known Allergies        Physical Exam     Vitals:    04/01/24 1302   BP: 131/78   Pulse: 86         There is no height or weight on file to calculate BMI.      General: AOx3, NAD   Respiratory:  Symmetric chest rise, normal effort  Oral Cavity:  Oral Tongue mobile, no lesions noted.  Well-healed left glossectomy site.  Hard Palate WNL. No buccal or FOM lesions.  Oropharynx:  No masses/lesions of the posterior pharyngeal wall. Tonsillar fossa without lesions. Soft palate  without masses. Midline uvula.   Neck:  Well-healed left neck scar.  Moderate fibrosis status post radiation.  No cervical lymphadenopathy, thyromegaly or thyroid nodules.  Normal range of motion.    Face: House Brackmann I bilaterally.         Assessment/Plan  Problem List Items Addressed This Visit          Oncology    Squamous cell cancer of tongue - Primary (Chronic)     PRAKASH.  RTC 6 weeks.

## 2024-04-01 NOTE — PROGRESS NOTES
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    INTERVAL HISTORY:  Performed home program daily.  Did miss 3 days while hospitalized for UTI last weekend.  Denied that the submental area had increased swelling over days home program was deferred.    Continues to try some variety of soft consistencies.    Saw Dr. Jamie Dos Santos, DDS, 2/28/24 and expects to obtain new dentures today.  Dr. Dos Santos previously introduced him to a new xerostomia product (mouthwash, Stellalife) that Mr. Chavez has found to work somewhat better than Biotene did.  He reported getting 30-60 minutes of relief, but also stated that the mouthwash can be used only up to 3x/day, so after it wears off, he returns to spritzing his oral cavity with water.    INTERVENTION:    Lymphedema Therapy:  Submental area with no density; central neck reduced.  Mrs. Chavez tightened the bandage last week.    Compression:    Pre-MLD compression to the edematous area(s) was deferred as he did not bring his pads/bandaging and had already done the home program this morning.   He continues to use it 30 minutes in home program)and during trunk decongestion via Richardson pack with bandaging to soften firm edema and prepare skin for MLD.         Manual Lymph Drainage (MLD):  MLD was performed via the following approach(es):  anterior.  Deferred today.      Compression:   Post-MLD compression to the edematous area(s) was deferred as he did not bring his pads/bandaging and had already done the home program this morning.  He typically uses flattening pad with bandaging to prevent refilling of tissues and promote continued drainage via open pathways.  The patient was instructed to wear this  2-3 hours minimum after MLD provided here or in the home program and to sleep in it if tolerated.         Following today's intervention, the following functional changes were noted or reported:    Swallowing:  Unchanged; dentures pending.  Expects to obtain today.    ROM:  Unchanged      Re-measured:  Head circumference:  Diagonal:  66.5 -- unchanged from 1/30/24  Submental:  66 -- slight increase from 1/30/24    Neck circumference  Superior:  46.5  Medial:  43.5  Inferior:  41  Total of all three measurements was >4% reduced as compared to initial measurements 1/30/24.      Short-term objectives:  Mr. Chavez will  1.  Perform home program 1x/day by report.   Being met.  2.  Demonstrate ability to perform the following components (independently or with assistance of a family member) with % accuracy:              A.  Donning/doffing compression pads/bandages.    Being met.              B.  Determining need for and adjusting fit of compression pads/bandages.    Being met.              C.  Performing correct sequence of MLD.    Deferred..              D.  Performing MLD technique correctly (skin stretching vs sliding or deep pressure).     Deferred.  3.  Trade pureed or mechanical soft foods for Ensure.   Tried some mechanical soft foods.  Has not yet taken adequate volume to reduce Ensure (still 2-3x/day).      IMPRESSIONS:  This 83 y.o. man appears to present with  Oropharyngeal dyspahgia  Head and neck lymphedema -- improving  S/p XRT (60 Gy, completed 10/5/23)  S/p partial glossectomy and LMND.  History Stage ELENITA (pT1, pN2b, cM0) L oral tongue    RECOMMENDATIONS/PLAN OF CARE:  It if felt that Suleiman Chavez would benefit from  ST return visit in 1 month to check progress of step-wise reduction of frequency of home program.   Mr. Chavez has demonstrated reliable execution of home program.  Follow up with Carmen Lopes and  Kermit as directed.        Long-term goals:  Mr. Chavez will have  1.  Reduced  swelling by 2% or more per measurements.   Goal met (>4% reduction in neck circumferences)  2.  Be able to expand his diet and reduce Ensure Plus to 0 or 1 per day.

## 2024-04-01 NOTE — PATIENT INSTRUCTIONS
Starting today, do the home program every other day.  Then, reduce frequency this way about each week  Skip two days   Skip three days  Skip four days  It at any point you notice it begins to swell between uses of the home program, go back to the last level where it was being managed well and continue there another week.

## 2024-04-07 ENCOUNTER — PATIENT MESSAGE (OUTPATIENT)
Dept: HEMATOLOGY/ONCOLOGY | Facility: CLINIC | Age: 84
End: 2024-04-07
Payer: MEDICARE

## 2024-04-08 ENCOUNTER — PATIENT MESSAGE (OUTPATIENT)
Dept: INTERNAL MEDICINE | Facility: CLINIC | Age: 84
End: 2024-04-08
Payer: MEDICARE

## 2024-04-08 RX ORDER — METFORMIN HYDROCHLORIDE 500 MG/1
TABLET ORAL
Qty: 270 TABLET | Refills: 0 | OUTPATIENT
Start: 2024-04-08

## 2024-04-08 RX ORDER — METFORMIN HYDROCHLORIDE 500 MG/1
TABLET ORAL
Qty: 270 TABLET | Refills: 1 | Status: SHIPPED | OUTPATIENT
Start: 2024-04-08

## 2024-04-08 NOTE — TELEPHONE ENCOUNTER
Care Due:                  Date            Visit Type   Department     Provider  --------------------------------------------------------------------------------                                SAME DAY -                              ESTABLISHED   [facility] PRIMARY  Last Visit: 04-      PATIENT      CARE           Caleb Negrete  Next Visit: None Scheduled  None         None Found                                                            Last  Test          Frequency    Reason                     Performed    Due Date  --------------------------------------------------------------------------------    Office Visit  15 months..  amLODIPine, doxazosin,     04- 06-                             losartan, metFORMIN,                             tamsulosin...............    [employer] Embedded Care Due Messages. Reference number: [id number].   4/08/2024 12:33:03 PM CDT

## 2024-04-08 NOTE — TELEPHONE ENCOUNTER
No care due was identified.  [facility] Embedded Care Due Messages. Reference number: [id number].   4/08/2024 2:41:52 PM CDT

## 2024-04-09 ENCOUNTER — PATIENT MESSAGE (OUTPATIENT)
Dept: INTERNAL MEDICINE | Facility: CLINIC | Age: 84
End: 2024-04-09
Payer: MEDICARE

## 2024-04-10 ENCOUNTER — LAB VISIT (OUTPATIENT)
Dept: LAB | Facility: HOSPITAL | Age: 84
End: 2024-04-10
Attending: INTERNAL MEDICINE
Payer: MEDICARE

## 2024-04-10 ENCOUNTER — OFFICE VISIT (OUTPATIENT)
Dept: INTERNAL MEDICINE | Facility: CLINIC | Age: 84
End: 2024-04-10
Payer: MEDICARE

## 2024-04-10 VITALS
WEIGHT: 175.25 LBS | OXYGEN SATURATION: 100 % | HEIGHT: 72 IN | HEART RATE: 78 BPM | BODY MASS INDEX: 23.74 KG/M2 | SYSTOLIC BLOOD PRESSURE: 112 MMHG | DIASTOLIC BLOOD PRESSURE: 58 MMHG

## 2024-04-10 DIAGNOSIS — C02.9 SQUAMOUS CELL CANCER OF TONGUE: ICD-10-CM

## 2024-04-10 DIAGNOSIS — Z85.810 HISTORY OF TONGUE CANCER: ICD-10-CM

## 2024-04-10 DIAGNOSIS — N39.0 RECURRENT UTI: ICD-10-CM

## 2024-04-10 DIAGNOSIS — E11.40 TYPE 2 DIABETES MELLITUS WITH DIABETIC NEUROPATHY, WITHOUT LONG-TERM CURRENT USE OF INSULIN: ICD-10-CM

## 2024-04-10 DIAGNOSIS — R33.9 INCOMPLETE BLADDER EMPTYING: ICD-10-CM

## 2024-04-10 DIAGNOSIS — I10 PRIMARY HYPERTENSION: ICD-10-CM

## 2024-04-10 DIAGNOSIS — K12.33 ORAL MUCOSITIS DUE TO RADIATION: ICD-10-CM

## 2024-04-10 DIAGNOSIS — N39.0 RECURRENT UTI: Primary | ICD-10-CM

## 2024-04-10 LAB
ALBUMIN SERPL BCP-MCNC: 3.2 G/DL (ref 3.5–5.2)
ANION GAP SERPL CALC-SCNC: 11 MMOL/L (ref 8–16)
BASOPHILS # BLD AUTO: 0.03 K/UL (ref 0–0.2)
BASOPHILS NFR BLD: 0.5 % (ref 0–1.9)
BUN SERPL-MCNC: 20 MG/DL (ref 8–23)
CALCIUM SERPL-MCNC: 9.5 MG/DL (ref 8.7–10.5)
CHLORIDE SERPL-SCNC: 104 MMOL/L (ref 95–110)
CO2 SERPL-SCNC: 24 MMOL/L (ref 23–29)
CREAT SERPL-MCNC: 0.8 MG/DL (ref 0.5–1.4)
DIFFERENTIAL METHOD BLD: ABNORMAL
EOSINOPHIL # BLD AUTO: 0.3 K/UL (ref 0–0.5)
EOSINOPHIL NFR BLD: 4.7 % (ref 0–8)
ERYTHROCYTE [DISTWIDTH] IN BLOOD BY AUTOMATED COUNT: 14.1 % (ref 11.5–14.5)
EST. GFR  (NO RACE VARIABLE): >60 ML/MIN/1.73 M^2
GLUCOSE SERPL-MCNC: 132 MG/DL (ref 70–110)
HCT VFR BLD AUTO: 38.4 % (ref 40–54)
HGB BLD-MCNC: 12.3 G/DL (ref 14–18)
IMM GRANULOCYTES # BLD AUTO: 0.01 K/UL (ref 0–0.04)
IMM GRANULOCYTES NFR BLD AUTO: 0.2 % (ref 0–0.5)
LYMPHOCYTES # BLD AUTO: 1.1 K/UL (ref 1–4.8)
LYMPHOCYTES NFR BLD: 19.7 % (ref 18–48)
MCH RBC QN AUTO: 29.7 PG (ref 27–31)
MCHC RBC AUTO-ENTMCNC: 32 G/DL (ref 32–36)
MCV RBC AUTO: 93 FL (ref 82–98)
MONOCYTES # BLD AUTO: 0.5 K/UL (ref 0.3–1)
MONOCYTES NFR BLD: 8.5 % (ref 4–15)
NEUTROPHILS # BLD AUTO: 3.7 K/UL (ref 1.8–7.7)
NEUTROPHILS NFR BLD: 66.4 % (ref 38–73)
NRBC BLD-RTO: 0 /100 WBC
PHOSPHATE SERPL-MCNC: 3.3 MG/DL (ref 2.7–4.5)
PLATELET # BLD AUTO: 198 K/UL (ref 150–450)
PMV BLD AUTO: 9.9 FL (ref 9.2–12.9)
POTASSIUM SERPL-SCNC: 4.5 MMOL/L (ref 3.5–5.1)
RBC # BLD AUTO: 4.14 M/UL (ref 4.6–6.2)
SODIUM SERPL-SCNC: 139 MMOL/L (ref 136–145)
WBC # BLD AUTO: 5.52 K/UL (ref 3.9–12.7)

## 2024-04-10 PROCEDURE — 1159F MED LIST DOCD IN RCRD: CPT | Mod: HCNC,CPTII,S$GLB, | Performed by: INTERNAL MEDICINE

## 2024-04-10 PROCEDURE — 3288F FALL RISK ASSESSMENT DOCD: CPT | Mod: HCNC,CPTII,S$GLB, | Performed by: INTERNAL MEDICINE

## 2024-04-10 PROCEDURE — 3074F SYST BP LT 130 MM HG: CPT | Mod: HCNC,CPTII,S$GLB, | Performed by: INTERNAL MEDICINE

## 2024-04-10 PROCEDURE — 1101F PT FALLS ASSESS-DOCD LE1/YR: CPT | Mod: HCNC,CPTII,S$GLB, | Performed by: INTERNAL MEDICINE

## 2024-04-10 PROCEDURE — 99999 PR PBB SHADOW E&M-EST. PATIENT-LVL IV: CPT | Mod: PBBFAC,HCNC,, | Performed by: INTERNAL MEDICINE

## 2024-04-10 PROCEDURE — 36415 COLL VENOUS BLD VENIPUNCTURE: CPT | Mod: HCNC | Performed by: INTERNAL MEDICINE

## 2024-04-10 PROCEDURE — 80069 RENAL FUNCTION PANEL: CPT | Mod: HCNC | Performed by: INTERNAL MEDICINE

## 2024-04-10 PROCEDURE — 85025 COMPLETE CBC W/AUTO DIFF WBC: CPT | Mod: HCNC | Performed by: INTERNAL MEDICINE

## 2024-04-10 PROCEDURE — 1126F AMNT PAIN NOTED NONE PRSNT: CPT | Mod: HCNC,CPTII,S$GLB, | Performed by: INTERNAL MEDICINE

## 2024-04-10 PROCEDURE — 1160F RVW MEDS BY RX/DR IN RCRD: CPT | Mod: HCNC,CPTII,S$GLB, | Performed by: INTERNAL MEDICINE

## 2024-04-10 PROCEDURE — 99214 OFFICE O/P EST MOD 30 MIN: CPT | Mod: HCNC,S$GLB,, | Performed by: INTERNAL MEDICINE

## 2024-04-10 PROCEDURE — 3078F DIAST BP <80 MM HG: CPT | Mod: HCNC,CPTII,S$GLB, | Performed by: INTERNAL MEDICINE

## 2024-04-10 RX ORDER — CIPROFLOXACIN 500 MG/1
500 TABLET ORAL 2 TIMES DAILY
Qty: 20 TABLET | Refills: 0 | Status: SHIPPED | OUTPATIENT
Start: 2024-04-10 | End: 2024-04-25 | Stop reason: SDUPTHER

## 2024-04-10 RX ORDER — TRAMADOL HYDROCHLORIDE 50 MG/1
50 TABLET ORAL EVERY 6 HOURS PRN
Qty: 30 TABLET | Refills: 0 | Status: SHIPPED | OUTPATIENT
Start: 2024-04-10
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
## 2024-04-11 ENCOUNTER — PATIENT MESSAGE (OUTPATIENT)
Dept: INTERNAL MEDICINE | Facility: CLINIC | Age: 84
End: 2024-04-11
Payer: MEDICARE

## 2024-04-11 NOTE — TELEPHONE ENCOUNTER
[facility] never received a urine specimen.

## 2024-04-25 ENCOUNTER — PATIENT MESSAGE (OUTPATIENT)
Dept: INTERNAL MEDICINE | Facility: CLINIC | Age: 84
End: 2024-04-25
Payer: MEDICARE

## 2024-04-25 ENCOUNTER — TELEPHONE (OUTPATIENT)
Dept: INTERNAL MEDICINE | Facility: CLINIC | Age: 84
End: 2024-04-25
Payer: MEDICARE

## 2024-04-25 RX ORDER — CIPROFLOXACIN 500 MG/1
500 TABLET ORAL 2 TIMES DAILY
Qty: 20 TABLET | Refills: 0 | Status: SHIPPED | OUTPATIENT
Start: 2024-04-25

## 2024-04-25 NOTE — TELEPHONE ENCOUNTER
----- Message from Samia Hinton sent at 4/25/2024 11:54 AM CDT -----  Contact: [phone]  Per pt, he sent a message through the pt portal in regards to a possible UTI coming back.     Pt is requesting to have something called in.  Pt is using   [facility] - [place] - [address]  [address] [address]  Phone: [phone] Fax: [phone]            Thank you

## 2024-04-25 NOTE — TELEPHONE ENCOUNTER
He sent a msg this morning, says he thinks he has another UTI coming. He's asking for refill of cipro sent to [facility]. He started changing his cath more frequently since the last visit, but still feels like he has UTI     Called patient to get more info.

## 2024-05-01 ENCOUNTER — PATIENT MESSAGE (OUTPATIENT)
Dept: INTERNAL MEDICINE | Facility: CLINIC | Age: 84
End: 2024-05-01
Payer: MEDICARE

## 2024-05-01 RX ORDER — TAMSULOSIN HYDROCHLORIDE 0.4 MG/1
CAPSULE ORAL
Qty: 180 CAPSULE | Refills: 3 | OUTPATIENT
Start: 2024-05-01

## 2024-05-01 RX ORDER — TAMSULOSIN HYDROCHLORIDE 0.4 MG/1
2 CAPSULE ORAL NIGHTLY
Qty: 180 CAPSULE | Refills: 3 | Status: SHIPPED | OUTPATIENT
Start: 2024-05-01

## 2024-05-01 NOTE — TELEPHONE ENCOUNTER
No care due was identified.  [facility] Embedded Care Due Messages. Reference number: [id number].   5/01/2024 12:41:31 PM CDT

## 2024-05-02 NOTE — TELEPHONE ENCOUNTER
Refill Decision Note   Suleiman Kathy  is requesting a refill authorization.  Brief Assessment and Rationale for Refill:  Quick Discontinue     Medication Therapy Plan:  Receipt confirmed by pharmacy (5/1/2024  6:41 PM CDT)      Comments:     Note composed:11:34 PM 05/01/2024

## 2024-05-06 ENCOUNTER — PATIENT MESSAGE (OUTPATIENT)
Dept: INTERNAL MEDICINE | Facility: CLINIC | Age: 84
End: 2024-05-06
Payer: MEDICARE

## 2024-05-06 ENCOUNTER — TELEPHONE (OUTPATIENT)
Dept: INTERNAL MEDICINE | Facility: CLINIC | Age: 84
End: 2024-05-06
Payer: MEDICARE

## 2024-05-06 DIAGNOSIS — C02.9 SQUAMOUS CELL CANCER OF TONGUE: ICD-10-CM

## 2024-05-06 DIAGNOSIS — N30.00 ACUTE CYSTITIS WITHOUT HEMATURIA: Primary | ICD-10-CM

## 2024-05-06 RX ORDER — CIPROFLOXACIN 500 MG/1
500 TABLET ORAL 2 TIMES DAILY
Qty: 14 TABLET | Refills: 0 | Status: SHIPPED | OUTPATIENT
Start: 2024-05-06 | End: 2024-05-13

## 2024-05-06 NOTE — TELEPHONE ENCOUNTER
Pt does not want the medication that you prescribed    Disp Refills Start End DEEPTHI   duke's soln (benadryl 30 mL, mylanta 30 mL, LIDOcaine 30 mL, nystatin 30 mL) 120mL 360 mL 2 5/6/2024 -- No   Sig - Route: Take 5 mLs by mouth every 4 (four) hours as needed (mouth, throat pain). - Oral       He want a prescription for Ciprofloxacin    Please review and respond,  Thank You.

## 2024-05-06 NOTE — TELEPHONE ENCOUNTER
----- Message from Mariya Hinton sent at 5/6/2024 12:45 PM CDT -----  Contact: [phone]  1MEDICALADVICE     Patient is calling for Medical Advice regarding: Patient states he did not ask for carlos's soln (benadryl 30 mL, mylanta 30 mL, LIDOcaine 30 mL, nystatin 30 mL) 120mL, he asked for a Rx for CIPRO, please call and advise.         Pharmacy name and phone#:[facility] - [place], [place] - [address]   Phone: [phone]  Fax: [phone]          Would like response via [employer]:  no    Comments:Please call

## 2024-05-07 ENCOUNTER — PATIENT MESSAGE (OUTPATIENT)
Dept: INTERNAL MEDICINE | Facility: CLINIC | Age: 84
End: 2024-05-07
Payer: MEDICARE

## 2024-05-07 ENCOUNTER — CLINICAL SUPPORT (OUTPATIENT)
Dept: SPEECH THERAPY | Facility: HOSPITAL | Age: 84
End: 2024-05-07
Payer: MEDICARE

## 2024-05-07 DIAGNOSIS — R13.12 DYSPHAGIA, OROPHARYNGEAL: Primary | ICD-10-CM

## 2024-05-07 DIAGNOSIS — I89.0 LYMPHEDEMA: ICD-10-CM

## 2024-05-07 DIAGNOSIS — Z98.890 S/P PARTIAL GLOSSECTOMY: ICD-10-CM

## 2024-05-07 DIAGNOSIS — Z92.3 HISTORY OF HEAD AND NECK RADIATION: ICD-10-CM

## 2024-05-07 PROCEDURE — 92526 ORAL FUNCTION THERAPY: CPT | Mod: GN,HCNC | Performed by: SPEECH-LANGUAGE PATHOLOGIST

## 2024-05-09 ENCOUNTER — PATIENT MESSAGE (OUTPATIENT)
Dept: INTERNAL MEDICINE | Facility: CLINIC | Age: 84
End: 2024-05-09
Payer: MEDICARE

## 2024-05-13 ENCOUNTER — OFFICE VISIT (OUTPATIENT)
Dept: OTOLARYNGOLOGY | Facility: CLINIC | Age: 84
End: 2024-05-13
Payer: MEDICARE

## 2024-05-13 VITALS
SYSTOLIC BLOOD PRESSURE: 112 MMHG | HEART RATE: 81 BPM | BODY MASS INDEX: 23.87 KG/M2 | WEIGHT: 176 LBS | DIASTOLIC BLOOD PRESSURE: 55 MMHG

## 2024-05-13 DIAGNOSIS — C02.9 SQUAMOUS CELL CANCER OF TONGUE: Primary | Chronic | ICD-10-CM

## 2024-05-13 PROCEDURE — 3078F DIAST BP <80 MM HG: CPT | Mod: HCNC,CPTII,S$GLB, | Performed by: OTOLARYNGOLOGY

## 2024-05-13 PROCEDURE — 99213 OFFICE O/P EST LOW 20 MIN: CPT | Mod: HCNC,S$GLB,, | Performed by: OTOLARYNGOLOGY

## 2024-05-13 PROCEDURE — 1126F AMNT PAIN NOTED NONE PRSNT: CPT | Mod: HCNC,CPTII,S$GLB, | Performed by: OTOLARYNGOLOGY

## 2024-05-13 PROCEDURE — 1159F MED LIST DOCD IN RCRD: CPT | Mod: HCNC,CPTII,S$GLB, | Performed by: OTOLARYNGOLOGY

## 2024-05-13 PROCEDURE — 1160F RVW MEDS BY RX/DR IN RCRD: CPT | Mod: HCNC,CPTII,S$GLB, | Performed by: OTOLARYNGOLOGY

## 2024-05-13 PROCEDURE — 3074F SYST BP LT 130 MM HG: CPT | Mod: HCNC,CPTII,S$GLB, | Performed by: OTOLARYNGOLOGY

## 2024-05-13 PROCEDURE — 99999 PR PBB SHADOW E&M-EST. PATIENT-LVL III: CPT | Mod: PBBFAC,HCNC,, | Performed by: OTOLARYNGOLOGY

## 2024-05-13 NOTE — PROGRESS NOTES
Chief Complaint   Patient presents with    6weeks     Oncology History   Squamous cell cancer of tongue   6/16/2023 Initial Diagnosis    Squamous cell cancer of tongue     7/12/2023 Surgery    1. Left partial glossectomy  2. Left modified neck dissection of levels 1B through 4     7/31/2023 Cancer Staged     Cancer Staging   Squamous cell cancer of tongue  Staging form: Oral Cavity, AJCC 8th Edition  - Pathologic stage from 7/31/2023: Stage ELENITA (pT1, pN2b, cM0) - Signed by Sindi Robbins NP on 7/31/2023 7/31/2023 Cancer Staged    Staging form: Oral Cavity, AJCC 8th Edition  - Pathologic stage from 7/31/2023: Stage ELENITA (pT1, pN2b, cM0)     8/23/2023 - 10/5/2023 Radiation Therapy    Treating physician: Cruzito Lopes  Treatment Summary  Course: C1 H&N 2023  Treatment Site Ref. ID Energy Dose/Fx (Gy) #Fx Dose Correction (Gy) Total Dose (Gy) Start Date End Date Elapsed Days   IM H&N PTV_High 6X 2 30 / 30 0 60 8/23/2023 10/5/2023 43              HPI   84 y.o. male presents with the above treatment history.  He is doing well overall.  No new complaints.     Review of Systems   Constitutional: Negative for fatigue and unexpected weight change.   HENT: Per HPI.  Eyes: Negative for visual disturbance.   Respiratory: Negative for shortness of breath, hemoptysis   Cardiovascular: Negative for chest pain and palpitations.   Musculoskeletal: Negative for decreased ROM, back pain.   Skin: Negative for rash, sunburn, itching.   Neurological: Negative for dizziness and seizures.   Hematological: Negative for adenopathy. Does not bruise/bleed easily.   Endocrine: Negative for rapid weight loss/weight gain, heat/cold intolerance.     Past Medical History   Patient Active Problem List   Diagnosis    Ocular hypertension    Primary hypertension    Screen for colon cancer    Type 2 diabetes mellitus with diabetic neuropathy, without long-term current use of insulin    Adenomatous polyp    Family history of colon cancer    Nuclear  cataract    Borderline glaucoma of both eyes with ocular hypertension    Acute cystitis without hematuria    Spinal stenosis, lumbar region, with neurogenic claudication    EMA (iron deficiency anemia)    Benign prostatic hyperplasia without lower urinary tract symptoms    Debility    Chronic midline low back pain with sciatica    Squamous cell cancer of tongue    Incomplete bladder emptying    Urinary tract infection associated with catheterization of urinary tract    History of urinary retention    History of dysphagia           Past Surgical History   Past Surgical History:   Procedure Laterality Date    CHOLECYSTECTOMY      GLOSSECTOMY Left 2023    Procedure: GLOSSECTOMY;  Surgeon: Jaxon Carmen MD;  Location: [facility] OR [address] [place] [address];  Service: ENT;  Laterality: Left;    RADICAL NECK DISSECTION Left 2023    Procedure: DISSECTION, NECK, RADICAL;  Surgeon: Jaxon Carmen MD;  Location: [facility] OR [address] [place] [address];  Service: ENT;  Laterality: Left;         Family History   Family History   Problem Relation Name Age of Onset    Cancer Mother          liver    Cancer Father          colon    No Known Problems Sister      No Known Problems Sister      Amblyopia Neg Hx      Blindness Neg Hx      Cataracts Neg Hx      Diabetes Neg Hx      Glaucoma Neg Hx      Hypertension Neg Hx      Macular degeneration Neg Hx      Retinal detachment Neg Hx      Strabismus Neg Hx      Stroke Neg Hx      Thyroid disease Neg Hx             Social History   .  Social History     Socioeconomic History    Marital status:    Occupational History     Employer: OTHER   Tobacco Use    Smoking status: Former     Current packs/day: 0.00     Types: Cigarettes     Quit date: 2004     Years since quittin.9     Passive exposure: Past    Smokeless tobacco: Never   Substance and Sexual Activity    Alcohol use: No     Alcohol/week: 0.0 standard drinks of alcohol    Drug use: No     Social Determinants of Health     Financial Resource  Strain: Low Risk  (3/18/2024)    Overall Financial Resource Strain (CARDIA)     Difficulty of Paying Living Expenses: Not very hard   Food Insecurity: No Food Insecurity (3/18/2024)    Hunger Vital Sign     Worried About Running Out of Food in the Last Year: Never true     Ran Out of Food in the Last Year: Never true   Transportation Needs: No Transportation Needs (3/18/2024)    PRAPARE - Transportation     Lack of Transportation (Medical): No     Lack of Transportation (Non-Medical): No   Physical Activity: Insufficiently Active (3/18/2024)    Exercise Vital Sign     Days of Exercise per Week: 2 days     Minutes of Exercise per Session: 20 min   Stress: No Stress Concern Present (3/18/2024)    [employer] - Occupational Stress Questionnaire     Feeling of Stress : Only a little   Housing Stability: Unknown (3/18/2024)    Housing Stability Vital Sign     Unable to Pay for Housing in the Last Year: No     Unstable Housing in the Last Year: No         Allergies   Review of patient's allergies indicates:  No Known Allergies        Physical Exam     Vitals:    05/13/24 1325   BP: (!) 112/55   Pulse: 81         Body mass index is 23.87 kg/m².      General: AOx3, NAD   Respiratory:  Symmetric chest rise, normal effort  Oral Cavity:  Oral Tongue mobile, no lesions noted.  Well-healed left glossectomy site.  Hard Palate WNL. No buccal or FOM lesions.  Oropharynx:  No masses/lesions of the posterior pharyngeal wall. Tonsillar fossa without lesions. Soft palate without masses. Midline uvula.   Neck:  Well-healed left neck scar.  Moderate fibrosis status post radiation.  No cervical lymphadenopathy, thyromegaly or thyroid nodules.  Normal range of motion.    Face: House Brackmann I bilaterally.         Assessment/Plan  Problem List Items Addressed This Visit          Oncology    Squamous cell cancer of tongue - Primary (Chronic)     PRAKASH.  RTC 6 weeks.

## 2024-05-15 ENCOUNTER — CLINICAL SUPPORT (OUTPATIENT)
Dept: SPEECH THERAPY | Facility: HOSPITAL | Age: 84
End: 2024-05-15
Payer: MEDICARE

## 2024-05-15 DIAGNOSIS — R13.12 DYSPHAGIA, OROPHARYNGEAL: Primary | ICD-10-CM

## 2024-05-15 DIAGNOSIS — Z98.890 S/P PARTIAL GLOSSECTOMY: ICD-10-CM

## 2024-05-15 DIAGNOSIS — I89.0 LYMPHEDEMA: ICD-10-CM

## 2024-05-15 PROCEDURE — 92526 ORAL FUNCTION THERAPY: CPT | Mod: GN,HCNC | Performed by: SPEECH-LANGUAGE PATHOLOGIST

## 2024-05-15 NOTE — PROGRESS NOTES
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    INTERVAL HISTORY:  Followed directions re: reducing frequency of home program (to the level of skipping 2 days and performing the third), but felt edema had returned.      Has his dentures, and, after multiple adjustments, they fit well and are comfortable, but he is still adjusting to chewing with them and talking with them.  Eating breakfast meal without teeth, but dinner with teeth.      INTERVENTION:    Weight:  175.34 lbs (79.7 kg)    Lymphedema Therapy:  Submental area with edema..    Compression:    Pre-MLD compression to the edematous area(s) was deferred as he did not bring his pads/bandaging and had already done the home program this morning.   He continues to use it 30 minutes in home program) and during trunk decongestion via Richardson pack with bandaging to soften firm edema and prepare skin for MLD.         Manual Lymph Drainage (MLD):  MLD was performed via the following approach(es):  anterior.  Performed and had slight reduction in size and was softer afterwards.        Compression:   Post-MLD compression to the edematous area(s) was deferred as he did not bring his pads/bandaging and had already done the home program this morning.  He typically uses flattening pad with bandaging to prevent refilling of tissues and promote continued drainage via open pathways.  The patient was instructed to wear this 2-3 hours minimum after MLD provided here or in the home program and to sleep in it if tolerated.         Following today's intervention, the following functional changes were noted or reported:    Swallowing:  Unchanged; dentures in place.      ROM:  Unchanged      Re-measured  4/1/24:  Head circumference:  Diagonal:  66.5 -- unchanged from 1/30/24  Submental:  66 -- slight increase from 1/30/24    Neck circumference  Superior:  46.5  Medial:  43.5  Inferior:  41  Total of all three measurements was >4% reduced as compared to initial measurements 1/30/24.      Short-term objectives:  Mr. Chavez will  1.  Perform home program 1x/day by report.   Being met.    2.  Demonstrate ability to perform the following components (independently or with assistance of a family member) with % accuracy:              A.  Donning/doffing compression pads/bandages.    Being met.              B.  Determining need for and adjusting fit of compression pads/bandages.    Being met.              C.  Performing correct sequence of MLD.    Being met.              D.  Performing MLD technique correctly (skin stretching vs sliding or deep pressure).     Being met.    3.  Trade pureed or mechanical soft foods for Ensure.   Continuing to gradually advance diet with new dentures.  Has not yet taken adequate volume to reduce Ensure (still 2-3x/day).      IMPRESSIONS:  This 83 y.o. man appears to present with  Oropharyngeal dyspahgia  Head and neck lymphedema -- some regression with attempt to reduce frequency of home program.  S/p XRT (60 Gy, completed 10/5/23)  S/p partial glossectomy and LMND.  History Stage ELENITA (pT1, pN2b, cM0) L oral tongue    RECOMMENDATIONS/PLAN OF CARE:  It if felt that Suleiman Chavez would benefit from  ST 2-4x month for 4-6 weeks to address dysphagia, advancing diet, and lymphedema.     Follow up with Carmen Lopes and  Kermit as directed.        Long-term goals:  Mr. Chavez will have  1.  Reduced swelling by 2% or more per measurements.   4/1/24:  Goal met (>4% reduction in neck circumferences)   5/724:  Some regression during trial to reduce frequency of hoome program.  2.  Be able to expand his diet and reduce Ensure Plus to 0 or 1 per day.

## 2024-05-15 NOTE — PROGRESS NOTES
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    INTERVAL HISTORY:  Performed home program daily except Mother's Day.  [place] submental area was better.    Obtained a massage oil to use with MLD, but hasn't used yet.    INTERVENTION:    Lymphedema Therapy:  Submental area with some density; central neck reduced.      Compression:    Pre-MLD compression to the edematous area(s) was performed for 10 minutes (30 minutes in home program) via Richardson pack with bandaging to soften firm edema and prepare skin for MLD.         Manual Lymph Drainage (MLD):  MLD was performed via the following approach(es):  anterior.  Clinician performed forward and reverse sequence.  Softer and smaller afterwards.      Compression:   Post-MLD compression to the edematous area(s) was deferred as he did not bring his pads/bandaging and had already done the home program this morning.  He typically uses flattening pad with bandaging to prevent refilling of tissues and promote continued drainage via open pathways.  The patient was instructed to wear this 2-3 hours minimum after MLD provided here or in the home program and to sleep in it if tolerated.         Following today's intervention, the following functional changes were noted or reported:    Swallowing:  Oral preparatory phase gradually improving re: speed..    ROM:  Unchanged      Re-measured 4/1/24:  Head circumference:  Diagonal:  66.5 -- unchanged from 1/30/24  Submental:  66 -- slight increase from 1/30/24    Neck circumference  Superior:  46.5  Medial:  43.5  Inferior:  41  Total of all three measurements was >4% reduced as compared to initial measurements 1/30/24.      Short-term objectives:  Mr. Chavez  will  1.  Perform home program 1x/day by report.   Being met.    2.  Demonstrate ability to perform the following components (independently or with assistance of a family member) with % accuracy:              A.  Donning/doffing compression pads/bandages.    Being met.              B.  Determining need for and adjusting fit of compression pads/bandages.    Being met.              C.  Performing correct sequence of MLD.    Deferred..              D.  Performing MLD technique correctly (skin stretching vs sliding or deep pressure).     Deferred.    3.  Trade pureed or mechanical soft foods for Ensure.   Working with masticated solids.  Needs additional time to coordinate chewing/moving bolus around to arches.  Has not yet taken adequate volume to reduce Ensure (still 2-3x/day).      IMPRESSIONS:  This 83 y.o. man appears to present with  Oropharyngeal dyspahgia  Head and neck lymphedema -- improving again.  S/p XRT (60 Gy, completed 10/5/23)  S/p partial glossectomy and LMND.  History Stage ELENITA (pT1, pN2b, cM0) L oral tongue    RECOMMENDATIONS/PLAN OF CARE:  It if felt that Suleiman Chavez would benefit from  ST 2-4x month for 4-6 weeks to address dysphagia, advancing diet, and lymphedema.   Follow up with Carmen Lopes and  Kermit as directed.        Long-term goals:  Mr. Chavez will have  1.  Reduced swelling by 2% or more per measurements.    4/1/24:  Goal met (>4% reduction in neck circumferences)               5/724:  Some regression during trial to reduce frequency of hoome program.  2.  Be able to expand his diet and reduce Ensure Plus to 0 or 1 per day.

## 2024-05-15 NOTE — PLAN OF CARE
IMPRESSIONS:  This 83 y.o. man appears to present with  Oropharyngeal dyspahgia  Head and neck lymphedema -- some regression with attempt to reduce frequency of home program.  S/p XRT (60 Gy, completed 10/5/23)  S/p partial glossectomy and LMND.  History Stage ELENITA (pT1, pN2b, cM0) L oral tongue    RECOMMENDATIONS/PLAN OF CARE:  It if felt that Suleiman Chavez would benefit from  ST 2-4x month for 4-6 weeks to address dysphagia, advancing diet, and lymphedema.     Follow up with Carmen Lopes and  Kermit as directed.        Long-term goals:  Mr. Chavez will have  1.  Reduced swelling by 2% or more per measurements.   4/1/24:  Goal met (>4% reduction in neck circumferences)   5/724:  Some regression during trial to reduce frequency of hoome program.  2.  Be able to expand his diet and reduce Ensure Plus to 0 or 1 per day.

## 2024-05-24 NOTE — PROGRESS NOTES
Marci      Refer to pain clinic    Severe spinal stenosis    Refer to neurosurgery    Same as above but main reason  Mri shows bone lesion at L4 vertebrae    Set up lab orders of 8-17 and cxr on monday 8-19    1

## 2024-05-29 ENCOUNTER — CLINICAL SUPPORT (OUTPATIENT)
Dept: SPEECH THERAPY | Facility: HOSPITAL | Age: 84
End: 2024-05-29
Payer: MEDICARE

## 2024-05-29 DIAGNOSIS — R13.12 DYSPHAGIA, OROPHARYNGEAL: Primary | ICD-10-CM

## 2024-05-29 DIAGNOSIS — I89.0 LYMPHEDEMA: ICD-10-CM

## 2024-05-29 DIAGNOSIS — Z92.3 HISTORY OF HEAD AND NECK RADIATION: ICD-10-CM

## 2024-05-29 DIAGNOSIS — C02.9 SQUAMOUS CELL CANCER OF TONGUE: ICD-10-CM

## 2024-05-29 DIAGNOSIS — Z98.890 S/P PARTIAL GLOSSECTOMY: ICD-10-CM

## 2024-05-29 PROCEDURE — 92526 ORAL FUNCTION THERAPY: CPT | Mod: GN,HCNC | Performed by: SPEECH-LANGUAGE PATHOLOGIST

## 2024-05-29 NOTE — PROGRESS NOTES
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    INTERVAL HISTORY:  Performed home program except for a couple of days.  Felt it remained stable from time to time of performance.    INTERVENTION:    Lymphedema Therapy:  Submental area with sublte density; central neck reduced.      Compression:    Pre-MLD compression to the edematous area(s) was performed for 10 minutes (30 minutes in home program) via Richardson pack with bandaging to soften firm edema and prepare skin for MLD.         Manual Lymph Drainage (MLD):  MLD was performed via the following approach(es):  anterior.  Clinician performed forward and reverse sequence.  Softer and smaller afterwards.      Compression:   Post-MLD compression to the edematous area(s) was deferred as he did not bring his pads/bandaging and had already done the home program this morning.  He typically uses flattening pad with bandaging to prevent refilling of tissues and promote continued drainage via open pathways.  The patient was instructed to wear this 2-3 hours minimum after MLD provided here or in the home program and to sleep in it if tolerated.         Following today's intervention, the following functional changes were noted or reported:    Swallowing:  Oral preparatory phase gradually improving re: speed.  Still frustrated to challenges using dentures.  Eating mostly soft foods.    ROM:  Unchanged      OK to try reducing frequency again.  Will do every other day for 2 weeks, then every third day for 2 weeks and return in a month.      Re-measured 4/1/24:  Head circumference:  Diagonal:  66.5 -- unchanged from 1/30/24  Submental:  66 -- slight increase from 1/30/24    Neck  circumference  Superior:  46.5  Medial:  43.5  Inferior:  41  Total of all three measurements was >4% reduced as compared to initial measurements 1/30/24.      Short-term objectives:  Mr. Chavez will  1.  Perform home program 1x/day by report.   Being met.    2.  Demonstrate ability to perform the following components (independently or with assistance of a family member) with % accuracy:              A.  Donning/doffing compression pads/bandages.    Being met.              B.  Determining need for and adjusting fit of compression pads/bandages.    Being met.              C.  Performing correct sequence of MLD.    Deferred..              D.  Performing MLD technique correctly (skin stretching vs sliding or deep pressure).     Deferred.    3.  Trade pureed or mechanical soft foods for Ensure.   Working with masticated solids.  Needs additional time to coordinate chewing/moving bolus around to arches.  Has not yet taken adequate volume to reduce Ensure (still 2-3x/day).      IMPRESSIONS:  This 83 y.o. man appears to present with  Oropharyngeal dyspahgia  Head and neck lymphedema -- improving again.  S/p XRT (60 Gy, completed 10/5/23)  S/p partial glossectomy and LMND.  History Stage ELENITA (pT1, pN2b, cM0) L oral tongue    RECOMMENDATIONS/PLAN OF CARE:  It if felt that Suleiman Chavez would benefit from  ST 2-4x month for 3-5 weeks to address dysphagia, advancing diet, and lymphedema.   Follow up with Carmen Lopes and  Kermit as directed.        Long-term goals:  Mr. Chavez will have  1.  Reduced swelling by 2% or more per measurements.    4/1/24:  Goal met (>4% reduction in neck circumferences)               5/724:  Some regression during trial to reduce frequency of hoome program.  2.  Be able to expand his diet and reduce Ensure Plus to 0 or 1 per day.

## 2024-06-10 ENCOUNTER — PATIENT MESSAGE (OUTPATIENT)
Dept: INTERNAL MEDICINE | Facility: CLINIC | Age: 84
End: 2024-06-10
Payer: MEDICARE

## 2024-06-17 PROBLEM — N39.0 URINARY TRACT INFECTION ASSOCIATED WITH CATHETERIZATION OF URINARY TRACT: Status: RESOLVED | Noted: 2024-03-17 | Resolved: 2024-06-17

## 2024-06-17 PROBLEM — T83.511A URINARY TRACT INFECTION ASSOCIATED WITH CATHETERIZATION OF URINARY TRACT: Status: RESOLVED | Noted: 2024-03-17 | Resolved: 2024-06-17

## 2024-06-23 ENCOUNTER — PATIENT MESSAGE (OUTPATIENT)
Dept: INTERNAL MEDICINE | Facility: CLINIC | Age: 84
End: 2024-06-23
Payer: MEDICARE

## 2024-06-23 DIAGNOSIS — C02.9 SQUAMOUS CELL CANCER OF TONGUE: ICD-10-CM

## 2024-06-23 DIAGNOSIS — K12.33 ORAL MUCOSITIS DUE TO RADIATION: ICD-10-CM

## 2024-06-24 ENCOUNTER — LAB VISIT (OUTPATIENT)
Dept: LAB | Facility: HOSPITAL | Age: 84
End: 2024-06-24
Attending: OTOLARYNGOLOGY
Payer: MEDICARE

## 2024-06-24 ENCOUNTER — CLINICAL SUPPORT (OUTPATIENT)
Dept: SPEECH THERAPY | Facility: HOSPITAL | Age: 84
End: 2024-06-24
Payer: MEDICARE

## 2024-06-24 ENCOUNTER — OFFICE VISIT (OUTPATIENT)
Dept: OTOLARYNGOLOGY | Facility: CLINIC | Age: 84
End: 2024-06-24
Payer: MEDICARE

## 2024-06-24 ENCOUNTER — PATIENT MESSAGE (OUTPATIENT)
Dept: SPEECH THERAPY | Facility: HOSPITAL | Age: 84
End: 2024-06-24

## 2024-06-24 VITALS
WEIGHT: 176.38 LBS | DIASTOLIC BLOOD PRESSURE: 51 MMHG | SYSTOLIC BLOOD PRESSURE: 110 MMHG | HEART RATE: 80 BPM | BODY MASS INDEX: 23.92 KG/M2

## 2024-06-24 DIAGNOSIS — C02.9 SQUAMOUS CELL CANCER OF TONGUE: Primary | ICD-10-CM

## 2024-06-24 DIAGNOSIS — R53.83 FATIGUE: ICD-10-CM

## 2024-06-24 DIAGNOSIS — Z92.3 HISTORY OF HEAD AND NECK RADIATION: ICD-10-CM

## 2024-06-24 DIAGNOSIS — C02.9 SQUAMOUS CELL CANCER OF TONGUE: ICD-10-CM

## 2024-06-24 DIAGNOSIS — Z98.890 S/P PARTIAL GLOSSECTOMY: ICD-10-CM

## 2024-06-24 DIAGNOSIS — R13.12 DYSPHAGIA, OROPHARYNGEAL: Primary | ICD-10-CM

## 2024-06-24 LAB — TSH SERPL DL<=0.005 MIU/L-ACNC: 1.58 UIU/ML (ref 0.4–4)

## 2024-06-24 PROCEDURE — 84443 ASSAY THYROID STIM HORMONE: CPT | Mod: HCNC | Performed by: OTOLARYNGOLOGY

## 2024-06-24 PROCEDURE — 99999 PR PBB SHADOW E&M-EST. PATIENT-LVL II: CPT | Mod: PBBFAC,HCNC,, | Performed by: OTOLARYNGOLOGY

## 2024-06-24 PROCEDURE — 99213 OFFICE O/P EST LOW 20 MIN: CPT | Mod: HCNC,S$GLB,, | Performed by: OTOLARYNGOLOGY

## 2024-06-24 PROCEDURE — 92526 ORAL FUNCTION THERAPY: CPT | Mod: GN,HCNC | Performed by: SPEECH-LANGUAGE PATHOLOGIST

## 2024-06-24 PROCEDURE — 1159F MED LIST DOCD IN RCRD: CPT | Mod: HCNC,CPTII,S$GLB, | Performed by: OTOLARYNGOLOGY

## 2024-06-24 PROCEDURE — 1126F AMNT PAIN NOTED NONE PRSNT: CPT | Mod: HCNC,CPTII,S$GLB, | Performed by: OTOLARYNGOLOGY

## 2024-06-24 PROCEDURE — 36415 COLL VENOUS BLD VENIPUNCTURE: CPT | Mod: HCNC | Performed by: OTOLARYNGOLOGY

## 2024-06-24 PROCEDURE — 1160F RVW MEDS BY RX/DR IN RCRD: CPT | Mod: HCNC,CPTII,S$GLB, | Performed by: OTOLARYNGOLOGY

## 2024-06-24 RX ORDER — TRAMADOL HYDROCHLORIDE 50 MG/1
50 TABLET ORAL EVERY 6 HOURS PRN
Qty: 30 TABLET | Refills: 3 | OUTPATIENT
Start: 2024-06-24

## 2024-06-24 NOTE — PROGRESS NOTES
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    INTERVAL HISTORY:  Performed home program every other day since last visit with one interval of two days.  Felt it worsened slightly.    INTERVENTION:    Lymphedema Therapy:  Submental area with more noteable density, slightly to R of midline; central neck reduced.      Compression:    Pre-MLD compression to the edematous area(s) was performed for 10 minutes (30 minutes in home program) via Richardson pack with bandaging to soften firm edema and prepare skin for MLD.  Mrs. Chavez had tightened bandage since last visit.       Manual Lymph Drainage (MLD):  MLD was performed via the following approach(es):  anterior.  Clinician performed forward and reverse sequence.  Softer and smaller afterwards.      Compression:   Post-MLD compression to the edematous area(s) was deferred as he did not bring his pads/bandaging and had already done the home program this morning.  He typically uses flattening pad with bandaging to prevent refilling of tissues and promote continued drainage via open pathways.  The patient was instructed to wear this 2-3 hours minimum after MLD provided here or in the home program and to sleep in it if tolerated.         Following today's intervention, the following functional changes were noted or reported:    Swallowing:  Oral preparatory phase gradually improving re: speed.  Still frustrated to challenges using dentures and reported dysgeusia .  Eating mostly soft foods.    ROM:  Unchanged      Re-measured 4/1/24:  Head circumference:  Diagonal:  66.5 -- unchanged from 1/30/24  Submental:  66 -- slight increase from 1/30/24    Neck circumference  Superior:  46.5  Medial:   43.5  Inferior:  41  Total of all three measurements was >4% reduced as compared to initial measurements 1/30/24.    Returned to daily home program.  Noted that he had an order for TSH labs from January that has not been drawn.  Discussed with Dr. Carmen (whom he saw immediately afterwards) and he agreed that if it is off, if may be contributing.  He facilitated a draw today.  Determined to defer next appt until we see if there is any thyroid dysfunction that needs to be addressed.    Short-term objectives:  Mr. Chavez will  1.  Perform home program 1x/day by report.   Being met.    2.  Demonstrate ability to perform the following components (independently or with assistance of a family member) with % accuracy:              A.  Donning/doffing compression pads/bandages.    Being met.              B.  Determining need for and adjusting fit of compression pads/bandages.    Being met.              C.  Performing correct sequence of MLD.    Deferred..              D.  Performing MLD technique correctly (skin stretching vs sliding or deep pressure).     Deferred.    3.  Trade pureed or mechanical soft foods for Ensure.   Working with masticated solids.  Needs additional time to coordinate chewing/moving bolus around to arches.  Has not yet taken adequate volume to reduce Ensure (still 2-3x/day).  Reviewed strategy of trying variety of foods to help stimulate taste.      IMPRESSIONS:  This 83 y.o. man appears to present with  Oropharyngeal dyspahgia  Head and neck lymphedema -- improving again.  S/p XRT (60 Gy, completed 10/5/23)  S/p partial glossectomy and LMND.  History Stage ELENITA (pT1, pN2b, cM0) L oral tongue    RECOMMENDATIONS/PLAN OF CARE:  It if felt that Suleiman Chavez would benefit from  ST 2-4x month for 2-4 weeks to address dysphagia, advancing diet, and lymphedema.   Follow up with Carmen Lopes and  Kermit as directed.        Long-term goals:  Mr. Chavez will have  1.  Reduced swelling by 2% or more  per measurements.    4/1/24:  Goal met (>4% reduction in neck circumferences)               5/724:  Some regression during trial to reduce frequency of hoome program.  2.  Be able to expand his diet and reduce Ensure Plus to 0 or 1 per day.

## 2024-06-24 NOTE — TELEPHONE ENCOUNTER
Care Due:                  Date            Visit Type   Department     Provider  --------------------------------------------------------------------------------                                EP -                              PRIMARY      [facility]  Last Visit: 04-      CARE ([facility])   MEDICINE       Caleb Negrete  Next Visit: None Scheduled  None         None Found                                                            Last  Test          Frequency    Reason                     Performed    Due Date  --------------------------------------------------------------------------------    HBA1C.......  6 months...  metFORMIN................  03- 09-    [facility] Embedded Care Due Messages. Reference number: [id number].   6/24/2024 10:44:40 AM CDT

## 2024-06-24 NOTE — PROGRESS NOTES
CC: Surveillance    Oncology History   Squamous cell cancer of tongue   6/16/2023 Initial Diagnosis    Squamous cell cancer of tongue     7/12/2023 Surgery    1. Left partial glossectomy  2. Left modified neck dissection of levels 1B through 4     7/31/2023 Cancer Staged     Cancer Staging   Squamous cell cancer of tongue  Staging form: Oral Cavity, AJCC 8th Edition  - Pathologic stage from 7/31/2023: Stage ELENITA (pT1, pN2b, cM0) - Signed by Sindi Robbins NP on 7/31/2023 7/31/2023 Cancer Staged    Staging form: Oral Cavity, AJCC 8th Edition  - Pathologic stage from 7/31/2023: Stage ELENITA (pT1, pN2b, cM0)     8/23/2023 - 10/5/2023 Radiation Therapy    Treating physician: Cruzito Lopes  Treatment Summary  Course: C1 H&N 2023  Treatment Site Ref. ID Energy Dose/Fx (Gy) #Fx Dose Correction (Gy) Total Dose (Gy) Start Date End Date Elapsed Days   IM H&N PTV_High 6X 2 30 / 30 0 60 8/23/2023 10/5/2023 43              HPI   84 y.o. male presents with the above treatment history.  He is doing well overall.  No new complaints.     Review of Systems   Constitutional: Negative for fatigue and unexpected weight change.   HENT: Per HPI.  Eyes: Negative for visual disturbance.   Respiratory: Negative for shortness of breath, hemoptysis   Cardiovascular: Negative for chest pain and palpitations.   Musculoskeletal: Negative for decreased ROM, back pain.   Skin: Negative for rash, sunburn, itching.   Neurological: Negative for dizziness and seizures.   Hematological: Negative for adenopathy. Does not bruise/bleed easily.   Endocrine: Negative for rapid weight loss/weight gain, heat/cold intolerance.     Past Medical History   Patient Active Problem List   Diagnosis    Ocular hypertension    Primary hypertension    Screen for colon cancer    Type 2 diabetes mellitus with diabetic neuropathy, without long-term current use of insulin    Adenomatous polyp    Family history of colon cancer    Nuclear cataract    Borderline glaucoma of  both eyes with ocular hypertension    Acute cystitis without hematuria    Spinal stenosis, lumbar region, with neurogenic claudication    EMA (iron deficiency anemia)    Benign prostatic hyperplasia without lower urinary tract symptoms    Debility    Chronic midline low back pain with sciatica    Squamous cell cancer of tongue    Incomplete bladder emptying    History of urinary retention    History of dysphagia           Past Surgical History   Past Surgical History:   Procedure Laterality Date    CHOLECYSTECTOMY      GLOSSECTOMY Left 2023    Procedure: GLOSSECTOMY;  Surgeon: Jaxon Carmen MD;  Location: [facility] OR [address] [place] [address];  Service: ENT;  Laterality: Left;    RADICAL NECK DISSECTION Left 2023    Procedure: DISSECTION, NECK, RADICAL;  Surgeon: Jaxon Carmen MD;  Location: [facility] OR [address] [place] [address];  Service: ENT;  Laterality: Left;         Family History   Family History   Problem Relation Name Age of Onset    Cancer Mother          liver    Cancer Father          colon    No Known Problems Sister      No Known Problems Sister      Amblyopia Neg Hx      Blindness Neg Hx      Cataracts Neg Hx      Diabetes Neg Hx      Glaucoma Neg Hx      Hypertension Neg Hx      Macular degeneration Neg Hx      Retinal detachment Neg Hx      Strabismus Neg Hx      Stroke Neg Hx      Thyroid disease Neg Hx             Social History   .  Social History     Socioeconomic History    Marital status:    Occupational History     Employer: OTHER   Tobacco Use    Smoking status: Former     Current packs/day: 0.00     Types: Cigarettes     Quit date: 2004     Years since quittin.0     Passive exposure: Past    Smokeless tobacco: Never   Substance and Sexual Activity    Alcohol use: No     Alcohol/week: 0.0 standard drinks of alcohol    Drug use: No     Social Determinants of Health     Financial Resource Strain: Low Risk  (3/18/2024)    Overall Financial Resource Strain (CARDIA)     Difficulty of Paying Living  Expenses: Not very hard   Food Insecurity: No Food Insecurity (3/18/2024)    Hunger Vital Sign     Worried About Running Out of Food in the Last Year: Never true     Ran Out of Food in the Last Year: Never true   Transportation Needs: No Transportation Needs (3/18/2024)    PRAPARE - Transportation     Lack of Transportation (Medical): No     Lack of Transportation (Non-Medical): No   Physical Activity: Insufficiently Active (3/18/2024)    Exercise Vital Sign     Days of Exercise per Week: 2 days     Minutes of Exercise per Session: 20 min   Stress: No Stress Concern Present (3/18/2024)    [place] [employer] - Occupational Stress Questionnaire     Feeling of Stress : Only a little   Housing Stability: Unknown (3/18/2024)    Housing Stability Vital Sign     Unable to Pay for Housing in the Last Year: No     Unstable Housing in the Last Year: No         Allergies   Review of patient's allergies indicates:  No Known Allergies        Physical Exam     There were no vitals filed for this visit.        Body mass index is 23.92 kg/m².      General: AOx3, NAD   Respiratory:  Symmetric chest rise, normal effort  Oral Cavity:  Oral Tongue mobile, no lesions noted.  Well-healed left glossectomy site.  Hard Palate WNL. No buccal or FOM lesions.  Oropharynx:  No masses/lesions of the posterior pharyngeal wall. Tonsillar fossa without lesions. Soft palate without masses. Midline uvula.   Neck:  Well-healed left neck scar.  Moderate fibrosis status post radiation.  No cervical lymphadenopathy, thyromegaly or thyroid nodules.  Normal range of motion.  Mild submental lymphedema.  Face: House Brackmann I bilaterally.         Assessment/Plan  Problem List Items Addressed This Visit          Oncology    Squamous cell cancer of tongue - Primary (Chronic)     PRAKASH.  Check TSH due to lymphedema. RTC 6 weeks.         Relevant Orders    TSH     Other Visit Diagnoses       Fatigue        Relevant Orders    TSH

## 2024-06-25 ENCOUNTER — PATIENT MESSAGE (OUTPATIENT)
Dept: HEMATOLOGY/ONCOLOGY | Facility: CLINIC | Age: 84
End: 2024-06-25
Payer: MEDICARE

## 2024-07-08 ENCOUNTER — PATIENT MESSAGE (OUTPATIENT)
Dept: SPEECH THERAPY | Facility: HOSPITAL | Age: 84
End: 2024-07-08
Payer: MEDICARE

## 2024-07-17 ENCOUNTER — CLINICAL SUPPORT (OUTPATIENT)
Dept: SPEECH THERAPY | Facility: HOSPITAL | Age: 84
End: 2024-07-17
Payer: MEDICARE

## 2024-07-17 DIAGNOSIS — Z98.890 S/P PARTIAL GLOSSECTOMY: ICD-10-CM

## 2024-07-17 DIAGNOSIS — Z92.3 HISTORY OF HEAD AND NECK RADIATION: ICD-10-CM

## 2024-07-17 DIAGNOSIS — R13.12 DYSPHAGIA, OROPHARYNGEAL: Primary | ICD-10-CM

## 2024-07-17 DIAGNOSIS — I89.0 LYMPHEDEMA: ICD-10-CM

## 2024-07-17 PROCEDURE — 92526 ORAL FUNCTION THERAPY: CPT | Mod: GN,HCNC | Performed by: SPEECH-LANGUAGE PATHOLOGIST

## 2024-07-17 NOTE — PROGRESS NOTES
DIAGNOSIS:  Dysphagia, oropharyngeal (R13.12), Lymphedema (I89.), History head and neck radiation (Z92.3), s/p partial glossectomy (Z98.890), History SCC of the tongue (C02.9)  REFERRING DOCTOR:  New Lopes M.D.,  Radiation Oncologist  LENGTH OF SESSION:  55 minutes    REASON FOR VISIT:  Lymphedema therapy, advancing diet.    INTERVAL HISTORY:  Performed home program most days since last visit with occasional intervals of one day off.  Felt it had improved nicely.    Having some improvement in taste sensation, but only gradually improving.  Still frustrated by challenges eating certain foods he formerly enjoyed.  Feels upper denture plate affects taste and has some difficulty manipulating boluses.    INTERVENTION:    Lymphedema Therapy:  Submental area and central neck markedly softer.  He denied that swelling returned when he skipped a day.    Compression:    Pre-MLD compression to the edematous area(s) was performed for 10 minutes (30 minutes in home program) via Richardson pack with bandaging to soften firm edema and prepare skin for MLD.  Mrs. Chavez had tightened bandage since last visit.       Manual Lymph Drainage (MLD):  MLD was performed via the following approach(es):  anterior.  Clinician performed forward and reverse sequence.  Limtied if any change afterwards.      Compression:   Post-MLD compression to the edematous area(s) was deferred as he did not bring his pads/bandaging and had already done the home program this morning.  He typically uses flattening pad with bandaging to prevent refilling of tissues and promote continued drainage via open pathways.  The patient was instructed to wear this 2-3 hours minimum after MLD provided here or in the home program and to sleep in it if tolerated.         Following today's intervention, the following functional changes were noted or reported:    Swallowing:  Oral preparatory phase gradually improving re: speed.  Still frustrated to challenges using  dentures and reported dysgeusia .  Eating mostly soft foods, but can eat some meats.  Discussed modifications..    ROM:  Unchanged      Re-measured 4/1/24:  Head circumference:  Diagonal:  66.5 -- unchanged from 1/30/24  Submental:  66 -- slight increase from 1/30/24    Neck circumference  Superior:  46.5  Medial:  43.5  Inferior:  41  Total of all three measurements was >4% reduced as compared to initial measurements 1/30/24.    Returned to stepped-down frequency protocol. Provided written directives with instruction to return to greater frequency if swelling returns anywhere along the way.    Short-term objectives:  Mr. Chavez will  1.  Perform home program 1x/day by report.   Being met.    2.  Demonstrate ability to perform the following components (independently or with assistance of a family member) with % accuracy:              A.  Donning/doffing compression pads/bandages.    Being met.              B.  Determining need for and adjusting fit of compression pads/bandages.    Being met.              C.  Performing correct sequence of MLD.    Deferred..              D.  Performing MLD technique correctly (skin stretching vs sliding or deep pressure).     Deferred.    3.  Trade pureed or mechanical soft foods for Ensure.   Working with masticated solids.  Needs additional time to coordinate chewing/moving bolus around to arches.  Reviewed strategy of trying variety of foods to help stimulate taste.  Suspect contribution of upper denture plate is not that it affects taste as much as it inhibits sensation of food against the palate.  If there is taste involved with the denture material, that may play a role.      IMPRESSIONS:  This 84 y.o. man appears to present with  Oropharyngeal dyspahgia  Head and neck lymphedema -- improving again.  S/p XRT (60 Gy, completed 10/5/23)  S/p partial glossectomy and LMND.  History Stage ELENITA (pT1, pN2b, cM0) L oral tongue    RECOMMENDATIONS/PLAN OF CARE:  It if felt that  Suleiman Chavez would benefit from  ST in 1 month  to review status re: lymphedema and dysphagia, advancing diet.   Follow up with Carmen Lopes and  Kermit as directed.        Long-term goals:  Mr. Chavez will have  1.  Reduced swelling by 2% or more per measurements.    4/1/24:  Goal met (>4% reduction in neck circumferences)               5/724:  Some regression during trial to reduce frequency of hoome program.  2.  Be able to expand his diet and reduce Ensure Plus to 0 or 1 per day.

## 2024-08-07 ENCOUNTER — CLINICAL SUPPORT (OUTPATIENT)
Dept: SPEECH THERAPY | Facility: HOSPITAL | Age: 84
End: 2024-08-07
Payer: MEDICARE

## 2024-08-07 DIAGNOSIS — I89.0 LYMPHEDEMA: ICD-10-CM

## 2024-08-07 DIAGNOSIS — R13.12 DYSPHAGIA, OROPHARYNGEAL: Primary | ICD-10-CM

## 2024-08-07 DIAGNOSIS — Z92.3 HISTORY OF HEAD AND NECK RADIATION: ICD-10-CM

## 2024-08-07 DIAGNOSIS — Z98.890 S/P PARTIAL GLOSSECTOMY: ICD-10-CM

## 2024-08-07 PROCEDURE — 92526 ORAL FUNCTION THERAPY: CPT | Mod: GN,HCNC | Performed by: SPEECH-LANGUAGE PATHOLOGIST

## 2024-08-12 ENCOUNTER — OFFICE VISIT (OUTPATIENT)
Dept: OTOLARYNGOLOGY | Facility: CLINIC | Age: 84
End: 2024-08-12
Payer: MEDICARE

## 2024-08-12 VITALS
WEIGHT: 180 LBS | DIASTOLIC BLOOD PRESSURE: 71 MMHG | BODY MASS INDEX: 24.41 KG/M2 | SYSTOLIC BLOOD PRESSURE: 131 MMHG | HEART RATE: 76 BPM

## 2024-08-12 DIAGNOSIS — C02.9 SQUAMOUS CELL CANCER OF TONGUE: Primary | Chronic | ICD-10-CM

## 2024-08-12 PROCEDURE — 3078F DIAST BP <80 MM HG: CPT | Mod: HCNC,CPTII,S$GLB, | Performed by: OTOLARYNGOLOGY

## 2024-08-12 PROCEDURE — 1159F MED LIST DOCD IN RCRD: CPT | Mod: HCNC,CPTII,S$GLB, | Performed by: OTOLARYNGOLOGY

## 2024-08-12 PROCEDURE — 1126F AMNT PAIN NOTED NONE PRSNT: CPT | Mod: HCNC,CPTII,S$GLB, | Performed by: OTOLARYNGOLOGY

## 2024-08-12 PROCEDURE — 99999 PR PBB SHADOW E&M-EST. PATIENT-LVL III: CPT | Mod: PBBFAC,HCNC,, | Performed by: OTOLARYNGOLOGY

## 2024-08-12 PROCEDURE — 1160F RVW MEDS BY RX/DR IN RCRD: CPT | Mod: HCNC,CPTII,S$GLB, | Performed by: OTOLARYNGOLOGY

## 2024-08-12 PROCEDURE — 99213 OFFICE O/P EST LOW 20 MIN: CPT | Mod: HCNC,S$GLB,, | Performed by: OTOLARYNGOLOGY

## 2024-08-12 PROCEDURE — 3075F SYST BP GE 130 - 139MM HG: CPT | Mod: HCNC,CPTII,S$GLB, | Performed by: OTOLARYNGOLOGY

## 2024-08-12 NOTE — PROGRESS NOTES
CC: Surveillance    Oncology History   Squamous cell cancer of tongue   6/16/2023 Initial Diagnosis    Squamous cell cancer of tongue     7/12/2023 Surgery    1. Left partial glossectomy  2. Left modified neck dissection of levels 1B through 4     7/31/2023 Cancer Staged     Cancer Staging   Squamous cell cancer of tongue  Staging form: Oral Cavity, AJCC 8th Edition  - Pathologic stage from 7/31/2023: Stage ELENITA (pT1, pN2b, cM0) - Signed by Sindi Robbins NP on 7/31/2023 7/31/2023 Cancer Staged    Staging form: Oral Cavity, AJCC 8th Edition  - Pathologic stage from 7/31/2023: Stage ELENITA (pT1, pN2b, cM0)     8/23/2023 - 10/5/2023 Radiation Therapy    Treating physician: Cruzito Lopes  Treatment Summary  Course: C1 H&N 2023  Treatment Site Ref. ID Energy Dose/Fx (Gy) #Fx Dose Correction (Gy) Total Dose (Gy) Start Date End Date Elapsed Days   IM H&N PTV_High 6X 2 30 / 30 0 60 8/23/2023 10/5/2023 43              HPI   84 y.o. male presents with the above treatment history.  He is doing well overall.  No new complaints.     Review of Systems   Constitutional: Negative for fatigue and unexpected weight change.   HENT: Per HPI.  Eyes: Negative for visual disturbance.   Respiratory: Negative for shortness of breath, hemoptysis   Cardiovascular: Negative for chest pain and palpitations.   Musculoskeletal: Negative for decreased ROM, back pain.   Skin: Negative for rash, sunburn, itching.   Neurological: Negative for dizziness and seizures.   Hematological: Negative for adenopathy. Does not bruise/bleed easily.   Endocrine: Negative for rapid weight loss/weight gain, heat/cold intolerance.     Past Medical History   Patient Active Problem List   Diagnosis    Ocular hypertension    Primary hypertension    Screen for colon cancer    Type 2 diabetes mellitus with diabetic neuropathy, without long-term current use of insulin    Adenomatous polyp    Family history of colon cancer    Nuclear cataract    Borderline glaucoma of  both eyes with ocular hypertension    Acute cystitis without hematuria    Spinal stenosis, lumbar region, with neurogenic claudication    EMA (iron deficiency anemia)    Benign prostatic hyperplasia without lower urinary tract symptoms    Debility    Chronic midline low back pain with sciatica    Squamous cell cancer of tongue    Incomplete bladder emptying    History of urinary retention    History of dysphagia           Past Surgical History   Past Surgical History:   Procedure Laterality Date    CHOLECYSTECTOMY      GLOSSECTOMY Left 2023    Procedure: GLOSSECTOMY;  Surgeon: Jaxon Carmen MD;  Location: [facility] OR [address] [place] [address];  Service: ENT;  Laterality: Left;    RADICAL NECK DISSECTION Left 2023    Procedure: DISSECTION, NECK, RADICAL;  Surgeon: Jaxon Carmen MD;  Location: [facility] OR [address] [place] [address];  Service: ENT;  Laterality: Left;         Family History   Family History   Problem Relation Name Age of Onset    Cancer Mother          liver    Cancer Father          colon    No Known Problems Sister      No Known Problems Sister      Amblyopia Neg Hx      Blindness Neg Hx      Cataracts Neg Hx      Diabetes Neg Hx      Glaucoma Neg Hx      Hypertension Neg Hx      Macular degeneration Neg Hx      Retinal detachment Neg Hx      Strabismus Neg Hx      Stroke Neg Hx      Thyroid disease Neg Hx             Social History   .  Social History     Socioeconomic History    Marital status:    Occupational History     Employer: OTHER   Tobacco Use    Smoking status: Former     Current packs/day: 0.00     Types: Cigarettes     Quit date: 2004     Years since quittin.2     Passive exposure: Past    Smokeless tobacco: Never   Substance and Sexual Activity    Alcohol use: No     Alcohol/week: 0.0 standard drinks of alcohol    Drug use: No     Social Determinants of Health     Financial Resource Strain: Low Risk  (3/18/2024)    Overall Financial Resource Strain (CARDIA)     Difficulty of Paying Living  Expenses: Not very hard   Food Insecurity: No Food Insecurity (3/18/2024)    Hunger Vital Sign     Worried About Running Out of Food in the Last Year: Never true     Ran Out of Food in the Last Year: Never true   Transportation Needs: No Transportation Needs (3/18/2024)    PRAPARE - Transportation     Lack of Transportation (Medical): No     Lack of Transportation (Non-Medical): No   Physical Activity: Insufficiently Active (3/18/2024)    Exercise Vital Sign     Days of Exercise per Week: 2 days     Minutes of Exercise per Session: 20 min   Stress: No Stress Concern Present (3/18/2024)    [employer] [place] [employer] - Occupational Stress Questionnaire     Feeling of Stress : Only a little   Housing Stability: Unknown (3/18/2024)    Housing Stability Vital Sign     Unable to Pay for Housing in the Last Year: No     Unstable Housing in the Last Year: No         Allergies   Review of patient's allergies indicates:  No Known Allergies        Physical Exam     Vitals:    08/12/24 1330   BP: 131/71   Pulse: 76           Body mass index is 24.41 kg/m².      General: AOx3, NAD   Respiratory:  Symmetric chest rise, normal effort  Oral Cavity:  Oral Tongue mobile, no lesions noted.  Well-healed left glossectomy site.  Hard Palate WNL. No buccal or FOM lesions.  Oropharynx:  No masses/lesions of the posterior pharyngeal wall. Tonsillar fossa without lesions. Soft palate without masses. Midline uvula.   Neck:  Well-healed left neck scar.  Moderate fibrosis status post radiation.  No cervical lymphadenopathy, thyromegaly or thyroid nodules.  Normal range of motion.  Mild submental lymphedema.  Face: House Brackmann I bilaterally.         Assessment/Plan  Problem List Items Addressed This Visit          Oncology    Squamous cell cancer of tongue - Primary (Chronic)     PRAKASH.  RTC 6 weeks.

## 2024-09-03 ENCOUNTER — PATIENT MESSAGE (OUTPATIENT)
Dept: SPEECH THERAPY | Facility: HOSPITAL | Age: 84
End: 2024-09-03
Payer: MEDICARE

## 2024-09-04 ENCOUNTER — OFFICE VISIT (OUTPATIENT)
Dept: OPTOMETRY | Facility: CLINIC | Age: 84
End: 2024-09-04
Payer: MEDICARE

## 2024-09-04 DIAGNOSIS — H25.13 SENILE NUCLEAR CATARACT, BILATERAL: ICD-10-CM

## 2024-09-04 DIAGNOSIS — H40.053 BILATERAL OCULAR HYPERTENSION: ICD-10-CM

## 2024-09-04 DIAGNOSIS — E11.40 TYPE 2 DIABETES MELLITUS WITH DIABETIC NEUROPATHY, WITHOUT LONG-TERM CURRENT USE OF INSULIN: ICD-10-CM

## 2024-09-04 DIAGNOSIS — E11.9 TYPE 2 DIABETES MELLITUS WITHOUT RETINOPATHY: Primary | ICD-10-CM

## 2024-09-04 DIAGNOSIS — H40.053 BORDERLINE GLAUCOMA OF BOTH EYES WITH OCULAR HYPERTENSION: Chronic | ICD-10-CM

## 2024-09-04 PROCEDURE — 92015 DETERMINE REFRACTIVE STATE: CPT | Mod: HCNC,S$GLB,, | Performed by: OPTOMETRIST

## 2024-09-04 PROCEDURE — 3288F FALL RISK ASSESSMENT DOCD: CPT | Mod: HCNC,CPTII,S$GLB, | Performed by: OPTOMETRIST

## 2024-09-04 PROCEDURE — 99999 PR PBB SHADOW E&M-EST. PATIENT-LVL III: CPT | Mod: PBBFAC,HCNC,, | Performed by: OPTOMETRIST

## 2024-09-04 PROCEDURE — 1101F PT FALLS ASSESS-DOCD LE1/YR: CPT | Mod: HCNC,CPTII,S$GLB, | Performed by: OPTOMETRIST

## 2024-09-04 PROCEDURE — 1126F AMNT PAIN NOTED NONE PRSNT: CPT | Mod: HCNC,CPTII,S$GLB, | Performed by: OPTOMETRIST

## 2024-09-04 PROCEDURE — 1159F MED LIST DOCD IN RCRD: CPT | Mod: HCNC,CPTII,S$GLB, | Performed by: OPTOMETRIST

## 2024-09-04 PROCEDURE — 99204 OFFICE O/P NEW MOD 45 MIN: CPT | Mod: HCNC,S$GLB,, | Performed by: OPTOMETRIST

## 2024-09-04 RX ORDER — LATANOPROST 50 UG/ML
1 SOLUTION/ DROPS OPHTHALMIC DAILY
Qty: 7.5 ML | Refills: 6 | Status: SHIPPED | OUTPATIENT
Start: 2024-09-04

## 2024-09-04 RX ORDER — LATANOPROST 50 UG/ML
SOLUTION/ DROPS OPHTHALMIC
Qty: 7.5 ML | Refills: 3 | Status: SHIPPED | OUTPATIENT
Start: 2024-09-04 | End: 2024-09-04 | Stop reason: SDUPTHER

## 2024-09-04 NOTE — PROGRESS NOTES
HPI    DEISI: 04/15/2021 Dr. weldon  Last DFE: 04/15/2021  Chief complaint (CC): 84 yr old male  Glasses? Yes  Contacts? No  H/o eye surgery, injections or laser: No  H/o eye injury: No  Known eye conditions? Glaucoma  Family h/o eye conditions? Sister (Cataract and Glaucoma)  Eye gtts? Latanoprost       (-) Flashes (-)  Floaters (-) Mucous   (-)  Tearing (-) Itching (-) Burning   (-) Headaches (-) Eye Pain/discomfort (-) Irritation   (-)  Redness (-) Double vision (-) Blurry vision    CL Exam: Yes/No  Current CL Brand: N/A  Rx OD     OS               Wears full-time or part-time:  Full time/Part Time     Sleeps with contact lenses:  Yes/No     CL Solution used:     How often replace CLs:      Any problem with VA with CLs?  Yes/No     Diabetic? Under control  A1c? Lab Results       Component                Value               Date                       HGBA1C                   5.8 (H)             03/17/2024              Last edited by Judi Boone on 9/4/2024  8:37 AM.            Assessment /Plan     For exam results, see Encounter Report.    Type 2 diabetes mellitus without retinopathy    Type 2 diabetes mellitus with diabetic neuropathy, without long-term current use of insulin    Borderline glaucoma of both eyes with ocular hypertension  -     Wu Visual Field - OU - Extended - Both Eyes; Future  -     OCT, Optic Nerve - OU - Both Eyes; Future    Bilateral ocular hypertension  -     Wu Visual Field - OU - Extended - Both Eyes; Future  -     OCT, Optic Nerve - OU - Both Eyes; Future    Senile nuclear cataract, bilateral    Other orders  -     latanoprost 0.005 % ophthalmic solution; Place 1 drop into both eyes once daily.  Dispense: 7.5 mL; Refill: 6      MONITOR. ED PT ON ALL EXAM FINDINGS  RX FINAL SPECS   TYPE 2 DM W/O RETINOPATHY OU; CONTINUE WITH PCP FOR GLYCEMIC CONTROL  H/O BORDERLINE POAG OU W/OCULAR HTN OU; NORMOTENSIVE IOP OU AT VISIT; CONTINUE WITH LATANOPROST QHS OU; RTC IN 4 MONTHS FOR  GLAUCOMA TESTING   MODERATE CATS OU; OD>OS; PRESURGICAL; STABLE; NO SURGICAL INTERVENTION NEEDED AT THIS TIME; MONITOR  RTC 1 YR//PRN FOR REE/DFE

## 2024-09-09 ENCOUNTER — PATIENT MESSAGE (OUTPATIENT)
Dept: SPEECH THERAPY | Facility: HOSPITAL | Age: 84
End: 2024-09-09
Payer: MEDICARE

## 2024-09-16 ENCOUNTER — CLINICAL SUPPORT (OUTPATIENT)
Dept: SPEECH THERAPY | Facility: HOSPITAL | Age: 84
End: 2024-09-16
Payer: MEDICARE

## 2024-09-16 DIAGNOSIS — R13.12 DYSPHAGIA, OROPHARYNGEAL: Primary | ICD-10-CM

## 2024-09-16 DIAGNOSIS — Z98.890 S/P PARTIAL GLOSSECTOMY: ICD-10-CM

## 2024-09-16 DIAGNOSIS — Z92.3 HISTORY OF HEAD AND NECK RADIATION: ICD-10-CM

## 2024-09-16 DIAGNOSIS — I89.0 LYMPHEDEMA: ICD-10-CM

## 2024-09-16 PROCEDURE — 92526 ORAL FUNCTION THERAPY: CPT | Mod: GN,HCNC | Performed by: SPEECH-LANGUAGE PATHOLOGIST

## 2024-09-16 NOTE — PATIENT INSTRUCTIONS
Skip 3-4 days for 2-3 weeks, then drop down to skipping 4-5 days for 2-3 weeks, and continued to once week if needed after that.     If at any time you notice a resumption of swelling or increased density, return to a more frequent schedule.    Let me know if you feel it needs my attention.

## 2024-09-16 NOTE — PLAN OF CARE
IMPRESSIONS:  This 84 y.o. man appears to present with  Oropharyngeal dyspahgia  Head and neck lymphedema -- continuing to gradually improve; now with very subtle edema..  S/p XRT (60 Gy, completed 10/5/23)  S/p partial glossectomy and LMND.  History Stage ELENITA (pT1, pN2b, cM0) L oral tongue    RECOMMENDATIONS/PLAN OF CARE:  It if felt that Suleiman Chavez would benefit from  Discharge from ST due to progress with continued use of home program in stepped-down fashion (protocol provided in AVS) with goal of ceasing use altogether.  Follow up with Carmen Lopes and  Kermit as directed.  Reconsult ST as needed.        Long-term goals:  Mr. Chavez will have  1.  Reduced swelling by 2% or more per measurements.    4/1/24:  Goal met (>4% reduction in neck circumferences)               5/724:  Some regression during trial to reduce frequency of hoome program.   7/17/24:  Reducing again.   9/16/24:  Subtle, reducing edema.  2.  Be able to expand his diet and reduce Ensure Plus to 0 or 1 per day.   Progressing.

## 2024-09-16 NOTE — PROGRESS NOTES
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    INTERVAL HISTORY:  Performed home program every 2-3 days.  Felt it had been stable. Not really swelling before next treatment and not really feeling significantly different from beginning of a use of the home program to the end -- may be a little softer.      INTERVENTION:    Lymphedema Therapy:  Submental area and central neck soft.      Compression:    Pre-MLD compression to the edematous area(s) was performed for 10 minutes (30 minutes in home program) via Richardson pack with bandaging to soften firm edema and prepare skin for MLD.       Manual Lymph Drainage (MLD):  MLD was performed via the following approach(es):  anterior.  Clinician performed forward and reverse sequence.  Very slightly softer afterwards suggesting some subtle edema was present prior to treatment.      Compression:   Post-MLD compression to the edematous area(s) was deferred as he did not bring his pads/bandaging and had already done the home program this morning.  He typically uses flattening pad with bandaging to prevent refilling of tissues and promote continued drainage via open pathways.  The patient was instructed to wear this 2-3 hours minimum after MLD provided here or in the home program and to sleep in it if tolerated.         Following today's intervention, the following functional changes were noted or reported:    Swallowing:  Oral preparatory phase gradually improving re: speed.  Still frustrated to challenges using dentures and reported dysgeusia .  Eating mostly soft foods, but can eat some meats.      ROM:  Unchanged      Re-measured 4/1/24:  Head circumference:  Diagonal:  66.5 -- unchanged from  1/30/24  Submental:  66 -- slight increase from 1/30/24    Neck circumference  Superior:  46.5  Medial:  43.5  Inferior:  41  Total of all three measurements was >4% reduced as compared to initial measurements 1/30/24.    Continuing stepped-down frequency protocol with increased # of days skipped between uses of home program. Provided written directives with instruction to return to greater frequency if swelling returns anywhere along the way.  Do not plan another visit at this point unless they need assistance (e.g., increased swelling).    Short-term objectives:  Mr. Chavez will  1.  Perform home program 1x/day by report.   Being met at current protocol.    2.  Demonstrate ability to perform the following components (independently or with assistance of a family member) with % accuracy:              A.  Donning/doffing compression pads/bandages.    Being met.              B.  Determining need for and adjusting fit of compression pads/bandages.    Being met.              C.  Performing correct sequence of MLD.    Deferred..              D.  Performing MLD technique correctly (skin stretching vs sliding or deep pressure).     Deferred.    3.  Trade pureed or mechanical soft foods for Ensure.   Working with masticated solids.  Needs additional time to coordinate chewing/moving bolus around to arches.  Doesn't like to eat with dentures in place.     Considering a [place] [employer] [place] cruise, but with questions about managing his diet or consistency needs (no salt, soft to pureed preferred).  Reinforced Mrs. Chavez's plan to contact [employer], discuss Mr. Chavez's needs, and see if they are such that [employer] can accommodate.  I suspect this is something the could and may routinely do for other elderly passengers.    IMPRESSIONS:  This 84 y.o. man appears to present with  Oropharyngeal dyspahgia  Head and neck lymphedema -- continuing to gradually improve; now with very subtle edema..  S/p XRT (60 Gy, completed  10/5/23)  S/p partial glossectomy and LMND.  History Stage ELENITA (pT1, pN2b, cM0) L oral tongue    RECOMMENDATIONS/PLAN OF CARE:  It if felt that Suleiman Chavez would benefit from  Discharge from ST due to progress with continued use of home program in stepped-down fashion (protocol provided in AVS) with goal of ceasing use altogether.  Follow up with Carmen Lopes and  Kermit as directed.  Reconsult ST as needed.        Long-term goals:  Mr. Chavez will have  1.  Reduced swelling by 2% or more per measurements.    4/1/24:  Goal met (>4% reduction in neck circumferences)               5/724:  Some regression during trial to reduce frequency of hoome program.   7/17/24:  Reducing again.   9/16/24:  Subtle, reducing edema.  2.  Be able to expand his diet and reduce Ensure Plus to 0 or 1 per day.   Progressing.

## 2024-09-23 ENCOUNTER — OFFICE VISIT (OUTPATIENT)
Dept: OTOLARYNGOLOGY | Facility: CLINIC | Age: 84
End: 2024-09-23
Payer: MEDICARE

## 2024-09-23 VITALS
HEART RATE: 105 BPM | BODY MASS INDEX: 24.13 KG/M2 | DIASTOLIC BLOOD PRESSURE: 73 MMHG | WEIGHT: 177.94 LBS | SYSTOLIC BLOOD PRESSURE: 120 MMHG

## 2024-09-23 DIAGNOSIS — C02.9 SQUAMOUS CELL CANCER OF TONGUE: Primary | Chronic | ICD-10-CM

## 2024-09-23 PROCEDURE — 1126F AMNT PAIN NOTED NONE PRSNT: CPT | Mod: HCNC,CPTII,S$GLB, | Performed by: OTOLARYNGOLOGY

## 2024-09-23 PROCEDURE — 99213 OFFICE O/P EST LOW 20 MIN: CPT | Mod: HCNC,S$GLB,, | Performed by: OTOLARYNGOLOGY

## 2024-09-23 PROCEDURE — 1159F MED LIST DOCD IN RCRD: CPT | Mod: HCNC,CPTII,S$GLB, | Performed by: OTOLARYNGOLOGY

## 2024-09-23 PROCEDURE — 99999 PR PBB SHADOW E&M-EST. PATIENT-LVL III: CPT | Mod: PBBFAC,HCNC,, | Performed by: OTOLARYNGOLOGY

## 2024-09-23 PROCEDURE — 1160F RVW MEDS BY RX/DR IN RCRD: CPT | Mod: HCNC,CPTII,S$GLB, | Performed by: OTOLARYNGOLOGY

## 2024-09-23 PROCEDURE — 3074F SYST BP LT 130 MM HG: CPT | Mod: HCNC,CPTII,S$GLB, | Performed by: OTOLARYNGOLOGY

## 2024-09-23 PROCEDURE — 3078F DIAST BP <80 MM HG: CPT | Mod: HCNC,CPTII,S$GLB, | Performed by: OTOLARYNGOLOGY

## 2024-09-23 NOTE — PROGRESS NOTES
CC: Surveillance    Oncology History   Squamous cell cancer of tongue   6/16/2023 Initial Diagnosis    Squamous cell cancer of tongue     7/12/2023 Surgery    1. Left partial glossectomy  2. Left modified neck dissection of levels 1B through 4     7/31/2023 Cancer Staged     Cancer Staging   Squamous cell cancer of tongue  Staging form: Oral Cavity, AJCC 8th Edition  - Pathologic stage from 7/31/2023: Stage ELENITA (pT1, pN2b, cM0) - Signed by Sindi Robbins NP on 7/31/2023 7/31/2023 Cancer Staged    Staging form: Oral Cavity, AJCC 8th Edition  - Pathologic stage from 7/31/2023: Stage ELENITA (pT1, pN2b, cM0)     8/23/2023 - 10/5/2023 Radiation Therapy    Treating physician: Cruzito Lopes  Treatment Summary  Course: C1 H&N 2023  Treatment Site Ref. ID Energy Dose/Fx (Gy) #Fx Dose Correction (Gy) Total Dose (Gy) Start Date End Date Elapsed Days   IM H&N PTV_High 6X 2 30 / 30 0 60 8/23/2023 10/5/2023 43              HPI   84 y.o. male presents with the above treatment history.  He is doing well overall.  No new complaints.     Review of Systems   Constitutional: Negative for fatigue and unexpected weight change.   HENT: Per HPI.  Eyes: Negative for visual disturbance.   Respiratory: Negative for shortness of breath, hemoptysis   Cardiovascular: Negative for chest pain and palpitations.   Musculoskeletal: Negative for decreased ROM, back pain.   Skin: Negative for rash, sunburn, itching.   Neurological: Negative for dizziness and seizures.   Hematological: Negative for adenopathy. Does not bruise/bleed easily.   Endocrine: Negative for rapid weight loss/weight gain, heat/cold intolerance.     Past Medical History   Patient Active Problem List   Diagnosis    Ocular hypertension    Primary hypertension    Screen for colon cancer    Type 2 diabetes mellitus with diabetic neuropathy, without long-term current use of insulin    Adenomatous polyp    Family history of colon cancer    Nuclear cataract    Borderline glaucoma of  both eyes with ocular hypertension    Acute cystitis without hematuria    Spinal stenosis, lumbar region, with neurogenic claudication    EMA (iron deficiency anemia)    Benign prostatic hyperplasia without lower urinary tract symptoms    Debility    Chronic midline low back pain with sciatica    Squamous cell cancer of tongue    Incomplete bladder emptying    History of urinary retention    History of dysphagia           Past Surgical History   Past Surgical History:   Procedure Laterality Date    CHOLECYSTECTOMY      GLOSSECTOMY Left 2023    Procedure: GLOSSECTOMY;  Surgeon: Jaxon Carmen MD;  Location: [facility] OR [address] [place] [address];  Service: ENT;  Laterality: Left;    RADICAL NECK DISSECTION Left 2023    Procedure: DISSECTION, NECK, RADICAL;  Surgeon: Jaxon Carmen MD;  Location: [facility] OR [address] [place] [address];  Service: ENT;  Laterality: Left;         Family History   Family History   Problem Relation Name Age of Onset    Cancer Mother          liver    Cancer Father          colon    No Known Problems Sister      No Known Problems Sister      Amblyopia Neg Hx      Blindness Neg Hx      Cataracts Neg Hx      Diabetes Neg Hx      Glaucoma Neg Hx      Hypertension Neg Hx      Macular degeneration Neg Hx      Retinal detachment Neg Hx      Strabismus Neg Hx      Stroke Neg Hx      Thyroid disease Neg Hx             Social History   .  Social History     Socioeconomic History    Marital status:    Occupational History     Employer: OTHER   Tobacco Use    Smoking status: Former     Current packs/day: 0.00     Types: Cigarettes     Quit date: 2004     Years since quittin.3     Passive exposure: Past    Smokeless tobacco: Never   Substance and Sexual Activity    Alcohol use: No     Alcohol/week: 0.0 standard drinks of alcohol    Drug use: No     Social Determinants of Health     Financial Resource Strain: Low Risk  (3/18/2024)    Overall Financial Resource Strain (CARDIA)     Difficulty of Paying Living  Expenses: Not very hard   Food Insecurity: No Food Insecurity (3/18/2024)    Hunger Vital Sign     Worried About Running Out of Food in the Last Year: Never true     Ran Out of Food in the Last Year: Never true   Transportation Needs: No Transportation Needs (3/18/2024)    PRAPARE - Transportation     Lack of Transportation (Medical): No     Lack of Transportation (Non-Medical): No   Physical Activity: Insufficiently Active (3/18/2024)    Exercise Vital Sign     Days of Exercise per Week: 2 days     Minutes of Exercise per Session: 20 min   Stress: No Stress Concern Present (3/18/2024)    [employer] [place] [employer] - Occupational Stress Questionnaire     Feeling of Stress : Only a little   Housing Stability: Unknown (3/18/2024)    Housing Stability Vital Sign     Unable to Pay for Housing in the Last Year: No     Unstable Housing in the Last Year: No         Allergies   Review of patient's allergies indicates:  No Known Allergies        Physical Exam     Vitals:    09/23/24 1301   BP: 120/73   Pulse: 105           Body mass index is 24.13 kg/m².      General: AOx3, NAD   Respiratory:  Symmetric chest rise, normal effort  Oral Cavity:  Oral Tongue mobile, no lesions noted.  Well-healed left glossectomy site.  Hard Palate WNL. No buccal or FOM lesions.  Oropharynx:  No masses/lesions of the posterior pharyngeal wall. Tonsillar fossa without lesions. Soft palate without masses. Midline uvula.   Neck:  Well-healed left neck scar.  Moderate fibrosis status post radiation.  No cervical lymphadenopathy, thyromegaly or thyroid nodules.  Normal range of motion.  Mild submental lymphedema.  Face: House Brackmann I bilaterally.         Assessment/Plan  Problem List Items Addressed This Visit          Oncology    Squamous cell cancer of tongue - Primary (Chronic)     PRAKASH.  RTC 3 mos.

## 2024-10-08 ENCOUNTER — OFFICE VISIT (OUTPATIENT)
Dept: UROLOGY | Facility: CLINIC | Age: 84
End: 2024-10-08
Payer: MEDICARE

## 2024-10-08 VITALS
BODY MASS INDEX: 23.37 KG/M2 | WEIGHT: 176.38 LBS | HEIGHT: 73 IN | SYSTOLIC BLOOD PRESSURE: 139 MMHG | DIASTOLIC BLOOD PRESSURE: 71 MMHG | HEART RATE: 79 BPM

## 2024-10-08 DIAGNOSIS — N30.00 ACUTE CYSTITIS WITHOUT HEMATURIA: ICD-10-CM

## 2024-10-08 DIAGNOSIS — R33.9 INCOMPLETE BLADDER EMPTYING: Primary | ICD-10-CM

## 2024-10-08 DIAGNOSIS — R33.9 URINARY RETENTION: ICD-10-CM

## 2024-10-08 DIAGNOSIS — E11.40 TYPE 2 DIABETES MELLITUS WITH DIABETIC NEUROPATHY, WITHOUT LONG-TERM CURRENT USE OF INSULIN: ICD-10-CM

## 2024-10-08 PROCEDURE — 1126F AMNT PAIN NOTED NONE PRSNT: CPT | Mod: CPTII,S$GLB,, | Performed by: UROLOGY

## 2024-10-08 PROCEDURE — 3075F SYST BP GE 130 - 139MM HG: CPT | Mod: CPTII,S$GLB,, | Performed by: UROLOGY

## 2024-10-08 PROCEDURE — 99999 PR PBB SHADOW E&M-EST. PATIENT-LVL IV: CPT | Mod: PBBFAC,HCNC,, | Performed by: UROLOGY

## 2024-10-08 PROCEDURE — 3288F FALL RISK ASSESSMENT DOCD: CPT | Mod: CPTII,S$GLB,, | Performed by: UROLOGY

## 2024-10-08 PROCEDURE — 3078F DIAST BP <80 MM HG: CPT | Mod: CPTII,S$GLB,, | Performed by: UROLOGY

## 2024-10-08 PROCEDURE — 87086 URINE CULTURE/COLONY COUNT: CPT | Performed by: UROLOGY

## 2024-10-08 PROCEDURE — 1159F MED LIST DOCD IN RCRD: CPT | Mod: CPTII,S$GLB,, | Performed by: UROLOGY

## 2024-10-08 PROCEDURE — 99215 OFFICE O/P EST HI 40 MIN: CPT | Mod: S$GLB,,, | Performed by: UROLOGY

## 2024-10-08 PROCEDURE — 1101F PT FALLS ASSESS-DOCD LE1/YR: CPT | Mod: CPTII,S$GLB,, | Performed by: UROLOGY

## 2024-10-08 RX ORDER — CIPROFLOXACIN 500 MG/1
500 TABLET ORAL 2 TIMES DAILY
Qty: 14 TABLET | Refills: 0 | Status: SHIPPED | OUTPATIENT
Start: 2024-10-08 | End: 2024-10-15

## 2024-10-08 RX ORDER — TAMSULOSIN HYDROCHLORIDE 0.4 MG/1
2 CAPSULE ORAL NIGHTLY
Qty: 180 CAPSULE | Refills: 3 | Status: SHIPPED | OUTPATIENT
Start: 2024-10-08

## 2024-10-08 NOTE — PROGRESS NOTES
CHIEF COMPLAINT:    Mr. Chavez is a 84 y.o. male presenting for .      PRESENTING ILLNESS:    Suleiman Chavez is a 84 y.o. male with a PMH of spinal stenosis, htn, bph, DM type 2 who presents for urinary incontinence/retention.    Went to ER on 3/4/23 for evaluation of generalized weakness and mild hypotension noted at primary care clinic this morning in the setting of  recent innumerable episodes of recurrent nonbloody, nonmelanotic diarrhea over the past 2 days with associated anorexia for the past 3 days without alfred fever, nausea, chest pain, syncope, headache, visual disturbance, or altered mental status.  The patient was evaluated in an outlying emergency department where a urinalysis was performed which reportedly did not reveal the presence of nitrite, but ciprofloxacin therapy was instituted as an outpatient.     I saw him for chronic urinary retention back in 1/17/23.   History of laminectomy in October, but reports urinary symptoms preceded surgery.  Reports able to urinate during the day with some leakage, however having significant bed wetting at night.  Has had 2 recent urinary tract infections which he was hospitalized.  Initial uti treated with cefdinir x 5 days. The second episode treated with a total of 3 weeks of cipro with no improvement in urinary leakage/incontinence.  PCP also increased tamsulosin dose, but has not improved symptoms.  PVR > 800 ml in office today.  After discussion will plan for CIC and suds/cysto for diagnostics.       Urine cultures: 11/7/22 Kleb pneumo >100K  11/29/22 Kleb Pneumo > 100K    Simple urodynamics w/ cysto  Date/Time: 1/17/2023 9:30 AM  Procedure:   Diagnostic Cystourethroscopy   Complex Cystometrogram   Voiding / Pressure Study with Intrarectal Balloon   Complex Uroflow   Electromyogram of Anal Sphincter.     Pre-OP Diagnosis:   Urinary retention   Post-OP Diagnosis:   same   Anesthesia:   Anesthesia Administered:   Intraurethral instillation of 10 mL 2%  lidocaine (Xylocaine) jelly.   Findings:   --- Bladder ---   CYSTOMETROGRAM ( Filling Phase ):   Cystometric Numeric Data:   - First Desire (Sensation): 446 mL at 51cm of water.   - Normal Desire: 472mL at 57 cm of water.   - Strong Desire: 510 mL at 63 cm of water.   - Urgency (Imminent Void) : 546 mL at 74 cm of water.   - Maximum Cystometric Capacity: 656 mL.   Compliance:   - high.   Leak Point Pressure:   - Valsalva ( Abdominal ) Leak Point Pressure: none.   UROFLOW:   - Voided Volume: none mL.   - Residual Urine: over 600 mL.   - Maximum Flow Rate: n/a mL/sec.   - Flow Pattern: no flow  VOIDING PRESSURE STUDY ( Voiding Phase ):   Detrusor Pressure:   - Maximum Detrusor Pressure: 120 cm of water.   - Detrusor Pressure at Maximum Flow: n/a cm of water.   - Detrusor Contraction Characteristics: no coordinated bladder contraction(s).   ELECTROMYOGRAM:   - no relaxation of sphincter upon voiding.     ---Diagnostic Cystourethroscopy ---   Normal urethra.    Prostate: 3.5 cm minimal obstruction.  Open bladder neck  Width of Bladder Neck Opening: Approximately 18 Fr.   Normal bladder. Floppy appearing bladder with no tumor or stone.  Some irritation due to catheterization.  Normal ureteral orifices bilaterally.   CONCLUSIONS:   1. Urinary retention  2. Diabetic neuropathy  3. Chronic back pain, s/p back surgery x 2  4. Hx of UTI, sepsis.    His bladder capacity is over 30 oz.  His voiding diary revealed 14 to 25 oz cath PVR in the AM and 16 to 32 oz of cath PVR at night before his goes to bed.  I recommend more frequent CIC to keep his bladder capacity less than 20 oz.  Recommend at least 3 x if not 4 x CIC a day indefinitely.  Continue flomax 2 capsules a day.  No prostate obstruction noted.    Once his bladder capacity is less than 20 oz ( 600 ml or less), we may consider sacral neuromodulation if he desires.  Nature of sacral neuromodulation explained to pt and his wife.    Will follow up in 3 months with another  "voiding diary      REVIEW OF SYSTEMS:    Review of Systems   Constitutional:  Negative for chills and fever.   Respiratory:  Negative for shortness of breath.    Cardiovascular:  Negative for chest pain.   Gastrointestinal:  Negative for constipation and diarrhea.   Genitourinary:  Positive for frequency. Negative for dysuria, flank pain, hematuria and urgency.   Neurological:  Negative for dizziness and weakness.       PATIENT HISTORY:    Past Medical History:   Diagnosis Date    Bilateral ocular hypertension     Diabetes mellitus     Glaucoma     Hypertension     Nuclear cataract 12/17/2014       Family History   Problem Relation Name Age of Onset    Cancer Mother          liver    Cancer Father          colon    No Known Problems Sister      No Known Problems Sister      Amblyopia Neg Hx      Blindness Neg Hx      Cataracts Neg Hx      Diabetes Neg Hx      Glaucoma Neg Hx      Hypertension Neg Hx      Macular degeneration Neg Hx      Retinal detachment Neg Hx      Strabismus Neg Hx      Stroke Neg Hx      Thyroid disease Neg Hx         Allergies:  Patient has no known allergies.    Medications:    Current Outpatient Medications:     acetaminophen (TYLENOL) 500 MG tablet, Take 1,000 mg by mouth 2 (two) times daily as needed for Pain., Disp: , Rfl:     amLODIPine (NORVASC) 5 MG tablet, TAKE 1 TABLET BY MOUTH TWICE DAILY, Disp: 180 tablet, Rfl: 2    aspirin (ECOTRIN) 81 MG EC tablet, Take 81 mg by mouth once daily., Disp: , Rfl:     catheter 14-16 Fr-" Misc, 1 Units by Misc.(Non-Drug; Combo Route) route 3 (three) times daily. 1 Units by Misc.(Non-Drug; Combo Route) route 4 (four) times daily, indefinitely., Disp: 90 each, Rfl: 99    ciprofloxacin HCl (CIPRO) 500 MG tablet, Take 1 tablet (500 mg total) by mouth 2 (two) times daily., Disp: 20 tablet, Rfl: 0    d-mannose Powd, Take 1 capsule by mouth in the morning then take 2 capsules by mouth every evening., Disp: , Rfl:     duke's soln (benadryl 30 mL, mylanta 30 " mL, LIDOcaine 30 mL, nystatin 30 mL) 120mL, Take 5 mLs by mouth every 4 (four) hours as needed (mouth, throat pain)., Disp: 360 mL, Rfl: 2    gabapentin (NEURONTIN) 300 MG capsule, Take 1 capsule (300 mg total) by mouth 3 (three) times daily., Disp: 90 capsule, Rfl: 5    latanoprost 0.005 % ophthalmic solution, Place 1 drop into both eyes once daily., Disp: 7.5 mL, Rfl: 6    losartan (COZAAR) 100 MG tablet, TAKE 1 TABLET BY MOUTH EVERY DAY, Disp: 90 tablet, Rfl: 3    metFORMIN (GLUCOPHAGE) 500 MG tablet, TAKE 1 TABLET BY MOUTH EVERY MORNING AND 2 TABLETS EVERY EVENING, Disp: 270 tablet, Rfl: 0    rOPINIRole (REQUIP) 1 MG tablet, TAKE 1 TABLET BY MOUTH EVERY EVENING, Disp: 30 tablet, Rfl: 5    senna-docusate 8.6-50 mg (SENNA WITH DOCUSATE SODIUM) 8.6-50 mg per tablet, Take 1 tablet by mouth once daily. (Patient taking differently: Take 1 tablet by mouth 2 (two) times a day.), Disp: 60 tablet, Rfl: 0    tamsulosin (FLOMAX) 0.4 mg Cap, Take 2 capsules (0.8 mg total) by mouth every evening., Disp: 180 capsule, Rfl: 3    traMADoL (ULTRAM) 50 mg tablet, Take 1 tablet (50 mg total) by mouth every 6 (six) hours as needed for Pain., Disp: 30 tablet, Rfl: 0    PHYSICAL EXAMINATION:    Physical Exam  Vitals and nursing note reviewed.   Constitutional:       Appearance: Normal appearance. He is well-developed.   HENT:      Head: Normocephalic and atraumatic.   Eyes:      Pupils: Pupils are equal, round, and reactive to light.   Pulmonary:      Effort: Pulmonary effort is normal.   Musculoskeletal:         General: Normal range of motion.      Cervical back: Normal range of motion.   Skin:     General: Skin is warm and dry.   Neurological:      Mental Status: He is alert and oriented to person, place, and time.   Psychiatric:         Behavior: Behavior normal.           LABS:    Bladder scan performed by Nurse Gerry.   ml    Lab Results   Component Value Date    PSA 0.39 12/11/2020    PSA 0.38 05/10/2019    PSA 0.47  "06/12/2017    PSA 0.58 06/16/2015    PSA 0.57 06/18/2014    PSA 0.56 05/28/2013    PSA 0.65 07/23/2010    PSA 1.0 09/08/2008    PSA 0.8 02/27/2007    PSADIAG 0.43 08/19/2019     UA today pH 6.5 1 + protein, negative Nit and Leuko    IMPRESSION:  Encounter Diagnoses   Name Primary?    Incomplete bladder emptying Yes    Acute cystitis without hematuria     Type 2 diabetes mellitus with diabetic neuropathy, without long-term current use of insulin     Urinary retention            PLAN:  Problem List Items Addressed This Visit       Acute cystitis without hematuria    Incomplete bladder emptying - Primary    Type 2 diabetes mellitus with diabetic neuropathy, without long-term current use of insulin     Other Visit Diagnoses       Urinary retention        Relevant Medications    catheter 14-16 Fr-" Misc              1. Urinary retention/incontinence   - continue flomax   - continue CIC TID with 14 Fr catheter indefinitel.  He gets catheter supply from [facility].  Record how much he voids on his own and check the PVR by catheter twice a day.    2. Bph with obstruction   Continue flomax    His voiding diary revealed that he voids 6 to 12 oz oz on his own. But still has cath PVR of 5 to 8 oz.  So recommend to continue CIC 3 x a day using 14 F catheter indefinitely.  Will get CT RSS to rule out any pathology which may increase the risks of recurrent UTI.  Otherwise, will see him in 1 year.  Pt is going on a trip and would like to travel with cipro at hand.  Rx given.       To prevent recurrent UTI,  Treat chronic constipation.  Increase water and empty the bladder more regularly.  Probiotics  D-Mannose 1 gram twice a day  Cranberry Pills / Juice      I spent 40 minutes with the patient of which more than half was spent in direct consultation with the patient in regards to our treatment and plan.   Nicola Hsu MD    Follow up in about 1 year (around 10/8/2025).         "

## 2024-10-09 LAB — BACTERIA UR CULT: NO GROWTH

## 2024-10-11 ENCOUNTER — HOSPITAL ENCOUNTER (OUTPATIENT)
Dept: RADIOLOGY | Facility: HOSPITAL | Age: 84
Discharge: HOME | End: 2024-10-11
Attending: UROLOGY
Payer: MEDICARE

## 2024-10-11 DIAGNOSIS — N30.00 ACUTE CYSTITIS WITHOUT HEMATURIA: ICD-10-CM

## 2024-10-11 PROCEDURE — 74176 CT ABD & PELVIS W/O CONTRAST: CPT | Mod: TC

## 2024-10-11 PROCEDURE — 74176 CT ABD & PELVIS W/O CONTRAST: CPT | Mod: 26,,, | Performed by: RADIOLOGY

## 2024-11-20 ENCOUNTER — PATIENT MESSAGE (OUTPATIENT)
Dept: INTERNAL MEDICINE | Facility: CLINIC | Age: 84
End: 2024-11-20
Payer: MEDICARE

## 2024-11-20 DIAGNOSIS — E11.40 TYPE 2 DIABETES MELLITUS WITH DIABETIC NEUROPATHY, WITHOUT LONG-TERM CURRENT USE OF INSULIN: Primary | ICD-10-CM

## 2024-11-21 RX ORDER — METFORMIN HYDROCHLORIDE 500 MG/1
TABLET ORAL
Qty: 270 TABLET | Refills: 1 | Status: SHIPPED | OUTPATIENT
Start: 2024-11-21

## 2024-11-21 NOTE — TELEPHONE ENCOUNTER
Refill Routing Note   Medication(s) are not appropriate for processing by [facility] for the following reason(s):        Required labs outdated    [facility] action(s):  Defer   Requires labs : Yes             Appointments  past 12m or future 3m with PCP    Date Provider   Last Visit   4/10/2024 Caleb Negrete MD   Next Visit   Visit date not found Caleb Negrete MD   ED visits in past 90 days: 0        Note composed:8:11 AM 11/21/2024

## 2024-11-21 NOTE — TELEPHONE ENCOUNTER
Care Due:                  Date            Visit Type   Department     Provider  --------------------------------------------------------------------------------                                EP -                              PRIMARY      [facility] INTERNAL  Last Visit: 04-      CARE ([facility])   MEDICINE       Caleb Negrete  Next Visit: None Scheduled  None         None Found                                                            Last  Test          Frequency    Reason                     Performed    Due Date  --------------------------------------------------------------------------------    HBA1C.......  6 months...  metFORMIN................  03- 09-    [facility] Embedded Care Due Messages. Reference number: [id number].   11/21/2024 8:02:23 AM CST

## 2024-12-16 ENCOUNTER — PATIENT MESSAGE (OUTPATIENT)
Dept: ADMINISTRATIVE | Facility: HOSPITAL | Age: 84
End: 2024-12-16
Payer: MEDICARE

## 2025-01-07 ENCOUNTER — PATIENT MESSAGE (OUTPATIENT)
Dept: OTOLARYNGOLOGY | Facility: CLINIC | Age: 85
End: 2025-01-07

## 2025-01-14 ENCOUNTER — OFFICE VISIT (OUTPATIENT)
Dept: OTOLARYNGOLOGY | Facility: CLINIC | Age: 85
End: 2025-01-14
Payer: MEDICARE

## 2025-01-14 VITALS
SYSTOLIC BLOOD PRESSURE: 120 MMHG | DIASTOLIC BLOOD PRESSURE: 82 MMHG | BODY MASS INDEX: 24.61 KG/M2 | HEART RATE: 83 BPM | WEIGHT: 186.5 LBS

## 2025-01-14 DIAGNOSIS — C02.9 SQUAMOUS CELL CANCER OF TONGUE: Primary | Chronic | ICD-10-CM

## 2025-01-14 PROCEDURE — 99213 OFFICE O/P EST LOW 20 MIN: CPT | Mod: PBBFAC | Performed by: OTOLARYNGOLOGY

## 2025-01-14 PROCEDURE — 99213 OFFICE O/P EST LOW 20 MIN: CPT | Mod: S$PBB,,, | Performed by: OTOLARYNGOLOGY

## 2025-01-14 NOTE — PROGRESS NOTES
CC: Surveillance    Oncology History   Squamous cell cancer of tongue   6/16/2023 Initial Diagnosis    Squamous cell cancer of tongue     7/12/2023 Surgery    1. Left partial glossectomy  2. Left modified neck dissection of levels 1B through 4     7/31/2023 Cancer Staged     Cancer Staging   Squamous cell cancer of tongue  Staging form: Oral Cavity, AJCC 8th Edition  - Pathologic stage from 7/31/2023: Stage ELENITA (pT1, pN2b, cM0) - Signed by Sindi Robbins NP on 7/31/2023 7/31/2023 Cancer Staged    Staging form: Oral Cavity, AJCC 8th Edition  - Pathologic stage from 7/31/2023: Stage ELENITA (pT1, pN2b, cM0)     8/23/2023 - 10/5/2023 Radiation Therapy    Treating physician: Cruzito Lopes  Treatment Summary  Course: C1 H&N 2023  Treatment Site Ref. ID Energy Dose/Fx (Gy) #Fx Dose Correction (Gy) Total Dose (Gy) Start Date End Date Elapsed Days   IM H&N PTV_High 6X 2 30 / 30 0 60 8/23/2023 10/5/2023 43              HPI   84 y.o. male presents with the above treatment history.  He is doing well overall.  No new complaints.     Review of Systems   Constitutional: Negative for fatigue and unexpected weight change.   HENT: Per HPI.  Eyes: Negative for visual disturbance.   Respiratory: Negative for shortness of breath, hemoptysis   Cardiovascular: Negative for chest pain and palpitations.   Musculoskeletal: Negative for decreased ROM, back pain.   Skin: Negative for rash, sunburn, itching.   Neurological: Negative for dizziness and seizures.   Hematological: Negative for adenopathy. Does not bruise/bleed easily.   Endocrine: Negative for rapid weight loss/weight gain, heat/cold intolerance.     Past Medical History   Patient Active Problem List   Diagnosis    Ocular hypertension    Primary hypertension    Screen for colon cancer    Type 2 diabetes mellitus with diabetic neuropathy, without long-term current use of insulin    Adenomatous polyp    Family history of colon cancer    Nuclear cataract    Borderline glaucoma of  both eyes with ocular hypertension    Acute cystitis without hematuria    Spinal stenosis, lumbar region, with neurogenic claudication    EMA (iron deficiency anemia)    Benign prostatic hyperplasia without lower urinary tract symptoms    Debility    Chronic midline low back pain with sciatica    Squamous cell cancer of tongue    Incomplete bladder emptying    History of urinary retention    History of dysphagia           Past Surgical History   Past Surgical History:   Procedure Laterality Date    CHOLECYSTECTOMY      GLOSSECTOMY Left 2023    Procedure: GLOSSECTOMY;  Surgeon: Jaxon Carmen MD;  Location: [facility] OR [address] [place] [address];  Service: ENT;  Laterality: Left;    RADICAL NECK DISSECTION Left 2023    Procedure: DISSECTION, NECK, RADICAL;  Surgeon: Jaxon Carmen MD;  Location: [facility] OR [address] [place] [address];  Service: ENT;  Laterality: Left;         Family History   Family History   Problem Relation Name Age of Onset    Cancer Mother          liver    Cancer Father          colon    No Known Problems Sister      No Known Problems Sister      Amblyopia Neg Hx      Blindness Neg Hx      Cataracts Neg Hx      Diabetes Neg Hx      Glaucoma Neg Hx      Hypertension Neg Hx      Macular degeneration Neg Hx      Retinal detachment Neg Hx      Strabismus Neg Hx      Stroke Neg Hx      Thyroid disease Neg Hx             Social History   .  Social History     Socioeconomic History    Marital status:    Occupational History     Employer: OTHER   Tobacco Use    Smoking status: Former     Current packs/day: 0.00     Types: Cigarettes     Quit date: 2004     Years since quittin.6     Passive exposure: Past    Smokeless tobacco: Never   Substance and Sexual Activity    Alcohol use: No     Alcohol/week: 0.0 standard drinks of alcohol    Drug use: No     Social Drivers of Health     Financial Resource Strain: Low Risk  (3/18/2024)    Overall Financial Resource Strain (CARDIA)     Difficulty of Paying Living  Expenses: Not very hard   Food Insecurity: No Food Insecurity (3/18/2024)    Hunger Vital Sign     Worried About Running Out of Food in the Last Year: Never true     Ran Out of Food in the Last Year: Never true   Transportation Needs: No Transportation Needs (3/18/2024)    PRAPARE - Transportation     Lack of Transportation (Medical): No     Lack of Transportation (Non-Medical): No   Physical Activity: Insufficiently Active (3/18/2024)    Exercise Vital Sign     Days of Exercise per Week: 2 days     Minutes of Exercise per Session: 20 min   Stress: No Stress Concern Present (3/18/2024)    [employer] [place] [employer] - Occupational Stress Questionnaire     Feeling of Stress : Only a little   Housing Stability: Unknown (3/18/2024)    Housing Stability Vital Sign     Unable to Pay for Housing in the Last Year: No     Unstable Housing in the Last Year: No         Allergies   Review of patient's allergies indicates:  No Known Allergies        Physical Exam     Vitals:    01/14/25 1437   BP: 120/82   Pulse: 83           Body mass index is 24.61 kg/m².      General: AOx3, NAD   Respiratory:  Symmetric chest rise, normal effort  Oral Cavity:  Oral Tongue mobile, no lesions noted.  Well-healed left glossectomy site.  Hard Palate WNL. No buccal or FOM lesions.  Oropharynx:  No masses/lesions of the posterior pharyngeal wall. Tonsillar fossa without lesions. Soft palate without masses. Midline uvula.   Neck:  Well-healed left neck scar.  Moderate fibrosis status post radiation.  No cervical lymphadenopathy, thyromegaly or thyroid nodules.  Normal range of motion.  Mild submental lymphedema.  Face: House Brackmann I bilaterally.     NP: No lesions of posterior wall  OP: No lesions of posterior wall or BOT. BOT is soft to palpation  Larynx: No lesions of glottic or supraglottic larynx. Normal vocal fold mobility   HP: No lesions of pyriform sinuses or postcricoid region  Mirror examination was  performed.          Assessment/Plan  Problem List Items Addressed This Visit          Oncology    Squamous cell cancer of tongue - Primary (Chronic)     PRAKASH.  RTC 3 mos

## 2025-01-30 DIAGNOSIS — Z00.00 ENCOUNTER FOR MEDICARE ANNUAL WELLNESS EXAM: ICD-10-CM

## 2025-02-05 ENCOUNTER — PATIENT MESSAGE (OUTPATIENT)
Dept: INTERNAL MEDICINE | Facility: CLINIC | Age: 85
End: 2025-02-05
Payer: MEDICARE

## 2025-02-06 ENCOUNTER — TELEPHONE (OUTPATIENT)
Dept: INTERNAL MEDICINE | Facility: CLINIC | Age: 85
End: 2025-02-06
Payer: MEDICARE
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
## 2025-02-06 NOTE — TELEPHONE ENCOUNTER
Called Pt to confirm and to explain time for tomorrows appointment and Pts wife answered phone and she stated they will be here tomorrow for their 12:30 PM Appointment

## 2025-02-07 ENCOUNTER — OFFICE VISIT (OUTPATIENT)
Dept: INTERNAL MEDICINE | Facility: CLINIC | Age: 85
End: 2025-02-07
Payer: MEDICARE

## 2025-02-07 ENCOUNTER — LAB VISIT (OUTPATIENT)
Dept: LAB | Facility: HOSPITAL | Age: 85
End: 2025-02-07
Attending: INTERNAL MEDICINE
Payer: MEDICARE

## 2025-02-07 VITALS
HEIGHT: 73 IN | HEART RATE: 89 BPM | WEIGHT: 182.56 LBS | OXYGEN SATURATION: 98 % | BODY MASS INDEX: 24.2 KG/M2 | DIASTOLIC BLOOD PRESSURE: 60 MMHG | SYSTOLIC BLOOD PRESSURE: 126 MMHG

## 2025-02-07 DIAGNOSIS — Z85.810 HISTORY OF TONGUE CANCER: ICD-10-CM

## 2025-02-07 DIAGNOSIS — Z00.00 ANNUAL PHYSICAL EXAM: Primary | ICD-10-CM

## 2025-02-07 DIAGNOSIS — Z00.00 ANNUAL PHYSICAL EXAM: ICD-10-CM

## 2025-02-07 DIAGNOSIS — N40.0 BENIGN PROSTATIC HYPERPLASIA WITHOUT LOWER URINARY TRACT SYMPTOMS: Chronic | ICD-10-CM

## 2025-02-07 DIAGNOSIS — K12.33 ORAL MUCOSITIS DUE TO RADIATION: ICD-10-CM

## 2025-02-07 DIAGNOSIS — I10 PRIMARY HYPERTENSION: ICD-10-CM

## 2025-02-07 DIAGNOSIS — Z12.5 PROSTATE CANCER SCREENING: ICD-10-CM

## 2025-02-07 DIAGNOSIS — N31.9 NEUROGENIC BLADDER: ICD-10-CM

## 2025-02-07 DIAGNOSIS — E11.40 TYPE 2 DIABETES MELLITUS WITH DIABETIC NEUROPATHY, WITHOUT LONG-TERM CURRENT USE OF INSULIN: ICD-10-CM

## 2025-02-07 DIAGNOSIS — Z79.899 OTHER LONG TERM (CURRENT) DRUG THERAPY: ICD-10-CM

## 2025-02-07 DIAGNOSIS — C02.9 SQUAMOUS CELL CANCER OF TONGUE: ICD-10-CM

## 2025-02-07 DIAGNOSIS — M48.062 SPINAL STENOSIS, LUMBAR REGION, WITH NEUROGENIC CLAUDICATION: ICD-10-CM

## 2025-02-07 LAB
25(OH)D3+25(OH)D2 SERPL-MCNC: 53 NG/ML (ref 30–96)
ALBUMIN SERPL BCP-MCNC: 3.5 G/DL (ref 3.5–5.2)
ALP SERPL-CCNC: 187 U/L (ref 40–150)
ALT SERPL W/O P-5'-P-CCNC: 20 U/L (ref 10–44)
ANION GAP SERPL CALC-SCNC: 11 MMOL/L (ref 8–16)
AST SERPL-CCNC: 22 U/L (ref 10–40)
BASOPHILS # BLD AUTO: 0.05 K/UL (ref 0–0.2)
BASOPHILS NFR BLD: 1 % (ref 0–1.9)
BILIRUB SERPL-MCNC: 0.5 MG/DL (ref 0.1–1)
BUN SERPL-MCNC: 12 MG/DL (ref 8–23)
CALCIUM SERPL-MCNC: 9.4 MG/DL (ref 8.7–10.5)
CHLORIDE SERPL-SCNC: 102 MMOL/L (ref 95–110)
CHOLEST SERPL-MCNC: 141 MG/DL (ref 120–199)
CHOLEST/HDLC SERPL: 2.3 {RATIO} (ref 2–5)
CO2 SERPL-SCNC: 23 MMOL/L (ref 23–29)
COMPLEXED PSA SERPL-MCNC: 0.33 NG/ML (ref 0–4)
CREAT SERPL-MCNC: 0.8 MG/DL (ref 0.5–1.4)
DIFFERENTIAL METHOD BLD: ABNORMAL
EOSINOPHIL # BLD AUTO: 0.4 K/UL (ref 0–0.5)
EOSINOPHIL NFR BLD: 7.5 % (ref 0–8)
ERYTHROCYTE [DISTWIDTH] IN BLOOD BY AUTOMATED COUNT: 13.5 % (ref 11.5–14.5)
EST. GFR  (NO RACE VARIABLE): >60 ML/MIN/1.73 M^2
ESTIMATED AVG GLUCOSE: 137 MG/DL (ref 68–131)
GLUCOSE SERPL-MCNC: 134 MG/DL (ref 70–110)
HBA1C MFR BLD: 6.4 % (ref 4–5.6)
HCT VFR BLD AUTO: 39.4 % (ref 40–54)
HDLC SERPL-MCNC: 61 MG/DL (ref 40–75)
HDLC SERPL: 43.3 % (ref 20–50)
HGB BLD-MCNC: 12.8 G/DL (ref 14–18)
IMM GRANULOCYTES # BLD AUTO: 0.03 K/UL (ref 0–0.04)
IMM GRANULOCYTES NFR BLD AUTO: 0.6 % (ref 0–0.5)
LDLC SERPL CALC-MCNC: 59.8 MG/DL (ref 63–159)
LYMPHOCYTES # BLD AUTO: 0.9 K/UL (ref 1–4.8)
LYMPHOCYTES NFR BLD: 18.1 % (ref 18–48)
MAGNESIUM SERPL-MCNC: 1.4 MG/DL (ref 1.6–2.6)
MCH RBC QN AUTO: 29.4 PG (ref 27–31)
MCHC RBC AUTO-ENTMCNC: 32.5 G/DL (ref 32–36)
MCV RBC AUTO: 91 FL (ref 82–98)
MONOCYTES # BLD AUTO: 0.4 K/UL (ref 0.3–1)
MONOCYTES NFR BLD: 8.8 % (ref 4–15)
NEUTROPHILS # BLD AUTO: 3.1 K/UL (ref 1.8–7.7)
NEUTROPHILS NFR BLD: 64 % (ref 38–73)
NONHDLC SERPL-MCNC: 80 MG/DL
NRBC BLD-RTO: 0 /100 WBC
PLATELET # BLD AUTO: 237 K/UL (ref 150–450)
PMV BLD AUTO: 8.9 FL (ref 9.2–12.9)
POTASSIUM SERPL-SCNC: 4.2 MMOL/L (ref 3.5–5.1)
PROT SERPL-MCNC: 7.9 G/DL (ref 6–8.4)
RBC # BLD AUTO: 4.35 M/UL (ref 4.6–6.2)
SODIUM SERPL-SCNC: 136 MMOL/L (ref 136–145)
TRIGL SERPL-MCNC: 101 MG/DL (ref 30–150)
TSH SERPL DL<=0.005 MIU/L-ACNC: 1.56 UIU/ML (ref 0.4–4)
VIT B12 SERPL-MCNC: 358 PG/ML (ref 210–950)
WBC # BLD AUTO: 4.8 K/UL (ref 3.9–12.7)

## 2025-02-07 PROCEDURE — 1159F MED LIST DOCD IN RCRD: CPT | Mod: CPTII,S$GLB,, | Performed by: INTERNAL MEDICINE

## 2025-02-07 PROCEDURE — 1160F RVW MEDS BY RX/DR IN RCRD: CPT | Mod: CPTII,S$GLB,, | Performed by: INTERNAL MEDICINE

## 2025-02-07 PROCEDURE — 99999 PR PBB SHADOW E&M-EST. PATIENT-LVL III: CPT | Mod: PBBFAC,,, | Performed by: INTERNAL MEDICINE

## 2025-02-07 PROCEDURE — 1126F AMNT PAIN NOTED NONE PRSNT: CPT | Mod: CPTII,S$GLB,, | Performed by: INTERNAL MEDICINE

## 2025-02-07 PROCEDURE — 3074F SYST BP LT 130 MM HG: CPT | Mod: CPTII,S$GLB,, | Performed by: INTERNAL MEDICINE

## 2025-02-07 PROCEDURE — 84153 ASSAY OF PSA TOTAL: CPT | Performed by: INTERNAL MEDICINE

## 2025-02-07 PROCEDURE — 83036 HEMOGLOBIN GLYCOSYLATED A1C: CPT | Performed by: INTERNAL MEDICINE

## 2025-02-07 PROCEDURE — 80053 COMPREHEN METABOLIC PANEL: CPT | Performed by: INTERNAL MEDICINE

## 2025-02-07 PROCEDURE — 83735 ASSAY OF MAGNESIUM: CPT | Performed by: INTERNAL MEDICINE

## 2025-02-07 PROCEDURE — 82306 VITAMIN D 25 HYDROXY: CPT | Performed by: INTERNAL MEDICINE

## 2025-02-07 PROCEDURE — 36415 COLL VENOUS BLD VENIPUNCTURE: CPT | Performed by: INTERNAL MEDICINE

## 2025-02-07 PROCEDURE — 99397 PER PM REEVAL EST PAT 65+ YR: CPT | Mod: S$GLB,,, | Performed by: INTERNAL MEDICINE

## 2025-02-07 PROCEDURE — 85025 COMPLETE CBC W/AUTO DIFF WBC: CPT | Performed by: INTERNAL MEDICINE

## 2025-02-07 PROCEDURE — 84443 ASSAY THYROID STIM HORMONE: CPT | Performed by: INTERNAL MEDICINE

## 2025-02-07 PROCEDURE — 3078F DIAST BP <80 MM HG: CPT | Mod: CPTII,S$GLB,, | Performed by: INTERNAL MEDICINE

## 2025-02-07 PROCEDURE — 80061 LIPID PANEL: CPT | Performed by: INTERNAL MEDICINE

## 2025-02-07 PROCEDURE — 82607 VITAMIN B-12: CPT | Performed by: INTERNAL MEDICINE

## 2025-02-07 RX ORDER — TRAMADOL HYDROCHLORIDE 50 MG/1
50 TABLET ORAL EVERY 6 HOURS PRN
Qty: 30 TABLET | Refills: 0 | Status: SHIPPED | OUTPATIENT
Start: 2025-02-07

## 2025-02-07 RX ORDER — CIPROFLOXACIN 500 MG/1
500 TABLET ORAL 2 TIMES DAILY
Qty: 28 TABLET | Refills: 0 | Status: SHIPPED | OUTPATIENT
Start: 2025-02-07

## 2025-03-07 RX ORDER — LOSARTAN POTASSIUM 100 MG/1
100 TABLET ORAL
Qty: 90 TABLET | Refills: 3 | Status: SHIPPED | OUTPATIENT
Start: 2025-03-07

## 2025-03-07 NOTE — TELEPHONE ENCOUNTER
No care due was identified.  [facility] Embedded Care Due Messages. Reference number: [id number].   3/07/2025 8:01:55 AM CST

## 2025-03-07 NOTE — TELEPHONE ENCOUNTER
Refill Decision Note   Suleiman Chavez  is requesting a refill authorization.  Brief Assessment and Rationale for Refill:  Approve     Medication Therapy Plan:         Comments:     Note composed:10:11 AM 03/07/2025

## 2025-05-06 ENCOUNTER — OFFICE VISIT (OUTPATIENT)
Dept: OTOLARYNGOLOGY | Facility: CLINIC | Age: 85
End: 2025-05-06
Payer: MEDICARE

## 2025-05-06 VITALS — SYSTOLIC BLOOD PRESSURE: 140 MMHG | BODY MASS INDEX: 24 KG/M2 | WEIGHT: 181.88 LBS | DIASTOLIC BLOOD PRESSURE: 66 MMHG

## 2025-05-06 DIAGNOSIS — C02.9 SQUAMOUS CELL CANCER OF TONGUE: Primary | Chronic | ICD-10-CM

## 2025-05-06 PROCEDURE — 99999 PR PBB SHADOW E&M-EST. PATIENT-LVL III: CPT | Mod: PBBFAC,,, | Performed by: OTOLARYNGOLOGY

## 2025-05-06 NOTE — PROGRESS NOTES
CC: Surveillance    Oncology History   Squamous cell cancer of tongue   6/16/2023 Initial Diagnosis    Squamous cell cancer of tongue     7/12/2023 Surgery    1. Left partial glossectomy  2. Left modified neck dissection of levels 1B through 4     7/31/2023 Cancer Staged     Cancer Staging   Squamous cell cancer of tongue  Staging form: Oral Cavity, AJCC 8th Edition  - Pathologic stage from 7/31/2023: Stage ELENITA (pT1, pN2b, cM0) - Signed by Sindi Robbins NP on 7/31/2023 7/31/2023 Cancer Staged    Staging form: Oral Cavity, AJCC 8th Edition  - Pathologic stage from 7/31/2023: Stage ELENITA (pT1, pN2b, cM0)     8/23/2023 - 10/5/2023 Radiation Therapy    Treating physician: Cruzito Lopes  Treatment Summary  Course: C1 H&N 2023  Treatment Site Ref. ID Energy Dose/Fx (Gy) #Fx Dose Correction (Gy) Total Dose (Gy) Start Date End Date Elapsed Days   IM H&N PTV_High 6X 2 30 / 30 0 60 8/23/2023 10/5/2023 43              HPI   85 y.o. male presents with the above treatment history.  He is doing well overall.  No new complaints.     Review of Systems   Constitutional: Negative for fatigue and unexpected weight change.   HENT: Per HPI.  Eyes: Negative for visual disturbance.   Respiratory: Negative for shortness of breath, hemoptysis   Cardiovascular: Negative for chest pain and palpitations.   Musculoskeletal: Negative for decreased ROM, back pain.   Skin: Negative for rash, sunburn, itching.   Neurological: Negative for dizziness and seizures.   Hematological: Negative for adenopathy. Does not bruise/bleed easily.   Endocrine: Negative for rapid weight loss/weight gain, heat/cold intolerance.     Past Medical History   Patient Active Problem List   Diagnosis    Ocular hypertension    Primary hypertension    Screen for colon cancer    Type 2 diabetes mellitus with diabetic neuropathy, without long-term current use of insulin    Adenomatous polyp    Family history of colon cancer    Nuclear cataract    Borderline glaucoma of  both eyes with ocular hypertension    Acute cystitis without hematuria    Spinal stenosis, lumbar region, with neurogenic claudication    EMA (iron deficiency anemia)    Benign prostatic hyperplasia without lower urinary tract symptoms    Debility    Chronic midline low back pain with sciatica    Squamous cell cancer of tongue    Incomplete bladder emptying    History of urinary retention    History of dysphagia    History of tongue cancer           Past Surgical History   Past Surgical History:   Procedure Laterality Date    CHOLECYSTECTOMY      GLOSSECTOMY Left 2023    Procedure: GLOSSECTOMY;  Surgeon: Jaxon Carmen MD;  Location: [facility] OR [address] [place] [address];  Service: ENT;  Laterality: Left;    RADICAL NECK DISSECTION Left 2023    Procedure: DISSECTION, NECK, RADICAL;  Surgeon: Jaxon Carmen MD;  Location: [facility] OR [address] [place] [address];  Service: ENT;  Laterality: Left;         Family History   Family History   Problem Relation Name Age of Onset    Cancer Mother          liver    Cancer Father          colon    No Known Problems Sister      No Known Problems Sister      Amblyopia Neg Hx      Blindness Neg Hx      Cataracts Neg Hx      Diabetes Neg Hx      Glaucoma Neg Hx      Hypertension Neg Hx      Macular degeneration Neg Hx      Retinal detachment Neg Hx      Strabismus Neg Hx      Stroke Neg Hx      Thyroid disease Neg Hx             Social History   .  Social History     Socioeconomic History    Marital status:    Occupational History     Employer: OTHER   Tobacco Use    Smoking status: Former     Current packs/day: 0.00     Types: Cigarettes     Quit date: 2004     Years since quittin.9     Passive exposure: Past    Smokeless tobacco: Never   Substance and Sexual Activity    Alcohol use: No     Alcohol/week: 0.0 standard drinks of alcohol    Drug use: No     Social Drivers of Health     Financial Resource Strain: Low Risk  (3/18/2024)    Overall Financial Resource Strain (CARDIA)      Difficulty of Paying Living Expenses: Not very hard   Food Insecurity: No Food Insecurity (3/18/2024)    Hunger Vital Sign     Worried About Running Out of Food in the Last Year: Never true     Ran Out of Food in the Last Year: Never true   Transportation Needs: No Transportation Needs (3/18/2024)    PRAPARE - Transportation     Lack of Transportation (Medical): No     Lack of Transportation (Non-Medical): No   Physical Activity: Insufficiently Active (3/18/2024)    Exercise Vital Sign     Days of Exercise per Week: 2 days     Minutes of Exercise per Session: 20 min   Stress: No Stress Concern Present (3/18/2024)    [employer] [place] [employer] - Occupational Stress Questionnaire     Feeling of Stress : Only a little   Housing Stability: Unknown (3/18/2024)    Housing Stability Vital Sign     Unable to Pay for Housing in the Last Year: No     Unstable Housing in the Last Year: No         Allergies   Review of patient's allergies indicates:  No Known Allergies        Physical Exam     Vitals:    05/06/25 1252   BP: (!) 140/66           Body mass index is 24 kg/m².      General: AOx3, NAD   Respiratory:  Symmetric chest rise, normal effort  Oral Cavity:  Oral Tongue mobile, no lesions noted.  Well-healed left glossectomy site.  Hard Palate WNL. No buccal or FOM lesions.  Oropharynx:  No masses/lesions of the posterior pharyngeal wall. Tonsillar fossa without lesions. Soft palate without masses. Midline uvula.   Neck:  Well-healed left neck scar.  Moderate fibrosis status post radiation.  No cervical lymphadenopathy, thyromegaly or thyroid nodules.  Normal range of motion.  Mild submental lymphedema.  Face: House Brackmann I bilaterally.             Assessment/Plan  Problem List Items Addressed This Visit          Oncology    Squamous cell cancer of tongue - Primary (Chronic)    PRAKASH.  RTC 3 mos

## 2025-05-08 DIAGNOSIS — E11.40 TYPE 2 DIABETES MELLITUS WITH DIABETIC NEUROPATHY, WITHOUT LONG-TERM CURRENT USE OF INSULIN: ICD-10-CM

## 2025-05-08 RX ORDER — METFORMIN HYDROCHLORIDE 500 MG/1
TABLET ORAL
Qty: 270 TABLET | Refills: 0 | Status: SHIPPED | OUTPATIENT
Start: 2025-05-08

## 2025-05-08 NOTE — TELEPHONE ENCOUNTER
Refill Decision Note   Suleiman Chavez  is requesting a refill authorization.    Brief Assessment and Rationale for Refill:  Approve       Medication Therapy Plan:         Comments:     Note composed:12:02 PM 05/08/2025

## 2025-05-08 NOTE — TELEPHONE ENCOUNTER
No care due was identified.  [facility] Embedded Care Due Messages. Reference number: [id number].   5/08/2025 8:05:07 AM CDT

## 2025-08-12 ENCOUNTER — OFFICE VISIT (OUTPATIENT)
Dept: OTOLARYNGOLOGY | Facility: CLINIC | Age: 85
End: 2025-08-12
Payer: MEDICARE

## 2025-08-12 VITALS
SYSTOLIC BLOOD PRESSURE: 162 MMHG | HEART RATE: 77 BPM | BODY MASS INDEX: 24.55 KG/M2 | DIASTOLIC BLOOD PRESSURE: 76 MMHG | WEIGHT: 186.06 LBS

## 2025-08-12 DIAGNOSIS — C02.9 SQUAMOUS CELL CANCER OF TONGUE: Primary | Chronic | ICD-10-CM

## 2025-08-12 PROCEDURE — 99999 PR PBB SHADOW E&M-EST. PATIENT-LVL III: CPT | Mod: PBBFAC,,, | Performed by: OTOLARYNGOLOGY

## 2025-08-14 DIAGNOSIS — E11.40 TYPE 2 DIABETES MELLITUS WITH DIABETIC NEUROPATHY, WITHOUT LONG-TERM CURRENT USE OF INSULIN: ICD-10-CM

## 2025-08-17 ENCOUNTER — PATIENT MESSAGE (OUTPATIENT)
Dept: INTERNAL MEDICINE | Facility: CLINIC | Age: 85
End: 2025-08-17
Payer: MEDICARE

## 2025-08-18 RX ORDER — METFORMIN HYDROCHLORIDE 500 MG/1
TABLET ORAL
Qty: 270 TABLET | Refills: 1 | Status: SHIPPED | OUTPATIENT
Start: 2025-08-18

## 2025-08-19 ENCOUNTER — PATIENT MESSAGE (OUTPATIENT)
Dept: INTERNAL MEDICINE | Facility: CLINIC | Age: 85
End: 2025-08-19
Payer: MEDICARE

## 2025-08-20 ENCOUNTER — PATIENT MESSAGE (OUTPATIENT)
Dept: INTERNAL MEDICINE | Facility: CLINIC | Age: 85
End: 2025-08-20
Payer: MEDICARE

## (undated) DEVICE — CONTAINER SPECIMEN STRL 4OZ

## (undated) DEVICE — SUT CTD VICRYL 3-0 CR/SH

## (undated) DEVICE — GOWN SURGICAL X-LARGE

## (undated) DEVICE — ELECTRODE REM PLYHSV RETURN 9

## (undated) DEVICE — NDL HYPO REG 25G X 1 1/2

## (undated) DEVICE — TRAY CATH FOL SIL URIMTR 16FR

## (undated) DEVICE — DRAPE HALF SURGICAL 40X58IN

## (undated) DEVICE — SEE MEDLINE ITEM 157194

## (undated) DEVICE — SUT VICRYL PLUS 3-0 SH 18IN

## (undated) DEVICE — ELECTRODE BLADE INSULATED 1 IN

## (undated) DEVICE — PROBE CATH TEMP 16 FRFOLEY 400

## (undated) DEVICE — COVER LIGHT HANDLE 80/CA

## (undated) DEVICE — SUT 2-0 12-18IN SILK

## (undated) DEVICE — STAPLER SKIN PROXIMATE WIDE

## (undated) DEVICE — SUT 2-0 SILK 30IN BLK BRAID

## (undated) DEVICE — DRAPE STERI INSTRUMENT 1018

## (undated) DEVICE — CORD BIPOLAR 12 FOOT

## (undated) DEVICE — SUT SILK 3-0 RB-1 30IN BLK

## (undated) DEVICE — ADHESIVE DERMABOND ADVANCED

## (undated) DEVICE — SUT COATED VICRYL 4/0 27IN

## (undated) DEVICE — TRAY MINOR GEN SURG OMC

## (undated) DEVICE — TOWEL OR DISP STRL BLUE 4/PK

## (undated) DEVICE — SUT LIGACLIP SMALL XTRA

## (undated) DEVICE — GAUZE SPONGE PEANUT STRL

## (undated) DEVICE — DRAPE EENT SPLIT STERILE

## (undated) DEVICE — BLADE SURG #15 CARBON STEEL

## (undated) DEVICE — SUT 2/0 30IN SILK BLK BRAI

## (undated) DEVICE — SUT SILK 3-0 SH 18IN BLACK

## (undated) DEVICE — SPONGE LAP 18X18 PREWASHED

## (undated) DEVICE — HOOK LONE STAR BLUNT 12MM

## (undated) DEVICE — TRAY SKIN SCRUB WET PREMIUM

## (undated) DEVICE — SUT 3-0 12-18IN SILK

## (undated) DEVICE — CLIP MED TICALL